# Patient Record
Sex: FEMALE | Race: BLACK OR AFRICAN AMERICAN | Employment: OTHER | URBAN - METROPOLITAN AREA
[De-identification: names, ages, dates, MRNs, and addresses within clinical notes are randomized per-mention and may not be internally consistent; named-entity substitution may affect disease eponyms.]

---

## 2017-01-09 DIAGNOSIS — E87.6 HYPOKALEMIA: Primary | ICD-10-CM

## 2017-01-24 RX ORDER — DULOXETIN HYDROCHLORIDE 30 MG/1
CAPSULE, DELAYED RELEASE ORAL
Qty: 60 CAPSULE | Refills: 0 | Status: SHIPPED | OUTPATIENT
Start: 2017-01-24 | End: 2017-02-23 | Stop reason: SDUPTHER

## 2017-01-30 ENCOUNTER — OFFICE VISIT (OUTPATIENT)
Dept: PAIN MANAGEMENT | Age: 62
End: 2017-01-30

## 2017-01-30 VITALS
BODY MASS INDEX: 31.45 KG/M2 | SYSTOLIC BLOOD PRESSURE: 125 MMHG | HEART RATE: 99 BPM | DIASTOLIC BLOOD PRESSURE: 68 MMHG | WEIGHT: 189 LBS

## 2017-01-30 DIAGNOSIS — M51.36 DDD (DEGENERATIVE DISC DISEASE), LUMBAR: ICD-10-CM

## 2017-01-30 DIAGNOSIS — M47.819 FACET ARTHROPATHY: ICD-10-CM

## 2017-01-30 DIAGNOSIS — M43.10 DEGENERATIVE SPONDYLOLISTHESIS: ICD-10-CM

## 2017-01-30 DIAGNOSIS — M48.061 SPINAL STENOSIS OF LUMBAR REGION: ICD-10-CM

## 2017-01-30 DIAGNOSIS — F32.A DEPRESSION, UNSPECIFIED DEPRESSION TYPE: ICD-10-CM

## 2017-01-30 DIAGNOSIS — G89.4 CHRONIC PAIN SYNDROME: Primary | ICD-10-CM

## 2017-01-30 DIAGNOSIS — M43.16 SPONDYLOLISTHESIS, LUMBAR REGION: ICD-10-CM

## 2017-01-30 PROCEDURE — 99213 OFFICE O/P EST LOW 20 MIN: CPT | Performed by: NURSE PRACTITIONER

## 2017-02-21 ENCOUNTER — TELEPHONE (OUTPATIENT)
Dept: PRIMARY CARE CLINIC | Age: 62
End: 2017-02-21

## 2017-02-21 DIAGNOSIS — R73.03 PREDIABETES: Primary | ICD-10-CM

## 2017-02-22 ENCOUNTER — OFFICE VISIT (OUTPATIENT)
Dept: ORTHOPEDIC SURGERY | Age: 62
End: 2017-02-22

## 2017-02-22 VITALS — HEIGHT: 65 IN | WEIGHT: 188.93 LBS | BODY MASS INDEX: 31.48 KG/M2

## 2017-02-22 DIAGNOSIS — M51.26 DISC DISPLACEMENT, LUMBAR: ICD-10-CM

## 2017-02-22 DIAGNOSIS — M47.896 OTHER OSTEOARTHRITIS OF SPINE, LUMBAR REGION: ICD-10-CM

## 2017-02-22 DIAGNOSIS — M54.5 CHRONIC LOW BACK PAIN, UNSPECIFIED BACK PAIN LATERALITY, WITH SCIATICA PRESENCE UNSPECIFIED: ICD-10-CM

## 2017-02-22 DIAGNOSIS — M48.061 DEGENERATIVE LUMBAR SPINAL STENOSIS: Primary | ICD-10-CM

## 2017-02-22 DIAGNOSIS — G89.29 CHRONIC LOW BACK PAIN, UNSPECIFIED BACK PAIN LATERALITY, WITH SCIATICA PRESENCE UNSPECIFIED: ICD-10-CM

## 2017-02-22 PROBLEM — M54.50 CHRONIC LOW BACK PAIN: Status: ACTIVE | Noted: 2017-02-22

## 2017-02-22 PROBLEM — M47.816 OSTEOARTHRITIS OF LUMBAR SPINE: Status: ACTIVE | Noted: 2017-02-22

## 2017-02-22 PROCEDURE — 99214 OFFICE O/P EST MOD 30 MIN: CPT | Performed by: INTERNAL MEDICINE

## 2017-02-22 RX ORDER — OXYCODONE HYDROCHLORIDE AND ACETAMINOPHEN 5; 325 MG/1; MG/1
1 TABLET ORAL EVERY 8 HOURS PRN
Qty: 90 TABLET | Refills: 0 | Status: SHIPPED | OUTPATIENT
Start: 2017-02-22 | End: 2017-03-23 | Stop reason: SDUPTHER

## 2017-02-23 DIAGNOSIS — F33.9 RECURRENT MAJOR DEPRESSIVE DISORDER, REMISSION STATUS UNSPECIFIED (HCC): ICD-10-CM

## 2017-02-23 RX ORDER — DULOXETIN HYDROCHLORIDE 30 MG/1
CAPSULE, DELAYED RELEASE ORAL
Qty: 60 CAPSULE | Refills: 0 | Status: SHIPPED | OUTPATIENT
Start: 2017-02-23 | End: 2017-03-22 | Stop reason: SDUPTHER

## 2017-02-24 ENCOUNTER — OFFICE VISIT (OUTPATIENT)
Dept: PRIMARY CARE CLINIC | Age: 62
End: 2017-02-24

## 2017-02-24 ENCOUNTER — TELEPHONE (OUTPATIENT)
Dept: PRIMARY CARE CLINIC | Age: 62
End: 2017-02-24

## 2017-02-24 VITALS
WEIGHT: 193 LBS | RESPIRATION RATE: 18 BRPM | HEART RATE: 80 BPM | BODY MASS INDEX: 32.16 KG/M2 | DIASTOLIC BLOOD PRESSURE: 84 MMHG | OXYGEN SATURATION: 96 % | SYSTOLIC BLOOD PRESSURE: 120 MMHG | TEMPERATURE: 98 F

## 2017-02-24 DIAGNOSIS — R11.0 NAUSEA: ICD-10-CM

## 2017-02-24 DIAGNOSIS — J30.1 NON-SEASONAL ALLERGIC RHINITIS DUE TO POLLEN: ICD-10-CM

## 2017-02-24 DIAGNOSIS — E03.9 ACQUIRED HYPOTHYROIDISM: ICD-10-CM

## 2017-02-24 DIAGNOSIS — M79.7 FIBROMYALGIA, PRIMARY: ICD-10-CM

## 2017-02-24 DIAGNOSIS — B37.0 THRUSH: ICD-10-CM

## 2017-02-24 DIAGNOSIS — J45.40 MODERATE PERSISTENT ASTHMA WITHOUT COMPLICATION: ICD-10-CM

## 2017-02-24 DIAGNOSIS — E55.9 VITAMIN D DEFICIENCY: ICD-10-CM

## 2017-02-24 DIAGNOSIS — I10 ESSENTIAL HYPERTENSION: ICD-10-CM

## 2017-02-24 DIAGNOSIS — R73.03 PREDIABETES: Primary | ICD-10-CM

## 2017-02-24 DIAGNOSIS — K21.00 GASTROESOPHAGEAL REFLUX DISEASE WITH ESOPHAGITIS: Chronic | ICD-10-CM

## 2017-02-24 DIAGNOSIS — E53.8 VITAMIN B 12 DEFICIENCY: ICD-10-CM

## 2017-02-24 LAB
A/G RATIO: 2 (ref 1.1–2.2)
ALBUMIN SERPL-MCNC: 4.5 G/DL (ref 3.4–5)
ALP BLD-CCNC: 107 U/L (ref 40–129)
ALT SERPL-CCNC: 17 U/L (ref 10–40)
ANION GAP SERPL CALCULATED.3IONS-SCNC: 1 MMOL/L (ref 3–16)
AST SERPL-CCNC: 15 U/L (ref 15–37)
BASOPHILS ABSOLUTE: 0 K/UL (ref 0–0.2)
BASOPHILS RELATIVE PERCENT: 0.3 %
BILIRUB SERPL-MCNC: 0.4 MG/DL (ref 0–1)
BILIRUBIN URINE: NEGATIVE
BLOOD, URINE: NEGATIVE
BUN BLDV-MCNC: 7 MG/DL (ref 7–20)
CALCIUM SERPL-MCNC: 9.6 MG/DL (ref 8.3–10.6)
CHLORIDE BLD-SCNC: 121 MMOL/L (ref 99–110)
CLARITY: CLEAR
CO2: 25 MMOL/L (ref 21–32)
COLOR: YELLOW
CREAT SERPL-MCNC: 0.9 MG/DL (ref 0.6–1.2)
EOSINOPHILS ABSOLUTE: 0.1 K/UL (ref 0–0.6)
EOSINOPHILS RELATIVE PERCENT: 0.9 %
GFR AFRICAN AMERICAN: >60
GFR NON-AFRICAN AMERICAN: >60
GLOBULIN: 2.2 G/DL
GLUCOSE BLD-MCNC: 123 MG/DL (ref 70–99)
GLUCOSE URINE: NEGATIVE MG/DL
HBA1C MFR BLD: 5.9 %
HCT VFR BLD CALC: 42.1 % (ref 36–48)
HEMOGLOBIN: 13.2 G/DL (ref 12–16)
KETONES, URINE: NEGATIVE MG/DL
LEUKOCYTE ESTERASE, URINE: NEGATIVE
LIPASE: 35 U/L (ref 13–60)
LYMPHOCYTES ABSOLUTE: 3.1 K/UL (ref 1–5.1)
LYMPHOCYTES RELATIVE PERCENT: 25.7 %
MCH RBC QN AUTO: 26.9 PG (ref 26–34)
MCHC RBC AUTO-ENTMCNC: 31.3 G/DL (ref 31–36)
MCV RBC AUTO: 85.8 FL (ref 80–100)
MICROSCOPIC EXAMINATION: NORMAL
MONOCYTES ABSOLUTE: 0.6 K/UL (ref 0–1.3)
MONOCYTES RELATIVE PERCENT: 5.1 %
NEUTROPHILS ABSOLUTE: 8.1 K/UL (ref 1.7–7.7)
NEUTROPHILS RELATIVE PERCENT: 68 %
NITRITE, URINE: NEGATIVE
PDW BLD-RTO: 13.8 % (ref 12.4–15.4)
PH UA: 6
PLATELET # BLD: 207 K/UL (ref 135–450)
PMV BLD AUTO: 10.6 FL (ref 5–10.5)
POTASSIUM SERPL-SCNC: 3.9 MMOL/L (ref 3.5–5.1)
PROTEIN UA: NEGATIVE MG/DL
RBC # BLD: 4.91 M/UL (ref 4–5.2)
SODIUM BLD-SCNC: 147 MMOL/L (ref 136–145)
SPECIFIC GRAVITY UA: 1.01
TOTAL PROTEIN: 6.7 G/DL (ref 6.4–8.2)
TSH REFLEX FT4: 3.85 UIU/ML (ref 0.27–4.2)
URINE TYPE: NORMAL
UROBILINOGEN, URINE: 1 E.U./DL
VITAMIN D 25-HYDROXY: 31.5 NG/ML
WBC # BLD: 12 K/UL (ref 4–11)

## 2017-02-24 PROCEDURE — 99214 OFFICE O/P EST MOD 30 MIN: CPT | Performed by: INTERNAL MEDICINE

## 2017-02-24 PROCEDURE — 83036 HEMOGLOBIN GLYCOSYLATED A1C: CPT | Performed by: INTERNAL MEDICINE

## 2017-02-24 RX ORDER — ERGOCALCIFEROL 1.25 MG/1
50000 CAPSULE ORAL WEEKLY
Qty: 4 CAPSULE | Refills: 5 | Status: SHIPPED | OUTPATIENT
Start: 2017-02-24 | End: 2017-06-09 | Stop reason: SDUPTHER

## 2017-02-24 RX ORDER — HYDROCHLOROTHIAZIDE 25 MG/1
25 TABLET ORAL DAILY
Qty: 30 TABLET | Refills: 4 | Status: SHIPPED | OUTPATIENT
Start: 2017-02-24 | End: 2017-06-09

## 2017-02-24 RX ORDER — CYCLOBENZAPRINE HCL 10 MG
10 TABLET ORAL 3 TIMES DAILY PRN
Qty: 90 TABLET | Refills: 5 | Status: SHIPPED | OUTPATIENT
Start: 2017-02-24 | End: 2017-03-06

## 2017-02-24 RX ORDER — LORATADINE 10 MG/1
TABLET ORAL
Qty: 30 TABLET | Refills: 5 | Status: SHIPPED | OUTPATIENT
Start: 2017-02-24 | End: 2017-08-08 | Stop reason: SDUPTHER

## 2017-02-24 RX ORDER — PANTOPRAZOLE SODIUM 40 MG/1
40 TABLET, DELAYED RELEASE ORAL DAILY
Qty: 30 TABLET | Refills: 5 | Status: SHIPPED | OUTPATIENT
Start: 2017-02-24 | End: 2017-07-05 | Stop reason: SDUPTHER

## 2017-02-24 RX ORDER — CLONIDINE HYDROCHLORIDE 0.2 MG/1
TABLET ORAL
Qty: 60 TABLET | Refills: 5 | Status: SHIPPED | OUTPATIENT
Start: 2017-02-24 | End: 2017-06-09

## 2017-02-24 RX ORDER — PREGABALIN 50 MG/1
50 CAPSULE ORAL 3 TIMES DAILY
Qty: 90 CAPSULE | Refills: 3 | Status: SHIPPED | OUTPATIENT
Start: 2017-02-24 | End: 2017-06-09

## 2017-02-24 ASSESSMENT — ENCOUNTER SYMPTOMS
WHEEZING: 0
ALLERGIC/IMMUNOLOGIC NEGATIVE: 1
COUGH: 0
EYES NEGATIVE: 1
GASTROINTESTINAL NEGATIVE: 1
APNEA: 0
CHEST TIGHTNESS: 0
SHORTNESS OF BREATH: 0

## 2017-02-25 LAB — VITAMIN B-12: 787 PG/ML (ref 211–911)

## 2017-02-25 RX ORDER — AZITHROMYCIN 250 MG/1
TABLET, FILM COATED ORAL
Qty: 1 PACKET | Refills: 0 | Status: SHIPPED | OUTPATIENT
Start: 2017-02-25 | End: 2017-03-07

## 2017-02-27 ENCOUNTER — TELEPHONE (OUTPATIENT)
Dept: ORTHOPEDIC SURGERY | Age: 62
End: 2017-02-27

## 2017-02-28 ENCOUNTER — TELEPHONE (OUTPATIENT)
Dept: FAMILY MEDICINE CLINIC | Age: 62
End: 2017-02-28

## 2017-03-17 ENCOUNTER — TELEPHONE (OUTPATIENT)
Dept: PRIMARY CARE CLINIC | Age: 62
End: 2017-03-17

## 2017-03-21 DIAGNOSIS — F51.01 PRIMARY INSOMNIA: ICD-10-CM

## 2017-03-22 DIAGNOSIS — F33.9 RECURRENT MAJOR DEPRESSIVE DISORDER, REMISSION STATUS UNSPECIFIED (HCC): ICD-10-CM

## 2017-03-22 RX ORDER — QUETIAPINE FUMARATE 25 MG/1
TABLET, FILM COATED ORAL
Qty: 30 TABLET | Refills: 2 | Status: SHIPPED | OUTPATIENT
Start: 2017-03-22 | End: 2017-06-16

## 2017-03-23 ENCOUNTER — TELEPHONE (OUTPATIENT)
Dept: ORTHOPEDIC SURGERY | Age: 62
End: 2017-03-23

## 2017-03-23 DIAGNOSIS — M48.061 DEGENERATIVE LUMBAR SPINAL STENOSIS: ICD-10-CM

## 2017-03-23 RX ORDER — OXYCODONE HYDROCHLORIDE AND ACETAMINOPHEN 5; 325 MG/1; MG/1
1 TABLET ORAL EVERY 8 HOURS PRN
Qty: 63 TABLET | Refills: 0 | Status: SHIPPED | OUTPATIENT
Start: 2017-03-23 | End: 2017-06-13

## 2017-03-23 RX ORDER — DULOXETIN HYDROCHLORIDE 30 MG/1
CAPSULE, DELAYED RELEASE ORAL
Qty: 60 CAPSULE | Refills: 0 | Status: SHIPPED | OUTPATIENT
Start: 2017-03-23 | End: 2017-07-05 | Stop reason: SDUPTHER

## 2017-04-16 DIAGNOSIS — E87.6 HYPOKALEMIA: ICD-10-CM

## 2017-04-17 RX ORDER — POTASSIUM CHLORIDE 1500 MG/1
TABLET, EXTENDED RELEASE ORAL
Qty: 60 TABLET | Refills: 3 | OUTPATIENT
Start: 2017-04-17

## 2017-04-19 DIAGNOSIS — F33.9 RECURRENT MAJOR DEPRESSIVE DISORDER, REMISSION STATUS UNSPECIFIED (HCC): ICD-10-CM

## 2017-04-19 RX ORDER — DULOXETIN HYDROCHLORIDE 30 MG/1
CAPSULE, DELAYED RELEASE ORAL
Qty: 60 CAPSULE | Refills: 0 | OUTPATIENT
Start: 2017-04-19

## 2017-05-26 ENCOUNTER — TELEPHONE (OUTPATIENT)
Dept: PRIMARY CARE CLINIC | Age: 62
End: 2017-05-26

## 2017-06-07 RX ORDER — GABAPENTIN 100 MG/1
CAPSULE ORAL
Qty: 60 CAPSULE | Refills: 2 | OUTPATIENT
Start: 2017-06-07

## 2017-06-09 ENCOUNTER — OFFICE VISIT (OUTPATIENT)
Dept: PRIMARY CARE CLINIC | Age: 62
End: 2017-06-09

## 2017-06-09 VITALS
HEART RATE: 89 BPM | SYSTOLIC BLOOD PRESSURE: 95 MMHG | DIASTOLIC BLOOD PRESSURE: 62 MMHG | OXYGEN SATURATION: 99 % | TEMPERATURE: 98.4 F | HEIGHT: 64 IN | BODY MASS INDEX: 32.61 KG/M2 | RESPIRATION RATE: 16 BRPM | WEIGHT: 191 LBS

## 2017-06-09 DIAGNOSIS — R19.01 ABDOMINAL MASS, RUQ (RIGHT UPPER QUADRANT): ICD-10-CM

## 2017-06-09 DIAGNOSIS — R53.1 WEAKNESS: ICD-10-CM

## 2017-06-09 DIAGNOSIS — J45.40 MODERATE PERSISTENT ASTHMA WITHOUT COMPLICATION: ICD-10-CM

## 2017-06-09 DIAGNOSIS — R53.83 OTHER FATIGUE: ICD-10-CM

## 2017-06-09 DIAGNOSIS — F51.01 PRIMARY INSOMNIA: ICD-10-CM

## 2017-06-09 DIAGNOSIS — E55.9 VITAMIN D DEFICIENCY: ICD-10-CM

## 2017-06-09 DIAGNOSIS — R11.0 CHRONIC NAUSEA: ICD-10-CM

## 2017-06-09 DIAGNOSIS — R73.03 PREDIABETES: Primary | ICD-10-CM

## 2017-06-09 DIAGNOSIS — I10 ESSENTIAL HYPERTENSION: ICD-10-CM

## 2017-06-09 DIAGNOSIS — R11.0 NAUSEA: ICD-10-CM

## 2017-06-09 LAB — HBA1C MFR BLD: 5.7 %

## 2017-06-09 PROCEDURE — 99213 OFFICE O/P EST LOW 20 MIN: CPT | Performed by: INTERNAL MEDICINE

## 2017-06-09 PROCEDURE — 83036 HEMOGLOBIN GLYCOSYLATED A1C: CPT | Performed by: INTERNAL MEDICINE

## 2017-06-09 RX ORDER — ERGOCALCIFEROL 1.25 MG/1
50000 CAPSULE ORAL WEEKLY
Qty: 4 CAPSULE | Refills: 5 | Status: SHIPPED | OUTPATIENT
Start: 2017-06-09 | End: 2017-10-19 | Stop reason: SDUPTHER

## 2017-06-09 RX ORDER — AMITRIPTYLINE HYDROCHLORIDE 50 MG/1
TABLET, FILM COATED ORAL
Qty: 30 TABLET | Refills: 5 | Status: SHIPPED | OUTPATIENT
Start: 2017-06-09 | End: 2017-11-14 | Stop reason: SDUPTHER

## 2017-06-09 RX ORDER — CLONIDINE HYDROCHLORIDE 0.1 MG/1
TABLET ORAL
Qty: 60 TABLET | Refills: 5 | Status: SHIPPED | OUTPATIENT
Start: 2017-06-09 | End: 2017-11-14 | Stop reason: SDUPTHER

## 2017-06-09 RX ORDER — ONDANSETRON 4 MG/1
4 TABLET, FILM COATED ORAL DAILY PRN
Qty: 30 TABLET | Refills: 5 | Status: SHIPPED | OUTPATIENT
Start: 2017-06-09 | End: 2017-11-14 | Stop reason: SDUPTHER

## 2017-06-12 ENCOUNTER — HOSPITAL ENCOUNTER (OUTPATIENT)
Dept: OTHER | Age: 62
Discharge: HOME | End: 2017-06-12
Attending: INTERNAL MEDICINE | Admitting: INTERNAL MEDICINE

## 2017-06-13 ASSESSMENT — PATIENT HEALTH QUESTIONNAIRE - PHQ9
2. FEELING DOWN, DEPRESSED OR HOPELESS: 1
1. LITTLE INTEREST OR PLEASURE IN DOING THINGS: 1
SUM OF ALL RESPONSES TO PHQ9 QUESTIONS 1 & 2: 2
SUM OF ALL RESPONSES TO PHQ QUESTIONS 1-9: 2

## 2017-06-13 ASSESSMENT — ENCOUNTER SYMPTOMS
WHEEZING: 0
ALLERGIC/IMMUNOLOGIC NEGATIVE: 1
SHORTNESS OF BREATH: 0
EYES NEGATIVE: 1
COUGH: 0
GASTROINTESTINAL NEGATIVE: 1
CHEST TIGHTNESS: 0
APNEA: 0

## 2017-06-15 DIAGNOSIS — F51.01 PRIMARY INSOMNIA: ICD-10-CM

## 2017-06-16 DIAGNOSIS — R73.03 PREDIABETES: ICD-10-CM

## 2017-06-16 DIAGNOSIS — E55.9 VITAMIN D DEFICIENCY: ICD-10-CM

## 2017-06-16 DIAGNOSIS — R53.83 OTHER FATIGUE: ICD-10-CM

## 2017-06-16 DIAGNOSIS — R11.0 CHRONIC NAUSEA: ICD-10-CM

## 2017-06-16 DIAGNOSIS — R53.1 WEAKNESS: ICD-10-CM

## 2017-06-16 LAB
A/G RATIO: 1.9 (ref 1.1–2.2)
ALBUMIN SERPL-MCNC: 4.6 G/DL (ref 3.4–5)
ALP BLD-CCNC: 115 U/L (ref 40–129)
ALT SERPL-CCNC: 17 U/L (ref 10–40)
ANION GAP SERPL CALCULATED.3IONS-SCNC: 18 MMOL/L (ref 3–16)
AST SERPL-CCNC: 17 U/L (ref 15–37)
ATYPICAL LYMPHOCYTE RELATIVE PERCENT: 2 % (ref 0–6)
BANDED NEUTROPHILS RELATIVE PERCENT: 3 % (ref 0–7)
BASOPHILS ABSOLUTE: 0 K/UL (ref 0–0.2)
BASOPHILS RELATIVE PERCENT: 0 %
BILIRUB SERPL-MCNC: <0.2 MG/DL (ref 0–1)
BILIRUBIN URINE: NEGATIVE
BLOOD, URINE: NEGATIVE
BUN BLDV-MCNC: 9 MG/DL (ref 7–20)
CALCIUM SERPL-MCNC: 10 MG/DL (ref 8.3–10.6)
CHLORIDE BLD-SCNC: 104 MMOL/L (ref 99–110)
CHOLESTEROL, TOTAL: 178 MG/DL (ref 0–199)
CLARITY: CLEAR
CO2: 23 MMOL/L (ref 21–32)
COLOR: YELLOW
CREAT SERPL-MCNC: 1 MG/DL (ref 0.6–1.2)
EOSINOPHILS ABSOLUTE: 0.1 K/UL (ref 0–0.6)
EOSINOPHILS RELATIVE PERCENT: 1 %
GFR AFRICAN AMERICAN: >60
GFR NON-AFRICAN AMERICAN: 56
GLOBULIN: 2.4 G/DL
GLUCOSE BLD-MCNC: 83 MG/DL (ref 70–99)
GLUCOSE URINE: NEGATIVE MG/DL
HCT VFR BLD CALC: 43.6 % (ref 36–48)
HDLC SERPL-MCNC: 44 MG/DL (ref 40–60)
HEMATOLOGY PATH CONSULT: YES
HEMOGLOBIN: 13.3 G/DL (ref 12–16)
KETONES, URINE: NEGATIVE MG/DL
LDL CHOLESTEROL CALCULATED: ABNORMAL MG/DL
LDL CHOLESTEROL DIRECT: 95 MG/DL
LEUKOCYTE ESTERASE, URINE: NEGATIVE
LYMPHOCYTES ABSOLUTE: 7.3 K/UL (ref 1–5.1)
LYMPHOCYTES RELATIVE PERCENT: 52 %
MAGNESIUM: 1.8 MG/DL (ref 1.8–2.4)
MCH RBC QN AUTO: 27 PG (ref 26–34)
MCHC RBC AUTO-ENTMCNC: 30.4 G/DL (ref 31–36)
MCV RBC AUTO: 88.6 FL (ref 80–100)
MICROSCOPIC EXAMINATION: NORMAL
MONOCYTES ABSOLUTE: 0.5 K/UL (ref 0–1.3)
MONOCYTES RELATIVE PERCENT: 4 %
NEUTROPHILS ABSOLUTE: 5.6 K/UL (ref 1.7–7.7)
NEUTROPHILS RELATIVE PERCENT: 38 %
NITRITE, URINE: NEGATIVE
PDW BLD-RTO: 14.7 % (ref 12.4–15.4)
PH UA: 6
PLATELET # BLD: 176 K/UL (ref 135–450)
PMV BLD AUTO: 11.7 FL (ref 5–10.5)
POTASSIUM SERPL-SCNC: 4.1 MMOL/L (ref 3.5–5.1)
PROTEIN UA: NEGATIVE MG/DL
RBC # BLD: 4.91 M/UL (ref 4–5.2)
RBC # BLD: NORMAL 10*6/UL
SODIUM BLD-SCNC: 145 MMOL/L (ref 136–145)
SPECIFIC GRAVITY UA: 1.01
TOTAL PROTEIN: 7 G/DL (ref 6.4–8.2)
TRIGL SERPL-MCNC: 314 MG/DL (ref 0–150)
TSH REFLEX FT4: 2.94 UIU/ML (ref 0.27–4.2)
URINE TYPE: NORMAL
UROBILINOGEN, URINE: 0.2 E.U./DL
VITAMIN B-12: 872 PG/ML (ref 211–911)
VITAMIN D 25-HYDROXY: 22.5 NG/ML
VLDLC SERPL CALC-MCNC: ABNORMAL MG/DL
WBC # BLD: 13.6 K/UL (ref 4–11)

## 2017-06-16 RX ORDER — QUETIAPINE FUMARATE 50 MG/1
TABLET, FILM COATED ORAL
Qty: 30 TABLET | Refills: 4 | Status: SHIPPED | OUTPATIENT
Start: 2017-06-16 | End: 2017-10-19 | Stop reason: SDUPTHER

## 2017-06-19 LAB — HEMATOLOGY PATH CONSULT: NORMAL

## 2017-06-20 ENCOUNTER — TELEPHONE (OUTPATIENT)
Dept: PRIMARY CARE CLINIC | Age: 62
End: 2017-06-20

## 2017-06-21 DIAGNOSIS — F17.200 NEEDS SMOKING CESSATION EDUCATION: Primary | ICD-10-CM

## 2017-06-21 RX ORDER — NICOTINE 21 MG/24HR
1 PATCH, TRANSDERMAL 24 HOURS TRANSDERMAL EVERY 24 HOURS
Qty: 30 PATCH | Refills: 3 | Status: SHIPPED | OUTPATIENT
Start: 2017-06-21 | End: 2017-09-12 | Stop reason: SDUPTHER

## 2017-06-23 ENCOUNTER — TELEPHONE (OUTPATIENT)
Dept: PRIMARY CARE CLINIC | Age: 62
End: 2017-06-23

## 2017-06-23 ENCOUNTER — HOSPITAL ENCOUNTER (OUTPATIENT)
Dept: CT IMAGING | Facility: MEDICAL CENTER | Age: 62
Discharge: HOME | End: 2017-06-23
Attending: INTERNAL MEDICINE | Admitting: INTERNAL MEDICINE

## 2017-06-23 DIAGNOSIS — R19.01 RIGHT UPPER QUADRANT ABDOMINAL SWELLING, MASS AND LUMP: ICD-10-CM

## 2017-06-23 DIAGNOSIS — D72.820 LYMPHOCYTOSIS: Primary | ICD-10-CM

## 2017-06-23 DIAGNOSIS — R19.01 ABDOMINAL MASS, RUQ (RIGHT UPPER QUADRANT): ICD-10-CM

## 2017-06-27 ENCOUNTER — TELEPHONE (OUTPATIENT)
Dept: PRIMARY CARE CLINIC | Age: 62
End: 2017-06-27

## 2017-06-27 DIAGNOSIS — E03.9 ACQUIRED HYPOTHYROIDISM: ICD-10-CM

## 2017-06-27 RX ORDER — LEVOTHYROXINE SODIUM 0.15 MG/1
150 TABLET ORAL DAILY
Qty: 30 TABLET | Refills: 5 | Status: CANCELLED | OUTPATIENT
Start: 2017-06-27

## 2017-06-28 DIAGNOSIS — E03.9 ACQUIRED HYPOTHYROIDISM: ICD-10-CM

## 2017-06-28 RX ORDER — LEVOTHYROXINE SODIUM 0.15 MG/1
150 TABLET ORAL DAILY
Qty: 30 TABLET | Refills: 5 | Status: SHIPPED | OUTPATIENT
Start: 2017-06-28 | End: 2017-10-19

## 2017-07-27 RX ORDER — GABAPENTIN 100 MG/1
CAPSULE ORAL
Qty: 60 CAPSULE | Refills: 2 | OUTPATIENT
Start: 2017-07-27

## 2017-08-08 DIAGNOSIS — R11.0 NAUSEA: ICD-10-CM

## 2017-08-09 PROBLEM — D72.829 LEUKOCYTOSIS: Status: ACTIVE | Noted: 2017-08-09

## 2017-08-09 RX ORDER — PROMETHAZINE HYDROCHLORIDE 6.25 MG/5ML
SYRUP ORAL
Qty: 480 ML | Refills: 11 | Status: SHIPPED | OUTPATIENT
Start: 2017-08-09 | End: 2017-12-03

## 2017-08-09 RX ORDER — LORATADINE 10 MG/1
TABLET ORAL
Qty: 30 TABLET | Refills: 11 | Status: SHIPPED | OUTPATIENT
Start: 2017-08-09 | End: 2018-08-14 | Stop reason: SDUPTHER

## 2017-09-12 DIAGNOSIS — F17.200 NEEDS SMOKING CESSATION EDUCATION: ICD-10-CM

## 2017-09-12 RX ORDER — PANTOPRAZOLE SODIUM 40 MG/1
TABLET, DELAYED RELEASE ORAL
Qty: 30 TABLET | Refills: 0 | Status: SHIPPED | OUTPATIENT
Start: 2017-09-12 | End: 2017-10-13 | Stop reason: SDUPTHER

## 2017-09-12 RX ORDER — CALCIUM CITRATE/VITAMIN D3 200MG-6.25
TABLET ORAL
Qty: 100 EACH | Refills: 0 | Status: SHIPPED | OUTPATIENT
Start: 2017-09-12 | End: 2018-01-10 | Stop reason: SDUPTHER

## 2017-09-12 RX ORDER — NICOTINE 21 MG/24HR
PATCH, TRANSDERMAL 24 HOURS TRANSDERMAL
Qty: 30 PATCH | Refills: 0 | Status: SHIPPED | OUTPATIENT
Start: 2017-09-12 | End: 2017-10-13 | Stop reason: SDUPTHER

## 2017-10-13 DIAGNOSIS — F17.200 NEEDS SMOKING CESSATION EDUCATION: ICD-10-CM

## 2017-10-14 RX ORDER — NICOTINE 21 MG/24HR
PATCH, TRANSDERMAL 24 HOURS TRANSDERMAL
Qty: 30 PATCH | Refills: 11 | Status: SHIPPED | OUTPATIENT
Start: 2017-10-14 | End: 2017-12-03

## 2017-10-14 RX ORDER — PANTOPRAZOLE SODIUM 40 MG/1
TABLET, DELAYED RELEASE ORAL
Qty: 30 TABLET | Refills: 11 | Status: SHIPPED | OUTPATIENT
Start: 2017-10-14 | End: 2018-02-23

## 2017-10-19 ENCOUNTER — OFFICE VISIT (OUTPATIENT)
Dept: PRIMARY CARE CLINIC | Age: 62
End: 2017-10-19

## 2017-10-19 VITALS
OXYGEN SATURATION: 96 % | WEIGHT: 205 LBS | TEMPERATURE: 97.8 F | HEART RATE: 91 BPM | RESPIRATION RATE: 18 BRPM | BODY MASS INDEX: 34.15 KG/M2 | DIASTOLIC BLOOD PRESSURE: 83 MMHG | SYSTOLIC BLOOD PRESSURE: 118 MMHG

## 2017-10-19 DIAGNOSIS — Z12.11 COLON CANCER SCREENING: ICD-10-CM

## 2017-10-19 DIAGNOSIS — I10 ESSENTIAL HYPERTENSION: ICD-10-CM

## 2017-10-19 DIAGNOSIS — D72.829 LEUKOCYTOSIS, UNSPECIFIED TYPE: ICD-10-CM

## 2017-10-19 DIAGNOSIS — R73.03 PREDIABETES: ICD-10-CM

## 2017-10-19 DIAGNOSIS — F51.01 PRIMARY INSOMNIA: ICD-10-CM

## 2017-10-19 DIAGNOSIS — B96.89 ACUTE BRONCHITIS, BACTERIAL: ICD-10-CM

## 2017-10-19 DIAGNOSIS — E03.9 ACQUIRED HYPOTHYROIDISM: ICD-10-CM

## 2017-10-19 DIAGNOSIS — K59.01 SLOW TRANSIT CONSTIPATION: Primary | Chronic | ICD-10-CM

## 2017-10-19 DIAGNOSIS — J20.8 ACUTE BRONCHITIS, BACTERIAL: ICD-10-CM

## 2017-10-19 DIAGNOSIS — E55.9 VITAMIN D DEFICIENCY: ICD-10-CM

## 2017-10-19 DIAGNOSIS — D17.1 LIPOMA OF BACK: ICD-10-CM

## 2017-10-19 DIAGNOSIS — J01.01 ACUTE RECURRENT MAXILLARY SINUSITIS: ICD-10-CM

## 2017-10-19 LAB — HBA1C MFR BLD: 5.7 %

## 2017-10-19 PROCEDURE — G8417 CALC BMI ABV UP PARAM F/U: HCPCS | Performed by: INTERNAL MEDICINE

## 2017-10-19 PROCEDURE — G8482 FLU IMMUNIZE ORDER/ADMIN: HCPCS | Performed by: INTERNAL MEDICINE

## 2017-10-19 PROCEDURE — 99214 OFFICE O/P EST MOD 30 MIN: CPT | Performed by: INTERNAL MEDICINE

## 2017-10-19 PROCEDURE — 83036 HEMOGLOBIN GLYCOSYLATED A1C: CPT | Performed by: INTERNAL MEDICINE

## 2017-10-19 PROCEDURE — G8427 DOCREV CUR MEDS BY ELIG CLIN: HCPCS | Performed by: INTERNAL MEDICINE

## 2017-10-19 PROCEDURE — 1036F TOBACCO NON-USER: CPT | Performed by: INTERNAL MEDICINE

## 2017-10-19 PROCEDURE — 3017F COLORECTAL CA SCREEN DOC REV: CPT | Performed by: INTERNAL MEDICINE

## 2017-10-19 PROCEDURE — 3014F SCREEN MAMMO DOC REV: CPT | Performed by: INTERNAL MEDICINE

## 2017-10-19 RX ORDER — DOCUSATE SODIUM 100 MG/1
100 CAPSULE, LIQUID FILLED ORAL 3 TIMES DAILY PRN
Qty: 90 CAPSULE | Refills: 5 | Status: SHIPPED | OUTPATIENT
Start: 2017-10-19 | End: 2018-10-25 | Stop reason: SDUPTHER

## 2017-10-19 RX ORDER — DIPHENHYDRAMINE HCL 50 MG
CAPSULE ORAL
Qty: 60 CAPSULE | Refills: 5 | Status: SHIPPED | OUTPATIENT
Start: 2017-10-19 | End: 2018-07-12

## 2017-10-19 RX ORDER — LEVOTHYROXINE SODIUM 0.15 MG/1
150 TABLET ORAL DAILY
Qty: 30 TABLET | Refills: 5 | Status: SHIPPED | OUTPATIENT
Start: 2017-10-19 | End: 2017-10-25 | Stop reason: SDUPTHER

## 2017-10-19 RX ORDER — QUETIAPINE FUMARATE 100 MG/1
100 TABLET, FILM COATED ORAL NIGHTLY
Qty: 30 TABLET | Refills: 5 | Status: SHIPPED | OUTPATIENT
Start: 2017-10-19 | End: 2017-11-16

## 2017-10-19 RX ORDER — ERGOCALCIFEROL 1.25 MG/1
50000 CAPSULE ORAL WEEKLY
Qty: 4 CAPSULE | Refills: 5 | Status: SHIPPED | OUTPATIENT
Start: 2017-10-19 | End: 2017-10-26 | Stop reason: SDUPTHER

## 2017-10-19 RX ORDER — AMOXICILLIN 500 MG/1
500 CAPSULE ORAL 3 TIMES DAILY
Qty: 30 CAPSULE | Refills: 0 | Status: SHIPPED | OUTPATIENT
Start: 2017-10-19 | End: 2017-10-29

## 2017-10-19 RX ORDER — ROSUVASTATIN CALCIUM 10 MG/1
10 TABLET, COATED ORAL NIGHTLY
Qty: 30 TABLET | Refills: 5 | Status: SHIPPED | OUTPATIENT
Start: 2017-10-19 | End: 2018-02-23

## 2017-10-19 ASSESSMENT — ENCOUNTER SYMPTOMS
GASTROINTESTINAL NEGATIVE: 1
EYES NEGATIVE: 1
SHORTNESS OF BREATH: 0
APNEA: 0
COUGH: 0
WHEEZING: 0
ALLERGIC/IMMUNOLOGIC NEGATIVE: 1
CHEST TIGHTNESS: 0

## 2017-10-19 ASSESSMENT — PATIENT HEALTH QUESTIONNAIRE - PHQ9
1. LITTLE INTEREST OR PLEASURE IN DOING THINGS: 0
2. FEELING DOWN, DEPRESSED OR HOPELESS: 0
SUM OF ALL RESPONSES TO PHQ QUESTIONS 1-9: 0
SUM OF ALL RESPONSES TO PHQ9 QUESTIONS 1 & 2: 0

## 2017-10-19 NOTE — PROGRESS NOTES
person, place, and time. She appears well-developed and well-nourished. No distress. HENT:   Head: Normocephalic and atraumatic. Right Ear: External ear normal.   Left Ear: External ear normal.   Nose: Nose normal.   Mouth/Throat: Oropharynx is clear and moist. No oropharyngeal exudate. Eyes: Conjunctivae and EOM are normal. Pupils are equal, round, and reactive to light. Right eye exhibits no discharge. Left eye exhibits no discharge. No scleral icterus. Neck: Normal range of motion. Neck supple. No JVD present. No thyromegaly present. Cardiovascular: Normal rate, regular rhythm and intact distal pulses. Exam reveals no friction rub. Pulmonary/Chest: Effort normal and breath sounds normal. No respiratory distress. She has no wheezes. She has no rales. She exhibits no tenderness. Scattered rhonchi   Abdominal: Soft. Bowel sounds are normal. She exhibits no distension. There is no tenderness. There is no rebound and no guarding. Musculoskeletal: She exhibits no edema or tenderness. Lymphadenopathy:     She has no cervical adenopathy. Neurological: She is alert and oriented to person, place, and time. No cranial nerve deficit. She exhibits normal muscle tone. Coordination normal.   Skin: Skin is warm and dry. No rash noted. She is not diaphoretic. No erythema. No pallor. Psychiatric: She has a normal mood and affect. Her behavior is normal. Judgment and thought content normal.   a1c 5.7    Assessment:      1. Slow transit constipation  docusate sodium (COLACE) 100 MG capsule   2. Vitamin D deficiency  vitamin D (ERGOCALCIFEROL) 76532 units CAPS capsule   3. Essential hypertension controlled, continue medication. 4. Prediabetes controlled. Work on diet and exercise with goal to get patient in the non diabetic range. 5. Leukocytosis, unspecified type     6. Acute recurrent maxillary sinusitis  amoxicillin (AMOXIL) 500 MG capsule   7.  Acute bronchitis, bacterial  amoxicillin (AMOXIL) 500 MG capsule   8. Colon cancer screening  POCT Fecal Immunochemical Test (FIT)   9. Acquired hypothyroidism , euthyroid, refill medication. levothyroxine (SYNTHROID) 150 MCG tablet           Plan:      Return in three months. Hardy Fierro received counseling on the following healthy behaviors: medication adherence    Patient given educational materials on After visit summary with diagnosis and treatment plan. I have instructed Hardy Fierro to complete a self tracking handout on Blood Pressures  and Weights and instructed them to bring it with them to her next appointment. Discussed use, benefit, and side effects of prescribed medications. Barriers to medication compliance addressed. All patient questions answered. Pt voiced understanding. Patient is taking over the counter meds and discussed as to how they interact with prescription medications.

## 2017-10-24 ENCOUNTER — TELEPHONE (OUTPATIENT)
Dept: PRIMARY CARE CLINIC | Age: 62
End: 2017-10-24

## 2017-10-24 NOTE — TELEPHONE ENCOUNTER
Pt called stating that she called the pharmacy and they dont have her levothyroxine (SYNTHROID) 150 MCG tablet and her Chantix RX's, looks like the levothyroxine (SYNTHROID) 150 MCG tablet was sent on 10.19.17    Pls call RX into  [facility], [address] [place] [address] Misa Lamar [phone]    Thank you!!!

## 2017-10-25 DIAGNOSIS — E03.9 ACQUIRED HYPOTHYROIDISM: ICD-10-CM

## 2017-10-25 RX ORDER — LEVOTHYROXINE SODIUM 0.15 MG/1
150 TABLET ORAL DAILY
Qty: 30 TABLET | Refills: 5 | Status: SHIPPED | OUTPATIENT
Start: 2017-10-25 | End: 2018-07-11 | Stop reason: SDUPTHER

## 2017-10-26 ENCOUNTER — TELEPHONE (OUTPATIENT)
Dept: PRIMARY CARE CLINIC | Age: 62
End: 2017-10-26

## 2017-10-26 DIAGNOSIS — E55.9 VITAMIN D DEFICIENCY: ICD-10-CM

## 2017-10-26 RX ORDER — ERGOCALCIFEROL 1.25 MG/1
50000 CAPSULE ORAL WEEKLY
Qty: 4 CAPSULE | Refills: 5 | Status: SHIPPED | OUTPATIENT
Start: 2017-10-26 | End: 2018-07-11 | Stop reason: SDUPTHER

## 2017-10-26 NOTE — TELEPHONE ENCOUNTER
Genie wants to know if script for vitamin D is D2 or D3 50,000.       ALSO:  Pt asking for script for Chantix    [facility]  [phone]

## 2017-11-14 DIAGNOSIS — F51.01 PRIMARY INSOMNIA: ICD-10-CM

## 2017-11-14 DIAGNOSIS — R11.0 CHRONIC NAUSEA: ICD-10-CM

## 2017-11-14 DIAGNOSIS — I10 ESSENTIAL HYPERTENSION: ICD-10-CM

## 2017-11-14 DIAGNOSIS — J45.40 MODERATE PERSISTENT ASTHMA WITHOUT COMPLICATION: ICD-10-CM

## 2017-11-16 RX ORDER — HYDROCHLOROTHIAZIDE 25 MG/1
TABLET ORAL
Qty: 30 TABLET | Refills: 1 | Status: SHIPPED | OUTPATIENT
Start: 2017-11-16 | End: 2018-01-10 | Stop reason: SDUPTHER

## 2017-11-16 RX ORDER — CYCLOBENZAPRINE HCL 10 MG
TABLET ORAL
Qty: 90 TABLET | Refills: 11 | Status: SHIPPED | OUTPATIENT
Start: 2017-11-16 | End: 2018-07-11 | Stop reason: SDUPTHER

## 2017-11-16 RX ORDER — IPRATROPIUM/ALBUTEROL SULFATE 20-100 MCG
MIST INHALER (GRAM) INHALATION
Qty: 4 G | Refills: 11 | Status: SHIPPED | OUTPATIENT
Start: 2017-11-16 | End: 2018-02-23

## 2017-11-16 RX ORDER — ONDANSETRON 4 MG/1
4 TABLET, FILM COATED ORAL DAILY PRN
Qty: 30 TABLET | Refills: 11 | Status: SHIPPED | OUTPATIENT
Start: 2017-11-16 | End: 2018-07-11

## 2017-11-16 RX ORDER — AMITRIPTYLINE HYDROCHLORIDE 50 MG/1
TABLET, FILM COATED ORAL
Qty: 30 TABLET | Refills: 11 | Status: SHIPPED | OUTPATIENT
Start: 2017-11-16 | End: 2018-02-23

## 2017-11-16 RX ORDER — CLONIDINE HYDROCHLORIDE 0.1 MG/1
TABLET ORAL
Qty: 60 TABLET | Refills: 11 | Status: SHIPPED | OUTPATIENT
Start: 2017-11-16 | End: 2018-02-23

## 2017-12-04 PROBLEM — J96.00 ACUTE RESPIRATORY FAILURE (HCC): Status: ACTIVE | Noted: 2017-12-04

## 2017-12-10 RX ORDER — IPRATROPIUM BROMIDE AND ALBUTEROL SULFATE 2.5; .5 MG/3ML; MG/3ML
1 SOLUTION RESPIRATORY (INHALATION) EVERY 4 HOURS
Qty: 360 ML | Refills: 0 | Status: SHIPPED | OUTPATIENT
Start: 2017-12-10 | End: 2018-02-23

## 2017-12-10 NOTE — PROGRESS NOTES
Pt was running out of duoneb solution.  I called in refill to [facility] on [address]

## 2017-12-14 ENCOUNTER — HOSPITAL ENCOUNTER (OUTPATIENT)
Dept: OTHER | Age: 62
Discharge: HOME | End: 2017-12-14

## 2017-12-14 DIAGNOSIS — M25.511 RIGHT SHOULDER PAIN, UNSPECIFIED CHRONICITY: ICD-10-CM

## 2017-12-19 DIAGNOSIS — Z12.11 COLON CANCER SCREENING: ICD-10-CM

## 2017-12-19 LAB
CONTROL: NORMAL
HEMOCCULT STL QL: NEGATIVE

## 2017-12-19 PROCEDURE — 82274 ASSAY TEST FOR BLOOD FECAL: CPT | Performed by: INTERNAL MEDICINE

## 2018-01-04 ENCOUNTER — TELEPHONE (OUTPATIENT)
Dept: PRIMARY CARE CLINIC | Age: 63
End: 2018-01-04

## 2018-01-05 DIAGNOSIS — F51.01 PRIMARY INSOMNIA: ICD-10-CM

## 2018-01-05 RX ORDER — QUETIAPINE FUMARATE 100 MG/1
100 TABLET, FILM COATED ORAL NIGHTLY
Qty: 30 TABLET | Refills: 5 | Status: CANCELLED | OUTPATIENT
Start: 2018-01-05

## 2018-01-10 DIAGNOSIS — E53.8 VITAMIN B 12 DEFICIENCY: ICD-10-CM

## 2018-01-12 RX ORDER — HYDROCHLOROTHIAZIDE 25 MG/1
TABLET ORAL
Qty: 30 TABLET | Refills: 11 | Status: SHIPPED | OUTPATIENT
Start: 2018-01-12 | End: 2018-12-31 | Stop reason: SDUPTHER

## 2018-01-12 RX ORDER — CYANOCOBALAMIN 1000 UG/ML
INJECTION INTRAMUSCULAR; SUBCUTANEOUS
Qty: 3 ML | Refills: 11 | Status: SHIPPED | OUTPATIENT
Start: 2018-01-12 | End: 2018-07-11

## 2018-01-12 RX ORDER — CALCIUM CITRATE/VITAMIN D3 200MG-6.25
TABLET ORAL
Qty: 100 EACH | Refills: 11 | Status: SHIPPED | OUTPATIENT
Start: 2018-01-12 | End: 2018-07-11 | Stop reason: SDUPTHER

## 2018-02-23 ENCOUNTER — OFFICE VISIT (OUTPATIENT)
Dept: PRIMARY CARE CLINIC | Age: 63
End: 2018-02-23

## 2018-02-23 VITALS
DIASTOLIC BLOOD PRESSURE: 84 MMHG | TEMPERATURE: 98.4 F | WEIGHT: 182.9 LBS | SYSTOLIC BLOOD PRESSURE: 124 MMHG | RESPIRATION RATE: 14 BRPM | BODY MASS INDEX: 30.47 KG/M2 | OXYGEN SATURATION: 93 % | HEIGHT: 65 IN | HEART RATE: 99 BPM

## 2018-02-23 DIAGNOSIS — E55.9 VITAMIN D DEFICIENCY: ICD-10-CM

## 2018-02-23 DIAGNOSIS — R73.03 PREDIABETES: ICD-10-CM

## 2018-02-23 DIAGNOSIS — R10.13 EPIGASTRIC PAIN: ICD-10-CM

## 2018-02-23 DIAGNOSIS — F32.0 MILD SINGLE CURRENT EPISODE OF MAJOR DEPRESSIVE DISORDER (HCC): Primary | ICD-10-CM

## 2018-02-23 DIAGNOSIS — F32.0 MILD SINGLE CURRENT EPISODE OF MAJOR DEPRESSIVE DISORDER (HCC): ICD-10-CM

## 2018-02-23 DIAGNOSIS — J45.40 MODERATE PERSISTENT ASTHMA WITHOUT COMPLICATION: ICD-10-CM

## 2018-02-23 DIAGNOSIS — I10 ESSENTIAL HYPERTENSION: ICD-10-CM

## 2018-02-23 DIAGNOSIS — B35.1 NAIL FUNGUS: ICD-10-CM

## 2018-02-23 LAB
BILIRUBIN URINE: ABNORMAL
BLOOD, URINE: NEGATIVE
CLARITY: CLEAR
COLOR: ABNORMAL
CREATININE URINE: 272.3 MG/DL (ref 28–259)
GLUCOSE URINE: NEGATIVE MG/DL
HBA1C MFR BLD: 5.8 %
KETONES, URINE: NEGATIVE MG/DL
LEUKOCYTE ESTERASE, URINE: NEGATIVE
MICROALBUMIN UR-MCNC: 1.7 MG/DL
MICROALBUMIN/CREAT UR-RTO: 6.2 MG/G (ref 0–30)
MICROSCOPIC EXAMINATION: ABNORMAL
NITRITE, URINE: NEGATIVE
PH UA: 6
PROTEIN UA: NEGATIVE MG/DL
SPECIFIC GRAVITY UA: 1.02
URINE TYPE: ABNORMAL
UROBILINOGEN, URINE: 1 E.U./DL

## 2018-02-23 PROCEDURE — G8482 FLU IMMUNIZE ORDER/ADMIN: HCPCS | Performed by: INTERNAL MEDICINE

## 2018-02-23 PROCEDURE — 3014F SCREEN MAMMO DOC REV: CPT | Performed by: INTERNAL MEDICINE

## 2018-02-23 PROCEDURE — G8417 CALC BMI ABV UP PARAM F/U: HCPCS | Performed by: INTERNAL MEDICINE

## 2018-02-23 PROCEDURE — G8427 DOCREV CUR MEDS BY ELIG CLIN: HCPCS | Performed by: INTERNAL MEDICINE

## 2018-02-23 PROCEDURE — 3017F COLORECTAL CA SCREEN DOC REV: CPT | Performed by: INTERNAL MEDICINE

## 2018-02-23 PROCEDURE — 1036F TOBACCO NON-USER: CPT | Performed by: INTERNAL MEDICINE

## 2018-02-23 PROCEDURE — 83036 HEMOGLOBIN GLYCOSYLATED A1C: CPT | Performed by: INTERNAL MEDICINE

## 2018-02-23 PROCEDURE — 99214 OFFICE O/P EST MOD 30 MIN: CPT | Performed by: INTERNAL MEDICINE

## 2018-02-23 RX ORDER — LOSARTAN POTASSIUM 25 MG/1
25 TABLET ORAL DAILY
Qty: 30 TABLET | Refills: 3 | Status: SHIPPED | OUTPATIENT
Start: 2018-02-23 | End: 2018-06-21 | Stop reason: SDUPTHER

## 2018-02-23 RX ORDER — RANITIDINE 150 MG/1
150 TABLET ORAL 2 TIMES DAILY
Qty: 60 TABLET | Refills: 3 | Status: SHIPPED | OUTPATIENT
Start: 2018-02-23 | End: 2018-02-23 | Stop reason: SDUPTHER

## 2018-02-23 RX ORDER — RANITIDINE 150 MG/1
150 TABLET ORAL 2 TIMES DAILY
Qty: 60 TABLET | Refills: 3 | Status: SHIPPED | OUTPATIENT
Start: 2018-02-23 | End: 2018-07-11

## 2018-02-23 RX ORDER — TERBINAFINE HYDROCHLORIDE 250 MG/1
250 TABLET ORAL DAILY
Qty: 42 TABLET | Refills: 0 | Status: SHIPPED | OUTPATIENT
Start: 2018-02-23 | End: 2018-07-11 | Stop reason: SDUPTHER

## 2018-02-23 ASSESSMENT — ENCOUNTER SYMPTOMS
EYES NEGATIVE: 1
APNEA: 0
CHEST TIGHTNESS: 0
SHORTNESS OF BREATH: 0
WHEEZING: 0
COUGH: 0
GASTROINTESTINAL NEGATIVE: 1
ALLERGIC/IMMUNOLOGIC NEGATIVE: 1

## 2018-02-23 NOTE — PROGRESS NOTES
disease)    Essential hypertension    Prediabetes    Vitamin D deficiency    History of cholecystectomy    Degenerative spondylolisthesis    Spondylolisthesis, lumbar region    DDD (degenerative disc disease), lumbar    Mechanical low back pain    Chronic pain syndrome    Facet arthropathy    Spinal stenosis of lumbar region    Degenerative lumbar spinal stenosis    Disc displacement, lumbar    Osteoarthritis of lumbar spine    Chronic low back pain    Lymphocytosis    Leukocytosis    Acute respiratory failure (HCC)     Allergies   Allergen Reactions    Bupropion Other (See Comments)     Muscle spasms    Morphine Hives and Itching    Morphine And Related Itching       Review of Systems   Constitutional: Negative. Negative for fatigue. HENT: Negative. Eyes: Negative. Respiratory: Negative for apnea, cough, chest tightness, shortness of breath and wheezing. Asthma and current smoker and copd. hospitalized for COPD exacerbation in December 2017. Cardiovascular: Negative for chest pain, palpitations and leg swelling. Hypertension. Gastrointestinal: Negative. Endocrine: Negative for cold intolerance, heat intolerance, polydipsia, polyphagia and polyuria. Acquired hypothyroidism on supplement. Genitourinary: Negative. Musculoskeletal: Negative. Negative for neck pain. Skin: Negative. Negative for pallor. Allergic/Immunologic: Negative. Neurological: Negative for dizziness, tremors, seizures, speech difficulty, weakness and headaches. Psychiatric/Behavioral: Negative for agitation, behavioral problems, confusion, decreased concentration and suicidal ideas. The patient is not nervous/anxious and is not hyperactive. INsomnia       Objective:   Physical Exam   Constitutional: She is oriented to person, place, and time. She appears well-developed and well-nourished. No distress. HENT:   Head: Normocephalic and atraumatic.    Right Ear: External TO FT4    Comprehensive Metabolic Panel    Urinalysis   5. Prediabetes Is controlled on current regimen continue. Microalbumin / Creatinine Urine Ratio    Lipid Panel   6. Moderate persistent asthma without complication With COPD will get thyroid function tests continue Dulera as Spiriva continue p.r.n. Combivent. FULL PFT STUDY WITH PRE AND POST    mometasone-formoterol (DULERA) 200-5 MCG/ACT inhaler    tiotropium (SPIRIVA HANDIHALER) 18 MCG inhalation capsule    DISCONTINUED: mometasone-formoterol (DULERA) 200-5 MCG/ACT inhaler    DISCONTINUED: tiotropium (SPIRIVA HANDIHALER) 18 MCG inhalation capsule   7. Vitamin D deficiency  Vitamin D 25 Hydroxy           Plan: Natasha Pacheco received counseling on the following healthy behaviors: medication adherence    Patient given educational materials on After visit summary with diagnosis and treatment plan. I have instructed Natasha Pacheco to complete a self tracking handout on Blood Pressures  and Weights and instructed them to bring it with them to her next appointment. Discussed use, benefit, and side effects of prescribed medications. Barriers to medication compliance addressed. All patient questions answered. Pt voiced understanding. Patient is taking over the counter meds and discussed as to how they interact with prescription medications.

## 2018-02-24 LAB
A/G RATIO: 1.8 (ref 1.1–2.2)
ALBUMIN SERPL-MCNC: 4.9 G/DL (ref 3.4–5)
ALP BLD-CCNC: 112 U/L (ref 40–129)
ALT SERPL-CCNC: 16 U/L (ref 10–40)
ANION GAP SERPL CALCULATED.3IONS-SCNC: 17 MMOL/L (ref 3–16)
AST SERPL-CCNC: 18 U/L (ref 15–37)
BILIRUB SERPL-MCNC: 0.5 MG/DL (ref 0–1)
BUN BLDV-MCNC: 13 MG/DL (ref 7–20)
CALCIUM SERPL-MCNC: 10.2 MG/DL (ref 8.3–10.6)
CHLORIDE BLD-SCNC: 100 MMOL/L (ref 99–110)
CHOLESTEROL, TOTAL: 117 MG/DL (ref 0–199)
CO2: 27 MMOL/L (ref 21–32)
CREAT SERPL-MCNC: 0.9 MG/DL (ref 0.6–1.2)
GFR AFRICAN AMERICAN: >60
GFR NON-AFRICAN AMERICAN: >60
GLOBULIN: 2.7 G/DL
GLUCOSE BLD-MCNC: 107 MG/DL (ref 70–99)
HDLC SERPL-MCNC: 52 MG/DL (ref 40–60)
LDL CHOLESTEROL CALCULATED: 44 MG/DL
LIPASE: 52 U/L (ref 13–60)
POTASSIUM SERPL-SCNC: 4.2 MMOL/L (ref 3.5–5.1)
SODIUM BLD-SCNC: 144 MMOL/L (ref 136–145)
TOTAL PROTEIN: 7.6 G/DL (ref 6.4–8.2)
TRIGL SERPL-MCNC: 103 MG/DL (ref 0–150)
TSH REFLEX FT4: 0.93 UIU/ML (ref 0.27–4.2)
VITAMIN B-12: 1071 PG/ML (ref 211–911)
VITAMIN D 25-HYDROXY: 43.4 NG/ML
VLDLC SERPL CALC-MCNC: 21 MG/DL

## 2018-02-25 LAB — H PYLORI BREATH TEST: NEGATIVE

## 2018-02-26 ENCOUNTER — TELEPHONE (OUTPATIENT)
Dept: PRIMARY CARE CLINIC | Age: 63
End: 2018-02-26

## 2018-03-07 ENCOUNTER — TELEPHONE (OUTPATIENT)
Dept: PRIMARY CARE CLINIC | Age: 63
End: 2018-03-07

## 2018-04-12 ENCOUNTER — CARE COORDINATION (OUTPATIENT)
Dept: CARE COORDINATION | Age: 63
End: 2018-04-12

## 2018-04-13 ENCOUNTER — TELEPHONE (OUTPATIENT)
Dept: PRIMARY CARE CLINIC | Age: 63
End: 2018-04-13

## 2018-04-19 ENCOUNTER — CARE COORDINATION (OUTPATIENT)
Dept: CARE COORDINATION | Age: 63
End: 2018-04-19

## 2018-04-24 ENCOUNTER — CARE COORDINATION (OUTPATIENT)
Dept: CARE COORDINATION | Age: 63
End: 2018-04-24

## 2018-05-02 ENCOUNTER — CARE COORDINATION (OUTPATIENT)
Dept: CARE COORDINATION | Age: 63
End: 2018-05-02

## 2018-05-09 ENCOUNTER — CARE COORDINATION (OUTPATIENT)
Dept: CARE COORDINATION | Age: 63
End: 2018-05-09

## 2018-05-29 ENCOUNTER — CARE COORDINATION (OUTPATIENT)
Dept: CARE COORDINATION | Age: 63
End: 2018-05-29

## 2018-07-11 ENCOUNTER — OFFICE VISIT (OUTPATIENT)
Dept: PRIMARY CARE CLINIC | Age: 63
End: 2018-07-11

## 2018-07-11 VITALS
BODY MASS INDEX: 30.95 KG/M2 | WEIGHT: 186 LBS | DIASTOLIC BLOOD PRESSURE: 70 MMHG | HEART RATE: 100 BPM | TEMPERATURE: 99 F | RESPIRATION RATE: 18 BRPM | SYSTOLIC BLOOD PRESSURE: 122 MMHG | OXYGEN SATURATION: 95 %

## 2018-07-11 DIAGNOSIS — G47.33 OSA (OBSTRUCTIVE SLEEP APNEA): ICD-10-CM

## 2018-07-11 DIAGNOSIS — R11.0 CHRONIC NAUSEA: ICD-10-CM

## 2018-07-11 DIAGNOSIS — E53.8 VITAMIN B 12 DEFICIENCY: ICD-10-CM

## 2018-07-11 DIAGNOSIS — M43.10 DEGENERATIVE SPONDYLOLISTHESIS: ICD-10-CM

## 2018-07-11 DIAGNOSIS — E03.9 ACQUIRED HYPOTHYROIDISM: ICD-10-CM

## 2018-07-11 DIAGNOSIS — M62.838 MUSCLE SPASM: ICD-10-CM

## 2018-07-11 DIAGNOSIS — Z71.6 ENCOUNTER FOR SMOKING CESSATION COUNSELING: ICD-10-CM

## 2018-07-11 DIAGNOSIS — F32.4 MAJOR DEPRESSIVE DISORDER WITH SINGLE EPISODE, IN PARTIAL REMISSION (HCC): ICD-10-CM

## 2018-07-11 DIAGNOSIS — R73.03 PREDIABETES: Primary | ICD-10-CM

## 2018-07-11 DIAGNOSIS — Z78.0 MENOPAUSE: ICD-10-CM

## 2018-07-11 DIAGNOSIS — E78.2 MIXED HYPERLIPIDEMIA: ICD-10-CM

## 2018-07-11 DIAGNOSIS — E55.9 VITAMIN D DEFICIENCY: ICD-10-CM

## 2018-07-11 DIAGNOSIS — B35.1 NAIL FUNGUS: ICD-10-CM

## 2018-07-11 DIAGNOSIS — E03.9 HYPOTHYROIDISM (ACQUIRED): ICD-10-CM

## 2018-07-11 PROBLEM — J96.00 ACUTE RESPIRATORY FAILURE (HCC): Status: RESOLVED | Noted: 2017-12-04 | Resolved: 2018-07-11

## 2018-07-11 LAB
ALBUMIN SERPL-MCNC: 4.8 G/DL (ref 3.4–5)
ANION GAP SERPL CALCULATED.3IONS-SCNC: 16 MMOL/L (ref 3–16)
BASOPHILS ABSOLUTE: 0 K/UL (ref 0–0.2)
BASOPHILS RELATIVE PERCENT: 0.4 %
BUN BLDV-MCNC: 14 MG/DL (ref 7–20)
CALCIUM SERPL-MCNC: 10 MG/DL (ref 8.3–10.6)
CHLORIDE BLD-SCNC: 101 MMOL/L (ref 99–110)
CO2: 27 MMOL/L (ref 21–32)
CREAT SERPL-MCNC: 0.9 MG/DL (ref 0.6–1.2)
EOSINOPHILS ABSOLUTE: 0.1 K/UL (ref 0–0.6)
EOSINOPHILS RELATIVE PERCENT: 0.5 %
GFR AFRICAN AMERICAN: >60
GFR NON-AFRICAN AMERICAN: >60
GLUCOSE BLD-MCNC: 129 MG/DL (ref 70–99)
HBA1C MFR BLD: 5.8 %
HCT VFR BLD CALC: 44.5 % (ref 36–48)
HEMOGLOBIN: 14.5 G/DL (ref 12–16)
LYMPHOCYTES ABSOLUTE: 2.8 K/UL (ref 1–5.1)
LYMPHOCYTES RELATIVE PERCENT: 26.5 %
MCH RBC QN AUTO: 28.2 PG (ref 26–34)
MCHC RBC AUTO-ENTMCNC: 32.6 G/DL (ref 31–36)
MCV RBC AUTO: 86.5 FL (ref 80–100)
MONOCYTES ABSOLUTE: 0.6 K/UL (ref 0–1.3)
MONOCYTES RELATIVE PERCENT: 5.3 %
NEUTROPHILS ABSOLUTE: 7.2 K/UL (ref 1.7–7.7)
NEUTROPHILS RELATIVE PERCENT: 67.3 %
PDW BLD-RTO: 14.1 % (ref 12.4–15.4)
PHOSPHORUS: 4.2 MG/DL (ref 2.5–4.9)
PLATELET # BLD: 189 K/UL (ref 135–450)
PMV BLD AUTO: 11 FL (ref 5–10.5)
POTASSIUM SERPL-SCNC: 4.2 MMOL/L (ref 3.5–5.1)
RBC # BLD: 5.14 M/UL (ref 4–5.2)
SODIUM BLD-SCNC: 144 MMOL/L (ref 136–145)
TSH REFLEX FT4: 0.6 UIU/ML (ref 0.27–4.2)
WBC # BLD: 10.6 K/UL (ref 4–11)

## 2018-07-11 PROCEDURE — 99214 OFFICE O/P EST MOD 30 MIN: CPT | Performed by: INTERNAL MEDICINE

## 2018-07-11 PROCEDURE — 3017F COLORECTAL CA SCREEN DOC REV: CPT | Performed by: INTERNAL MEDICINE

## 2018-07-11 PROCEDURE — G8417 CALC BMI ABV UP PARAM F/U: HCPCS | Performed by: INTERNAL MEDICINE

## 2018-07-11 PROCEDURE — 1036F TOBACCO NON-USER: CPT | Performed by: INTERNAL MEDICINE

## 2018-07-11 PROCEDURE — 83036 HEMOGLOBIN GLYCOSYLATED A1C: CPT | Performed by: INTERNAL MEDICINE

## 2018-07-11 PROCEDURE — G8427 DOCREV CUR MEDS BY ELIG CLIN: HCPCS | Performed by: INTERNAL MEDICINE

## 2018-07-11 RX ORDER — VARENICLINE TARTRATE 25 MG
KIT ORAL
Qty: 1 EACH | Refills: 0 | Status: SHIPPED | OUTPATIENT
Start: 2018-07-11 | End: 2018-08-16

## 2018-07-11 RX ORDER — CLONIDINE HYDROCHLORIDE 0.1 MG/1
TABLET ORAL
Refills: 11 | COMMUNITY
Start: 2018-06-21 | End: 2018-11-08 | Stop reason: SDUPTHER

## 2018-07-11 RX ORDER — CYANOCOBALAMIN 1000 UG/ML
INJECTION INTRAMUSCULAR; SUBCUTANEOUS
Qty: 3 ML | Refills: 11 | Status: SHIPPED | OUTPATIENT
Start: 2018-07-11 | End: 2019-01-24 | Stop reason: SDUPTHER

## 2018-07-11 RX ORDER — PANTOPRAZOLE SODIUM 40 MG/1
TABLET, DELAYED RELEASE ORAL
Refills: 11 | COMMUNITY
Start: 2018-06-21 | End: 2018-08-30 | Stop reason: SDUPTHER

## 2018-07-11 RX ORDER — ERGOCALCIFEROL 1.25 MG/1
50000 CAPSULE ORAL WEEKLY
Qty: 4 CAPSULE | Refills: 5 | Status: SHIPPED | OUTPATIENT
Start: 2018-07-11 | End: 2018-10-22

## 2018-07-11 RX ORDER — AMITRIPTYLINE HYDROCHLORIDE 50 MG/1
TABLET, FILM COATED ORAL
Refills: 11 | COMMUNITY
Start: 2018-06-21 | End: 2018-07-11

## 2018-07-11 RX ORDER — DESONIDE 0.5 MG/G
CREAM TOPICAL
COMMUNITY
Start: 2018-07-09 | End: 2019-05-17

## 2018-07-11 RX ORDER — QUETIAPINE FUMARATE 100 MG/1
TABLET, FILM COATED ORAL
COMMUNITY
Start: 2018-06-09 | End: 2018-07-11 | Stop reason: SDUPTHER

## 2018-07-11 RX ORDER — ROSUVASTATIN CALCIUM 10 MG/1
10 TABLET, COATED ORAL NIGHTLY
Qty: 30 TABLET | Refills: 5 | Status: SHIPPED | OUTPATIENT
Start: 2018-07-11 | End: 2019-01-04 | Stop reason: SDUPTHER

## 2018-07-11 RX ORDER — TERBINAFINE HYDROCHLORIDE 250 MG/1
250 TABLET ORAL DAILY
Qty: 42 TABLET | Refills: 0 | Status: SHIPPED | OUTPATIENT
Start: 2018-07-11 | End: 2018-07-12

## 2018-07-11 RX ORDER — CYCLOBENZAPRINE HCL 10 MG
TABLET ORAL
Qty: 90 TABLET | Refills: 11 | Status: SHIPPED | OUTPATIENT
Start: 2018-07-11 | End: 2018-11-08 | Stop reason: SDUPTHER

## 2018-07-11 RX ORDER — ONDANSETRON 4 MG/1
4 TABLET, FILM COATED ORAL DAILY PRN
Qty: 30 TABLET | Refills: 11 | Status: SHIPPED | OUTPATIENT
Start: 2018-07-11 | End: 2018-10-22

## 2018-07-11 RX ORDER — LEVOTHYROXINE SODIUM 0.15 MG/1
150 TABLET ORAL DAILY
Qty: 30 TABLET | Refills: 5 | Status: SHIPPED | OUTPATIENT
Start: 2018-07-11 | End: 2018-10-16 | Stop reason: SDUPTHER

## 2018-07-11 RX ORDER — PROMETHAZINE HYDROCHLORIDE 6.25 MG/5ML
SYRUP ORAL
Refills: 11 | COMMUNITY
Start: 2018-06-21 | End: 2019-05-06

## 2018-07-11 RX ORDER — LACTULOSE 10 G/15ML
SOLUTION ORAL PRN
COMMUNITY
Start: 2018-06-10 | End: 2021-02-17 | Stop reason: SDUPTHER

## 2018-07-11 ASSESSMENT — ENCOUNTER SYMPTOMS: ABDOMINAL PAIN: 1

## 2018-07-11 NOTE — PROGRESS NOTES
dyspnea on exertion with COPD. No chest pain. Lab Results   Component Value Date    CHOL 117 02/23/2018    CHOL 178 06/16/2017    CHOL 172 10/03/2016     Lab Results   Component Value Date    TRIG 103 02/23/2018    TRIG 314 (H) 06/16/2017    TRIG 149 10/03/2016     Lab Results   Component Value Date    HDL 52 02/23/2018    HDL 44 06/16/2017    HDL 48 10/03/2016     Lab Results   Component Value Date    LDLCALC 44 02/23/2018    LDLCALC see below 06/16/2017    LDLCALC 94 10/03/2016     Lab Results   Component Value Date    LABVLDL 21 02/23/2018    LABVLDL see below 06/16/2017    LABVLDL 30 10/03/2016     No results found for: CHOLHDLRATIO         9. Nail fungus to Lamisil for 6 weeks with only 30% improvement. Thickening of all nails of the toes and graying discoloration. No pain in the nails or drainage. terbinafine (LAMISIL) 250 MG tablet   10. Muscle spasm and neck upper lower back. Symptoms controlled with Flexeril and needs refill. cyclobenzaprine (FLEXERIL) 10 MG tablet   11. Hypothyroidism (acquired) present for years and treated with levothyroxine 150 MCG daily. No heat or cold intolerance. Some fatigue present. Needs updated TSH. Renal Function Panel   12. Vitamin D deficiency 2/24/2018  3:09 AM - Leydi Patel Incoming Results [facility]     Component Results     Component Value Ref Range & Units Status Collected Lab   Vit D, 25-Hydroxy 43.4  >=30 ng/mL Final 02/23/2018  9:34 AM  - [facility]   <=20 ng/mL. ........... Jasvir Inch Deficient   21-29 ng/mL. ......... Jasvir Inch Insufficient   >=30 ng/mL. ........ Jasvir Inch Sufficient    Testing Performed By   Present for years treated with weekly vitamin D2, 50,000 units. No bone pain. Needs refill medication. Goal vitamin D level is 50-80. vitamin D (ERGOCALCIFEROL) 38756 units CAPS capsule   13. Menopause for many years. No recent bone density. No history of fractures. Recommend 600 mg of calcium daily and already on vitamin D supplement and recommend walking daily.   Needs Spondylolisthesis, lumbar region    DDD (degenerative disc disease), lumbar    Mechanical low back pain    Chronic pain syndrome    Facet arthropathy    Spinal stenosis of lumbar region    Degenerative lumbar spinal stenosis    Disc displacement, lumbar    Osteoarthritis of lumbar spine    Chronic low back pain    Lymphocytosis    Leukocytosis     Allergies   Allergen Reactions    Bupropion Other (See Comments)     Muscle spasms    Morphine Hives and Itching    Morphine And Related Itching       Review of Systems   Constitutional: Negative. Negative for fatigue. HENT: Negative. Eyes: Negative. Respiratory: Negative for apnea, cough, chest tightness, shortness of breath and wheezing. Asthma and current smoker and copd. hospitalized for COPD exacerbation in December 2017. Cardiovascular: Negative for chest pain, palpitations and leg swelling. Hypertension. Gastrointestinal: Positive for abdominal pain. Endocrine: Negative for cold intolerance, heat intolerance, polydipsia, polyphagia and polyuria. Acquired hypothyroidism on supplement. Genitourinary: Negative. Musculoskeletal: Positive for back pain and myalgias. Negative for neck pain. Muscle spasm in neck and lower back   Skin: Negative. Negative for pallor. Allergic/Immunologic: Negative. Neurological: Negative for dizziness, tremors, seizures, speech difficulty, weakness and headaches. Psychiatric/Behavioral: Negative for agitation, behavioral problems, confusion, decreased concentration and suicidal ideas. The patient is not nervous/anxious and is not hyperactive. INsomnia       Objective:   Physical Exam   Constitutional: She is oriented to person, place, and time. She appears well-developed and well-nourished. No distress. HENT:   Head: Normocephalic and atraumatic.    Right Ear: External ear normal.   Left Ear: External ear normal.   Nose: Nose normal.   Mouth/Throat: Oropharynx is clear and moist. No oropharyngeal exudate. Eyes: Conjunctivae and EOM are normal. Pupils are equal, round, and reactive to light. Right eye exhibits no discharge. Left eye exhibits no discharge. No scleral icterus. Neck: Normal range of motion. Neck supple. No JVD present. No thyromegaly present. Cardiovascular: Normal rate, regular rhythm and intact distal pulses. Exam reveals no friction rub. Pulmonary/Chest: Effort normal and breath sounds normal. No respiratory distress. She has no wheezes. She has no rales. She exhibits no tenderness. Abdominal: Soft. Bowel sounds are normal. She exhibits no distension. There is no tenderness. There is no rebound and no guarding. Musculoskeletal: She exhibits no edema or tenderness. Lymphadenopathy:     She has no cervical adenopathy. Neurological: She is alert and oriented to person, place, and time. No cranial nerve deficit. She exhibits normal muscle tone. Coordination normal.   Skin: Skin is warm and dry. No rash noted. She is not diaphoretic. No erythema. No pallor. Psychiatric: She has a normal mood and affect. Her behavior is normal. Judgment and thought content normal.       Assessment:       Diagnosis Orders   1. Prediabetes is under good control with diet. Continue and encouraged more exercise. POCT glycosylated hemoglobin (Hb A1C)   2. Acquired hypothyroidism clinically euthyroid will refill medication and monitor level. levothyroxine (SYNTHROID) 150 MCG tablet    TSH WITH REFLEX TO FT4   3. Vitamin B 12 deficiency at goal with supplement will renew. cyanocobalamin 1000 MCG/ML injection    CBC Auto Differential   4. Chronic nausea , resolved will discontinue Zofran. ondansetron (ZOFRAN) 4 MG tablet   5. Encounter for smoking cessation counseling  varenicline (CHANTIX STARTING MONTH PAK) 0.5 MG X 11 & 1 MG X 42 tablet   6. AVRIL (obstructive sleep apnea) history not using CPAP and willing to try again.   Will refer to pulmonary/sleep medicine,

## 2018-07-11 NOTE — LETTER
[facility]  [address] [address]  Phone: [phone]  Fax: [phone]    Jayant Mir MD        July 11, 2018     Patient: Supriya Lara   YOB: 1955   Date of Visit: 7/11/2018       To Pharmacist:    Please give Azalia Palin Shingrix , if covered by insurance. If you have any questions or concerns, please don't hesitate to call.     Sincerely,        Jayant Mir MD

## 2018-07-12 RX ORDER — QUETIAPINE FUMARATE 100 MG/1
100 TABLET, FILM COATED ORAL NIGHTLY
Qty: 28 TABLET | Refills: 5 | Status: SHIPPED | OUTPATIENT
Start: 2018-07-12 | End: 2018-12-23 | Stop reason: SDUPTHER

## 2018-07-12 RX ORDER — TERBINAFINE HYDROCHLORIDE 250 MG/1
250 TABLET ORAL DAILY
Qty: 42 TABLET | Refills: 0 | Status: SHIPPED | OUTPATIENT
Start: 2018-07-12 | End: 2018-10-22

## 2018-07-12 ASSESSMENT — ENCOUNTER SYMPTOMS
APNEA: 0
SHORTNESS OF BREATH: 0
ALLERGIC/IMMUNOLOGIC NEGATIVE: 1
BACK PAIN: 1
EYES NEGATIVE: 1
COUGH: 0
CHEST TIGHTNESS: 0
WHEEZING: 0

## 2018-07-19 ENCOUNTER — TELEPHONE (OUTPATIENT)
Dept: PRIMARY CARE CLINIC | Age: 63
End: 2018-07-19

## 2018-07-19 DIAGNOSIS — R73.03 PREDIABETES: Primary | ICD-10-CM

## 2018-07-20 ENCOUNTER — TELEPHONE (OUTPATIENT)
Dept: PRIMARY CARE CLINIC | Age: 63
End: 2018-07-20

## 2018-07-20 NOTE — TELEPHONE ENCOUNTER
Pharmacy call requesting a glucose monitor for the patient please advise pharmacy number [phone]

## 2018-07-24 DIAGNOSIS — R73.03 PREDIABETES: ICD-10-CM

## 2018-07-24 NOTE — TELEPHONE ENCOUNTER
Ruben on [employer] needs script for [employer]. Her insurance no longer covers [employer].

## 2018-07-25 ENCOUNTER — TELEPHONE (OUTPATIENT)
Dept: PRIMARY CARE CLINIC | Age: 63
End: 2018-07-25

## 2018-07-25 NOTE — TELEPHONE ENCOUNTER
Pharmacy requesting glucose monitor for the patient (true metrix meter) patient has strips but not the meter please advise [phone]

## 2018-07-26 DIAGNOSIS — R73.03 PREDIABETES: ICD-10-CM

## 2018-07-26 RX ORDER — BLOOD-GLUCOSE METER
1 EACH MISCELLANEOUS DAILY
Qty: 1 DEVICE | Refills: 1 | Status: SHIPPED | OUTPATIENT
Start: 2018-07-26 | End: 2021-09-03

## 2018-08-16 RX ORDER — VARENICLINE TARTRATE 1 MG/1
1 TABLET, FILM COATED ORAL 2 TIMES DAILY
Qty: 60 TABLET | Refills: 3 | Status: SHIPPED | OUTPATIENT
Start: 2018-08-16 | End: 2018-08-28

## 2018-08-20 ENCOUNTER — TELEPHONE (OUTPATIENT)
Dept: PRIMARY CARE CLINIC | Age: 63
End: 2018-08-20

## 2018-08-28 ENCOUNTER — APPOINTMENT (OUTPATIENT)
Dept: GENERAL RADIOLOGY | Age: 63
End: 2018-08-28
Payer: COMMERCIAL

## 2018-08-28 ENCOUNTER — HOSPITAL ENCOUNTER (EMERGENCY)
Age: 63
Discharge: HOME | End: 2018-08-28
Attending: EMERGENCY MEDICINE
Payer: COMMERCIAL

## 2018-08-28 VITALS
RESPIRATION RATE: 17 BRPM | HEART RATE: 104 BPM | DIASTOLIC BLOOD PRESSURE: 74 MMHG | HEIGHT: 65 IN | TEMPERATURE: 98.3 F | OXYGEN SATURATION: 89 % | WEIGHT: 190 LBS | SYSTOLIC BLOOD PRESSURE: 132 MMHG | BODY MASS INDEX: 31.65 KG/M2

## 2018-08-28 DIAGNOSIS — J44.1 COPD EXACERBATION (HCC): Primary | ICD-10-CM

## 2018-08-28 DIAGNOSIS — J20.9 ACUTE BRONCHITIS, UNSPECIFIED ORGANISM: ICD-10-CM

## 2018-08-28 LAB
A/G RATIO: 1.8 (ref 1.1–2.2)
ALBUMIN SERPL-MCNC: 4.9 G/DL (ref 3.4–5)
ALP BLD-CCNC: 123 U/L (ref 40–129)
ALT SERPL-CCNC: 28 U/L (ref 10–40)
ANION GAP SERPL CALCULATED.3IONS-SCNC: 16 MMOL/L (ref 3–16)
AST SERPL-CCNC: 29 U/L (ref 15–37)
BILIRUB SERPL-MCNC: 0.4 MG/DL (ref 0–1)
BUN BLDV-MCNC: 10 MG/DL (ref 7–20)
CALCIUM SERPL-MCNC: 9.8 MG/DL (ref 8.3–10.6)
CHLORIDE BLD-SCNC: 100 MMOL/L (ref 99–110)
CO2: 27 MMOL/L (ref 21–32)
CREAT SERPL-MCNC: 0.8 MG/DL (ref 0.6–1.2)
GFR AFRICAN AMERICAN: >60
GFR NON-AFRICAN AMERICAN: >60
GLOBULIN: 2.7 G/DL
GLUCOSE BLD-MCNC: 115 MG/DL (ref 70–99)
HCT VFR BLD CALC: 43.2 % (ref 36–48)
HEMOGLOBIN: 13.8 G/DL (ref 12–16)
MCH RBC QN AUTO: 27.8 PG (ref 26–34)
MCHC RBC AUTO-ENTMCNC: 32 G/DL (ref 31–36)
MCV RBC AUTO: 86.7 FL (ref 80–100)
PDW BLD-RTO: 14.1 % (ref 12.4–15.4)
PLATELET # BLD: 171 K/UL (ref 135–450)
PMV BLD AUTO: 10.2 FL (ref 5–10.5)
POTASSIUM SERPL-SCNC: 3.7 MMOL/L (ref 3.5–5.1)
PRO-BNP: 10 PG/ML (ref 0–124)
RBC # BLD: 4.99 M/UL (ref 4–5.2)
SODIUM BLD-SCNC: 143 MMOL/L (ref 136–145)
TOTAL PROTEIN: 7.6 G/DL (ref 6.4–8.2)
TROPONIN: <0.01 NG/ML
WBC # BLD: 14 K/UL (ref 4–11)

## 2018-08-28 PROCEDURE — 6360000002 HC RX W HCPCS: Performed by: EMERGENCY MEDICINE

## 2018-08-28 PROCEDURE — 6360000002 HC RX W HCPCS

## 2018-08-28 PROCEDURE — 71045 X-RAY EXAM CHEST 1 VIEW: CPT

## 2018-08-28 PROCEDURE — 96374 THER/PROPH/DIAG INJ IV PUSH: CPT

## 2018-08-28 PROCEDURE — 94761 N-INVAS EAR/PLS OXIMETRY MLT: CPT

## 2018-08-28 PROCEDURE — 94664 DEMO&/EVAL PT USE INHALER: CPT

## 2018-08-28 PROCEDURE — 84484 ASSAY OF TROPONIN QUANT: CPT

## 2018-08-28 PROCEDURE — 6370000000 HC RX 637 (ALT 250 FOR IP): Performed by: EMERGENCY MEDICINE

## 2018-08-28 PROCEDURE — 85027 COMPLETE CBC AUTOMATED: CPT

## 2018-08-28 PROCEDURE — 99285 EMERGENCY DEPT VISIT HI MDM: CPT

## 2018-08-28 PROCEDURE — 94640 AIRWAY INHALATION TREATMENT: CPT

## 2018-08-28 PROCEDURE — 83880 ASSAY OF NATRIURETIC PEPTIDE: CPT

## 2018-08-28 PROCEDURE — 80053 COMPREHEN METABOLIC PANEL: CPT

## 2018-08-28 PROCEDURE — 93005 ELECTROCARDIOGRAM TRACING: CPT | Performed by: EMERGENCY MEDICINE

## 2018-08-28 RX ORDER — LEVOFLOXACIN 500 MG/1
500 TABLET, FILM COATED ORAL DAILY
Qty: 7 TABLET | Refills: 0 | Status: SHIPPED | OUTPATIENT
Start: 2018-08-28 | End: 2018-09-04

## 2018-08-28 RX ORDER — METHYLPREDNISOLONE SODIUM SUCCINATE 125 MG/2ML
INJECTION, POWDER, LYOPHILIZED, FOR SOLUTION INTRAMUSCULAR; INTRAVENOUS
Status: COMPLETED
Start: 2018-08-28 | End: 2018-08-28

## 2018-08-28 RX ORDER — ALBUTEROL SULFATE 2.5 MG/3ML
SOLUTION RESPIRATORY (INHALATION)
Status: DISCONTINUED
Start: 2018-08-28 | End: 2018-08-28 | Stop reason: HOSPADM

## 2018-08-28 RX ORDER — ALBUTEROL SULFATE 2.5 MG/3ML
5 SOLUTION RESPIRATORY (INHALATION) ONCE
Status: COMPLETED | OUTPATIENT
Start: 2018-08-28 | End: 2018-08-28

## 2018-08-28 RX ORDER — BENZONATATE 100 MG/1
100 CAPSULE ORAL 3 TIMES DAILY PRN
Qty: 21 CAPSULE | Refills: 1 | Status: SHIPPED | OUTPATIENT
Start: 2018-08-28 | End: 2018-10-22

## 2018-08-28 RX ORDER — IPRATROPIUM BROMIDE AND ALBUTEROL SULFATE 2.5; .5 MG/3ML; MG/3ML
1 SOLUTION RESPIRATORY (INHALATION) ONCE
Status: COMPLETED | OUTPATIENT
Start: 2018-08-28 | End: 2018-08-28

## 2018-08-28 RX ORDER — BENZONATATE 100 MG/1
200 CAPSULE ORAL ONCE
Status: COMPLETED | OUTPATIENT
Start: 2018-08-28 | End: 2018-08-28

## 2018-08-28 RX ORDER — PREDNISONE 10 MG/1
TABLET ORAL
Qty: 30 TABLET | Refills: 0 | Status: SHIPPED | OUTPATIENT
Start: 2018-08-28 | End: 2018-09-07

## 2018-08-28 RX ORDER — METHYLPREDNISOLONE SODIUM SUCCINATE 125 MG/2ML
125 INJECTION, POWDER, LYOPHILIZED, FOR SOLUTION INTRAMUSCULAR; INTRAVENOUS ONCE
Status: COMPLETED | OUTPATIENT
Start: 2018-08-28 | End: 2018-08-28

## 2018-08-28 RX ADMIN — IPRATROPIUM BROMIDE AND ALBUTEROL SULFATE 1 AMPULE: .5; 3 SOLUTION RESPIRATORY (INHALATION) at 09:41

## 2018-08-28 RX ADMIN — BENZONATATE 200 MG: 100 CAPSULE ORAL at 08:17

## 2018-08-28 RX ADMIN — METHYLPREDNISOLONE SODIUM SUCCINATE 125 MG: 125 INJECTION, POWDER, FOR SOLUTION INTRAMUSCULAR; INTRAVENOUS at 08:17

## 2018-08-28 RX ADMIN — METHYLPREDNISOLONE SODIUM SUCCINATE 125 MG: 125 INJECTION, POWDER, LYOPHILIZED, FOR SOLUTION INTRAMUSCULAR; INTRAVENOUS at 08:17

## 2018-08-28 RX ADMIN — ALBUTEROL SULFATE 5 MG: 2.5 SOLUTION RESPIRATORY (INHALATION) at 07:51

## 2018-08-28 NOTE — ED NOTES
Patient to ed with complaints of cough and generalized body aches which started yesterday. Patient reports her granddaughter has been ill with URI.      Theron Truong RN  08/28/18 0133

## 2018-08-28 NOTE — ED PROVIDER NOTES
mammogram 8/26/13    Annually at [facility]    Hx of migraine headaches     Hyperlipidemia     Hypertension     Hypothyroidism     Neuropathic pain         CURRENT MEDICATIONS:   Patient's Medications   New Prescriptions    No medications on file   Previous Medications    ALBUTEROL-IPRATROPIUM (COMBIVENT RESPIMAT)  MCG/ACT AERS INHALER    Inhale 1 puff into the lungs every 4 hours as needed for Wheezing    BLOOD GLUCOSE MONITORING SUPPL (TRUE METRIX METER) CRYSTAL    1 kit by Does not apply route daily    BLOOD GLUCOSE MONITORING SUPPL (TRUE METRIX METER) W/DEVICE KIT    1 kit by Does not apply route daily    BLOOD GLUCOSE MONITORING SUPPL (TRUE METRIX METER) W/DEVICE KIT    USE TO TEST BLOOD SUGAR DAILY    BLOOD GLUCOSE TEST STRIPS (TRUE METRIX BLOOD GLUCOSE TEST) STRIP    1 each by In Vitro route daily As needed.     CHOLECALCIFEROL (VITAMIN D3) 5000 UNITS CAPS    Take 1 capsule by mouth daily    CLONIDINE (CATAPRES) 0.1 MG TABLET    TAKE 1 TABLET BY MOUTH 2 TIMES A DAY    CYANOCOBALAMIN 1000 MCG/ML INJECTION    INJECT 1ML SUBCUTANEOUSLY EVERY MONTH    CYCLOBENZAPRINE (FLEXERIL) 10 MG TABLET    TAKE ONE (1) TABLET BY MOUTH THREE TIMES A DAY AS NEEDED FOR SPASMS    DESONIDE (DESOWEN) 0.05 % CREAM        DOCUSATE SODIUM (COLACE) 100 MG CAPSULE    Take 1 capsule by mouth 3 times daily as needed for Constipation    HYDROCHLOROTHIAZIDE (HYDRODIURIL) 25 MG TABLET    TAKE 1 TABLET BY MOUTH ONCE DAILY    LACTULOSE (CHRONULAC) 10 GM/15ML SOLUTION        LEVOTHYROXINE (SYNTHROID) 150 MCG TABLET    Take 1 tablet by mouth daily    LORATADINE (CLARITIN) 10 MG TABLET    TAKE ONE (1) TABLET BY MOUTH ONCE DAILY    LOSARTAN (COZAAR) 25 MG TABLET    TAKE ONE TABLET BY MOUTH DAILY    MOMETASONE-FORMOTEROL (DULERA) 200-5 MCG/ACT INHALER    INHALE 2 PUFFS BY MOUTH EVERY 12 HOURS    ONDANSETRON (ZOFRAN) 4 MG TABLET    Take 1 tablet by mouth daily as needed for Nausea or Vomiting    PANTOPRAZOLE (PROTONIX) 40 MG TABLET TAKE ONE  1  TABLET BY MOUTH ONCE DAILY    PROMETHAZINE (PHENERGAN) 6.25 MG/5ML SYRUP    TAKE 5MLS BY MOUTH FOUR TIMES DAILY AS NEEDED FOR NAUSEA    PROMETHAZINE (PHENERGAN) 6.25 MG/5ML SYRUP    TAKE 5MLS BY MOUTH FOUR TIMES DAILY AS NEEDED FOR NAUSEA    QUETIAPINE (SEROQUEL) 100 MG TABLET    Take 1 tablet by mouth nightly Discontinue elavil. Patient is on zoloft 50 mg daily now.     ROSUVASTATIN (CRESTOR) 10 MG TABLET    Take 1 tablet by mouth nightly    SERTRALINE (ZOLOFT) 50 MG TABLET    Take 1 tablet by mouth daily    TERBINAFINE (LAMISIL) 250 MG TABLET    Take 1 tablet by mouth daily    TIOTROPIUM (SPIRIVA HANDIHALER) 18 MCG INHALATION CAPSULE    Inhale 1 capsule into the lungs daily    TUBERCULIN-ALLERGY SYRINGES 28G X 1/2\" 1 ML MISC    1 each by Does not apply route every 30 days    VITAMIN D (ERGOCALCIFEROL) 68791 UNITS CAPS CAPSULE    Take 1 capsule by mouth once a week Give Vitamin D2,   Modified Medications    No medications on file   Discontinued Medications    VARENICLINE (CHANTIX CONTINUING MONTH DONNA) 1 MG TABLET    Take 1 tablet by mouth 2 times daily        SURGICAL HISTORY:       Procedure Laterality Date    CARPAL TUNNEL RELEASE      CENTRAL LINE  10/7/2013         CHOLECYSTECTOMY      COLONOSCOPY  9/6/2011    Tubular adenoma    COLONOSCOPY  10/24/2005    Dr. Beverlee Mcburney - Tubular adenoma    ESOPHAGEAL MOTILITY STUDY  6/17/2013    NORMAL    FINE NEEDLE ASPIRATION  8/10/2012    THYROID - NEGATIVE    HERNIA REPAIR      HYSTERECTOMY      MIA AND BSO  8/15/2002    Endometriosis, fibroids    TONSILLECTOMY      UPPER GASTROINTESTINAL ENDOSCOPY  10/17/2005    Bx small bowel, Gastric, GE junction all negative    UPPER GASTROINTESTINAL ENDOSCOPY  9/5/2000    Dr. Rena Thomas - NORMAL        FAMILY HISTORY:   Family History   Problem Relation Age of Onset    Diabetes Sister     Heart Disease Sister         chf    Cancer Sister 46        has metastatic colon cancer    Diabetes Sister     Kidney Disease Sister     Cancer Sister         brain cancer    Cancer Mother 68        cerival cancer    Osteoarthritis Mother     Heart Disease Father     High Blood Pressure Father     Heart Disease Brother 40        heart attack    High Blood Pressure Maternal Aunt     Cancer Other 46        liver cancer    Cancer Other 47        lung cancer and smoker, maternal neice    Cancer Other         bladder cancer, first cousin. SOCIAL HISTORY:   Social History     Social History    Marital status: Single     Spouse name: N/A    Number of children: N/A    Years of education: N/A     Occupational History    Not on file. Social History Main Topics    Smoking status: Former Smoker     Packs/day: 0.25     Years: 32.00     Types: Cigarettes     Quit date: 1/10/2017    Smokeless tobacco: Never Used      Comment: 30 years     Alcohol use No    Drug use: No    Sexual activity: Not on file     Other Topics Concern    Not on file     Social History Narrative    No narrative on file        ALLERGIES:   Allergies   Allergen Reactions    Bupropion Other (See Comments)     Muscle spasms    Morphine Hives and Itching    Morphine And Related Itching       PHYSICAL EXAM:  VITAL SIGNS: /89   Pulse 104   Temp 98.3 °F (36.8 °C) (Oral)   Resp 18   Ht 5' 5\" (1.651 m)   Wt 86.2 kg (190 lb)   SpO2 94%   BMI 31.62 kg/m²   Constitutional:  No acute distress, Non-toxic appearance. Occasional harsh cough. Not pale or diaphoretic. HENT: Normocephalic, Atraumatic Oropharynx moist, No oral exudates. TMs are normal.  Eyes:  PERRLA, EOMI, Conjunctiva normal, No discharge. Neck: No tenderness, Supple, No lymphadenopathy, No stridor. Cardiovascular:  Normal heart rate, Normal rhythm, No murmurs, No rubs, No gallops. Pulmonary/Chest:  Bilateral expiratory wheezes. No rales heard. She has tenderness of the anterior bilateral chest wall to palpation. This does seem to mimic her pain.   Abdomen:   Soft, No normal.  Troponin was normal.  EKG shows no ischemia or arrhythmia. BNP was 10. Chest x-ray read by the radiologist reviewed by myself shows no acute abnormality. Patient was initially treated with IV Solu-Medrol and albuterol ×2. She was given Tessalon for cough. On repeat exam she still had wheezing. Recheck O2 sat was 88-93% on room air. Temperature was 99.2. She was given a DuoNeb treatment. On reexam at 1045, the patient feels better. Breath sounds are significantly increased. No wheezes heard. Her cough is much improved. She states she wants to go home and does not want admission to the hospital.  I will treat her with tapering course of prednisone as well as Levaquin. She was given Tessalon to take if needed for cough. I advised follow-up with her primary care provider. I discussed in detail with Marylee Knee the results of evaluation in the Emergency Department, diagnosis, care and prognosis.        FINAL IMPRESSION:  1 -- COPD exacerbation  2 -- Acute bronchitis                Roseanna Skiff, MD  08/28/18 1609 [facility] Belem Kumar MD  09/07/18 1622

## 2018-08-28 NOTE — ED NOTES
While awake pt O2 saturation is between 90-93%, per patient this is her\" baseline\". When sleeping at home patient uses oxygen 2 1/2L NC at night. Patient admits she has COPD.

## 2018-08-28 NOTE — ED NOTES
Patient given prescription, discharge instructions verbal and written, patient verbalized understanding. Alert/oriented X4, Clear speech.   Patient exhibits no distress, ambulates with steady gait per self leaving unit, no further request.     Eliazar Keenan RN  08/28/18 [id number]

## 2018-08-29 DIAGNOSIS — M43.10 DEGENERATIVE SPONDYLOLISTHESIS: Primary | ICD-10-CM

## 2018-08-29 LAB
EKG ATRIAL RATE: 97 BPM
EKG DIAGNOSIS: NORMAL
EKG P AXIS: 79 DEGREES
EKG P-R INTERVAL: 148 MS
EKG Q-T INTERVAL: 320 MS
EKG QRS DURATION: 90 MS
EKG QTC CALCULATION (BAZETT): 406 MS
EKG R AXIS: -59 DEGREES
EKG T AXIS: 86 DEGREES
EKG VENTRICULAR RATE: 97 BPM

## 2018-08-29 PROCEDURE — 93010 ELECTROCARDIOGRAM REPORT: CPT | Performed by: INTERNAL MEDICINE

## 2018-08-30 NOTE — TELEPHONE ENCOUNTER
Asad Mccloud needs    Pantoprazole (PROTONIX) 40 Mg Tablet    She is out of them. Please call into [facility].

## 2018-09-01 RX ORDER — PANTOPRAZOLE SODIUM 40 MG/1
TABLET, DELAYED RELEASE ORAL
Qty: 30 TABLET | Refills: 11 | Status: SHIPPED | OUTPATIENT
Start: 2018-09-01 | End: 2018-10-08

## 2018-09-05 ENCOUNTER — HOSPITAL ENCOUNTER (OUTPATIENT)
Dept: OTHER | Age: 63
Discharge: HOME | End: 2018-09-12
Attending: INTERNAL MEDICINE | Admitting: INTERNAL MEDICINE

## 2018-09-05 ASSESSMENT — PAIN SCALES - QUEBEC BACK PAIN DISABILITY SCALE
MAKE YOUR BED: 1
MOVE A CHAIR: 1
CLIMB ONE FLIGHT OF STAIRS: 1
SLEEP THROUGH THE NIGHT: 2
PULL OR PUSH HEAVY DOORS: 2
QUEBEC DISABILITY INDEX: 40-59%
WALK SEVERAL KILOMETERS  OR MILES: 4
THROW A BALL: 4
TOTAL SCORE: 46
TURN OVER IN BED: 2
RUN ONE BLOCK OR 100M: 5
SIT IN A CHAIR FOR SEVERAL HOURS: 2
CARRY TWO BAGS OF GROCERIES: 3
REACH UP TO HIGH SHELVES: 1
LIFT AND CARRY A HEAVY SUITCASE: 5
WALK A FEW BLOCKS OR 300 TO 400M: 3
RIDE IN A CAR: 1
GET OUT OF BED: 2
QUEBEC CMS MODIFIER: CK
STAND UP FOR 20 TO 30 MINUTES: 1
PUT ON SOCKS OR PANYHOSE: 2
TAKE FOOD OUT OF THE REFRIGERATOR: 1
BEND OVER TO CLEAN THE BATHTUB: 3

## 2018-09-05 NOTE — PROGRESS NOTES
Physical Therapy  Initial Assessment  Date: 2018  Patient Name: Eloy Perez  MRN: [id number]  : 1955          Restrictions  Restrictions/Precautions  Restrictions/Precautions: Fall Risk (No fall risk)  Position Activity Restriction  Other position/activity restrictions: Intermittently uses O2    Subjective   General  Chart Reviewed: Yes  Patient assessed for rehabilitation services?: Yes  Additional Pertinent Hx: COPD, HTN, asthma, arthritis, Thyroid disease, Torn R Rot Cuff  Family / Caregiver Present: No  Referring Practitioner: Dr. Irene Deng  Referral Date : 18  Diagnosis: Degenerative Spondylolisthesis  PT Visit Information  Onset Date:  (States pain has been increaseing over the last 2 years)  PT Insurance Information: [employer]  Total # of Visits Approved: 12  Total # of Visits to Date: 1  Subjective  Subjective: C/o LBP intermittently radiating down L LE  Pain Screening  Patient Currently in Pain:  (8-9/10)       Objective     Observation/Palpation  Posture: Fair    Spine  Thoracic: Trunk mobility decreased 25%    Strength RLE  Strength RLE: WNL  Strength LLE  Strength LLE: WNL (Except PF 4/5, knee flex/ext 4/5)  Strength Other  Other: Trunk flex/ext 4/5     Additional Measures  Flexibility: HS flexibility: R/ 75 degrees, L/ 55 degrees  Sensation  Overall Sensation Status: WFL        Ambulation  Ambulation?:  (Amb without any assistive device)                            Assessment   Conditions Requiring Skilled Therapeutic Intervention  Body structures, Functions, Activity limitations: Decreased strength;Decreased ROM  Assessment: Prior level of function: Able to complete usual ADLs with less pain/ difficulty  Prognosis: Good  Decision Making: Low Complexity  REQUIRES PT FOLLOW UP: Yes  Activity Tolerance  Activity Tolerance: Patient Tolerated treatment well         Plan   Plan  Times per week: 2x/wk x 6 weeks  Current Treatment Recommendations: Strengthening, ROM, Home Exercise Program,

## 2018-09-12 DIAGNOSIS — F32.0 MILD SINGLE CURRENT EPISODE OF MAJOR DEPRESSIVE DISORDER (HCC): ICD-10-CM

## 2018-09-12 DIAGNOSIS — J45.40 MODERATE PERSISTENT ASTHMA WITHOUT COMPLICATION: ICD-10-CM

## 2018-09-12 RX ORDER — VORTIOXETINE 10 MG/1
TABLET, FILM COATED ORAL
Qty: 30 TABLET | Refills: 10 | Status: SHIPPED | OUTPATIENT
Start: 2018-09-12 | End: 2018-10-12

## 2018-09-12 RX ORDER — TIOTROPIUM BROMIDE 18 UG/1
CAPSULE ORAL; RESPIRATORY (INHALATION)
Qty: 30 CAPSULE | Refills: 10 | Status: SHIPPED | OUTPATIENT
Start: 2018-09-12 | End: 2018-10-12

## 2018-09-14 ENCOUNTER — TELEPHONE (OUTPATIENT)
Dept: PRIMARY CARE CLINIC | Age: 63
End: 2018-09-14

## 2018-09-24 LAB — DIABETIC RETINOPATHY: NEGATIVE

## 2018-10-08 RX ORDER — PANTOPRAZOLE SODIUM 40 MG/1
TABLET, DELAYED RELEASE ORAL
Qty: 30 TABLET | Refills: 11 | Status: SHIPPED | OUTPATIENT
Start: 2018-10-08 | End: 2018-10-22

## 2018-10-09 ENCOUNTER — OFFICE VISIT (OUTPATIENT)
Dept: PRIMARY CARE CLINIC | Age: 63
End: 2018-10-09
Payer: COMMERCIAL

## 2018-10-09 VITALS
SYSTOLIC BLOOD PRESSURE: 120 MMHG | HEIGHT: 65 IN | HEART RATE: 84 BPM | TEMPERATURE: 98.6 F | OXYGEN SATURATION: 96 % | DIASTOLIC BLOOD PRESSURE: 82 MMHG | BODY MASS INDEX: 31.06 KG/M2 | RESPIRATION RATE: 12 BRPM | WEIGHT: 186.4 LBS

## 2018-10-09 DIAGNOSIS — Z01.818 PRE-OPERATIVE GENERAL PHYSICAL EXAMINATION: ICD-10-CM

## 2018-10-09 DIAGNOSIS — H25.9 AGE-RELATED CATARACT OF BOTH EYES, UNSPECIFIED AGE-RELATED CATARACT TYPE: Primary | ICD-10-CM

## 2018-10-09 PROCEDURE — G8427 DOCREV CUR MEDS BY ELIG CLIN: HCPCS | Performed by: FAMILY MEDICINE

## 2018-10-09 PROCEDURE — G8484 FLU IMMUNIZE NO ADMIN: HCPCS | Performed by: FAMILY MEDICINE

## 2018-10-09 PROCEDURE — G8417 CALC BMI ABV UP PARAM F/U: HCPCS | Performed by: FAMILY MEDICINE

## 2018-10-09 PROCEDURE — 99243 OFF/OP CNSLTJ NEW/EST LOW 30: CPT | Performed by: FAMILY MEDICINE

## 2018-10-09 PROCEDURE — 3017F COLORECTAL CA SCREEN DOC REV: CPT | Performed by: FAMILY MEDICINE

## 2018-10-09 NOTE — PROGRESS NOTES
In Vitro route daily As needed. 100 each 3    QUEtiapine (SEROQUEL) 100 MG tablet Take 1 tablet by mouth nightly Discontinue elavil. Patient is on zoloft 50 mg daily now.  28 tablet 5    terbinafine (LAMISIL) 250 MG tablet Take 1 tablet by mouth daily 42 tablet 0    cloNIDine (CATAPRES) 0.1 MG tablet TAKE 1 TABLET BY MOUTH 2 TIMES A DAY  11    desonide (DESOWEN) 0.05 % cream       lactulose (CHRONULAC) 10 GM/15ML solution       promethazine (PHENERGAN) 6.25 MG/5ML syrup TAKE 5MLS BY MOUTH FOUR TIMES DAILY AS NEEDED FOR NAUSEA  11    cyclobenzaprine (FLEXERIL) 10 MG tablet TAKE ONE (1) TABLET BY MOUTH THREE TIMES A DAY AS NEEDED FOR SPASMS 90 tablet 11    rosuvastatin (CRESTOR) 10 MG tablet Take 1 tablet by mouth nightly 30 tablet 5    levothyroxine (SYNTHROID) 150 MCG tablet Take 1 tablet by mouth daily 30 tablet 5    cyanocobalamin 1000 MCG/ML injection INJECT 1ML SUBCUTANEOUSLY EVERY MONTH 3 mL 11    ondansetron (ZOFRAN) 4 MG tablet Take 1 tablet by mouth daily as needed for Nausea or Vomiting 30 tablet 11    vitamin D (ERGOCALCIFEROL) 73233 units CAPS capsule Take 1 capsule by mouth once a week Give Vitamin D2, 4 capsule 5    mometasone-formoterol (DULERA) 200-5 MCG/ACT inhaler INHALE 2 PUFFS BY MOUTH EVERY 12 HOURS 13 g 11    Tuberculin-Allergy Syringes 28G X 1/2\" 1 ML MISC 1 each by Does not apply route every 30 days 100 each 3    hydrochlorothiazide (HYDRODIURIL) 25 MG tablet TAKE 1 TABLET BY MOUTH ONCE DAILY 30 tablet 11    docusate sodium (COLACE) 100 MG capsule Take 1 capsule by mouth 3 times daily as needed for Constipation 90 capsule 5    Cholecalciferol (VITAMIN D3) 5000 units CAPS Take 1 capsule by mouth daily 30 capsule 5    albuterol-ipratropium (COMBIVENT RESPIMAT)  MCG/ACT AERS inhaler Inhale 1 puff into the lungs every 4 hours as needed for Wheezing 1 Inhaler 5     CATARACT    This patient presents to the office today for a preoperative consultation at the request of

## 2018-10-12 ENCOUNTER — OFFICE VISIT (OUTPATIENT)
Dept: PRIMARY CARE CLINIC | Age: 63
End: 2018-10-12
Payer: COMMERCIAL

## 2018-10-12 VITALS
DIASTOLIC BLOOD PRESSURE: 80 MMHG | SYSTOLIC BLOOD PRESSURE: 123 MMHG | BODY MASS INDEX: 29.95 KG/M2 | OXYGEN SATURATION: 98 % | HEART RATE: 88 BPM | WEIGHT: 180 LBS | RESPIRATION RATE: 18 BRPM

## 2018-10-12 DIAGNOSIS — I10 ESSENTIAL HYPERTENSION: ICD-10-CM

## 2018-10-12 DIAGNOSIS — R73.03 PREDIABETES: Primary | ICD-10-CM

## 2018-10-12 DIAGNOSIS — J44.1 COPD EXACERBATION (HCC): ICD-10-CM

## 2018-10-12 DIAGNOSIS — B35.1 NAIL FUNGUS: ICD-10-CM

## 2018-10-12 DIAGNOSIS — M48.061 SPINAL STENOSIS OF LUMBAR REGION WITHOUT NEUROGENIC CLAUDICATION: ICD-10-CM

## 2018-10-12 DIAGNOSIS — E03.9 ACQUIRED HYPOTHYROIDISM: ICD-10-CM

## 2018-10-12 DIAGNOSIS — Z01.419 WELL WOMAN EXAM: ICD-10-CM

## 2018-10-12 DIAGNOSIS — G47.33 OSA (OBSTRUCTIVE SLEEP APNEA): ICD-10-CM

## 2018-10-12 DIAGNOSIS — E55.9 VITAMIN D DEFICIENCY: ICD-10-CM

## 2018-10-12 DIAGNOSIS — M79.671 PAIN IN BOTH FEET: ICD-10-CM

## 2018-10-12 DIAGNOSIS — Z23 NEED FOR IMMUNIZATION AGAINST INFLUENZA: ICD-10-CM

## 2018-10-12 DIAGNOSIS — M79.672 PAIN IN BOTH FEET: ICD-10-CM

## 2018-10-12 DIAGNOSIS — R91.1 PULMONARY NODULE, LEFT: ICD-10-CM

## 2018-10-12 DIAGNOSIS — R73.03 PREDIABETES: ICD-10-CM

## 2018-10-12 LAB
ALBUMIN SERPL-MCNC: 4.7 G/DL (ref 3.4–5)
ANION GAP SERPL CALCULATED.3IONS-SCNC: 14 MMOL/L (ref 3–16)
BUN BLDV-MCNC: 12 MG/DL (ref 7–20)
CALCIUM SERPL-MCNC: 9.9 MG/DL (ref 8.3–10.6)
CHLORIDE BLD-SCNC: 104 MMOL/L (ref 99–110)
CHOLESTEROL, TOTAL: 106 MG/DL (ref 0–199)
CO2: 25 MMOL/L (ref 21–32)
CREAT SERPL-MCNC: 1.1 MG/DL (ref 0.6–1.2)
GFR AFRICAN AMERICAN: >60
GFR NON-AFRICAN AMERICAN: 50
GLUCOSE BLD-MCNC: 108 MG/DL (ref 70–99)
HBA1C MFR BLD: 6 %
HDLC SERPL-MCNC: 48 MG/DL (ref 40–60)
LDL CHOLESTEROL CALCULATED: 41 MG/DL
PHOSPHORUS: 3.4 MG/DL (ref 2.5–4.9)
POTASSIUM SERPL-SCNC: 3.6 MMOL/L (ref 3.5–5.1)
SODIUM BLD-SCNC: 143 MMOL/L (ref 136–145)
TRIGL SERPL-MCNC: 86 MG/DL (ref 0–150)
TSH REFLEX FT4: 0.25 UIU/ML (ref 0.27–4.2)
VLDLC SERPL CALC-MCNC: 17 MG/DL

## 2018-10-12 PROCEDURE — 99214 OFFICE O/P EST MOD 30 MIN: CPT | Performed by: INTERNAL MEDICINE

## 2018-10-12 PROCEDURE — G8926 SPIRO NO PERF OR DOC: HCPCS | Performed by: INTERNAL MEDICINE

## 2018-10-12 PROCEDURE — 1036F TOBACCO NON-USER: CPT | Performed by: INTERNAL MEDICINE

## 2018-10-12 PROCEDURE — G8427 DOCREV CUR MEDS BY ELIG CLIN: HCPCS | Performed by: INTERNAL MEDICINE

## 2018-10-12 PROCEDURE — G8417 CALC BMI ABV UP PARAM F/U: HCPCS | Performed by: INTERNAL MEDICINE

## 2018-10-12 PROCEDURE — 90688 IIV4 VACCINE SPLT 0.5 ML IM: CPT | Performed by: INTERNAL MEDICINE

## 2018-10-12 PROCEDURE — 3017F COLORECTAL CA SCREEN DOC REV: CPT | Performed by: INTERNAL MEDICINE

## 2018-10-12 PROCEDURE — 90471 IMMUNIZATION ADMIN: CPT | Performed by: INTERNAL MEDICINE

## 2018-10-12 PROCEDURE — G8482 FLU IMMUNIZE ORDER/ADMIN: HCPCS | Performed by: INTERNAL MEDICINE

## 2018-10-12 PROCEDURE — 83036 HEMOGLOBIN GLYCOSYLATED A1C: CPT | Performed by: INTERNAL MEDICINE

## 2018-10-12 PROCEDURE — 3023F SPIROM DOC REV: CPT | Performed by: INTERNAL MEDICINE

## 2018-10-12 ASSESSMENT — PATIENT HEALTH QUESTIONNAIRE - PHQ9
2. FEELING DOWN, DEPRESSED OR HOPELESS: 0
SUM OF ALL RESPONSES TO PHQ QUESTIONS 1-9: 0
1. LITTLE INTEREST OR PLEASURE IN DOING THINGS: 0
SUM OF ALL RESPONSES TO PHQ QUESTIONS 1-9: 0
SUM OF ALL RESPONSES TO PHQ9 QUESTIONS 1 & 2: 0

## 2018-10-12 ASSESSMENT — ENCOUNTER SYMPTOMS
ALLERGIC/IMMUNOLOGIC NEGATIVE: 1
CHEST TIGHTNESS: 0
ABDOMINAL PAIN: 1
COUGH: 0
SHORTNESS OF BREATH: 0
WHEEZING: 0
APNEA: 0
EYES NEGATIVE: 1
BACK PAIN: 1

## 2018-10-12 NOTE — PATIENT INSTRUCTIONS
the floor and your hips and pelvis are rocking back. 3. Hold for about 6 seconds while you breathe smoothly. 4. Repeat 8 to 12 times. Heel dig bridging    1. Lie on your back with both knees bent and your ankles bent so that only your heels are digging into the floor. Your knees should be bent about 90 degrees. 2. Then push your heels into the floor, squeeze your buttocks, and lift your hips off the floor until your shoulders, hips, and knees are all in a straight line. 3. Hold for about 6 seconds as you continue to breathe normally, and then slowly lower your hips back down to the floor and rest for up to 10 seconds. 4. Do 8 to 12 repetitions. Hamstring stretch in doorway    1. Lie on your back in a doorway, with one leg through the open door. 2. Slide your leg up the wall to straighten your knee. You should feel a gentle stretch down the back of your leg. 3. Hold the stretch for at least 15 to 30 seconds. Do not arch your back, point your toes, or bend either knee. Keep one heel touching the floor and the other heel touching the wall. 4. Repeat with your other leg. 5. Do 2 to 4 times for each leg. Hip flexor stretch    1. Kneel on the floor with one knee bent and one leg behind you. Place your forward knee over your foot. Keep your other knee touching the floor. 2. Slowly push your hips forward until you feel a stretch in the upper thigh of your rear leg. 3. Hold the stretch for at least 15 to 30 seconds. Repeat with your other leg. 4. Do 2 to 4 times on each side. Wall sit    1. Stand with your back 10 to 12 inches away from a wall. 2. Lean into the wall until your back is flat against it. 3. Slowly slide down until your knees are slightly bent, pressing your lower back into the wall. 4. Hold for about 6 seconds, then slide back up the wall. 5. Repeat 8 to 12 times. Follow-up care is a key part of your treatment and safety.  Be sure to make and go to all appointments, and call your doctor if

## 2018-10-12 NOTE — PROGRESS NOTES
Vaccine Information Sheet, \"Influenza - Inactivated\"  given to Corrinne Ganser, or parent/legal guardian of  Corrinne Ganser and verbalized understanding. Patient responses:    Have you ever had a reaction to a flu vaccine? No  Are you able to eat eggs without adverse effects? Yes  Do you have any current illness? No  Have you ever had Guillian Orlando Syndrome? No    Flu vaccine given per order. Please see immunization tab.
to person, place, and time. No cranial nerve deficit. She exhibits normal muscle tone. Coordination normal.   Skin: Skin is warm and dry. No rash noted. She is not diaphoretic. No erythema. No pallor. Psychiatric: She has a normal mood and affect. Her behavior is normal. Judgment and thought content normal.       ASSESSMENT/PLAN:     Diagnosis Orders   1. Prediabetes Stable on lifestyle changes, continue. POCT glycosylated hemoglobin (Hb A1C)    Microalbumin / Creatinine Urine Ratio    Renal Function Panel    Urinalysis    Lipid Panel    FRUCTOSAMINE   2. Need for immunization against influenza  INFLUENZA, QUADV, 3 YRS AND OLDER, IM, MDV, 0.5ML (Andrae Husk)   3. Essential hypertension Controlled with current medications, continue. 4. Spinal stenosis of lumbar region without neurogenic claudication Give back exercises since it has not done well with therapy in the past.  No bowel or bladder control issues. Flexeril controls back muscle spasm. 5. AVRIL (obstructive sleep apnea) Suspected will refer for evaluation. Amy Wei MD   6. COPD exacerbation ([address] Controlled with Dulera and Combivent, continue. No sign of infection. mometasone-formoterol (DULERA) 200-5 MCG/ACT inhaler   7. Well woman exam Needed refer to GYN. Jearld Brunner, MD   8. Vitamin D deficiency On supplement ordered level to see if dose adjustment need to keep level between 50-80. Vitamin D 25 Hydroxy   9. Acquired hypothyroidism Clinically euthyroid monitor TSH to see if dose adjustments in  TSH WITH REFLEX TO FT4   10. Pain in both feet Monitor B12 level and get fructosamine and see if A1c is accurate. Symptoms consistent with neuropathy. External Referral To Podiatry   11. Nail fungus With thickened nails patient completed a course of Lamisil refer to podiatry for trimming. Patient is also applying Vicks a febrile twice a day to thickened nails. External Referral To Podiatry   12.

## 2018-10-13 LAB
T4 FREE: 1.8 NG/DL (ref 0.9–1.8)
VITAMIN D 25-HYDROXY: 41.9 NG/ML

## 2018-10-14 LAB — FRUCTOSAMINE: 242 UMOL/L (ref 170–285)

## 2018-10-16 DIAGNOSIS — E03.9 ACQUIRED HYPOTHYROIDISM: ICD-10-CM

## 2018-10-16 RX ORDER — LEVOTHYROXINE SODIUM 0.12 MG/1
125 TABLET ORAL DAILY
Qty: 90 TABLET | Refills: 3 | Status: SHIPPED | OUTPATIENT
Start: 2018-10-16 | End: 2019-09-05 | Stop reason: SDUPTHER

## 2018-10-22 ENCOUNTER — OFFICE VISIT (OUTPATIENT)
Dept: GYNECOLOGY | Age: 63
End: 2018-10-22
Payer: COMMERCIAL

## 2018-10-22 VITALS
WEIGHT: 187 LBS | BODY MASS INDEX: 31.16 KG/M2 | HEART RATE: 89 BPM | DIASTOLIC BLOOD PRESSURE: 71 MMHG | SYSTOLIC BLOOD PRESSURE: 114 MMHG | HEIGHT: 65 IN

## 2018-10-22 DIAGNOSIS — Z01.419 WELL WOMAN EXAM WITH ROUTINE GYNECOLOGICAL EXAM: Primary | ICD-10-CM

## 2018-10-22 PROCEDURE — 99386 PREV VISIT NEW AGE 40-64: CPT | Performed by: OBSTETRICS & GYNECOLOGY

## 2018-10-22 PROCEDURE — G8482 FLU IMMUNIZE ORDER/ADMIN: HCPCS | Performed by: OBSTETRICS & GYNECOLOGY

## 2018-10-22 ASSESSMENT — ENCOUNTER SYMPTOMS
SHORTNESS OF BREATH: 0
ABDOMINAL PAIN: 0
APNEA: 0
NAUSEA: 0
TROUBLE SWALLOWING: 0
COUGH: 0
COLOR CHANGE: 0
CHEST TIGHTNESS: 0
SORE THROAT: 0
DIARRHEA: 0
VOMITING: 0
PHOTOPHOBIA: 0
ABDOMINAL DISTENTION: 0
CONSTIPATION: 0
ANAL BLEEDING: 0
RECTAL PAIN: 0
BACK PAIN: 0
BLOOD IN STOOL: 0
WHEEZING: 0

## 2018-10-22 NOTE — PROGRESS NOTES
Does not apply route daily 1 kit 0    blood glucose test strips (TRUE METRIX BLOOD GLUCOSE TEST) strip 1 each by In Vitro route daily As needed. 100 each 3    QUEtiapine (SEROQUEL) 100 MG tablet Take 1 tablet by mouth nightly Discontinue elavil. Patient is on zoloft 50 mg daily now.  28 tablet 5    cloNIDine (CATAPRES) 0.1 MG tablet TAKE 1 TABLET BY MOUTH 2 TIMES A DAY  11    desonide (DESOWEN) 0.05 % cream       lactulose (CHRONULAC) 10 GM/15ML solution       promethazine (PHENERGAN) 6.25 MG/5ML syrup TAKE 5MLS BY MOUTH FOUR TIMES DAILY AS NEEDED FOR NAUSEA  11    cyclobenzaprine (FLEXERIL) 10 MG tablet TAKE ONE (1) TABLET BY MOUTH THREE TIMES A DAY AS NEEDED FOR SPASMS 90 tablet 11    rosuvastatin (CRESTOR) 10 MG tablet Take 1 tablet by mouth nightly 30 tablet 5    cyanocobalamin 1000 MCG/ML injection INJECT 1ML SUBCUTANEOUSLY EVERY MONTH 3 mL 11    Tuberculin-Allergy Syringes 28G X 1/2\" 1 ML MISC 1 each by Does not apply route every 30 days 100 each 3    hydrochlorothiazide (HYDRODIURIL) 25 MG tablet TAKE 1 TABLET BY MOUTH ONCE DAILY 30 tablet 11    docusate sodium (COLACE) 100 MG capsule Take 1 capsule by mouth 3 times daily as needed for Constipation 90 capsule 5    Cholecalciferol (VITAMIN D3) 5000 units CAPS Take 1 capsule by mouth daily 30 capsule 5    albuterol-ipratropium (COMBIVENT RESPIMAT)  MCG/ACT AERS inhaler Inhale 1 puff into the lungs every 4 hours as needed for Wheezing 1 Inhaler 5     Family History   Problem Relation Age of Onset    Diabetes Sister     Heart Disease Sister         chf    Cancer Sister 46        has metastatic colon cancer    Diabetes Sister     Kidney Disease Sister     Cancer Sister         brain cancer    Cancer Mother 68        cerival cancer    Osteoarthritis Mother     Heart Disease Father     High Blood Pressure Father     Heart Disease Brother 44        heart attack    High Blood Pressure Maternal Aunt     Cancer Other 46        liver cancer    Cancer Other 47        lung cancer and smoker, maternal neice    Cancer Other         bladder cancer, first cousin. Review of Systems:  Review of Systems   Constitutional: Negative for appetite change, chills, fatigue, fever and unexpected weight change. HENT: Negative for ear pain, hearing loss, mouth sores, sore throat and trouble swallowing. Eyes: Negative for photophobia and visual disturbance. Respiratory: Negative for apnea, cough, chest tightness, shortness of breath and wheezing. Cardiovascular: Negative for chest pain, palpitations and leg swelling. Gastrointestinal: Negative for abdominal distention, abdominal pain, anal bleeding, blood in stool, constipation, diarrhea, nausea, rectal pain and vomiting. Endocrine: Negative for cold intolerance, heat intolerance, polydipsia, polyphagia and polyuria. Genitourinary: Negative for difficulty urinating, dyspareunia, dysuria, enuresis, frequency, genital sores, hematuria, menstrual problem, pelvic pain, urgency, vaginal bleeding, vaginal discharge and vaginal pain. Musculoskeletal: Negative for arthralgias, back pain, joint swelling and myalgias. Skin: Negative for color change and rash. Neurological: Negative for dizziness, seizures, syncope, light-headedness and headaches. Psychiatric/Behavioral: Negative for agitation, behavioral problems, confusion, decreased concentration, dysphoric mood, hallucinations, self-injury, sleep disturbance and suicidal ideas. The patient is not nervous/anxious and is not hyperactive. Physical Exam:  /71   Pulse 89   Ht 5' 5\" (1.651 m)   Wt 187 lb (84.8 kg)   BMI 31.12 kg/m²   Body mass index is 31.12 kg/m². Physical Exam   Constitutional: She appears well-developed and well-nourished. She is cooperative. No distress. HENT:   Head: Normocephalic and atraumatic.    Mouth/Throat: Oropharynx is clear and moist.   Eyes: Conjunctivae are normal. Right eye exhibits no

## 2018-10-24 LAB
HPV COMMENT: NORMAL
HPV TYPE 16: NOT DETECTED
HPV TYPE 18: NOT DETECTED
HPVOH (OTHER TYPES): NOT DETECTED

## 2018-10-25 DIAGNOSIS — K59.01 SLOW TRANSIT CONSTIPATION: Chronic | ICD-10-CM

## 2018-10-26 RX ORDER — DOCUSATE SODIUM 100 MG
CAPSULE ORAL
Qty: 90 CAPSULE | Refills: 4 | Status: SHIPPED | OUTPATIENT
Start: 2018-10-26 | End: 2021-02-17 | Stop reason: SDUPTHER

## 2018-10-26 RX ORDER — DIPHENHYDRAMINE HCL 50 MG/1
CAPSULE ORAL
Qty: 30 CAPSULE | Refills: 4 | Status: SHIPPED | OUTPATIENT
Start: 2018-10-26 | End: 2021-02-17 | Stop reason: SDUPTHER

## 2018-11-01 PROBLEM — H35.54 RPE (RETINAL PIGMENT EPITHELIUM) ATROPHY: Status: ACTIVE | Noted: 2018-11-01

## 2018-11-01 PROBLEM — H25.013 CORTICAL AGE-RELATED CATARACT OF BOTH EYES: Status: ACTIVE | Noted: 2018-11-01

## 2018-11-01 PROBLEM — H25.13 AGE-RELATED NUCLEAR CATARACT OF BOTH EYES: Status: ACTIVE | Noted: 2018-11-01

## 2018-11-08 DIAGNOSIS — J45.40 MODERATE PERSISTENT ASTHMA WITHOUT COMPLICATION: ICD-10-CM

## 2018-11-08 DIAGNOSIS — M62.838 MUSCLE SPASM: ICD-10-CM

## 2018-11-08 RX ORDER — CLONIDINE HYDROCHLORIDE 0.1 MG/1
TABLET ORAL
Qty: 60 TABLET | Refills: 11 | Status: SHIPPED | OUTPATIENT
Start: 2018-11-08 | End: 2019-12-31

## 2018-11-08 RX ORDER — VARENICLINE TARTRATE 1 MG/1
1 TABLET, FILM COATED ORAL 2 TIMES DAILY
Qty: 60 TABLET | Refills: 3 | Status: SHIPPED | OUTPATIENT
Start: 2018-11-08 | End: 2019-05-17

## 2018-11-08 RX ORDER — ONDANSETRON 4 MG/1
4 TABLET, ORALLY DISINTEGRATING ORAL EVERY 8 HOURS PRN
Qty: 30 TABLET | Refills: 1 | Status: SHIPPED | OUTPATIENT
Start: 2018-11-08 | End: 2019-01-24 | Stop reason: SDUPTHER

## 2018-11-08 RX ORDER — CYCLOBENZAPRINE HCL 10 MG
TABLET ORAL
Qty: 90 TABLET | Refills: 11 | Status: SHIPPED | OUTPATIENT
Start: 2018-11-08 | End: 2019-12-08 | Stop reason: SDUPTHER

## 2018-11-14 ENCOUNTER — TELEPHONE (OUTPATIENT)
Dept: PRIMARY CARE CLINIC | Age: 63
End: 2018-11-14

## 2018-11-14 ENCOUNTER — OFFICE VISIT (OUTPATIENT)
Dept: SLEEP MEDICINE | Age: 63
End: 2018-11-14
Payer: COMMERCIAL

## 2018-11-14 VITALS
SYSTOLIC BLOOD PRESSURE: 100 MMHG | DIASTOLIC BLOOD PRESSURE: 60 MMHG | HEIGHT: 65 IN | TEMPERATURE: 98.8 F | WEIGHT: 189.8 LBS | BODY MASS INDEX: 31.62 KG/M2 | RESPIRATION RATE: 18 BRPM | OXYGEN SATURATION: 97 % | HEART RATE: 95 BPM

## 2018-11-14 DIAGNOSIS — G47.33 OSA AND COPD OVERLAP SYNDROME (HCC): ICD-10-CM

## 2018-11-14 DIAGNOSIS — R09.02 HYPOXEMIA: ICD-10-CM

## 2018-11-14 DIAGNOSIS — J44.9 OSA AND COPD OVERLAP SYNDROME (HCC): ICD-10-CM

## 2018-11-14 DIAGNOSIS — G47.33 OSA (OBSTRUCTIVE SLEEP APNEA): Primary | ICD-10-CM

## 2018-11-14 PROCEDURE — G8417 CALC BMI ABV UP PARAM F/U: HCPCS | Performed by: PSYCHIATRY & NEUROLOGY

## 2018-11-14 PROCEDURE — 3017F COLORECTAL CA SCREEN DOC REV: CPT | Performed by: PSYCHIATRY & NEUROLOGY

## 2018-11-14 PROCEDURE — G8926 SPIRO NO PERF OR DOC: HCPCS | Performed by: PSYCHIATRY & NEUROLOGY

## 2018-11-14 PROCEDURE — 3023F SPIROM DOC REV: CPT | Performed by: PSYCHIATRY & NEUROLOGY

## 2018-11-14 PROCEDURE — 1036F TOBACCO NON-USER: CPT | Performed by: PSYCHIATRY & NEUROLOGY

## 2018-11-14 PROCEDURE — G8482 FLU IMMUNIZE ORDER/ADMIN: HCPCS | Performed by: PSYCHIATRY & NEUROLOGY

## 2018-11-14 PROCEDURE — 99204 OFFICE O/P NEW MOD 45 MIN: CPT | Performed by: PSYCHIATRY & NEUROLOGY

## 2018-11-14 PROCEDURE — G8427 DOCREV CUR MEDS BY ELIG CLIN: HCPCS | Performed by: PSYCHIATRY & NEUROLOGY

## 2018-11-14 ASSESSMENT — ENCOUNTER SYMPTOMS
ABDOMINAL PAIN: 1
NAUSEA: 1
SHORTNESS OF BREATH: 1
ALLERGIC/IMMUNOLOGIC NEGATIVE: 1
COUGH: 1
WHEEZING: 1
EYES NEGATIVE: 1
APNEA: 1

## 2018-11-14 ASSESSMENT — SLEEP AND FATIGUE QUESTIONNAIRES
HOW LIKELY ARE YOU TO NOD OFF OR FALL ASLEEP WHILE SITTING AND TALKING TO SOMEONE: 0
HOW LIKELY ARE YOU TO NOD OFF OR FALL ASLEEP WHILE SITTING INACTIVE IN A PUBLIC PLACE: 0
HOW LIKELY ARE YOU TO NOD OFF OR FALL ASLEEP WHILE LYING DOWN TO REST IN THE AFTERNOON WHEN CIRCUMSTANCES PERMIT: 0
HOW LIKELY ARE YOU TO NOD OFF OR FALL ASLEEP WHILE SITTING AND READING: 1
ESS TOTAL SCORE: 2
HOW LIKELY ARE YOU TO NOD OFF OR FALL ASLEEP IN A CAR, WHILE STOPPED FOR A FEW MINUTES IN TRAFFIC: 0
HOW LIKELY ARE YOU TO NOD OFF OR FALL ASLEEP WHILE WATCHING TV: 1
HOW LIKELY ARE YOU TO NOD OFF OR FALL ASLEEP WHILE SITTING QUIETLY AFTER LUNCH WITHOUT ALCOHOL: 0
NECK CIRCUMFERENCE (INCHES): 15
HOW LIKELY ARE YOU TO NOD OFF OR FALL ASLEEP WHEN YOU ARE A PASSENGER IN A CAR FOR AN HOUR WITHOUT A BREAK: 0

## 2018-11-14 NOTE — PATIENT INSTRUCTIONS
Patient Education        Learning About CPAP for Sleep Apnea  What is CPAP? CPAP is a small machine that you use at home every night while you sleep. It increases air pressure in your throat to keep your airway open. When you have sleep apnea, this can help you sleep better so you feel much better. CPAP stands for \"continuous positive airway pressure. \"  The CPAP machine will have one of the following:  · A mask that covers your nose and mouth  · Prongs that fit into your nose  · A mask that covers your nose only, the most common type. This type is called NCPAP. The N stands for \"nasal.\"  Why is it done? CPAP is usually the best treatment for obstructive sleep apnea. It is the first treatment choice and the most widely used. Your doctor may suggest CPAP if you have:  · Moderate to severe sleep apnea. · Sleep apnea and coronary artery disease (CAD). · Sleep apnea and heart failure. How does it help? · CPAP can help you have more normal sleep, so you feel less sleepy and more alert during the daytime. · CPAP may help keep heart failure or other heart problems from getting worse. · CPAP may help lower your blood pressure. · If you use CPAP, your bed partner may also sleep better because you are not snoring or restless. What are the side effects? Some people who use CPAP have:  · A dry or stuffy nose and a sore throat. · Irritated skin on the face. · Sore eyes. · Bloating. If you have any of these problems, work with your doctor to fix them. Here are some things you can try:  · Be sure the mask or nasal prongs fit well. · See if your doctor can adjust the pressure of your CPAP. · If your nose is dry, try a humidifier. · If your nose is runny or stuffy, try decongestant medicine or a steroid nasal spray. Be safe with medicines. Read and follow all instructions on the label. Do not use the medicine longer than the label says. If these things do not help, you might try a different type of machine. Some machines have air pressure that adjusts on its own. Others have air pressures that are different when you breathe in than when you breathe out. This may reduce discomfort caused by too much pressure in your nose. Where can you learn more? Go to https://chmichaeleb.[employer]. org and sign in to your [employer] account. Enter J676 in the [employer] box to learn more about \"Learning About CPAP for Sleep Apnea. \"     If you do not have an account, please click on the \"Sign Up Now\" link. Current as of: December 6, 2017  Content Version: 11.8  © 1851-5207 [employer], [employer]. Care instructions adapted under license by [facility] ([facility]). If you have questions about a medical condition or this instruction, always ask your healthcare professional. [address] any warranty or liability for your use of this information.

## 2018-12-09 ENCOUNTER — HOSPITAL ENCOUNTER (OUTPATIENT)
Dept: SLEEP CENTER | Age: 63
Discharge: HOME | End: 2018-12-09
Payer: COMMERCIAL

## 2018-12-09 DIAGNOSIS — G47.33 OSA (OBSTRUCTIVE SLEEP APNEA): ICD-10-CM

## 2018-12-09 DIAGNOSIS — R09.02 HYPOXEMIA: ICD-10-CM

## 2018-12-09 PROCEDURE — 95810 POLYSOM 6/> YRS 4/> PARAM: CPT

## 2018-12-10 PROCEDURE — 95810 POLYSOM 6/> YRS 4/> PARAM: CPT | Performed by: PSYCHIATRY & NEUROLOGY

## 2018-12-13 ENCOUNTER — TELEPHONE (OUTPATIENT)
Dept: SLEEP MEDICINE | Age: 63
End: 2018-12-13

## 2018-12-21 ENCOUNTER — TELEPHONE (OUTPATIENT)
Dept: PRIMARY CARE CLINIC | Age: 63
End: 2018-12-21

## 2018-12-24 RX ORDER — QUETIAPINE FUMARATE 100 MG/1
TABLET, FILM COATED ORAL
Qty: 28 TABLET | Refills: 4 | Status: SHIPPED | OUTPATIENT
Start: 2018-12-24 | End: 2019-05-17 | Stop reason: SDUPTHER

## 2018-12-26 ENCOUNTER — TELEPHONE (OUTPATIENT)
Dept: PRIMARY CARE CLINIC | Age: 63
End: 2018-12-26

## 2018-12-26 NOTE — TELEPHONE ENCOUNTER
Caller:[facility] @ [facility]   An order was faxed from [address] [facility] for home O2. Calling to request a signature and a fax back to [phone]   thank you!   Jessie: [phone]

## 2019-01-04 DIAGNOSIS — E78.2 MIXED HYPERLIPIDEMIA: ICD-10-CM

## 2019-01-08 RX ORDER — ROSUVASTATIN CALCIUM 10 MG/1
TABLET, COATED ORAL
Qty: 30 TABLET | Refills: 4 | Status: SHIPPED | OUTPATIENT
Start: 2019-01-08 | End: 2019-06-10 | Stop reason: SDUPTHER

## 2019-01-09 ENCOUNTER — TELEPHONE (OUTPATIENT)
Dept: PRIMARY CARE CLINIC | Age: 64
End: 2019-01-09

## 2019-01-19 ENCOUNTER — HOSPITAL ENCOUNTER (OUTPATIENT)
Dept: SLEEP CENTER | Age: 64
Discharge: HOME | End: 2019-01-19
Payer: COMMERCIAL

## 2019-01-19 DIAGNOSIS — R09.02 HYPOXEMIA: ICD-10-CM

## 2019-01-19 DIAGNOSIS — G47.33 OSA (OBSTRUCTIVE SLEEP APNEA): ICD-10-CM

## 2019-01-19 PROCEDURE — 95811 POLYSOM 6/>YRS CPAP 4/> PARM: CPT

## 2019-01-21 PROCEDURE — 95811 POLYSOM 6/>YRS CPAP 4/> PARM: CPT | Performed by: PSYCHIATRY & NEUROLOGY

## 2019-01-22 ENCOUNTER — TELEPHONE (OUTPATIENT)
Dept: SLEEP MEDICINE | Age: 64
End: 2019-01-22

## 2019-01-24 DIAGNOSIS — E53.8 VITAMIN B 12 DEFICIENCY: ICD-10-CM

## 2019-01-24 RX ORDER — ONDANSETRON 4 MG/1
TABLET, ORALLY DISINTEGRATING ORAL
Qty: 30 TABLET | Refills: 10 | Status: SHIPPED | OUTPATIENT
Start: 2019-01-24 | End: 2019-11-18

## 2019-01-24 RX ORDER — CALCIUM CITRATE/VITAMIN D3 200MG-6.25
TABLET ORAL
Qty: 100 EACH | Refills: 10 | Status: SHIPPED | OUTPATIENT
Start: 2019-01-24 | End: 2019-05-16 | Stop reason: SDUPTHER

## 2019-01-24 RX ORDER — CYANOCOBALAMIN 1000 UG/ML
INJECTION INTRAMUSCULAR; SUBCUTANEOUS
Qty: 3 ML | Refills: 10 | Status: SHIPPED | OUTPATIENT
Start: 2019-01-24 | End: 2020-03-03

## 2019-01-29 RX ORDER — LOSARTAN POTASSIUM 25 MG/1
TABLET ORAL
Qty: 30 TABLET | Refills: 0 | Status: SHIPPED | OUTPATIENT
Start: 2019-01-29 | End: 2019-03-03 | Stop reason: SDUPTHER

## 2019-02-02 DIAGNOSIS — E03.9 ACQUIRED HYPOTHYROIDISM: ICD-10-CM

## 2019-02-04 RX ORDER — LEVOTHYROXINE SODIUM 0.15 MG/1
TABLET ORAL
Qty: 30 TABLET | Refills: 4 | Status: SHIPPED | OUTPATIENT
Start: 2019-02-04 | End: 2019-04-29 | Stop reason: ALTCHOICE

## 2019-02-12 ENCOUNTER — TELEPHONE (OUTPATIENT)
Dept: PULMONOLOGY | Age: 64
End: 2019-02-12

## 2019-03-06 DIAGNOSIS — J45.40 MODERATE PERSISTENT ASTHMA WITHOUT COMPLICATION: ICD-10-CM

## 2019-04-03 DIAGNOSIS — R73.03 PREDIABETES: ICD-10-CM

## 2019-04-19 ENCOUNTER — TELEPHONE (OUTPATIENT)
Dept: INTERNAL MEDICINE CLINIC | Age: 64
End: 2019-04-19

## 2019-04-19 NOTE — TELEPHONE ENCOUNTER
PA SUBMITTED FOR True Metrix Meter w/Device XX KIT  Key: [facility] #: [id number]   STATUS: PENDING

## 2019-04-22 NOTE — TELEPHONE ENCOUNTER
Received APPROVAL for True Metrix Meter w/Device XX KIT for 30 days. Please advise patient. Thank you.

## 2019-04-29 ENCOUNTER — OFFICE VISIT (OUTPATIENT)
Dept: PRIMARY CARE CLINIC | Age: 64
End: 2019-04-29
Payer: COMMERCIAL

## 2019-04-29 VITALS
SYSTOLIC BLOOD PRESSURE: 118 MMHG | BODY MASS INDEX: 33.28 KG/M2 | DIASTOLIC BLOOD PRESSURE: 76 MMHG | WEIGHT: 200 LBS | OXYGEN SATURATION: 97 % | RESPIRATION RATE: 18 BRPM | TEMPERATURE: 99.8 F | HEART RATE: 94 BPM

## 2019-04-29 DIAGNOSIS — Z23 NEED FOR HEPATITIS B VACCINATION: ICD-10-CM

## 2019-04-29 DIAGNOSIS — R10.30 LOWER ABDOMINAL PAIN: ICD-10-CM

## 2019-04-29 DIAGNOSIS — Z11.59 NEED FOR HEPATITIS C SCREENING TEST: ICD-10-CM

## 2019-04-29 DIAGNOSIS — I10 ESSENTIAL HYPERTENSION: ICD-10-CM

## 2019-04-29 DIAGNOSIS — E55.9 VITAMIN D DEFICIENCY: ICD-10-CM

## 2019-04-29 DIAGNOSIS — E53.8 VITAMIN B 12 DEFICIENCY: ICD-10-CM

## 2019-04-29 DIAGNOSIS — J44.9 CHRONIC OBSTRUCTIVE PULMONARY DISEASE, UNSPECIFIED COPD TYPE (HCC): ICD-10-CM

## 2019-04-29 DIAGNOSIS — R73.03 PREDIABETES: ICD-10-CM

## 2019-04-29 DIAGNOSIS — R73.03 PREDIABETES: Primary | ICD-10-CM

## 2019-04-29 LAB
A/G RATIO: 1.6 (ref 1.1–2.2)
ALBUMIN SERPL-MCNC: 4.2 G/DL (ref 3.4–5)
ALP BLD-CCNC: 115 U/L (ref 40–129)
ALT SERPL-CCNC: 19 U/L (ref 10–40)
ANION GAP SERPL CALCULATED.3IONS-SCNC: 19 MMOL/L (ref 3–16)
AST SERPL-CCNC: 17 U/L (ref 15–37)
BILIRUB SERPL-MCNC: 0.3 MG/DL (ref 0–1)
BILIRUBIN URINE: NEGATIVE
BLOOD, URINE: NEGATIVE
BUN BLDV-MCNC: 9 MG/DL (ref 7–20)
CALCIUM SERPL-MCNC: 9.9 MG/DL (ref 8.3–10.6)
CHLORIDE BLD-SCNC: 99 MMOL/L (ref 99–110)
CHOLESTEROL, TOTAL: 135 MG/DL (ref 0–199)
CLARITY: CLEAR
CO2: 26 MMOL/L (ref 21–32)
COLOR: YELLOW
CREAT SERPL-MCNC: 0.9 MG/DL (ref 0.6–1.2)
GFR AFRICAN AMERICAN: >60
GFR NON-AFRICAN AMERICAN: >60
GLOBULIN: 2.7 G/DL
GLUCOSE BLD-MCNC: 169 MG/DL (ref 70–99)
GLUCOSE URINE: NEGATIVE MG/DL
HBA1C MFR BLD: 6 %
HBV SURFACE AB TITR SER: 8.62 MIU/ML
HDLC SERPL-MCNC: 48 MG/DL (ref 40–60)
HEPATITIS C ANTIBODY INTERPRETATION: NORMAL
KETONES, URINE: NEGATIVE MG/DL
LDL CHOLESTEROL CALCULATED: 58 MG/DL
LEUKOCYTE ESTERASE, URINE: NEGATIVE
MICROSCOPIC EXAMINATION: NORMAL
NITRITE, URINE: NEGATIVE
PH UA: 7 (ref 5–8)
POTASSIUM SERPL-SCNC: 3.2 MMOL/L (ref 3.5–5.1)
PROTEIN UA: NEGATIVE MG/DL
SODIUM BLD-SCNC: 144 MMOL/L (ref 136–145)
SPECIFIC GRAVITY UA: 1.01 (ref 1–1.03)
TOTAL PROTEIN: 6.9 G/DL (ref 6.4–8.2)
TRIGL SERPL-MCNC: 143 MG/DL (ref 0–150)
URINE TYPE: NORMAL
UROBILINOGEN, URINE: 0.2 E.U./DL
VITAMIN B-12: 919 PG/ML (ref 211–911)
VITAMIN D 25-HYDROXY: 39.7 NG/ML
VLDLC SERPL CALC-MCNC: 29 MG/DL

## 2019-04-29 PROCEDURE — G8417 CALC BMI ABV UP PARAM F/U: HCPCS | Performed by: INTERNAL MEDICINE

## 2019-04-29 PROCEDURE — 1036F TOBACCO NON-USER: CPT | Performed by: INTERNAL MEDICINE

## 2019-04-29 PROCEDURE — 99214 OFFICE O/P EST MOD 30 MIN: CPT | Performed by: INTERNAL MEDICINE

## 2019-04-29 PROCEDURE — G8427 DOCREV CUR MEDS BY ELIG CLIN: HCPCS | Performed by: INTERNAL MEDICINE

## 2019-04-29 PROCEDURE — 3017F COLORECTAL CA SCREEN DOC REV: CPT | Performed by: INTERNAL MEDICINE

## 2019-04-29 PROCEDURE — G8926 SPIRO NO PERF OR DOC: HCPCS | Performed by: INTERNAL MEDICINE

## 2019-04-29 PROCEDURE — 3023F SPIROM DOC REV: CPT | Performed by: INTERNAL MEDICINE

## 2019-04-29 PROCEDURE — 83036 HEMOGLOBIN GLYCOSYLATED A1C: CPT | Performed by: INTERNAL MEDICINE

## 2019-04-29 ASSESSMENT — ENCOUNTER SYMPTOMS
EYES NEGATIVE: 1
BACK PAIN: 1
ALLERGIC/IMMUNOLOGIC NEGATIVE: 1
APNEA: 0
ABDOMINAL PAIN: 1
CHEST TIGHTNESS: 0
SHORTNESS OF BREATH: 0
COUGH: 0
WHEEZING: 0

## 2019-04-29 NOTE — LETTER
[facility]  [facility] [address]  Phone: [phone]  Fax: [phone]    Russ Dubin, MD        April 29, 2019     Patient: Irene Hill   YOB: 1955   Date of Visit: 4/29/2019       To Pharmacist:    Please give Kneebone Shingrix. Immunization History   Administered Date(s) Administered    Hepatitis B (Engerix-B) 04/13/2017    Hepatitis B Adult (Engerix-B) 07/28/2017    Influenza Vaccine, unspecified formulation 10/02/2011, 02/15/2014    Influenza Virus Vaccine 09/25/2016, 09/05/2017    Influenza, Intradermal, Preservative free 09/25/2013, 02/15/2014    Influenza, Orvis Samir, 3 Years and older, IM (Fluzone 3 yrs and older or Afluria 5 yrs and older) 10/12/2018    Pneumococcal 13-valent Conjugate (Axjebei24) 10/05/2016    Pneumococcal Polysaccharide (Gemzlxegn76) 10/08/2013    Tdap (Boostrix, Adacel) 03/27/2015    Zoster Live (Zostavax) 04/13/2017     . If you have any questions or concerns, please don't hesitate to call.     Sincerely,          Russ Dubin, MD

## 2019-04-29 NOTE — PROGRESS NOTES
metabolic acidosis    COPD exacerbation (HCC)    GERD (gastroesophageal reflux disease)    Essential hypertension    Prediabetes    Vitamin D deficiency    History of cholecystectomy    Degenerative spondylolisthesis    Spondylolisthesis, lumbar region    DDD (degenerative disc disease), lumbar    Mechanical low back pain    Chronic pain syndrome    Facet arthropathy    Spinal stenosis of lumbar region    Degenerative lumbar spinal stenosis    Disc displacement, lumbar    Osteoarthritis of lumbar spine    Chronic low back pain    Lymphocytosis    Leukocytosis    Partial small bowel obstruction (HCC)    Cortical age-related cataract of both eyes    Age-related nuclear cataract of both eyes    RPE (retinal pigment epithelium) atrophy    Thyroid eye disease    AVRIL (obstructive sleep apnea)     Allergies   Allergen Reactions    Bupropion Other (See Comments)     Muscle spasms    Morphine Hives and Itching    Morphine And Related Itching         Review of Systems   Constitutional: Negative. Negative for fatigue. HENT: Negative. Negative for hoarse voice. Eyes: Negative. Respiratory: Negative for apnea, cough, hemoptysis, sputum production, chest tightness, shortness of breath and wheezing. Asthma and current smoker and copd. hospitalized for COPD exacerbation in December 2017. Cardiovascular: Negative for chest pain, orthopnea and leg swelling. Hypertension. Gastrointestinal: Positive for abdominal pain. Endocrine: Negative for cold intolerance, heat intolerance, polydipsia, polyphagia and polyuria. Acquired hypothyroidism on supplement. Genitourinary: Negative. Musculoskeletal: Positive for back pain and myalgias. Negative for neck pain. Muscle spasm in neck and lower back   Skin: Negative. Negative for pallor. Allergic/Immunologic: Negative.     Neurological: Negative for dizziness, tremors, seizures, speech difficulty, weakness and headaches. Psychiatric/Behavioral: Negative for agitation, behavioral problems, confusion, decreased concentration and suicidal ideas. The patient is not nervous/anxious and is not hyperactive. Insomnia controlled with Seroquel       Prior to Visit Medications    Medication Sig Taking? Authorizing Provider   Blood Glucose Monitoring Suppl (TRUE METRIX METER) w/Device KIT 1 kit by Does not apply route daily Yes Camille Troy MD   mometasone-formoterol (DULERA) 200-5 MCG/ACT inhaler INHALE TWO (2) PUFFS BY MOUTH EVERY 12 HOURS Yes Camille Troy MD   losartan (COZAAR) 25 MG tablet TAKE ONE TABLET BY MOUTH DAILY Yes Camille Troy MD   TRUE METRIX BLOOD GLUCOSE TEST strip USE TO TEST BLOOD SUGAR DAILY Yes Camille Troy MD   cyanocobalamin 1000 MCG/ML injection INJECT 1ML SUBCUTANEOUSLY EVERY MONTH Yes Camille Troy MD   ondansetron (ZOFRAN-ODT) 4 MG disintegrating tablet DISSOLVE 1 TABLET BY MOUTH EVERY 8 HOURS AS NEEDED FOR NAUSEA & VOMITING Yes Camille Troy MD   rosuvastatin (CRESTOR) 10 MG tablet TAKE ONE TABLET BY MOUTH ONCE NIGHTLY Yes Camille Troy MD   vitamin D (ERGOCALCIFEROL) 40504 units CAPS capsule TAKE 1 CAPSULE BY MOUTH EVERY WEEK Yes Camille Troy MD   hydrochlorothiazide (HYDRODIURIL) 25 MG tablet TAKE 1 TABLET BY MOUTH ONCE DAILY Yes Camille Troy MD   QUEtiapine (SEROQUEL) 100 MG tablet TAKE ONE TABLET BY MOUTH ONCE NIGHTLY .  DISCONTINUE ELAVIL Yes Camille Troy MD   cloNIDine (CATAPRES) 0.1 MG tablet TAKE 1 TABLET BY MOUTH 2 TIMES A DAY Yes Camille Troy MD   cyclobenzaprine (FLEXERIL) 10 MG tablet TAKE ONE (1) TABLET BY MOUTH THREE TIMES A DAY AS NEEDED FOR SPASMS Yes Camille Troy MD   albuterol-ipratropium (COMBIVENT RESPIMAT)  MCG/ACT AERS inhaler Inhale 1 puff into the lungs every 4 hours as needed for Wheezing Yes Camille Troy MD   varenicline ([address] well-nourished. No distress. HENT:   Head: Normocephalic and atraumatic. Right Ear: External ear normal.   Left Ear: External ear normal.   Nose: Nose normal.   Mouth/Throat: Oropharynx is clear and moist. No oropharyngeal exudate. Eyes: Pupils are equal, round, and reactive to light. Conjunctivae and EOM are normal. Right eye exhibits no discharge. Left eye exhibits no discharge. No scleral icterus. Neck: Normal range of motion. Neck supple. No JVD present. No thyromegaly present. Cardiovascular: Normal rate, regular rhythm and intact distal pulses. Exam reveals no friction rub. Pulmonary/Chest: Effort normal and breath sounds normal. No respiratory distress. She has no wheezes. She has no rales. She exhibits no tenderness. Abdominal: Soft. Bowel sounds are normal. She exhibits no distension. There is no tenderness. There is no rebound and no guarding. Musculoskeletal: She exhibits no edema or tenderness. Lymphadenopathy:     She has no cervical adenopathy. Neurological: She is alert and oriented to person, place, and time. No cranial nerve deficit. She exhibits normal muscle tone. Coordination normal.   Skin: Skin is warm and dry. No rash noted. She is not diaphoretic. No erythema. No pallor. Psychiatric: She has a normal mood and affect. Her behavior is normal. Judgment and thought content normal.       ASSESSMENT/PLAN:   Diagnosis Orders   1. Prediabetes stable on diet monitor A1c. POCT glycosylated hemoglobin (Hb A1C)    Comprehensive Metabolic Panel    Hemoglobin A1C   2. COPD continue current regimen, stable without exacerbation. 3. Essential hypertension controlled on current regimen continue. BP Readings from Last 3 Encounters:   04/29/19 118/76   11/14/18 100/60   10/22/18 114/71    TSH WITH REFLEX TO FT4    Lipid Panel   4.  Lower abdominal pain  VIET - Cesar Norton MD, Gastroenterology, [place]    Urinalysis    Urine Culture    CT ABDOMEN PELVIS W IV CONTRAST Additional Contrast? Radiologist Recommendation   5. Vitamin D deficiency  Vitamin D 25 Hydroxy   6. Vitamin B 12 deficiency  Vitamin B12   7. Need for hepatitis B vaccination  HEPATITIS B SURFACE ANTIBODY    HEPATITIS B CORE ANTIBODY, TOTAL   8. Need for hepatitis C screening test  HEPATITIS C ANTIBODY       Mole on right breast medially on lateral areola. Epigastric nodule hernia  No follow-ups on file. An electronic signature was used to authenticate this note.     --Washington Rivas MD on 4/29/2019 at 11:24 AM

## 2019-04-30 ENCOUNTER — TELEPHONE (OUTPATIENT)
Dept: PAIN MANAGEMENT | Age: 64
End: 2019-04-30

## 2019-04-30 LAB
ESTIMATED AVERAGE GLUCOSE: 142.7 MG/DL
HBA1C MFR BLD: 6.6 %

## 2019-04-30 NOTE — TELEPHONE ENCOUNTER
Submitted PA for True Metrix Blood Glucose Test VI STRP, Key: [id number] - Rx #: [id number] Via [facility] STATUS: PENDING

## 2019-05-01 LAB
HEPATITIS B CORE TOTAL ANTIBODY: NEGATIVE
URINE CULTURE, ROUTINE: NORMAL

## 2019-05-01 NOTE — TELEPHONE ENCOUNTER
Received DENIAL for True Metrix Blood Glucose Test VI STRP. Denial letter attached. Please advise patient. Thank you.

## 2019-05-02 ASSESSMENT — ENCOUNTER SYMPTOMS
HEMOPTYSIS: 0
DIFFICULTY BREATHING: 0
FREQUENT THROAT CLEARING: 0
HOARSE VOICE: 0
SPUTUM PRODUCTION: 0
ORTHOPNEA: 0

## 2019-05-02 ASSESSMENT — PATIENT HEALTH QUESTIONNAIRE - PHQ9
SUM OF ALL RESPONSES TO PHQ9 QUESTIONS 1 & 2: 0
2. FEELING DOWN, DEPRESSED OR HOPELESS: 0
SUM OF ALL RESPONSES TO PHQ QUESTIONS 1-9: 0
SUM OF ALL RESPONSES TO PHQ QUESTIONS 1-9: 0
1. LITTLE INTEREST OR PLEASURE IN DOING THINGS: 0

## 2019-05-02 ASSESSMENT — COPD QUESTIONNAIRES: COPD: 1

## 2019-05-03 ENCOUNTER — TELEPHONE (OUTPATIENT)
Dept: PRIMARY CARE CLINIC | Age: 64
End: 2019-05-03

## 2019-05-03 DIAGNOSIS — J45.40 MODERATE PERSISTENT ASTHMA WITHOUT COMPLICATION: ICD-10-CM

## 2019-05-03 RX ORDER — IPRATROPIUM/ALBUTEROL SULFATE 20-100 MCG
MIST INHALER (GRAM) INHALATION
Qty: 4 G | Refills: 10 | Status: SHIPPED | OUTPATIENT
Start: 2019-05-03 | End: 2020-04-07

## 2019-05-07 ENCOUNTER — HOSPITAL ENCOUNTER (OUTPATIENT)
Dept: CT IMAGING | Age: 64
Discharge: HOME | End: 2019-05-07
Payer: COMMERCIAL

## 2019-05-07 DIAGNOSIS — Z87.891 HISTORY OF TOBACCO USE, PRESENTING HAZARDS TO HEALTH: ICD-10-CM

## 2019-05-07 DIAGNOSIS — J44.1 COPD EXACERBATION (HCC): ICD-10-CM

## 2019-05-07 DIAGNOSIS — R10.30 LOWER ABDOMINAL PAIN: ICD-10-CM

## 2019-05-07 PROCEDURE — 74177 CT ABD & PELVIS W/CONTRAST: CPT

## 2019-05-07 PROCEDURE — 6360000004 HC RX CONTRAST MEDICATION: Performed by: INTERNAL MEDICINE

## 2019-05-07 PROCEDURE — 71250 CT THORAX DX C-: CPT

## 2019-05-07 RX ADMIN — IOPAMIDOL 80 ML: 755 INJECTION, SOLUTION INTRAVENOUS at 15:21

## 2019-05-07 RX ADMIN — IOHEXOL 50 ML: 240 INJECTION, SOLUTION INTRATHECAL; INTRAVASCULAR; INTRAVENOUS; ORAL at 15:21

## 2019-05-15 ENCOUNTER — TELEPHONE (OUTPATIENT)
Dept: PRIMARY CARE CLINIC | Age: 64
End: 2019-05-15

## 2019-05-15 NOTE — TELEPHONE ENCOUNTER
Per pharmacy One Touch test strips is not covered by insurance w/o a PA but the machine is covered. Pharmacy states that [facility] test and meter is covered and patient was switched to [facility].

## 2019-05-16 ENCOUNTER — ANESTHESIA EVENT (OUTPATIENT)
Dept: OPERATING ROOM | Age: 64
End: 2019-05-16
Payer: COMMERCIAL

## 2019-05-16 DIAGNOSIS — R73.03 PREDIABETES: ICD-10-CM

## 2019-05-16 NOTE — PROGRESS NOTES
[facility] / [facility] ([facility]) [address] [facility], [address]    Acknowledgment of Informed Consent for Surgical or Medical Procedure and Sedation  I agree to allow doctor(s) Lukasz Belcher and his/her associates or assistants, including residents and/or other qualified medical practitioner to perform the following medical treatment or procedure and to administer or direct the administration of sedation as necessary:  Procedure(s): TOTAL AVULSION OF 1-5 TOENAILS BILATERAL FEET  My doctor has explained the following regarding the proposed procedure:   the explanation of the procedure   the benefits of the procedure   the potential problems that might occur during recuperation   the risks and side effects of the procedure which could include but are not limited to severe blood loss, infection, stroke or death   the benefits, risks and side effect of alternative procedures including the consequences of declining this procedure or any alternative procedures   the likelihood of achieving satisfactory results. I acknowledge no guarantee or assurance has been made to me regarding the results. I understand that during the course of this treatment/procedure, unforeseen conditions can occur which require an additional or different procedure. I agree to allow my physician or assistants to perform such extension of the original procedure as they may find necessary. I understand that sedation will often result in temporary impairment of memory and fine motor skills and that sedation can occasionally progress to a state of deep sedation or general anesthesia. I understand the risks of anesthesia for surgery include, but are not limited to, sore throat, hoarseness, injury to face, mouth, or teeth; nausea; headache; injury to blood vessels or nerves; death, brain damage, or paralysis.     I understand that if I have a Limitation of Treatment order in effect during my hospitalization, the order may or may not be in effect during this procedure. I give my doctor permission to give me blood or blood products. I understand that there are risks with receiving blood such as hepatitis, AIDS, fever, or allergic reaction. I acknowledge that the risks, benefits, and alternatives of this treatment have been explained to me and that no express or implied warranty has been given by the hospital, any blood bank, or any person or entity as to the blood or blood components transfused. At the discretion of my doctor, I agree to allow observers, equipment/product representatives and allow photographing, and/or televising of the procedure, provided my name or identity is maintained confidentially. I agree the hospital may dispose of or use for scientific or educational purposes any tissue, fluid, or body parts which may be removed.     ________________________________Date________Time______ am/pm  ([facility])  Patient or Signature of Closest Relative or Legal Guardian    ________________________________Date________Time______am/pm      Page 1 of  1  Witness

## 2019-05-17 RX ORDER — AMITRIPTYLINE HYDROCHLORIDE 50 MG/1
50 TABLET, FILM COATED ORAL 2 TIMES DAILY
COMMUNITY
End: 2021-02-17

## 2019-05-17 RX ORDER — IPRATROPIUM BROMIDE AND ALBUTEROL SULFATE 2.5; .5 MG/3ML; MG/3ML
1 SOLUTION RESPIRATORY (INHALATION) PRN
COMMUNITY
End: 2019-11-18 | Stop reason: SDUPTHER

## 2019-05-17 RX ORDER — OXYCODONE AND ACETAMINOPHEN 10; 325 MG/1; MG/1
1 TABLET ORAL EVERY 6 HOURS PRN
Status: ON HOLD | COMMUNITY
End: 2022-10-11 | Stop reason: HOSPADM

## 2019-05-17 RX ORDER — QUETIAPINE FUMARATE 100 MG/1
TABLET, FILM COATED ORAL
Qty: 28 TABLET | Refills: 3 | Status: SHIPPED | OUTPATIENT
Start: 2019-05-17 | End: 2019-08-20 | Stop reason: SDUPTHER

## 2019-05-17 NOTE — PROGRESS NOTES
[address] [facility]                          Date of Procedure 5/21/19 Time of Procedure per surgeon    PRIOR TO PROCEDURE DATE:  1. Please follow any guidelines/instructions prior to your procedure as advised by your surgeon. 2. Arrange for someone to drive you home and be with you for the first 24 hours after discharge for your safety after your procedure for which you received sedation. Ensure it is someone we can share information with regarding your discharge. 3. You must contact your surgeon for instructions IF:   You are taking any blood thinners, aspirin, anti-inflammatory or vitamin E.   There is a change in your physical condition such as a cold, fever, rash, cuts, sores or any other infection, especially near your surgical site. 4. Do not drink alcohol the day before or day of your procedure. 5. A Pre-op History and Physical for surgery MUST be completed by your Physician or Urgent Care within 30 days of your procedure date. Please bring a copy with you on the day of your procedure and along with any other testing performed. THE DAY OF YOUR PROCEDURE:  1. Follow instructions for ARRIVAL TIME as DIRECTED BY YOUR SURGEON. If your surgeon does not give you a specific arrival time, please arrive at per surgeon. 2. Enter the MAIN entrance from [address] [place] [address] and follow the signs to the free [employer] or [employer] parking (offered free of charge 6am-5pm). 3. Enter the Main Entrance of the hospital (do not enter from the lower level of the parking garage). Upon entrance, check in with the  at the main desk on your left. If no one is available at the desk, proceed into the [facility] Waiting Room and go through the door directly into the [facility]. There is a Check-in desk ACROSS from Room 5 (marked with a sign hanging from the ceiling). The phone number for the surgery center is [phone].     4. Please call [phone] stay overnight, you may bring a bag with personal items. Please have any large items you may need brought in by your family after your arrival to your hospital room. 15. If you have a Living Will or Durable Power of , please bring a copy on the day of your procedure. 15. With your permission, one family member may accompany you while you are being prepared for surgery. Once you are ready, additional family members may join you. HOW WE KEEP YOU SAFE and WORK TO PREVENT SURGICAL SITE INFECTIONS:  1. Health care workers should always check your ID bracelet to verify your name and birth date. You will be asked many times to state your name, date of birth, and allergies. 2. Health care workers should always clean their hands with soap or alcohol gel before providing care to you. It is okay to ask anyone if they cleaned their hands before they touch you. 3. You will be actively involved in verifying the type of procedure you are having and ensuring the correct surgical site. This will be confirmed multiple times prior to your procedure. Do NOT jeferson your surgery site UNLESS instructed to by your surgeon. 4. Do not shave or wax for 72 hours prior to procedure near your operative site. Shaving with a razor can irritate your skin and make it easier to develop an infection. On the day of your procedure, any hair that needs to be removed near the surgical site will be clipped by a healthcare worker using a special clippers designed to avoid skin irritation. 5. When you are in the operating room, your surgical site will be cleansed with a special soap, and in most cases, you will be given an antibiotic before the surgery begins. What to expect AFTER YOUR PROCEDURE:  1. Immediately following your procedure, your will be taken to the PACU for the first phase of your recovery.   Your nurse will help you recover from any potential side effects of anesthesia, such as extreme drowsiness, changes in your vital signs or breathing patterns. Nausea, headache, muscle aches, or sore throat may also occur after anesthesia. Your nurse will help you manage these potential side effects. 2. For comfort and safety, arrange to have someone at home with you for the first 24 hours after discharge. 3. You and your family will be given written instructions about your diet, activity, dressing care, medications, and return visits. 4. Once at home, should issues with nausea, pain, or bleeding occur, or should you notice any signs of infection, you should call your surgeon. 5. Always clean your hands before and after caring for your wound. Do not let your family touch your surgery site without cleaning their hands. 6. Narcotic pain medications can cause significant constipation. You may want to add a stool softener to your postoperative medication schedule or speak to your surgeon on how best to manage this SIDE EFFECT. SPECIAL INSTRUCTIONS     Thank you for allowing us to care for you. We strive to exceed your expectations in the delivery of care and service provided to you and your family. If you need to contact us for any reason, please call us at [phone]    Instructions reviewed with patient during preadmission testing phone interview. Jovi Fine. 5/17/2019 .3:41 PM      ADDITIONAL EDUCATIONAL INFORMATION REVIEWED PER PHONE WITH YOU AND/OR YOUR FAMILY:  No Bring a urine sample on day of surgery  No Pain Goal-Taking Control of Your Pain  No FAQs about Surgical Site Infections  No Hibiclens® Bathing Instructions   Yes Antibacterial Soap  No Karri® Wipes Bathing Instructions (Obtained from: https://www.i-dispo.com/. pdf )  No Incentive Spirometer Education  No Other

## 2019-05-21 ENCOUNTER — ANESTHESIA (OUTPATIENT)
Dept: OPERATING ROOM | Age: 64
End: 2019-05-21
Payer: COMMERCIAL

## 2019-05-21 ENCOUNTER — HOSPITAL ENCOUNTER (OUTPATIENT)
Age: 64
Setting detail: OUTPATIENT SURGERY
Discharge: HOME | End: 2019-05-21
Attending: PODIATRIST | Admitting: PODIATRIST
Payer: COMMERCIAL

## 2019-05-21 VITALS
HEART RATE: 83 BPM | WEIGHT: 194 LBS | RESPIRATION RATE: 18 BRPM | HEIGHT: 65 IN | BODY MASS INDEX: 32.32 KG/M2 | OXYGEN SATURATION: 94 % | SYSTOLIC BLOOD PRESSURE: 105 MMHG | TEMPERATURE: 97.1 F | DIASTOLIC BLOOD PRESSURE: 74 MMHG

## 2019-05-21 VITALS — DIASTOLIC BLOOD PRESSURE: 86 MMHG | RESPIRATION RATE: 10 BRPM | SYSTOLIC BLOOD PRESSURE: 134 MMHG

## 2019-05-21 DIAGNOSIS — G89.18 POST-OP PAIN: Primary | ICD-10-CM

## 2019-05-21 LAB
ANION GAP SERPL CALCULATED.3IONS-SCNC: 12 MMOL/L (ref 3–16)
BUN BLDV-MCNC: 16 MG/DL (ref 7–20)
CALCIUM SERPL-MCNC: 9.4 MG/DL (ref 8.3–10.6)
CHLORIDE BLD-SCNC: 103 MMOL/L (ref 99–110)
CO2: 26 MMOL/L (ref 21–32)
CREAT SERPL-MCNC: 0.9 MG/DL (ref 0.6–1.2)
GFR AFRICAN AMERICAN: >60
GFR NON-AFRICAN AMERICAN: >60
GLUCOSE BLD-MCNC: 119 MG/DL (ref 70–99)
GLUCOSE BLD-MCNC: 124 MG/DL (ref 70–99)
PERFORMED ON: ABNORMAL
POTASSIUM SERPL-SCNC: 4.1 MMOL/L (ref 3.5–5.1)
SODIUM BLD-SCNC: 141 MMOL/L (ref 136–145)

## 2019-05-21 PROCEDURE — 7100000011 HC PHASE II RECOVERY - ADDTL 15 MIN: Performed by: PODIATRIST

## 2019-05-21 PROCEDURE — 6360000002 HC RX W HCPCS: Performed by: NURSE ANESTHETIST, CERTIFIED REGISTERED

## 2019-05-21 PROCEDURE — 80048 BASIC METABOLIC PNL TOTAL CA: CPT

## 2019-05-21 PROCEDURE — 3600000012 HC SURGERY LEVEL 2 ADDTL 15MIN: Performed by: PODIATRIST

## 2019-05-21 PROCEDURE — 6360000002 HC RX W HCPCS: Performed by: PODIATRIST

## 2019-05-21 PROCEDURE — 2500000003 HC RX 250 WO HCPCS: Performed by: PODIATRIST

## 2019-05-21 PROCEDURE — 7100000001 HC PACU RECOVERY - ADDTL 15 MIN: Performed by: PODIATRIST

## 2019-05-21 PROCEDURE — 7100000010 HC PHASE II RECOVERY - FIRST 15 MIN: Performed by: PODIATRIST

## 2019-05-21 PROCEDURE — 3700000001 HC ADD 15 MINUTES (ANESTHESIA): Performed by: PODIATRIST

## 2019-05-21 PROCEDURE — 2709999900 HC NON-CHARGEABLE SUPPLY: Performed by: PODIATRIST

## 2019-05-21 PROCEDURE — 6370000000 HC RX 637 (ALT 250 FOR IP): Performed by: STUDENT IN AN ORGANIZED HEALTH CARE EDUCATION/TRAINING PROGRAM

## 2019-05-21 PROCEDURE — 2580000003 HC RX 258: Performed by: PODIATRIST

## 2019-05-21 PROCEDURE — 3600000002 HC SURGERY LEVEL 2 BASE: Performed by: PODIATRIST

## 2019-05-21 PROCEDURE — 7100000000 HC PACU RECOVERY - FIRST 15 MIN: Performed by: PODIATRIST

## 2019-05-21 PROCEDURE — 3700000000 HC ANESTHESIA ATTENDED CARE: Performed by: PODIATRIST

## 2019-05-21 PROCEDURE — 2580000003 HC RX 258: Performed by: ANESTHESIOLOGY

## 2019-05-21 RX ORDER — KETOROLAC TROMETHAMINE 5 MG/ML
1 SOLUTION OPHTHALMIC 3 TIMES DAILY
Status: ON HOLD | COMMUNITY
Start: 2018-10-25 | End: 2022-02-06

## 2019-05-21 RX ORDER — ERGOCALCIFEROL 1.25 MG/1
1 CAPSULE ORAL WEEKLY
Refills: 10 | COMMUNITY
Start: 2019-05-20 | End: 2019-12-08 | Stop reason: SDUPTHER

## 2019-05-21 RX ORDER — PROMETHAZINE HYDROCHLORIDE 6.25 MG/5ML
5 SYRUP ORAL PRN
Refills: 11 | COMMUNITY
Start: 2019-05-20 | End: 2019-09-10 | Stop reason: SDUPTHER

## 2019-05-21 RX ORDER — LABETALOL 20 MG/4 ML (5 MG/ML) INTRAVENOUS SYRINGE
5 EVERY 10 MIN PRN
Status: DISCONTINUED | OUTPATIENT
Start: 2019-05-21 | End: 2019-05-21 | Stop reason: HOSPADM

## 2019-05-21 RX ORDER — OXYCODONE HYDROCHLORIDE AND ACETAMINOPHEN 5; 325 MG/1; MG/1
2 TABLET ORAL PRN
Status: DISCONTINUED | OUTPATIENT
Start: 2019-05-21 | End: 2019-05-21 | Stop reason: HOSPADM

## 2019-05-21 RX ORDER — KETOROLAC TROMETHAMINE 30 MG/ML
INJECTION, SOLUTION INTRAMUSCULAR; INTRAVENOUS PRN
Status: DISCONTINUED | OUTPATIENT
Start: 2019-05-21 | End: 2019-05-21 | Stop reason: SDUPTHER

## 2019-05-21 RX ORDER — FENTANYL CITRATE 50 UG/ML
25 INJECTION, SOLUTION INTRAMUSCULAR; INTRAVENOUS EVERY 5 MIN PRN
Status: DISCONTINUED | OUTPATIENT
Start: 2019-05-21 | End: 2019-05-21 | Stop reason: HOSPADM

## 2019-05-21 RX ORDER — IBUPROFEN 800 MG/1
800 TABLET ORAL EVERY 8 HOURS PRN
Qty: 30 TABLET | Refills: 0 | Status: SHIPPED | OUTPATIENT
Start: 2019-05-21 | End: 2019-06-16 | Stop reason: SINTOL

## 2019-05-21 RX ORDER — PROPOFOL 10 MG/ML
INJECTION, EMULSION INTRAVENOUS PRN
Status: DISCONTINUED | OUTPATIENT
Start: 2019-05-21 | End: 2019-05-21 | Stop reason: SDUPTHER

## 2019-05-21 RX ORDER — FENTANYL CITRATE 50 UG/ML
INJECTION, SOLUTION INTRAMUSCULAR; INTRAVENOUS PRN
Status: DISCONTINUED | OUTPATIENT
Start: 2019-05-21 | End: 2019-05-21 | Stop reason: SDUPTHER

## 2019-05-21 RX ORDER — ONDANSETRON 2 MG/ML
4 INJECTION INTRAMUSCULAR; INTRAVENOUS
Status: DISCONTINUED | OUTPATIENT
Start: 2019-05-21 | End: 2019-05-21 | Stop reason: HOSPADM

## 2019-05-21 RX ORDER — BUPIVACAINE HYDROCHLORIDE 5 MG/ML
INJECTION, SOLUTION EPIDURAL; INTRACAUDAL PRN
Status: DISCONTINUED | OUTPATIENT
Start: 2019-05-21 | End: 2019-05-21 | Stop reason: ALTCHOICE

## 2019-05-21 RX ORDER — HYDROCODONE BITARTRATE AND ACETAMINOPHEN 5; 325 MG/1; MG/1
1 TABLET ORAL
Status: COMPLETED | OUTPATIENT
Start: 2019-05-21 | End: 2019-05-21

## 2019-05-21 RX ORDER — LIDOCAINE HYDROCHLORIDE 20 MG/ML
INJECTION, SOLUTION INTRAVENOUS PRN
Status: DISCONTINUED | OUTPATIENT
Start: 2019-05-21 | End: 2019-05-21 | Stop reason: SDUPTHER

## 2019-05-21 RX ORDER — HYDRALAZINE HYDROCHLORIDE 20 MG/ML
5 INJECTION INTRAMUSCULAR; INTRAVENOUS EVERY 10 MIN PRN
Status: DISCONTINUED | OUTPATIENT
Start: 2019-05-21 | End: 2019-05-21 | Stop reason: HOSPADM

## 2019-05-21 RX ORDER — SODIUM CHLORIDE, SODIUM LACTATE, POTASSIUM CHLORIDE, CALCIUM CHLORIDE 600; 310; 30; 20 MG/100ML; MG/100ML; MG/100ML; MG/100ML
INJECTION, SOLUTION INTRAVENOUS CONTINUOUS
Status: DISCONTINUED | OUTPATIENT
Start: 2019-05-21 | End: 2019-05-21 | Stop reason: HOSPADM

## 2019-05-21 RX ORDER — PREDNISOLONE ACETATE 10 MG/ML
1 SUSPENSION/ DROPS OPHTHALMIC 3 TIMES DAILY
Status: ON HOLD | COMMUNITY
Start: 2018-10-25 | End: 2022-02-06

## 2019-05-21 RX ORDER — ONDANSETRON 2 MG/ML
INJECTION INTRAMUSCULAR; INTRAVENOUS PRN
Status: DISCONTINUED | OUTPATIENT
Start: 2019-05-21 | End: 2019-05-21 | Stop reason: SDUPTHER

## 2019-05-21 RX ORDER — FENTANYL CITRATE 50 UG/ML
50 INJECTION, SOLUTION INTRAMUSCULAR; INTRAVENOUS EVERY 5 MIN PRN
Status: DISCONTINUED | OUTPATIENT
Start: 2019-05-21 | End: 2019-05-21 | Stop reason: HOSPADM

## 2019-05-21 RX ORDER — OXYCODONE HYDROCHLORIDE AND ACETAMINOPHEN 5; 325 MG/1; MG/1
1 TABLET ORAL PRN
Status: DISCONTINUED | OUTPATIENT
Start: 2019-05-21 | End: 2019-05-21 | Stop reason: HOSPADM

## 2019-05-21 RX ADMIN — Medication 2 G: at 11:07

## 2019-05-21 RX ADMIN — FENTANYL CITRATE 50 MCG: 50 INJECTION INTRAMUSCULAR; INTRAVENOUS at 11:34

## 2019-05-21 RX ADMIN — FENTANYL CITRATE 50 MCG: 50 INJECTION INTRAMUSCULAR; INTRAVENOUS at 11:07

## 2019-05-21 RX ADMIN — SODIUM CHLORIDE, POTASSIUM CHLORIDE, SODIUM LACTATE AND CALCIUM CHLORIDE: 600; 310; 30; 20 INJECTION, SOLUTION INTRAVENOUS at 09:59

## 2019-05-21 RX ADMIN — KETOROLAC TROMETHAMINE 30 MG: 30 INJECTION, SOLUTION INTRAMUSCULAR; INTRAVENOUS at 11:44

## 2019-05-21 RX ADMIN — HYDROCODONE BITARTRATE AND ACETAMINOPHEN 1 TABLET: 5; 325 TABLET ORAL at 13:41

## 2019-05-21 RX ADMIN — LIDOCAINE HYDROCHLORIDE 60 MG: 20 INJECTION, SOLUTION INTRAVENOUS at 11:13

## 2019-05-21 RX ADMIN — ONDANSETRON 4 MG: 2 INJECTION INTRAMUSCULAR; INTRAVENOUS at 11:37

## 2019-05-21 RX ADMIN — PROPOFOL 180 MG: 10 INJECTION, EMULSION INTRAVENOUS at 11:13

## 2019-05-21 ASSESSMENT — PULMONARY FUNCTION TESTS
PIF_VALUE: 6
PIF_VALUE: 5
PIF_VALUE: 9
PIF_VALUE: 6
PIF_VALUE: 5
PIF_VALUE: 5
PIF_VALUE: 3
PIF_VALUE: 7
PIF_VALUE: 0
PIF_VALUE: 8
PIF_VALUE: 3
PIF_VALUE: 7
PIF_VALUE: 7
PIF_VALUE: 5
PIF_VALUE: 1
PIF_VALUE: 5
PIF_VALUE: 4
PIF_VALUE: 5
PIF_VALUE: 7
PIF_VALUE: 5
PIF_VALUE: 1
PIF_VALUE: 7
PIF_VALUE: 5
PIF_VALUE: 7
PIF_VALUE: 5
PIF_VALUE: 7
PIF_VALUE: 24
PIF_VALUE: 5
PIF_VALUE: 7
PIF_VALUE: 7
PIF_VALUE: 5
PIF_VALUE: 6
PIF_VALUE: 1
PIF_VALUE: 5
PIF_VALUE: 5
PIF_VALUE: 8
PIF_VALUE: 7
PIF_VALUE: 5
PIF_VALUE: 7
PIF_VALUE: 5

## 2019-05-21 ASSESSMENT — LIFESTYLE VARIABLES: SMOKING_STATUS: 0

## 2019-05-21 ASSESSMENT — PAIN DESCRIPTION - PAIN TYPE: TYPE: SURGICAL PAIN

## 2019-05-21 ASSESSMENT — PAIN SCALES - GENERAL
PAINLEVEL_OUTOF10: 3
PAINLEVEL_OUTOF10: 3

## 2019-05-21 ASSESSMENT — PAIN - FUNCTIONAL ASSESSMENT: PAIN_FUNCTIONAL_ASSESSMENT: 0-10

## 2019-05-21 ASSESSMENT — PAIN DESCRIPTION - ORIENTATION: ORIENTATION: LEFT

## 2019-05-21 ASSESSMENT — PAIN DESCRIPTION - DESCRIPTORS: DESCRIPTORS: TINGLING

## 2019-05-21 ASSESSMENT — ENCOUNTER SYMPTOMS: SHORTNESS OF BREATH: 0

## 2019-05-21 ASSESSMENT — PAIN DESCRIPTION - LOCATION: LOCATION: FOOT

## 2019-05-21 NOTE — PROGRESS NOTES
Called Lab to draw stat BMP after unable to draw enough blood for sample with IV start and lab states 'someone will be right up'.

## 2019-05-21 NOTE — ANESTHESIA POSTPROCEDURE EVALUATION
Department of Anesthesiology  Postprocedure Note    Patient: Raquel Orourke  MRN: [id number]  YOB: 1955  Date of evaluation: 5/21/2019  Time:  12:29 PM     Procedure Summary     Date:  05/21/19 Room / Location:  [facility] OR [address] [place] [address] OR    Anesthesia Start:  1107 Anesthesia Stop:  6357    Procedure:  TOTAL AVULSION OF 1-5 TOENAILS BILATERAL FEET (Bilateral ) Diagnosis:  (PAINFUL MYCOTIC NAILS 1-5 BOTH FEET)    Surgeon:  Oscar Edge DPM Responsible Provider:  Heriberto Styles MD    Anesthesia Type:  general ASA Status:  3          Anesthesia Type: general    Vesta Phase I: Vesta Score: 7    Vesta Phase II:      Last vitals: Reviewed and per EMR flowsheets.        Anesthesia Post Evaluation    Patient location during evaluation: PACU  Level of consciousness: awake and alert  Airway patency: patent  Nausea & Vomiting: no vomiting  Complications: no  Cardiovascular status: blood pressure returned to baseline  Respiratory status: acceptable  Hydration status: euvolemic

## 2019-05-21 NOTE — PROGRESS NOTES
Pt and spouse verbalize understanding of dc instructions. Pt given pain pill prior to discharge as ice did not help.

## 2019-05-21 NOTE — PROGRESS NOTES
PACU Transfer to [facility]    Vitals:    05/21/19 1258   BP: 121/77   Pulse: 83   Resp: 18   Temp: 97.5   SpO2: 96%         Intake/Output Summary (Last 24 hours) at 5/21/2019 1306  Last data filed at 5/21/2019 1258  Gross per 24 hour   Intake 515 ml   Output 2 ml   Net 513 ml       Pain assessment:  0-10  Pain Level: 0    Patient transferred to care of [facility] RN.    5/21/2019 1:06 PM

## 2019-05-21 NOTE — H&P
Raquel Haven    [id number]    [facility] Same Day Surgery Update H & P  Department of General Surgery   Surgical Service   Pre-operative History and Physical  Last H & P within the last 30 days. DIAGNOSIS:   PAINFUL MYCOTIC NAILS 1-5 BOTH FEET    PROCEDURE:  SD REMOVAL OF NAIL BED [91879] (TOTAL AVULSION OF 1-5 TOENAILS BILATERAL FEET)     HISTORY OF PRESENT ILLNESS:  Patient with painful mycotic toenails of the bilateral feet.         Past Medical History:        Diagnosis Date    CAMDEN (acute kidney injury) ([address] 10/7/2013    Anxiety     Bronchitis     Chronic abdominal pain     possibly due to diverticulosos    Chronic back pain     COPD (chronic obstructive pulmonary disease) (HCC)     DDD (degenerative disc disease), lumbar 12/1/2016    Depression     Elevated glycated hemoglobin     History of normal mammogram 8/26/13    Annually at [facility]    Hx of migraine headaches     Hyperlipidemia     Hypertension     Hypothyroidism     Rotator cuff tear     right     Past Surgical History:        Procedure Laterality Date    CARPAL TUNNEL RELEASE      CENTRAL LINE  10/7/2013         CHOLECYSTECTOMY      COLONOSCOPY  9/6/2011    Tubular adenoma    COLONOSCOPY  10/24/2005    Dr. Chele Ku - Tubular adenoma    ESOPHAGEAL MOTILITY STUDY  6/17/2013    NORMAL    EYE SURGERY      cataracts    FINE NEEDLE ASPIRATION  8/10/2012    THYROID - NEGATIVE    HEMORRHOID SURGERY      HERNIA REPAIR      HYSTERECTOMY      MIA AND BSO  8/15/2002    Endometriosis, fibroids    TONSILLECTOMY      UPPER GASTROINTESTINAL ENDOSCOPY  10/17/2005    Bx small bowel, Gastric, GE junction all negative    UPPER GASTROINTESTINAL ENDOSCOPY  9/5/2000    Dr. Michael Kumar - NORMAL     Past Social History:  Social History     Socioeconomic History    Marital status: Single     Spouse name: None    Number of children: None    Years of education: None    Highest education level: None   Occupational History    None Social Needs    Financial resource strain: None    Food insecurity:     Worry: None     Inability: None    Transportation needs:     Medical: None     Non-medical: None   Tobacco Use    Smoking status: Former Smoker     Packs/day: 0.25     Years: 32.00     Pack years: 8.00     Types: Cigarettes     Last attempt to quit: 1/10/2017     Years since quittin.3    Smokeless tobacco: Never Used    Tobacco comment: 30 years    Substance and Sexual Activity    Alcohol use: No    Drug use: No    Sexual activity: None   Lifestyle    Physical activity:     Days per week: None     Minutes per session: None    Stress: None   Relationships    Social connections:     Talks on phone: None     Gets together: None     Attends [employer] service: None     Active member of club or organization: None     Attends meetings of clubs or organizations: None     Relationship status: None    Intimate partner violence:     Fear of current or ex partner: None     Emotionally abused: None     Physically abused: None     Forced sexual activity: None   Other Topics Concern    None   Social History Narrative    None         Medications Prior to Admission:      Prior to Admission medications    Medication Sig Start Date End Date Taking? Authorizing Provider   ipratropium-albuterol (DUONEB) 0.5-2.5 (3) MG/3ML SOLN nebulizer solution Inhale 1 vial into the lungs as needed for Shortness of Breath   Yes Historical Provider, MD   amitriptyline (ELAVIL) 50 MG tablet Take 50 mg by mouth 2 times daily   Yes Historical Provider, MD   oxyCODONE-acetaminophen (PERCOCET) 7.5-325 MG per tablet Take 1 tablet by mouth every 6 hours as needed for Pain.    Yes Historical Provider, MD   COMBIVENT RESPIMAT  MCG/ACT AERS inhaler INHALE 1 PUFF INTO THE LUNGS EVERY 4 HOURS AS NEEDED FOR WHEEZING 5/3/19  Yes Jerson Anthony MD   mometasone-formoterol (DULERA) 200-5 MCG/ACT inhaler INHALE TWO (2) PUFFS BY MOUTH EVERY 12 HOURS 3/6/19  Yes Claudine Underwood MD   losartan (COZAAR) 25 MG tablet TAKE ONE TABLET BY MOUTH DAILY 3/5/19  Yes Claudine Underwood MD   cyanocobalamin 1000 MCG/ML injection INJECT 1ML SUBCUTANEOUSLY EVERY MONTH 1/24/19  Yes Claudine Underwood MD   ondansetron (ZOFRAN-ODT) 4 MG disintegrating tablet DISSOLVE 1 TABLET BY MOUTH EVERY 8 HOURS AS NEEDED FOR NAUSEA & VOMITING 1/24/19  Yes Claudine Underwood MD   rosuvastatin (CRESTOR) 10 MG tablet TAKE ONE TABLET BY MOUTH ONCE NIGHTLY 1/8/19  Yes Claudine Underwood MD   hydrochlorothiazide (HYDRODIURIL) 25 MG tablet TAKE 1 TABLET BY MOUTH ONCE DAILY 1/3/19  Yes Claudine Underwood MD   cloNIDine (CATAPRES) 0.1 MG tablet TAKE 1 TABLET BY MOUTH 2 TIMES A DAY 11/8/18  Yes Claudine Underwood MD   cyclobenzaprine (FLEXERIL) 10 MG tablet TAKE ONE (1) TABLET BY MOUTH THREE TIMES A DAY AS NEEDED FOR SPASMS 11/8/18  Yes Claudine Underwood MD   BANOPHEN 50 MG capsule TAKE ONE CAPSULE BY MOUTH EVERY NIGHT AT BEDTIME AS NEEDED FOR ITCHING 10/26/18  Yes Claudine Underwood MD   levothyroxine (SYNTHROID) 125 MCG tablet Take 1 tablet by mouth daily Discontinue 135. 10/16/18  Yes Claudine Underwood MD   lactulose ([facility]) 10 GM/15ML solution as needed  6/10/18  Yes Ursula Gonzalez MD   QUEtiapine (SEROQUEL) 100 MG tablet TAKE ONE TABLET BY MOUTH ONCE NIGHTLY 5/17/19   Claudine Underwood MD   blood glucose test strips (TRUE METRIX BLOOD GLUCOSE TEST) strip USE TO TEST BLOOD SUGAR DAILY 5/16/19   Claudine Underwood MD   Blood Glucose Monitoring Suppl (TRUE METRIX METER) w/Device KIT 1 kit by Does not apply route daily 5/16/19   Claudine Underwood MD   STOOL SOFTENER 100 MG capsule TAKE ONE CAPSULE BY MOUTH THREE TIMES A DAY AS NEEDED FOR CONSTIPATION 10/26/18   Claudine Underwood MD   Blood Glucose Monitoring Suppl (TRUE METRIX METER) CRYSTAL 1 kit by Does not apply route daily 7/26/18   Claudine Underwood MD   Tuberculin-Allergy Syringes 28G X 1/2\" 1 ML MISC 1 each by Does not apply route every 30 days 2/23/18   Sheron Boone MD         Allergies:  Bupropion; Morphine; Dye [barium-containing compounds]; and Morphine and related    PHYSICAL EXAM:      /78   Pulse 80   Temp 98 °F (36.7 °C) (Oral)   Resp 18   Ht 5' 5\" (1.651 m)   Wt 194 lb (88 kg)   SpO2 93%   BMI 32.28 kg/m²      Heart:  regular rate and rhythm    Lungs:  No increased work of breathing, good air exchange, clear to auscultation bilaterally, no crackles or wheezing    Abdomen:  soft, non-distended, non-tender, no rebound tenderness or guarding, normal active bowel sounds and no masses palpated    ASSESSMENT AND PLAN:    1. Patient seen and focused exam done today- no new changes since last physical exam on 4/29/19    2. Access to ancillary services are available per request of the provider.     JACKIE Otto - SVETLANA     5/21/2019

## 2019-05-21 NOTE — ANESTHESIA PRE PROCEDURE
Department of Anesthesiology  Preprocedure Note       Name:  Fozia Nicole   Age:  61 y.o.  :  1955                                          MRN:  [id number]         Date:  2019      Surgeon: Emma Figueroa):  Noe Altman DPM    Procedure: TOTAL AVULSION OF 1-5 TOENAILS BILATERAL FEET (Bilateral )    Medications prior to admission:   Prior to Admission medications    Medication Sig Start Date End Date Taking? Authorizing Provider   vitamin D (ERGOCALCIFEROL) 34634 units CAPS capsule Take 1 capsule by mouth once a week 19  Yes Historical Provider, MD   prednisoLONE acetate (PRED FORTE) 1 % ophthalmic suspension Place 1 drop into the left eye three times daily 10/25/18  Yes Historical Provider, MD   ketorolac (ACULAR) 0.5 % ophthalmic solution Place 1 drop into the left eye 3 times daily 10/25/18  Yes Historical Provider, MD   amitriptyline (ELAVIL) 50 MG tablet Take 50 mg by mouth 2 times daily   Yes Historical Provider, MD   oxyCODONE-acetaminophen (PERCOCET) 7.5-325 MG per tablet Take 1 tablet by mouth every 6 hours as needed for Pain.    Yes Historical Provider, MD   blood glucose test strips (TRUE METRIX BLOOD GLUCOSE TEST) strip USE TO TEST BLOOD SUGAR DAILY 19  Yes Gary Mahoney MD   Blood Glucose Monitoring Suppl (TRUE METRIX METER) w/Device KIT 1 kit by Does not apply route daily 19  Yes Gary Mahoney MD   mometasone-formoterol (DULERA) 200-5 MCG/ACT inhaler INHALE TWO (2) PUFFS BY MOUTH EVERY 12 HOURS 3/6/19  Yes Gary Mahoney MD   losartan (COZAAR) 25 MG tablet TAKE ONE TABLET BY MOUTH DAILY 3/5/19  Yes Gary Mahoney MD   cyanocobalamin 1000 MCG/ML injection INJECT 1ML SUBCUTANEOUSLY EVERY MONTH 19  Yes Gary Mahoney MD   rosuvastatin (CRESTOR) 10 MG tablet TAKE ONE TABLET BY MOUTH ONCE NIGHTLY 19  Yes Gary Mahoney MD   hydrochlorothiazide (HYDRODIURIL) 25 MG tablet TAKE 1 TABLET BY MOUTH ONCE DAILY 1/3/19  Yes Amanda Loza Naomi Gonzalez MD   cloNIDine (CATAPRES) 0.1 MG tablet TAKE 1 TABLET BY MOUTH 2 TIMES A DAY 11/8/18  Yes Michelle Kaplan MD   cyclobenzaprine (FLEXERIL) 10 MG tablet TAKE ONE (1) TABLET BY MOUTH THREE TIMES A DAY AS NEEDED FOR SPASMS 11/8/18  Yes Michelle Kaplan MD   STOOL SOFTENER 100 MG capsule TAKE ONE CAPSULE BY MOUTH THREE TIMES A DAY AS NEEDED FOR CONSTIPATION 10/26/18  Yes Michelle Kaplan MD   levothyroxine (SYNTHROID) 125 MCG tablet Take 1 tablet by mouth daily Discontinue 135. 10/16/18  Yes Michelle Kaplan MD   Blood Glucose Monitoring Suppl (TRUE METRIX METER) CRYSTAL 1 kit by Does not apply route daily 7/26/18  Yes Michelle Kaplan MD   lactulose [facility]) 10 GM/15ML solution as needed  6/10/18  Yes Historical Provider, MD   Tuberculin-Allergy Syringes 28G X 1/2\" 1 ML MISC 1 each by Does not apply route every 30 days 2/23/18  Yes Michelle Kaplan MD   promethazine (PHENERGAN) 6.25 MG/5ML syrup Take 5 mLs by mouth as needed 5/20/19   Historical Provider, MD   QUEtiapine (SEROQUEL) 100 MG tablet TAKE ONE TABLET BY MOUTH ONCE NIGHTLY 5/17/19   Michelle Kaplan MD   ipratropium-albuterol (DUONEB) 0.5-2.5 (3) MG/3ML SOLN nebulizer solution Inhale 1 vial into the lungs as needed for Shortness of Breath    Historical Provider, MD   COMBIVENT RESPIMAT  MCG/ACT AERS inhaler INHALE 1 PUFF INTO THE LUNGS EVERY 4 HOURS AS NEEDED FOR WHEEZING 5/3/19   Michelle Kaplan MD   ondansetron (ZOFRAN-ODT) 4 MG disintegrating tablet DISSOLVE 1 TABLET BY MOUTH EVERY 8 HOURS AS NEEDED FOR NAUSEA & VOMITING 1/24/19   Michelle Kaplan MD   BANOPHEN 50 MG capsule TAKE ONE CAPSULE BY MOUTH EVERY NIGHT AT BEDTIME AS NEEDED FOR ITCHING 10/26/18   Michelle Kaplan MD       Current medications:    Current Facility-Administered Medications   Medication Dose Route Frequency Provider Last Rate Last Dose    lactated ringers infusion   Intravenous Continuous Paris Blanca Vicente  mL/hr at 05/21/19 0959      ceFAZolin (ANCEF) 2 g in dextrose 5 % 50 mL IVPB  2 g Intravenous Once Chet Smith DPM        sodium chloride 0.9 % 1,000 mL with gentamicin (GARAMYCIN) 80 mg    PRN Christophe Smith DPM           Allergies:     Allergies   Allergen Reactions    Bupropion Other (See Comments)     Muscle spasms    Morphine Hives and Itching    Dye [Barium-Containing Compounds] Nausea Only     Nausea and burning in throat after use of contrast dye for CT scan    Morphine And Related Itching       Problem List:    Patient Active Problem List   Diagnosis Code    Depression F32.9    Constipation K59.00    High anion gap metabolic acidosis H35.0    COPD exacerbation (HCC) J44.1    GERD (gastroesophageal reflux disease) K21.9    Essential hypertension I10    Prediabetes R73.03    Vitamin D deficiency E55.9    History of cholecystectomy Z90.49    Degenerative spondylolisthesis M43.10    Spondylolisthesis, lumbar region M43.16    DDD (degenerative disc disease), lumbar M51.36    Mechanical low back pain M54.5    Chronic pain syndrome G89.4    Facet arthropathy M47.819    Spinal stenosis of lumbar region M48.061    Degenerative lumbar spinal stenosis M48.061    Disc displacement, lumbar M51.26    Osteoarthritis of lumbar spine M47.816    Chronic low back pain M54.5, G89.29    Lymphocytosis D72.820    Leukocytosis D72.829    Partial small bowel obstruction (HCC) K56.600    Cortical age-related cataract of both eyes H25.013    Age-related nuclear cataract of both eyes H25.13    RPE (retinal pigment epithelium) atrophy H35.54    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 [address] 10/7/2013    Anxiety     Bronchitis     Chronic abdominal pain     possibly due to diverticulosos    Chronic back pain     COPD (chronic obstructive pulmonary disease) ([facility] [address]     DDD (degenerative disc disease), lumbar 2016    Depression     Elevated glycated hemoglobin     [facility] of normal mammogram 13    [facility]    Hx of migraine headaches     Hyperlipidemia     Hypertension     Hypothyroidism     Rotator cuff tear     right       Past Surgical History:        Procedure Laterality Date    CARPAL TUNNEL RELEASE      CENTRAL LINE  10/7/2013         CHOLECYSTECTOMY      COLONOSCOPY  2011    Tubular adenoma    COLONOSCOPY  10/24/2005    Dr. Gris Davis - Tubular adenoma    ENDOSCOPY, COLON, DIAGNOSTIC      ESOPHAGEAL MOTILITY STUDY  2013    NORMAL    EYE SURGERY      cataracts    FINE NEEDLE ASPIRATION  8/10/2012    THYROID - NEGATIVE    HEMORRHOID SURGERY      HERNIA REPAIR      HYSTERECTOMY      MIA AND BSO  8/15/2002    Endometriosis, fibroids    TONSILLECTOMY      UPPER GASTROINTESTINAL ENDOSCOPY  10/17/2005    Bx small bowel, Gastric, GE junction all negative    UPPER GASTROINTESTINAL ENDOSCOPY  2000    Dr. Gio Finch - NORMAL       Social History:    Social History     Tobacco Use    Smoking status: Former Smoker     Packs/day: 0.25     Years: 32.00     Pack years: 8.00     Types: Cigarettes     Last attempt to quit: 1/10/2017     Years since quittin.3    Smokeless tobacco: Never Used    Tobacco comment: 30 years    Substance Use Topics    Alcohol use:  No                                Counseling given: Not Answered  Comment: 30 years       Vital Signs (Current):   Vitals:    19 1528 19 0914   BP:  108/78   Pulse:  80   Resp:  18   Temp:  98 °F (36.7 °C)   TempSrc:  Oral   SpO2:  93%   Weight: 194 lb (88 kg) 194 lb (88 kg)   Height: 5' 5\" (1.651 m) 5' 5\" (1.651 m)                                              BP Readings from Last 3 Encounters:   19 108/78   19 118/76   18 100/60       NPO Status: Time of last liquid consumption: 0715                        Time of last solid consumption: comment: Oxygen use with exercise 2 L   Cardiovascular:    (+) hypertension: severe, hyperlipidemia    (-) past MI, CAD, CABG/stent, dysrhythmias,  angina,  CHF, orthopnea, PND and  MORALES      Rhythm: regular  Rate: normal                    Neuro/Psych:   (+) psychiatric history: stable with treatment   (-) seizures, TIA and CVA           GI/Hepatic/Renal:   (+) GERD:,      (-) hepatitis, liver disease, no renal disease and bowel prep       Endo/Other:    (+) hypothyroidism::., no malignancy/cancer. (-) blood dyscrasia, arthritis, no malignancy/cancer               Abdominal:           Vascular:     - DVT and PE. Anesthesia Plan      general     ASA 3       Induction: intravenous. MIPS: Postoperative opioids intended and Prophylactic antiemetics administered. Anesthetic plan and risks discussed with patient. Plan discussed with CRNA.                   Sierra Gould MD   5/21/2019

## 2019-05-21 NOTE — BRIEF OP NOTE
Brief Postoperative Note  ______________________________________________________________    Patient: Zafar Kaur  YOB: 1955  MRN: [id number]  Date of Procedure: 5/21/2019    Pre-Op Diagnosis: PAINFUL MYCOTIC NAILS 1-5 BOTH FEET    Post-Op Diagnosis: Same       Procedure(s):  TOTAL AVULSION OF 1-5 TOENAILS BILATERAL FEET    Anesthesia: General    Surgeon(s):  José Luis Nicholson DPM    Assistant: Nathalie Murray    Estimated Blood Loss (mL): <5 mL    Injectables: 30 cc of 0.5% marcaine plain total     Complications: None    Specimens:   * No specimens in log *    Implants:  * No implants in log *      Drains: * No LDAs found *    Findings:  Thickened, mycotic toenails x10 removed    Sarthak Hope DPM  Date: 5/21/2019  Time: 11:55 AM

## 2019-05-22 NOTE — OP NOTE
OPERATIVE REPORT    Gregg Ariza  [id number]  YOB: 1955    Surgeon: Dr. Nazia Durant, DPM  Assistant: Jackson Henderson, PGY1  Pre-operative Diagnosis: painful mycotic nails 1-5 b/l  Post-operative Diagnosis: Same  Procedure:  1. Total nail avulsion nail 1, right   2. Total nail avulsion nail 2, right   3. Total nail avulsion nail 3, right   4. Total nail avulsion nail 4, right   5. Total nail avulsion nail 5, right  6. Total nail avulsion nail 1, left  7. Total nail avulsion nail 2, left  8. Total nail avulsion nail 3, left   9. Total nail avulsion nail 4, left  10. Total nail avulsion nail 5, left  Pathology: none obtained  Anesthesia: general  Hemostasis: esmarch  Est. Blood Loss: <15cc   Materials: N/a  Injectables: 30 cc of 0.5% marcaine plain total (15 cc on the right foot, 15 cc on the left foot)  Findings: As dictated    INDICATIONS FOR PROCEDURE: This patient has signs and symptoms clinically  consistent with the above mentioned preoperative diagnosis. Discussed treatment options for mycotic toenails with the patient, including permanent and temporary procedures. We agreed upon a total nail avulsion procedure for toenails 1-5 b/l. For informed consent, the more common risks, benefits, and alternatives to the procedure were thoroughly discussed with the patient. No guarantees were given regarding the outcome. All potential risks, benefits, and complications were discussed with the patient prior to the scheduling of surgery. All the patient's questions were answered and no guarantees were given. The patient wished to proceed with surgery, and informed written consent was obtained. DETAILS OF PROCEDURE: The patient was brought from the pre-operative area and placed on the operating table in the supine position.  Following IV sedation, a local anesthetic block was then injected proximal to the incision site consisting of 15cc of 0.5% marcaine plain on the left and 15cc of 0.5% marcaine plain on the right. The bilateral  lower extremity was then scrubbed, prepped, and draped in the usual sterile fashion. A time-out was performed. The patient, procedure, and operative site were confirmed. An Esmarch bandage was then utilized to exsanguinate the patient's bilateral lower extremity and secured in place for hemostasis and the following procedures were performed:     Details of Procedure #1: Total Nail Avulsion, Toenail 1, Right Foot: Attention was directed toward the hallux nail on the right. Using a freer, the nail plate was freed from all soft tissue attachments. Next, a hemostat was used to remove the nail plate completely. Upon removal, a dermal curette was used to remove any remaining nail spicules. The nail bed was then irrigated with copious amounts of normal sterile saline and gentamicin. Attention was directed to the remaining nails 2-5 on the right foot. Details of Procedures 2-5: Total Nail Avulsion, Toenails 2-5, Right Foot: Attention was directed toward the respective nails on the right foot. Using a freer, each nail plate was freed from all soft tissue attachments. Next, a hemostat was used to remove the nail plates completely. Upon removal, a dermal curette was used to remove any remaining nail spicules. The nail bed was then irrigated with copious amounts of normal sterile saline and gentamicin. Next, attention was directed to the left foot. Details of Procedure #6: Total Nail Avulsion, Toenail 1, Left Foot: Attention was directed toward the hallux nail on the left. Using a freer, the nail plate was freed from all soft tissue attachments. Next, a hemostat was used to remove the nail plate completely. Upon removal, a dermal curette was used to remove any remaining nail spicules. The nail bed was then washed with copious amounts of normal sterile saline and gentamicin. Attention was directed to the remaining nails 2-5 on the right foot. Details of Procedures 7-10:  Total Nail Avulsion,

## 2019-06-09 ENCOUNTER — HOSPITAL ENCOUNTER (EMERGENCY)
Age: 64
Discharge: HOME | End: 2019-06-09
Attending: EMERGENCY MEDICINE
Payer: COMMERCIAL

## 2019-06-09 ENCOUNTER — APPOINTMENT (OUTPATIENT)
Dept: GENERAL RADIOLOGY | Age: 64
End: 2019-06-09
Payer: COMMERCIAL

## 2019-06-09 ENCOUNTER — APPOINTMENT (OUTPATIENT)
Dept: CT IMAGING | Age: 64
End: 2019-06-09
Payer: COMMERCIAL

## 2019-06-09 VITALS
HEART RATE: 93 BPM | BODY MASS INDEX: 33.32 KG/M2 | DIASTOLIC BLOOD PRESSURE: 77 MMHG | WEIGHT: 200 LBS | SYSTOLIC BLOOD PRESSURE: 108 MMHG | RESPIRATION RATE: 17 BRPM | OXYGEN SATURATION: 96 % | TEMPERATURE: 100.2 F | HEIGHT: 65 IN

## 2019-06-09 DIAGNOSIS — B34.9 VIRAL ILLNESS: Primary | ICD-10-CM

## 2019-06-09 DIAGNOSIS — R50.9 FEBRILE ILLNESS: ICD-10-CM

## 2019-06-09 DIAGNOSIS — E87.6 HYPOKALEMIA: ICD-10-CM

## 2019-06-09 LAB
A/G RATIO: 1.5 (ref 1.1–2.2)
ALBUMIN SERPL-MCNC: 4.1 G/DL (ref 3.4–5)
ALP BLD-CCNC: 100 U/L (ref 40–129)
ALT SERPL-CCNC: 15 U/L (ref 10–40)
ANION GAP SERPL CALCULATED.3IONS-SCNC: 12 MMOL/L (ref 3–16)
AST SERPL-CCNC: 15 U/L (ref 15–37)
BASOPHILS ABSOLUTE: 0.1 K/UL (ref 0–0.2)
BASOPHILS RELATIVE PERCENT: 0.9 %
BILIRUB SERPL-MCNC: 0.4 MG/DL (ref 0–1)
BILIRUBIN URINE: NEGATIVE
BLOOD, URINE: ABNORMAL
BUN BLDV-MCNC: 12 MG/DL (ref 7–20)
CALCIUM SERPL-MCNC: 9.2 MG/DL (ref 8.3–10.6)
CHLORIDE BLD-SCNC: 102 MMOL/L (ref 99–110)
CLARITY: CLEAR
CO2: 23 MMOL/L (ref 21–32)
COLOR: YELLOW
CREAT SERPL-MCNC: 1.1 MG/DL (ref 0.6–1.2)
EOSINOPHILS ABSOLUTE: 0 K/UL (ref 0–0.6)
EOSINOPHILS RELATIVE PERCENT: 0.1 %
EPITHELIAL CELLS, UA: NORMAL /HPF
GFR AFRICAN AMERICAN: >60
GFR NON-AFRICAN AMERICAN: 50
GLOBULIN: 2.7 G/DL
GLUCOSE BLD-MCNC: 109 MG/DL (ref 70–99)
GLUCOSE URINE: NEGATIVE MG/DL
HCT VFR BLD CALC: 37.8 % (ref 36–48)
HEMOGLOBIN: 12.3 G/DL (ref 12–16)
KETONES, URINE: NEGATIVE MG/DL
LACTIC ACID: 1.9 MMOL/L (ref 0.4–2)
LEUKOCYTE ESTERASE, URINE: NEGATIVE
LIPASE: 27 U/L (ref 13–60)
LYMPHOCYTES ABSOLUTE: 1.8 K/UL (ref 1–5.1)
LYMPHOCYTES RELATIVE PERCENT: 10.5 %
MAGNESIUM: 1.3 MG/DL (ref 1.8–2.4)
MCH RBC QN AUTO: 27.6 PG (ref 26–34)
MCHC RBC AUTO-ENTMCNC: 32.4 G/DL (ref 31–36)
MCV RBC AUTO: 85.2 FL (ref 80–100)
MICROSCOPIC EXAMINATION: YES
MONOCYTES ABSOLUTE: 1 K/UL (ref 0–1.3)
MONOCYTES RELATIVE PERCENT: 6.1 %
NEUTROPHILS ABSOLUTE: 13.7 K/UL (ref 1.7–7.7)
NEUTROPHILS RELATIVE PERCENT: 82.4 %
NITRITE, URINE: NEGATIVE
PDW BLD-RTO: 13.9 % (ref 12.4–15.4)
PH UA: 5 (ref 5–8)
PLATELET # BLD: 163 K/UL (ref 135–450)
PMV BLD AUTO: 11 FL (ref 5–10.5)
POTASSIUM REFLEX MAGNESIUM: 3.3 MMOL/L (ref 3.5–5.1)
PROTEIN UA: NEGATIVE MG/DL
RAPID INFLUENZA  B AGN: NEGATIVE
RAPID INFLUENZA A AGN: NEGATIVE
RBC # BLD: 4.44 M/UL (ref 4–5.2)
RBC UA: NORMAL /HPF (ref 0–2)
S PYO AG THROAT QL: NEGATIVE
SODIUM BLD-SCNC: 137 MMOL/L (ref 136–145)
SPECIFIC GRAVITY UA: <=1.005 (ref 1–1.03)
TOTAL PROTEIN: 6.8 G/DL (ref 6.4–8.2)
URINE TYPE: ABNORMAL
UROBILINOGEN, URINE: 0.2 E.U./DL
WBC # BLD: 16.7 K/UL (ref 4–11)
WBC UA: NORMAL /HPF (ref 0–5)

## 2019-06-09 PROCEDURE — 96360 HYDRATION IV INFUSION INIT: CPT

## 2019-06-09 PROCEDURE — 87880 STREP A ASSAY W/OPTIC: CPT

## 2019-06-09 PROCEDURE — 83690 ASSAY OF LIPASE: CPT

## 2019-06-09 PROCEDURE — 87081 CULTURE SCREEN ONLY: CPT

## 2019-06-09 PROCEDURE — 6360000004 HC RX CONTRAST MEDICATION: Performed by: EMERGENCY MEDICINE

## 2019-06-09 PROCEDURE — 74177 CT ABD & PELVIS W/CONTRAST: CPT

## 2019-06-09 PROCEDURE — 83605 ASSAY OF LACTIC ACID: CPT

## 2019-06-09 PROCEDURE — 71046 X-RAY EXAM CHEST 2 VIEWS: CPT

## 2019-06-09 PROCEDURE — 2580000003 HC RX 258: Performed by: EMERGENCY MEDICINE

## 2019-06-09 PROCEDURE — 85025 COMPLETE CBC W/AUTO DIFF WBC: CPT

## 2019-06-09 PROCEDURE — 87804 INFLUENZA ASSAY W/OPTIC: CPT

## 2019-06-09 PROCEDURE — 81001 URINALYSIS AUTO W/SCOPE: CPT

## 2019-06-09 PROCEDURE — 83735 ASSAY OF MAGNESIUM: CPT

## 2019-06-09 PROCEDURE — 80053 COMPREHEN METABOLIC PANEL: CPT

## 2019-06-09 PROCEDURE — 99284 EMERGENCY DEPT VISIT MOD MDM: CPT

## 2019-06-09 PROCEDURE — 6370000000 HC RX 637 (ALT 250 FOR IP): Performed by: EMERGENCY MEDICINE

## 2019-06-09 RX ORDER — POTASSIUM CHLORIDE 750 MG/1
20 TABLET, EXTENDED RELEASE ORAL ONCE
Status: COMPLETED | OUTPATIENT
Start: 2019-06-09 | End: 2019-06-09

## 2019-06-09 RX ORDER — TIZANIDINE 4 MG/1
4 TABLET ORAL 2 TIMES DAILY PRN
COMMUNITY

## 2019-06-09 RX ORDER — ACETAMINOPHEN 325 MG/1
650 TABLET ORAL ONCE
Status: COMPLETED | OUTPATIENT
Start: 2019-06-09 | End: 2019-06-09

## 2019-06-09 RX ORDER — 0.9 % SODIUM CHLORIDE 0.9 %
1000 INTRAVENOUS SOLUTION INTRAVENOUS ONCE
Status: COMPLETED | OUTPATIENT
Start: 2019-06-09 | End: 2019-06-09

## 2019-06-09 RX ORDER — POTASSIUM CHLORIDE 750 MG/1
20 TABLET, EXTENDED RELEASE ORAL DAILY
Qty: 10 TABLET | Refills: 0 | Status: SHIPPED | OUTPATIENT
Start: 2019-06-09 | End: 2021-02-17 | Stop reason: SDUPTHER

## 2019-06-09 RX ADMIN — IBUPROFEN 600 MG: 400 TABLET ORAL at 15:00

## 2019-06-09 RX ADMIN — ACETAMINOPHEN 650 MG: 325 TABLET ORAL at 15:00

## 2019-06-09 RX ADMIN — IOPAMIDOL 80 ML: 755 INJECTION, SOLUTION INTRAVENOUS at 17:21

## 2019-06-09 RX ADMIN — POTASSIUM CHLORIDE 20 MEQ: 10 TABLET, EXTENDED RELEASE ORAL at 18:04

## 2019-06-09 RX ADMIN — SODIUM CHLORIDE 1000 ML: 0.9 INJECTION, SOLUTION INTRAVENOUS at 16:42

## 2019-06-09 ASSESSMENT — PAIN SCALES - GENERAL
PAINLEVEL_OUTOF10: 8
PAINLEVEL_OUTOF10: 8

## 2019-06-09 ASSESSMENT — PAIN DESCRIPTION - LOCATION: LOCATION: CHEST;GENERALIZED

## 2019-06-09 ASSESSMENT — PAIN DESCRIPTION - PAIN TYPE: TYPE: ACUTE PAIN

## 2019-06-09 ASSESSMENT — PAIN DESCRIPTION - DESCRIPTORS: DESCRIPTORS: ACHING

## 2019-06-09 NOTE — ED NOTES
Pt up to RR, UA specimen collected and sent to lab      [facility] OF [facility], RN  06/09/19 9484

## 2019-06-09 NOTE — ED PROVIDER NOTES
CRITICAL CARE NOTE  There was a high probability of clinical significant / life threatening deterioration in the patient's condition which required my urgent intervention. Total critical care time was 24  minutes excluding any separately reported procedures. TRIAGE CHIEF COMPLAINT:   Chief Complaint   Patient presents with    Fever    Generalized Body Aches    Chest Pain    Pharyngitis       HPI: Caroleen Landau is a 59 y.o. female who presents to the emergency department with complaint of Onset last night of chills followed by fever, sore throat and body aches. She states she feels short of breath at times but is not coughing. She states she has some mild upper abdominal pain but has a ventral hernia and thinks that is the reason she has some pain. She had slight diarrhea last night but none today. Denies any vomiting. She is somewhat vague about whether she has any UTI symptoms. She has no hematuria or dysuria but may be urinating more frequently than normal.  Denies any skin rash. Denies neck pain. She has some mild bilateral ear pain. She did take some Tylenol this morning around 9 AM.     REVIEW OF SYSTEMS:   10 systems reviewed. Pertinent positives per HPI. Otherwise noted to be negative. I have reviewed the triage/nursing documentation and agree unless otherwise noted below.     PAST MEDICAL HISTORY:   Past Medical History:   Diagnosis Date    CAMDEN (acute kidney injury) ([facility] [address] 10/7/2013    Anxiety     Bronchitis     Chronic abdominal pain     possibly due to diverticulosos    Chronic back pain     [facility]     DDD (degenerative disc disease), lumbar 12/1/2016    Depression     Elevated glycated hemoglobin     History of normal mamm[facility]/26/13    [facility]    Hx of migraine headaches     Hyperlipidemia     Hypertension     Hypothyroidism     Rotator cuff tear     right        CURRENT MEDICATIONS:   Patient's Pain.    PREDNISOLONE ACETATE (PRED FORTE) 1 % OPHTHALMIC SUSPENSION    Place 1 drop into the left eye three times daily    PROMETHAZINE (PHENERGAN) 6.25 MG/5ML SYRUP    Take 5 mLs by mouth as needed    QUETIAPINE (SEROQUEL) 100 MG TABLET    TAKE ONE TABLET BY MOUTH ONCE NIGHTLY    ROSUVASTATIN (CRESTOR) 10 MG TABLET    TAKE ONE TABLET BY MOUTH ONCE NIGHTLY    STOOL SOFTENER 100 MG CAPSULE    TAKE ONE CAPSULE BY MOUTH THREE TIMES A DAY AS NEEDED FOR CONSTIPATION    TIZANIDINE (ZANAFLEX) 4 MG TABLET    Take 4 mg by mouth every 6 hours as needed    TUBERCULIN-ALLERGY SYRINGES 28G X 1/2\" 1 ML MISC    1 each by Does not apply route every 30 days    VITAMIN D (ERGOCALCIFEROL) 57191 UNITS CAPS CAPSULE    Take 1 capsule by mouth once a week   Modified Medications    No medications on file   Discontinued Medications    No medications on file        SURGICAL HISTORY:       Procedure Laterality Date    CARPAL TUNNEL RELEASE      CENTRAL LINE  10/7/2013         CHOLECYSTECTOMY      COLONOSCOPY  9/6/2011    Tubular adenoma    COLONOSCOPY  10/24/2005    Dr. Scott Martínez - Tubular adenoma    ENDOSCOPY, COLON, DIAGNOSTIC      ESOPHAGEAL MOTILITY STUDY  6/17/2013    NORMAL    EYE SURGERY      cataracts    FINE NEEDLE ASPIRATION  8/10/2012    THYROID - NEGATIVE    FINGER NAIL SURGERY Bilateral 5/21/2019    TOTAL AVULSION OF 1-5 TOENAILS BILATERAL FEET performed by Denny Loo DPM at [address] AND BSO  8/15/2002    Endometriosis, fibroids    TONSILLECTOMY      UPPER GASTROINTESTINAL ENDOSCOPY  10/17/2005    Bx small bowel, Gastric, GE junction all negative    UPPER GASTROINTESTINAL ENDOSCOPY  9/5/2000    Dr. Mercedes Irby - NORMAL        FAMILY HISTORY:       Problem Relation Age of Onset    Diabetes Sister     Heart Disease Sister         chf    Cancer Sister 46        has metastatic colon cancer    Diabetes Sister     Kidney Disease Sister     Cancer Sister         brain cancer    Cancer Mother 68        cerival cancer    Osteoarthritis Mother     Heart Disease Father     High Blood Pressure Father     Heart Disease Brother 40        heart attack    High Blood Pressure Maternal Aunt     Cancer Other 46        liver cancer    Cancer Other 47        lung cancer and smoker, maternal neice    Cancer Other         bladder cancer, first cousin. SOCIAL HISTORY:    reports that she quit smoking about 2 years ago. Her smoking use included cigarettes. She has a 8.00 pack-year smoking history. She has never used smokeless tobacco. She reports that she does not drink alcohol or use drugs. ALLERGIES:   Allergies   Allergen Reactions    Bupropion Other (See Comments)     Muscle spasms    Morphine Hives and Itching    Dye [Barium-Containing Compounds] Nausea Only     Nausea and burning in throat after use of contrast dye for CT scan    Morphine And Related Itching       PHYSICAL EXAM:  VITAL SIGNS: /72   Pulse 97   Temp 101.5 °F (38.6 °C) (Oral)   Resp 17   Ht 5' 5\" (1.651 m)   Wt 90.7 kg (200 lb)   SpO2 96%   BMI 33.28 kg/m²   Constitutional:  No acute distress, Non-toxic appearance. Not pale or diaphoretic. No respiratory distress. She is not coughing. HENT: Normocephalic, Atraumatic Oropharynx moist, No oral exudates. No oral swelling. No sinus tenderness. TMs are normal.   Eyes:  PERRL, EOMI, Conjunctiva normal, No discharge. Neck: No tenderness, Supple, No lymphadenopathy, No stridor. No meningismus. Cardiovascular:  Normal heart rate, Normal rhythm, No murmurs, No rubs, No gallops. Pulmonary/Chest:  Normal breath sounds, No respiratory distress, No wheezing,  Abdomen:   Soft, there is some inconsistent epigastric tenderness to palpation. No definite lower tenderness. No masses, No pulsatile masses.  Bowel sounds are normal. No evidence of hernia clinically  Back:  No tenderness, No CVA tenderness  Extremities:  Normal range of motion, Intact distal pulses, No edema, No tenderness  Neurologic:  Alert & oriented x 3, Speech is clear and appropriate, No upper extremity drift or lower extremity weakness,  Normal sensory function, No facial asymmetry, no truncal or extremity ataxia. Normal gait. Skin:  Warm, Dry, No erythema, No rash  Psychiatric:  Affect normal, Mood normal      EKG:    EKG interpreted by myself. Radiology:  XR CHEST STANDARD (2 VW)   Final Result      No acute disease. LAB  Labs Reviewed   URINALYSIS - Abnormal; Notable for the following components:       Result Value    Blood, Urine TRACE-INTACT (*)     All other components within normal limits    Narrative:     Performed at:  [facility]  [address],  [place]   Phone [phone] A THROAT    Narrative:     Performed at:  [facility]  [address],  [employer] [facility] [place]   Phone [phone]   RAPID INFLUENZA A/B ANTIGENS    Narrative:     Performed at:  [facility]  [address],  [facility] [place]   Phone [phone]   MICROSCOPIC URINALYSIS    Narrative:     Performed at:  [facility]  [address],  [employer] [facility] [place]   Phone [phone]   [address] [facility]       ED COURSE & MEDICAL DECISION MAKING:  Pertinent Labs & Imaging studies reviewed. (See chart for details)  66-year-old female with onset last night of chills followed by fever, body aches and sore throat. She has slight ear pain. She is not coughing. Some mild diarrhea last night but no vomiting. Denies abdominal pain. She was medicated with Tylenol and ibuprofen. Rapid flu and rapid strep tests were negative. Urinalysis was normal.  Chest x-ray read by the radiologist and reviewed by myself shows no acute abnormality.      On reexam, the patient's recheck temperature had gone up to 102.8. She was covered tightly with several blankets. Blankets were removed. On reexam, neck is supple without meningismus. I see no rash. She still has some mild inconsistent epigastric tenderness to palpation. She clinically looks like she feels better but her temperature did go up. She is not coughing. IV fluids were started. White blood cell count was 16.7 with a left shift. 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 Review of the old record shows that she had a white blood cell count of 14.0 on August 28 of last year. Hemoglobin was normal.  Lactic acid was normal.  CMP was normal with the exception of a potassium of 3.3. She was medicated with potassium by mouth. BUN and creatinine were normal.  Lipase was normal. CT scan of the abdomen and pelvis read by the radiologist reviewed by myself shows no acute intra-abdominal or pelvic abnormality to explain the patient's symptoms. There is a stable 12 mm low-density lesion in the liver suggesting hepatic cysts. Vision is status post cholecystectomy and hysterectomy. On reexam, the patient appears comfortable and is ambulating around the department. She did receive 1 L of IV fluids. Recheck temperature was 99.8 with recheck heart rate 93 and O2 sat 97%. Clinically she does not have a surgical abdomen, bowel obstruction, pyelonephritis, ureteral colic, pancreatitis, pneumonia,, meningitis, influenza or strep throat. She states that she feels much better and would like to go home. Likely this is a viral illness. She does take Percocet at home for chronic pain. I recommended she continue her regular dose of Percocet and add ibuprofen every 8 hours with food to this for fever or body aches. Advised increase fluids by mouth. She was given a Rx for potassium to take for the next 5 days.  I advised follow-up with her primary care doctor and return here for worsening. I discussed with Jt Zarco the exam results, diagnosis, care, prognosis and the importance of follow-up. The patient is stable for discharge. The patient and/or family are in agreement with the plan and all questions have been answered. Specific discharge instructions were explained, including reasons to return to the emergency department.       (Please note that portions of this note may have been completed with a voice recognition program.  Efforts were made to edit the dictation but occasionally words are mis-transcribed)      FINAL IMPRESSION:  1 -- viral illness  2 -- fever  3 -- hypokalemia                  Prince Ghanshyam MD  06/10/19 6465

## 2019-06-10 DIAGNOSIS — E78.2 MIXED HYPERLIPIDEMIA: ICD-10-CM

## 2019-06-10 RX ORDER — ROSUVASTATIN CALCIUM 10 MG/1
TABLET, COATED ORAL
Qty: 30 TABLET | Refills: 3 | Status: SHIPPED | OUTPATIENT
Start: 2019-06-10 | End: 2019-09-05 | Stop reason: SDUPTHER

## 2019-06-11 LAB — S PYO THROAT QL CULT: NORMAL

## 2019-06-16 ENCOUNTER — APPOINTMENT (OUTPATIENT)
Dept: CT IMAGING | Age: 64
End: 2019-06-16
Payer: COMMERCIAL

## 2019-06-16 ENCOUNTER — HOSPITAL ENCOUNTER (EMERGENCY)
Age: 64
Discharge: HOME | End: 2019-06-16
Attending: EMERGENCY MEDICINE
Payer: COMMERCIAL

## 2019-06-16 ENCOUNTER — APPOINTMENT (OUTPATIENT)
Dept: GENERAL RADIOLOGY | Age: 64
End: 2019-06-16
Payer: COMMERCIAL

## 2019-06-16 VITALS
DIASTOLIC BLOOD PRESSURE: 88 MMHG | OXYGEN SATURATION: 96 % | HEIGHT: 65 IN | SYSTOLIC BLOOD PRESSURE: 131 MMHG | WEIGHT: 202 LBS | BODY MASS INDEX: 33.66 KG/M2 | TEMPERATURE: 98.9 F | HEART RATE: 84 BPM | RESPIRATION RATE: 18 BRPM

## 2019-06-16 DIAGNOSIS — K29.00 ACUTE GASTRITIS, PRESENCE OF BLEEDING UNSPECIFIED, UNSPECIFIED GASTRITIS TYPE: ICD-10-CM

## 2019-06-16 DIAGNOSIS — J44.1 COPD EXACERBATION (HCC): Primary | ICD-10-CM

## 2019-06-16 LAB
A/G RATIO: 1.2 (ref 1.1–2.2)
ALBUMIN SERPL-MCNC: 3.9 G/DL (ref 3.4–5)
ALP BLD-CCNC: 102 U/L (ref 40–129)
ALT SERPL-CCNC: 17 U/L (ref 10–40)
ANION GAP SERPL CALCULATED.3IONS-SCNC: 10 MMOL/L (ref 3–16)
AST SERPL-CCNC: 16 U/L (ref 15–37)
BANDED NEUTROPHILS RELATIVE PERCENT: 3 % (ref 0–7)
BASOPHILS ABSOLUTE: 0 K/UL (ref 0–0.2)
BASOPHILS RELATIVE PERCENT: 0 %
BILIRUB SERPL-MCNC: 0.3 MG/DL (ref 0–1)
BILIRUBIN URINE: NEGATIVE
BLOOD, URINE: NEGATIVE
BUN BLDV-MCNC: 9 MG/DL (ref 7–20)
CALCIUM SERPL-MCNC: 9.1 MG/DL (ref 8.3–10.6)
CHLORIDE BLD-SCNC: 105 MMOL/L (ref 99–110)
CLARITY: CLEAR
CO2: 28 MMOL/L (ref 21–32)
COLOR: YELLOW
CREAT SERPL-MCNC: 0.9 MG/DL (ref 0.6–1.2)
EOSINOPHILS ABSOLUTE: 0.1 K/UL (ref 0–0.6)
EOSINOPHILS RELATIVE PERCENT: 1 %
GFR AFRICAN AMERICAN: >60
GFR NON-AFRICAN AMERICAN: >60
GLOBULIN: 3.2 G/DL
GLUCOSE BLD-MCNC: 145 MG/DL (ref 70–99)
GLUCOSE URINE: NEGATIVE MG/DL
HCT VFR BLD CALC: 36.9 % (ref 36–48)
HEMOGLOBIN: 11.5 G/DL (ref 12–16)
HYPOCHROMIA: ABNORMAL
KETONES, URINE: NEGATIVE MG/DL
LEUKOCYTE ESTERASE, URINE: NEGATIVE
LIPASE: 22 U/L (ref 13–60)
LYMPHOCYTES ABSOLUTE: 4.1 K/UL (ref 1–5.1)
LYMPHOCYTES RELATIVE PERCENT: 31 %
MCH RBC QN AUTO: 27 PG (ref 26–34)
MCHC RBC AUTO-ENTMCNC: 31.2 G/DL (ref 31–36)
MCV RBC AUTO: 86.3 FL (ref 80–100)
MICROSCOPIC EXAMINATION: NORMAL
MONOCYTES ABSOLUTE: 0.1 K/UL (ref 0–1.3)
MONOCYTES RELATIVE PERCENT: 1 %
NEUTROPHILS ABSOLUTE: 8.9 K/UL (ref 1.7–7.7)
NEUTROPHILS RELATIVE PERCENT: 64 %
NITRITE, URINE: NEGATIVE
PDW BLD-RTO: 14.3 % (ref 12.4–15.4)
PH UA: 5.5 (ref 5–8)
PLATELET # BLD: 253 K/UL (ref 135–450)
PLATELET SLIDE REVIEW: ADEQUATE
PMV BLD AUTO: 9.4 FL (ref 5–10.5)
POTASSIUM REFLEX MAGNESIUM: 3.9 MMOL/L (ref 3.5–5.1)
PROTEIN UA: NEGATIVE MG/DL
RBC # BLD: 4.28 M/UL (ref 4–5.2)
SODIUM BLD-SCNC: 143 MMOL/L (ref 136–145)
SPECIFIC GRAVITY UA: 1.01 (ref 1–1.03)
TOTAL PROTEIN: 7.1 G/DL (ref 6.4–8.2)
URINE TYPE: NORMAL
UROBILINOGEN, URINE: 0.2 E.U./DL
WBC # BLD: 13.3 K/UL (ref 4–11)

## 2019-06-16 PROCEDURE — 85025 COMPLETE CBC W/AUTO DIFF WBC: CPT

## 2019-06-16 PROCEDURE — 6360000004 HC RX CONTRAST MEDICATION: Performed by: EMERGENCY MEDICINE

## 2019-06-16 PROCEDURE — 94640 AIRWAY INHALATION TREATMENT: CPT

## 2019-06-16 PROCEDURE — 74177 CT ABD & PELVIS W/CONTRAST: CPT

## 2019-06-16 PROCEDURE — 94761 N-INVAS EAR/PLS OXIMETRY MLT: CPT

## 2019-06-16 PROCEDURE — 99285 EMERGENCY DEPT VISIT HI MDM: CPT

## 2019-06-16 PROCEDURE — 71046 X-RAY EXAM CHEST 2 VIEWS: CPT

## 2019-06-16 PROCEDURE — 2700000000 HC OXYGEN THERAPY PER DAY

## 2019-06-16 PROCEDURE — 83690 ASSAY OF LIPASE: CPT

## 2019-06-16 PROCEDURE — 80053 COMPREHEN METABOLIC PANEL: CPT

## 2019-06-16 PROCEDURE — 6360000002 HC RX W HCPCS: Performed by: EMERGENCY MEDICINE

## 2019-06-16 PROCEDURE — 94664 DEMO&/EVAL PT USE INHALER: CPT

## 2019-06-16 PROCEDURE — 6370000000 HC RX 637 (ALT 250 FOR IP): Performed by: EMERGENCY MEDICINE

## 2019-06-16 PROCEDURE — 81003 URINALYSIS AUTO W/O SCOPE: CPT

## 2019-06-16 RX ORDER — ALBUTEROL SULFATE 2.5 MG/3ML
2.5 SOLUTION RESPIRATORY (INHALATION) ONCE
Status: COMPLETED | OUTPATIENT
Start: 2019-06-16 | End: 2019-06-16

## 2019-06-16 RX ORDER — ALBUTEROL SULFATE 90 UG/1
2 AEROSOL, METERED RESPIRATORY (INHALATION) EVERY 4 HOURS PRN
Qty: 1 INHALER | Refills: 0 | Status: SHIPPED | OUTPATIENT
Start: 2019-06-16 | End: 2021-02-17

## 2019-06-16 RX ORDER — AZITHROMYCIN 250 MG/1
500 TABLET, FILM COATED ORAL ONCE
Status: COMPLETED | OUTPATIENT
Start: 2019-06-16 | End: 2019-06-16

## 2019-06-16 RX ORDER — IPRATROPIUM BROMIDE AND ALBUTEROL SULFATE 2.5; .5 MG/3ML; MG/3ML
1 SOLUTION RESPIRATORY (INHALATION) ONCE
Status: COMPLETED | OUTPATIENT
Start: 2019-06-16 | End: 2019-06-16

## 2019-06-16 RX ORDER — AZITHROMYCIN 250 MG/1
500 TABLET, FILM COATED ORAL ONCE
Status: DISCONTINUED | OUTPATIENT
Start: 2019-06-16 | End: 2019-06-16

## 2019-06-16 RX ORDER — OMEPRAZOLE 20 MG/1
20 CAPSULE, DELAYED RELEASE ORAL DAILY
Qty: 14 CAPSULE | Refills: 0 | Status: SHIPPED | OUTPATIENT
Start: 2019-06-16 | End: 2021-02-17 | Stop reason: SDUPTHER

## 2019-06-16 RX ORDER — PREDNISONE 20 MG/1
40 TABLET ORAL DAILY
Qty: 15 TABLET | Refills: 0 | Status: SHIPPED | OUTPATIENT
Start: 2019-06-16 | End: 2019-06-21

## 2019-06-16 RX ORDER — PREDNISONE 20 MG/1
60 TABLET ORAL ONCE
Status: COMPLETED | OUTPATIENT
Start: 2019-06-16 | End: 2019-06-16

## 2019-06-16 RX ORDER — AZITHROMYCIN 250 MG/1
TABLET, FILM COATED ORAL
Qty: 1 PACKET | Refills: 0 | Status: SHIPPED | OUTPATIENT
Start: 2019-06-16 | End: 2019-06-26

## 2019-06-16 RX ADMIN — AZITHROMYCIN MONOHYDRATE 500 MG: 250 TABLET ORAL at 16:26

## 2019-06-16 RX ADMIN — IPRATROPIUM BROMIDE AND ALBUTEROL SULFATE 1 AMPULE: .5; 3 SOLUTION RESPIRATORY (INHALATION) at 15:08

## 2019-06-16 RX ADMIN — IOPAMIDOL 80 ML: 755 INJECTION, SOLUTION INTRAVENOUS at 15:39

## 2019-06-16 RX ADMIN — ALBUTEROL SULFATE 2.5 MG: 2.5 SOLUTION RESPIRATORY (INHALATION) at 15:08

## 2019-06-16 RX ADMIN — PREDNISONE 60 MG: 20 TABLET ORAL at 14:51

## 2019-06-16 ASSESSMENT — PAIN SCALES - GENERAL: PAINLEVEL_OUTOF10: 10

## 2019-06-16 ASSESSMENT — PAIN DESCRIPTION - PAIN TYPE: TYPE: ACUTE PAIN

## 2019-06-16 ASSESSMENT — PAIN DESCRIPTION - LOCATION: LOCATION: ABDOMEN

## 2019-06-16 NOTE — ED PROVIDER NOTES
EMERGENCY DEPARTMENT PHYSICIAN DOCUMENTATION      CHIEF COMPLAINT  Abdominal Pain; Melena; and Pharyngitis      HISTORY OF PRESENT ILLNESS  Fozia Nicole is a 59 y.o. female with complaint of Abdominal Pain; Melena; and Pharyngitis    Onset of symptoms 5 days days ago. Sore throat is sharp, worse with swallowing, no radiation. No throat discharge. Associated with some nasal congestion. Cough is mildly productive, no hemoptysis. No mara fevers.   Also L ear pain  Seen in ER 7 days ago with sim sx, rapid flu and strep swabs neg  CXR neg    Hx of copd    Of note, now describes 12 hours of mild lower abdominal pain, mostly L, mild stretching, not better/worse with anything , no radiation, Mild nausea, no vomiting, +diarrhea  Hx diverticulitis      REVIEW OF SYSTEMS  A full 10 point Review of Systems was performed and is negative aside from pertinent positives mentioned in HPI    ALLERGIES:  Allergies   Allergen Reactions    Bupropion Other (See Comments)     Muscle spasms    Morphine Hives and Itching    Dye [Barium-Containing Compounds] Nausea Only     Nausea and burning in throat after use of contrast dye for CT scan    Morphine And Related Itching       PAST HISTORY  Past Medical History:   Diagnosis Date    [place] (acute kidney injury) ([facility] [address] 10/7/2013    Anxiety     Bronchitis     Chronic abdominal pain     possibly due to diverticulosos    Chronic back pain     COPD (chronic obstructive pulmonary disease) (HCC)     DDD (degenerative disc disease), lumbar 12/1/2016    Depression     Elevated glycated hemoglobin     History of normal mammogram 8/26/13    [facility] at [facility]    Hx of migraine headaches     Hyperlipidemia     Hypertension     Hypothyroidism     Rotator cuff tear     right       Family History   Problem Relation Age of Onset    Diabetes Sister     Heart Disease Sister         chf    Cancer Sister 46        has metastatic colon cancer    Diabetes Sister    Luke Sos Kidney Disease Sister     Cancer Sister         brain cancer    Cancer Mother 68        cerival cancer    Osteoarthritis Mother     Heart Disease Father     High Blood Pressure Father     Heart Disease Brother 40        heart attack    High Blood Pressure Maternal Aunt     Cancer Other 46        liver cancer    Cancer Other 47        lung cancer and smoker, maternal neice    Cancer Other         bladder cancer, first cousin. No current facility-administered medications on file prior to encounter. Current Outpatient Medications on File Prior to Encounter   Medication Sig Dispense Refill    rosuvastatin (CRESTOR) 10 MG tablet TAKE ONE TABLET BY MOUTH ONCE NIGHTLY 30 tablet 3    tiZANidine (ZANAFLEX) 4 MG tablet Take 4 mg by mouth every 6 hours as needed      potassium chloride (KLOR-CON M) 10 MEQ extended release tablet Take 2 tablets by mouth daily for 5 days 10 tablet 0    vitamin D (ERGOCALCIFEROL) 64818 units CAPS capsule Take 1 capsule by mouth once a week  10    prednisoLONE acetate (PRED FORTE) 1 % ophthalmic suspension Place 1 drop into the left eye three times daily      ketorolac (ACULAR) 0.5 % ophthalmic solution Place 1 drop into the left eye 3 times daily      promethazine (PHENERGAN) 6.25 MG/5ML syrup Take 5 mLs by mouth as needed  11    ibuprofen (ADVIL;MOTRIN) 800 MG tablet Take 1 tablet by mouth every 8 hours as needed for Pain 30 tablet 0    QUEtiapine (SEROQUEL) 100 MG tablet TAKE ONE TABLET BY MOUTH ONCE NIGHTLY 28 tablet 3    ipratropium-albuterol (DUONEB) 0.5-2.5 (3) MG/3ML SOLN nebulizer solution Inhale 1 vial into the lungs as needed for Shortness of Breath      amitriptyline (ELAVIL) 50 MG tablet Take 50 mg by mouth 2 times daily      oxyCODONE-acetaminophen (PERCOCET) 7.5-325 MG per tablet Take 1 tablet by mouth every 6 hours as needed for Pain.       blood glucose test strips (TRUE METRIX BLOOD GLUCOSE TEST) strip USE TO TEST BLOOD SUGAR DAILY 100 each 11    Blood Glucose Monitoring Suppl (TRUE METRIX METER) w/Device KIT 1 kit by Does not apply route daily 1 kit 0    COMBIVENT RESPIMAT  MCG/ACT AERS inhaler INHALE 1 PUFF INTO THE LUNGS EVERY 4 HOURS AS NEEDED FOR WHEEZING 4 g 10    mometasone-formoterol (DULERA) 200-5 MCG/ACT inhaler INHALE TWO (2) PUFFS BY MOUTH EVERY 12 HOURS 13 g 10    losartan (COZAAR) 25 MG tablet TAKE ONE TABLET BY MOUTH DAILY 90 tablet 3    cyanocobalamin 1000 MCG/ML injection INJECT 1ML SUBCUTANEOUSLY EVERY MONTH 3 mL 10    ondansetron (ZOFRAN-ODT) 4 MG disintegrating tablet DISSOLVE 1 TABLET BY MOUTH EVERY 8 HOURS AS NEEDED FOR NAUSEA & VOMITING 30 tablet 10    hydrochlorothiazide (HYDRODIURIL) 25 MG tablet TAKE 1 TABLET BY MOUTH ONCE DAILY 30 tablet 10    cloNIDine (CATAPRES) 0.1 MG tablet TAKE 1 TABLET BY MOUTH 2 TIMES A DAY 60 tablet 11    cyclobenzaprine (FLEXERIL) 10 MG tablet TAKE ONE (1) TABLET BY MOUTH THREE TIMES A DAY AS NEEDED FOR SPASMS 90 tablet 11    BANOPHEN 50 MG capsule TAKE ONE CAPSULE BY MOUTH EVERY NIGHT AT BEDTIME AS NEEDED FOR ITCHING 30 capsule 4    STOOL SOFTENER 100 MG capsule TAKE ONE CAPSULE BY MOUTH THREE TIMES A DAY AS NEEDED FOR CONSTIPATION 90 capsule 4    levothyroxine (SYNTHROID) 125 MCG tablet Take 1 tablet by mouth daily Discontinue 135. 90 tablet 3    Blood Glucose Monitoring Suppl (TRUE METRIX METER) CRYSTAL 1 kit by Does not apply route daily 1 Device 1    lactulose (CHRONULAC) 10 GM/15ML solution as needed       Tuberculin-Allergy Syringes 28G X 1/2\" 1 ML MISC 1 each by Does not apply route every 30 days 100 each 3       Social History     Tobacco Use    Smoking status: Former Smoker     Packs/day: 0.25     Years: 32.00     Pack years: 8.00     Types: Cigarettes     Last attempt to quit: 1/10/2017     Years since quittin.4    Smokeless tobacco: Never Used    Tobacco comment: 30 years    Substance Use Topics    Alcohol use: No    Drug use: No         EXAM:   Presentation Vital Signs: /88   Pulse 84   Temp 98.9 °F (37.2 °C) (Oral)   Resp 20   Ht 5' 5\" (1.651 m)   Wt 91.6 kg (202 lb)   SpO2 93%   BMI 33.61 kg/m²     General: Well nourished, no acute distress  Head: No traumatic injury  ENT: MMM, no facial asymmetry, no nasal discharge  Pharynx shows normal non-significantly enlarged tonsils without exudates. No pharyngeal lesions or uvular deviation  Eyes: EOM  Neck: No tracheal deviation or stridor  Lungs: bilateral wheezing that is mild, no sob, good air exchange  Abdomen: soft, mod epigastric pain and llq pain with mild voluntary guarding here only, no r/p signs  Cardiac: Regular rate and rhythm without gallops, murmurs, or rubs  Skin: no pallor, erythema, lesions or other abnormalities on exposed skin of face and arms  Extremities: Normal ROM of bilateral upper extremities at shoulders, elbows, wrists; normal ROM of bilateral LE at hips and knees. Neurologic: Alert, oriented x 3. No focal deficits upon moving arms and legs  Psychiatric: Appropriate demeanor without agitation or internal stimulation      MEDICAL DECISION MAKING  1) URI--> wheezing/copd exacerbation  Seen in ER 7 days ago with sim sx, rapid flu and strep swabs neg  CXR neg at that time  Has hx of copd  On baseline O2 no change satting well  Mild wheezes on exam, admin duoneb, steroids. CXR neg today  Azithromycin admin in ER, dc with same, prednisone, and albuterol    2) Diffuse mild abdom pain, mod epigastric pain, mod LLQ pain  Hx diverticulosis she states  Had fever/abdom pain 7 days ago, CT neg then  She states a new pain onset about 12-14 hours pta    CBC WBC 13, but has chronic leukocytosis  CMP wnl  Lipase 22  UA wnl  CT ab/pelvis obtained due to her moderate LLQ pain with reported hx of diverticulosis to r/o diverticulitis   1. No acute abnormality in the abdomen and pelvis. 2. Diverticulosis without evidence of diverticulitis. 3. Mild hepatic steatosis.     Will rx omeprazole, pt to avoid nsaids for 1 week    DISPOSITION  Home    IMPRESSION:  Viral upper respiratory illness  Cough  COPD exacerbation  --  Abdominal pain  diverticulosis  gastritis    This medical chart used with aid of transcription software. As such, there may be inadvertent errors in transcription of spellings and words despite physician's attempts to correct all possible errors.          Michael Melendrez MD  06/16/19 [id number]

## 2019-06-25 RX ORDER — LOSARTAN POTASSIUM 25 MG/1
TABLET ORAL
Qty: 30 TABLET | Refills: 0 | Status: SHIPPED | OUTPATIENT
Start: 2019-06-25 | End: 2019-09-05 | Stop reason: SDUPTHER

## 2019-08-22 RX ORDER — QUETIAPINE FUMARATE 100 MG/1
TABLET, FILM COATED ORAL
Qty: 28 TABLET | Refills: 10 | Status: SHIPPED | OUTPATIENT
Start: 2019-08-22 | End: 2020-09-12

## 2019-08-26 ENCOUNTER — OFFICE VISIT (OUTPATIENT)
Dept: PRIMARY CARE CLINIC | Age: 64
End: 2019-08-26
Payer: COMMERCIAL

## 2019-08-26 VITALS
RESPIRATION RATE: 20 BRPM | TEMPERATURE: 98.1 F | BODY MASS INDEX: 34.45 KG/M2 | SYSTOLIC BLOOD PRESSURE: 120 MMHG | HEART RATE: 101 BPM | WEIGHT: 207 LBS | OXYGEN SATURATION: 95 % | DIASTOLIC BLOOD PRESSURE: 81 MMHG

## 2019-08-26 DIAGNOSIS — I10 ESSENTIAL HYPERTENSION: ICD-10-CM

## 2019-08-26 DIAGNOSIS — K21.9 GASTROESOPHAGEAL REFLUX DISEASE WITHOUT ESOPHAGITIS: Primary | Chronic | ICD-10-CM

## 2019-08-26 DIAGNOSIS — R73.03 PREDIABETES: ICD-10-CM

## 2019-08-26 DIAGNOSIS — R06.09 DOE (DYSPNEA ON EXERTION): ICD-10-CM

## 2019-08-26 DIAGNOSIS — J44.1 COPD EXACERBATION (HCC): ICD-10-CM

## 2019-08-26 DIAGNOSIS — E55.9 VITAMIN D DEFICIENCY: ICD-10-CM

## 2019-08-26 DIAGNOSIS — E78.2 MIXED HYPERLIPIDEMIA: ICD-10-CM

## 2019-08-26 LAB
A/G RATIO: 2 (ref 1.1–2.2)
ALBUMIN SERPL-MCNC: 4.9 G/DL (ref 3.4–5)
ALP BLD-CCNC: 97 U/L (ref 40–129)
ALT SERPL-CCNC: 18 U/L (ref 10–40)
ANION GAP SERPL CALCULATED.3IONS-SCNC: 23 MMOL/L (ref 3–16)
AST SERPL-CCNC: 20 U/L (ref 15–37)
BASOPHILS ABSOLUTE: 0.1 K/UL (ref 0–0.2)
BASOPHILS RELATIVE PERCENT: 0.6 %
BILIRUB SERPL-MCNC: 0.3 MG/DL (ref 0–1)
BILIRUBIN URINE: NEGATIVE
BLOOD, URINE: NEGATIVE
BUN BLDV-MCNC: 11 MG/DL (ref 7–20)
CALCIUM SERPL-MCNC: 10.1 MG/DL (ref 8.3–10.6)
CHLORIDE BLD-SCNC: 96 MMOL/L (ref 99–110)
CHOLESTEROL, TOTAL: 120 MG/DL (ref 0–199)
CLARITY: CLEAR
CO2: 23 MMOL/L (ref 21–32)
COLOR: YELLOW
CREAT SERPL-MCNC: 0.8 MG/DL (ref 0.6–1.2)
CREATININE URINE: 189.6 MG/DL (ref 28–259)
EOSINOPHILS ABSOLUTE: 0.1 K/UL (ref 0–0.6)
EOSINOPHILS RELATIVE PERCENT: 0.7 %
GFR AFRICAN AMERICAN: >60
GFR NON-AFRICAN AMERICAN: >60
GLOBULIN: 2.4 G/DL
GLUCOSE BLD-MCNC: 137 MG/DL (ref 70–99)
GLUCOSE URINE: NEGATIVE MG/DL
HCT VFR BLD CALC: 42.3 % (ref 36–48)
HDLC SERPL-MCNC: 48 MG/DL (ref 40–60)
HEMOGLOBIN: 13.2 G/DL (ref 12–16)
KETONES, URINE: NEGATIVE MG/DL
LDL CHOLESTEROL CALCULATED: 47 MG/DL
LEUKOCYTE ESTERASE, URINE: NEGATIVE
LYMPHOCYTES ABSOLUTE: 3.9 K/UL (ref 1–5.1)
LYMPHOCYTES RELATIVE PERCENT: 29.5 %
MCH RBC QN AUTO: 27.6 PG (ref 26–34)
MCHC RBC AUTO-ENTMCNC: 31.2 G/DL (ref 31–36)
MCV RBC AUTO: 88.5 FL (ref 80–100)
MICROALBUMIN UR-MCNC: <1.2 MG/DL
MICROALBUMIN/CREAT UR-RTO: NORMAL MG/G (ref 0–30)
MICROSCOPIC EXAMINATION: NORMAL
MONOCYTES ABSOLUTE: 0.7 K/UL (ref 0–1.3)
MONOCYTES RELATIVE PERCENT: 5 %
NEUTROPHILS ABSOLUTE: 8.4 K/UL (ref 1.7–7.7)
NEUTROPHILS RELATIVE PERCENT: 64.2 %
NITRITE, URINE: NEGATIVE
PDW BLD-RTO: 14.1 % (ref 12.4–15.4)
PH UA: 6 (ref 5–8)
PLATELET # BLD: 190 K/UL (ref 135–450)
PMV BLD AUTO: 11.4 FL (ref 5–10.5)
POTASSIUM SERPL-SCNC: 3.9 MMOL/L (ref 3.5–5.1)
PRO-BNP: <5 PG/ML (ref 0–124)
PROTEIN UA: NEGATIVE MG/DL
RBC # BLD: 4.78 M/UL (ref 4–5.2)
SODIUM BLD-SCNC: 142 MMOL/L (ref 136–145)
SPECIFIC GRAVITY UA: 1.02 (ref 1–1.03)
TOTAL PROTEIN: 7.3 G/DL (ref 6.4–8.2)
TRIGL SERPL-MCNC: 123 MG/DL (ref 0–150)
TSH REFLEX FT4: 1.56 UIU/ML (ref 0.27–4.2)
URINE TYPE: NORMAL
UROBILINOGEN, URINE: 0.2 E.U./DL
VLDLC SERPL CALC-MCNC: 25 MG/DL
WBC # BLD: 13.1 K/UL (ref 4–11)

## 2019-08-26 PROCEDURE — G8926 SPIRO NO PERF OR DOC: HCPCS | Performed by: INTERNAL MEDICINE

## 2019-08-26 PROCEDURE — 99214 OFFICE O/P EST MOD 30 MIN: CPT | Performed by: INTERNAL MEDICINE

## 2019-08-26 PROCEDURE — G8417 CALC BMI ABV UP PARAM F/U: HCPCS | Performed by: INTERNAL MEDICINE

## 2019-08-26 PROCEDURE — 3023F SPIROM DOC REV: CPT | Performed by: INTERNAL MEDICINE

## 2019-08-26 PROCEDURE — 1036F TOBACCO NON-USER: CPT | Performed by: INTERNAL MEDICINE

## 2019-08-26 PROCEDURE — G8427 DOCREV CUR MEDS BY ELIG CLIN: HCPCS | Performed by: INTERNAL MEDICINE

## 2019-08-26 PROCEDURE — 3017F COLORECTAL CA SCREEN DOC REV: CPT | Performed by: INTERNAL MEDICINE

## 2019-08-26 RX ORDER — RANITIDINE 150 MG/1
150 TABLET ORAL 2 TIMES DAILY
Qty: 60 TABLET | Refills: 3 | Status: SHIPPED | OUTPATIENT
Start: 2019-08-26 | End: 2020-01-08

## 2019-08-26 ASSESSMENT — ENCOUNTER SYMPTOMS
HEMATOCHEZIA: 1
DIFFICULTY BREATHING: 0
SORE THROAT: 0
HEARTBURN: 0
RHINORRHEA: 0
TROUBLE SWALLOWING: 0
PRIMARY PULMONARY SYMPTOMS: DOE
SPUTUM PRODUCTION: 0
HEMOPTYSIS: 0
BACK PAIN: 1
NAUSEA: 1
CHEST TIGHTNESS: 1
COUGH: 0
CHEST TIGHTNESS: 0
ALLERGIC/IMMUNOLOGIC NEGATIVE: 1
APNEA: 0
WHEEZING: 0
FREQUENT THROAT CLEARING: 0
ABDOMINAL PAIN: 0
HEMATEMESIS: 0
EYES NEGATIVE: 1
DIARRHEA: 0
SHORTNESS OF BREATH: 1
HOARSE VOICE: 0

## 2019-08-26 ASSESSMENT — PATIENT HEALTH QUESTIONNAIRE - PHQ9
SUM OF ALL RESPONSES TO PHQ QUESTIONS 1-9: 0
2. FEELING DOWN, DEPRESSED OR HOPELESS: 0
SUM OF ALL RESPONSES TO PHQ QUESTIONS 1-9: 0
SUM OF ALL RESPONSES TO PHQ9 QUESTIONS 1 & 2: 0
1. LITTLE INTEREST OR PLEASURE IN DOING THINGS: 0

## 2019-08-26 ASSESSMENT — COPD QUESTIONNAIRES: COPD: 1

## 2019-08-26 NOTE — PROGRESS NOTES
Positive for back pain. Negative for myalgias and neck pain. Muscle spasm in neck and lower back   Skin: Negative. Negative for pallor and rash. Allergic/Immunologic: Negative. Neurological: Negative for dizziness, tremors, seizures, speech difficulty, weakness and headaches. Psychiatric/Behavioral: Negative for agitation, behavioral problems, confusion, decreased concentration and suicidal ideas. The patient is not nervous/anxious and is not hyperactive. Insomnia controlled with Seroquel       Prior to Visit Medications    Medication Sig Taking? Authorizing Provider   QUEtiapine (SEROQUEL) 100 MG tablet TAKE 1 TABLET BY MOUTH EVERY NIGHT AT BEDTIME  Yonny Lewis MD   losartan (COZAAR) 25 MG tablet TAKE ONE TABLET BY MOUTH DAILY  Gary Leggett MD   albuterol sulfate HFA (PROVENTIL HFA) 108 (90 Base) MCG/ACT inhaler Inhale 2 puffs into the lungs every 4 hours as needed for Wheezing or Shortness of Breath (Space out to every 6 hours as symptoms improve) Space out to every 6 hours as symptoms improve.   Tripp Yen MD   omeprazole (PRILOSEC) 20 MG delayed release capsule Take 1 capsule by mouth daily  Tripp Yen MD   rosuvastatin (CRESTOR) 10 MG tablet TAKE ONE TABLET BY MOUTH ONCE NIGHTLY  Yonny Lewis MD   tiZANidine (ZANAFLEX) 4 MG tablet Take 4 mg by mouth every 6 hours as needed  Historical Provider, MD   potassium chloride (KLOR-CON M) 10 MEQ extended release tablet Take 2 tablets by mouth daily for 5 days  Jackie Monsivais MD   vitamin D (ERGOCALCIFEROL) 12968 units CAPS capsule Take 1 capsule by mouth once a week  Historical Provider, MD   prednisoLONE acetate (PRED FORTE) 1 % ophthalmic suspension Place 1 drop into the left eye three times daily  Historical Provider, MD   ketorolac (ACULAR) 0.5 % ophthalmic solution Place 1 drop into the left eye 3 times daily  Historical Provider, MD   promethazine (PHENERGAN) 6.25 MG/5ML syrup Take 5 mLs by mouth as Monitoring Suppl (TRUE METRIX METER) CRYSTAL 1 kit by Does not apply route daily  Fortunato Stephenson MD   lactulose [facility]) 10 GM/15ML solution as needed   Historical Provider, MD   Tuberculin-Allergy Syringes 28G X 1/2\" 1 ML MISC 1 each by Does not apply route every 30 days  Fortunato Stephenson MD        Social History     Tobacco Use    Smoking status: Former Smoker     Packs/day: 0.25     Years: 32.00     Pack years: 8.00     Types: Cigarettes     Last attempt to quit: 1/10/2017     Years since quittin.6    Smokeless tobacco: Never Used    Tobacco comment: 30 years    Substance Use Topics    Alcohol use: No        There were no vitals filed for this visit. Estimated body mass index is 33.61 kg/m² as calculated from the following:    Height as of 19: 5' 5\" (1.651 m). Weight as of 19: 202 lb (91.6 kg). Physical Exam   Constitutional: She is oriented to person, place, and time. She appears well-developed and well-nourished. No distress. HENT:   Head: Normocephalic and atraumatic. Right Ear: External ear normal.   Left Ear: External ear normal.   Nose: Nose normal.   Mouth/Throat: Oropharynx is clear and moist. No oropharyngeal exudate. Eyes: Pupils are equal, round, and reactive to light. Conjunctivae and EOM are normal. Right eye exhibits no discharge. Left eye exhibits no discharge. No scleral icterus. Neck: Normal range of motion. Neck supple. No JVD present. No thyromegaly present. Cardiovascular: Normal rate, regular rhythm and intact distal pulses. Exam reveals no friction rub. Pulmonary/Chest: Effort normal and breath sounds normal. No respiratory distress. She has no wheezes. She has no rales. She exhibits no tenderness. Abdominal: Soft. Bowel sounds are normal. She exhibits no distension. There is no tenderness. There is no rebound and no guarding. Musculoskeletal: She exhibits no edema or tenderness. Lymphadenopathy:     She has no cervical adenopathy.

## 2019-08-27 DIAGNOSIS — D72.828 ACQUIRED NEUTROPHILIA: Primary | ICD-10-CM

## 2019-08-27 LAB
ESTIMATED AVERAGE GLUCOSE: 137 MG/DL
HBA1C MFR BLD: 6.4 %

## 2019-08-28 ENCOUNTER — TELEPHONE (OUTPATIENT)
Dept: PAIN MANAGEMENT | Age: 64
End: 2019-08-28

## 2019-08-28 LAB — FRUCTOSAMINE: 307 UMOL/L (ref 170–285)

## 2019-08-28 NOTE — TELEPHONE ENCOUNTER
Submitted PA for [employer] tablets, Key: [id number] - PA Case ID: [id number] - Rx #: [id number]  Via CMM STATUS: PENDING

## 2019-08-29 ENCOUNTER — TELEPHONE (OUTPATIENT)
Dept: PRIMARY CARE CLINIC | Age: 64
End: 2019-08-29

## 2019-09-05 DIAGNOSIS — E78.2 MIXED HYPERLIPIDEMIA: ICD-10-CM

## 2019-09-05 DIAGNOSIS — E03.9 ACQUIRED HYPOTHYROIDISM: ICD-10-CM

## 2019-09-06 RX ORDER — LOSARTAN POTASSIUM 25 MG/1
TABLET ORAL
Qty: 30 TABLET | Refills: 10 | Status: SHIPPED | OUTPATIENT
Start: 2019-09-06 | End: 2020-09-12

## 2019-09-06 RX ORDER — LEVOTHYROXINE SODIUM 0.12 MG/1
TABLET ORAL
Qty: 90 TABLET | Refills: 10 | Status: SHIPPED | OUTPATIENT
Start: 2019-09-06 | End: 2020-09-12

## 2019-09-06 RX ORDER — ROSUVASTATIN CALCIUM 10 MG/1
TABLET, COATED ORAL
Qty: 30 TABLET | Refills: 10 | Status: SHIPPED | OUTPATIENT
Start: 2019-09-06 | End: 2020-08-27

## 2019-09-09 ENCOUNTER — HOSPITAL ENCOUNTER (OUTPATIENT)
Dept: PULMONOLOGY | Age: 64
Setting detail: THERAPIES SERIES
Discharge: HOME | End: 2019-09-09
Payer: COMMERCIAL

## 2019-09-09 PROCEDURE — 99211 OFF/OP EST MAY X REQ PHY/QHP: CPT

## 2019-09-16 ENCOUNTER — HOSPITAL ENCOUNTER (EMERGENCY)
Age: 64
Discharge: HOME | End: 2019-09-16
Attending: EMERGENCY MEDICINE
Payer: COMMERCIAL

## 2019-09-16 VITALS
HEART RATE: 94 BPM | WEIGHT: 210.9 LBS | RESPIRATION RATE: 18 BRPM | OXYGEN SATURATION: 97 % | DIASTOLIC BLOOD PRESSURE: 78 MMHG | HEIGHT: 65 IN | SYSTOLIC BLOOD PRESSURE: 123 MMHG | BODY MASS INDEX: 35.14 KG/M2 | TEMPERATURE: 98.5 F

## 2019-09-16 DIAGNOSIS — K12.2 ORAL INFECTION: Primary | ICD-10-CM

## 2019-09-16 PROCEDURE — 90715 TDAP VACCINE 7 YRS/> IM: CPT | Performed by: EMERGENCY MEDICINE

## 2019-09-16 PROCEDURE — 6360000002 HC RX W HCPCS: Performed by: EMERGENCY MEDICINE

## 2019-09-16 PROCEDURE — 99282 EMERGENCY DEPT VISIT SF MDM: CPT

## 2019-09-16 PROCEDURE — 90471 IMMUNIZATION ADMIN: CPT | Performed by: EMERGENCY MEDICINE

## 2019-09-16 PROCEDURE — 6370000000 HC RX 637 (ALT 250 FOR IP): Performed by: EMERGENCY MEDICINE

## 2019-09-16 RX ORDER — CLINDAMYCIN HYDROCHLORIDE 300 MG/1
300 CAPSULE ORAL 3 TIMES DAILY
Qty: 21 CAPSULE | Refills: 0 | Status: SHIPPED | OUTPATIENT
Start: 2019-09-16 | End: 2019-09-23

## 2019-09-16 RX ADMIN — IBUPROFEN 600 MG: 200 TABLET, FILM COATED ORAL at 17:44

## 2019-09-16 RX ADMIN — TETANUS TOXOID, REDUCED DIPHTHERIA TOXOID AND ACELLULAR PERTUSSIS VACCINE, ADSORBED 0.5 ML: 5; 2.5; 8; 8; 2.5 SUSPENSION INTRAMUSCULAR at 17:45

## 2019-09-16 ASSESSMENT — PAIN SCALES - GENERAL
PAINLEVEL_OUTOF10: 9
PAINLEVEL_OUTOF10: 9

## 2019-09-16 ASSESSMENT — PAIN DESCRIPTION - DESCRIPTORS: DESCRIPTORS: ACHING

## 2019-09-16 ASSESSMENT — PAIN DESCRIPTION - PAIN TYPE: TYPE: ACUTE PAIN

## 2019-09-16 ASSESSMENT — PAIN DESCRIPTION - LOCATION: LOCATION: MOUTH

## 2019-09-16 NOTE — ED PROVIDER NOTES
History   Problem Relation Age of Onset    Diabetes Sister     Heart Disease Sister         [place]ink Numbers Cancer Sister 76        colon cancer  metastatic    Diabetes Sister     Kidney Disease Sister     Cancer Sister         brain cancer throat cancer and smoked    Cancer Mother 68        cerival cancer    Osteoarthritis Mother     Heart Disease Father     High Blood Pressure Father     Heart Disease Brother 40        heart attack    High Blood Pressure Maternal Aunt     Cancer Other 46        liver cancer    Cancer Other 47        lung cancer and smoker, maternal neice    Cancer Other         bladder cancer, first cousin.     Diabetes Sister 46        complication of diabetes     Social History     Socioeconomic History    Marital status: Single     Spouse name: Not on file    Number of children: Not on file    Years of education: Not on file    Highest education level: Not on file   Occupational History    Not on file   Social Needs    Financial resource strain: Not on file    Food insecurity:     Worry: Not on file     Inability: Not on file    Transportation needs:     Medical: Not on file     Non-medical: Not on file   Tobacco Use    Smoking status: Former Smoker     Packs/day: 0.25     Years: 32.00     Pack years: 8.00     Types: Cigarettes     Last attempt to quit: 1/10/2017     Years since quittin.6    Smokeless tobacco: Never Used    Tobacco comment: 30 years    Substance and Sexual Activity    Alcohol use: No    Drug use: No    Sexual activity: Not on file   Lifestyle    Physical activity:     Days per week: Not on file     Minutes per session: Not on file    Stress: Not on file   Relationships    Social connections:     Talks on phone: Not on file     Gets together: Not on file     Attends Scientologist service: Not on file     Active member of club or organization: Not on file     Attends meetings of clubs or organizations: Not on file     Relationship status: Not on file   [facility]

## 2019-09-23 ENCOUNTER — TELEPHONE (OUTPATIENT)
Dept: PAIN MANAGEMENT | Age: 64
End: 2019-09-23

## 2019-09-23 NOTE — TELEPHONE ENCOUNTER
Submitted PA for [employer] 500MCG tablets, via CM. Medication is APPROVED. 23-SEP-19:22-SEP-20 [employer]resp 500MCG OR TABS Quantity:90        Please advise patient. Thank you.

## 2019-10-02 ENCOUNTER — APPOINTMENT (OUTPATIENT)
Dept: PULMONOLOGY | Age: 64
End: 2019-10-02
Payer: COMMERCIAL

## 2019-10-04 ENCOUNTER — APPOINTMENT (OUTPATIENT)
Dept: PULMONOLOGY | Age: 64
End: 2019-10-04
Payer: COMMERCIAL

## 2019-10-07 ENCOUNTER — APPOINTMENT (OUTPATIENT)
Dept: PULMONOLOGY | Age: 64
End: 2019-10-07
Payer: COMMERCIAL

## 2019-10-09 ENCOUNTER — APPOINTMENT (OUTPATIENT)
Dept: PULMONOLOGY | Age: 64
End: 2019-10-09
Payer: COMMERCIAL

## 2019-10-11 ENCOUNTER — APPOINTMENT (OUTPATIENT)
Dept: PULMONOLOGY | Age: 64
End: 2019-10-11
Payer: COMMERCIAL

## 2019-10-14 ENCOUNTER — APPOINTMENT (OUTPATIENT)
Dept: PULMONOLOGY | Age: 64
End: 2019-10-14
Payer: COMMERCIAL

## 2019-10-16 ENCOUNTER — APPOINTMENT (OUTPATIENT)
Dept: PULMONOLOGY | Age: 64
End: 2019-10-16
Payer: COMMERCIAL

## 2019-10-18 ENCOUNTER — APPOINTMENT (OUTPATIENT)
Dept: PULMONOLOGY | Age: 64
End: 2019-10-18
Payer: COMMERCIAL

## 2019-10-21 ENCOUNTER — APPOINTMENT (OUTPATIENT)
Dept: PULMONOLOGY | Age: 64
End: 2019-10-21
Payer: COMMERCIAL

## 2019-10-23 ENCOUNTER — TELEPHONE (OUTPATIENT)
Dept: PRIMARY CARE CLINIC | Age: 64
End: 2019-10-23

## 2019-10-23 ENCOUNTER — HOSPITAL ENCOUNTER (OUTPATIENT)
Dept: PULMONOLOGY | Age: 64
Setting detail: THERAPIES SERIES
Discharge: HOME | End: 2019-10-23
Payer: COMMERCIAL

## 2019-10-23 PROCEDURE — G0238 OTH RESP PROC, INDIV: HCPCS

## 2019-10-23 PROCEDURE — 94618 PULMONARY STRESS TESTING: CPT

## 2019-10-23 PROCEDURE — G0237 THERAPEUTIC PROCD STRG ENDUR: HCPCS

## 2019-10-25 ENCOUNTER — APPOINTMENT (OUTPATIENT)
Dept: PULMONOLOGY | Age: 64
End: 2019-10-25
Payer: COMMERCIAL

## 2019-10-28 ENCOUNTER — HOSPITAL ENCOUNTER (OUTPATIENT)
Dept: PULMONOLOGY | Age: 64
Setting detail: THERAPIES SERIES
Discharge: HOME | End: 2019-10-28
Payer: COMMERCIAL

## 2019-10-28 PROCEDURE — G0237 THERAPEUTIC PROCD STRG ENDUR: HCPCS

## 2019-10-30 ENCOUNTER — HOSPITAL ENCOUNTER (OUTPATIENT)
Dept: PULMONOLOGY | Age: 64
Setting detail: THERAPIES SERIES
Discharge: HOME | End: 2019-10-30
Payer: COMMERCIAL

## 2019-10-30 PROCEDURE — G0237 THERAPEUTIC PROCD STRG ENDUR: HCPCS

## 2019-11-01 ENCOUNTER — APPOINTMENT (OUTPATIENT)
Dept: PULMONOLOGY | Age: 64
End: 2019-11-01
Payer: COMMERCIAL

## 2019-11-04 ENCOUNTER — HOSPITAL ENCOUNTER (OUTPATIENT)
Dept: PULMONOLOGY | Age: 64
Setting detail: THERAPIES SERIES
Discharge: HOME | End: 2019-11-04
Payer: COMMERCIAL

## 2019-11-04 PROCEDURE — G0237 THERAPEUTIC PROCD STRG ENDUR: HCPCS

## 2019-11-06 ENCOUNTER — HOSPITAL ENCOUNTER (OUTPATIENT)
Dept: PULMONOLOGY | Age: 64
Setting detail: THERAPIES SERIES
Discharge: HOME | End: 2019-11-06
Payer: COMMERCIAL

## 2019-11-06 PROCEDURE — G0237 THERAPEUTIC PROCD STRG ENDUR: HCPCS | Performed by: CLINICAL NURSE SPECIALIST

## 2019-11-08 ENCOUNTER — HOSPITAL ENCOUNTER (OUTPATIENT)
Dept: PULMONOLOGY | Age: 64
Setting detail: THERAPIES SERIES
Discharge: HOME | End: 2019-11-08
Payer: COMMERCIAL

## 2019-11-08 PROCEDURE — G0237 THERAPEUTIC PROCD STRG ENDUR: HCPCS

## 2019-11-11 ENCOUNTER — APPOINTMENT (OUTPATIENT)
Dept: PULMONOLOGY | Age: 64
End: 2019-11-11
Payer: COMMERCIAL

## 2019-11-13 ENCOUNTER — HOSPITAL ENCOUNTER (OUTPATIENT)
Dept: PULMONOLOGY | Age: 64
Setting detail: THERAPIES SERIES
Discharge: HOME | End: 2019-11-13
Payer: COMMERCIAL

## 2019-11-13 PROCEDURE — G0237 THERAPEUTIC PROCD STRG ENDUR: HCPCS

## 2019-11-15 ENCOUNTER — APPOINTMENT (OUTPATIENT)
Dept: PULMONOLOGY | Age: 64
End: 2019-11-15
Payer: COMMERCIAL

## 2019-11-18 ENCOUNTER — OFFICE VISIT (OUTPATIENT)
Dept: PRIMARY CARE CLINIC | Age: 64
End: 2019-11-18
Payer: COMMERCIAL

## 2019-11-18 ENCOUNTER — APPOINTMENT (OUTPATIENT)
Dept: PULMONOLOGY | Age: 64
End: 2019-11-18
Payer: COMMERCIAL

## 2019-11-18 VITALS
TEMPERATURE: 98.5 F | SYSTOLIC BLOOD PRESSURE: 133 MMHG | WEIGHT: 209.8 LBS | OXYGEN SATURATION: 92 % | HEART RATE: 103 BPM | BODY MASS INDEX: 34.95 KG/M2 | DIASTOLIC BLOOD PRESSURE: 79 MMHG | HEIGHT: 65 IN

## 2019-11-18 DIAGNOSIS — R11.0 CHRONIC NAUSEA: ICD-10-CM

## 2019-11-18 DIAGNOSIS — I10 ESSENTIAL HYPERTENSION: ICD-10-CM

## 2019-11-18 DIAGNOSIS — J01.00 ACUTE MAXILLARY SINUSITIS, RECURRENCE NOT SPECIFIED: ICD-10-CM

## 2019-11-18 DIAGNOSIS — R73.03 PREDIABETES: Primary | ICD-10-CM

## 2019-11-18 DIAGNOSIS — N63.20 LEFT BREAST MASS: ICD-10-CM

## 2019-11-18 DIAGNOSIS — R06.09 DOE (DYSPNEA ON EXERTION): ICD-10-CM

## 2019-11-18 DIAGNOSIS — J44.1 COPD EXACERBATION (HCC): ICD-10-CM

## 2019-11-18 LAB — HBA1C MFR BLD: 6.4 %

## 2019-11-18 PROCEDURE — 3017F COLORECTAL CA SCREEN DOC REV: CPT | Performed by: INTERNAL MEDICINE

## 2019-11-18 PROCEDURE — G8482 FLU IMMUNIZE ORDER/ADMIN: HCPCS | Performed by: INTERNAL MEDICINE

## 2019-11-18 PROCEDURE — G8417 CALC BMI ABV UP PARAM F/U: HCPCS | Performed by: INTERNAL MEDICINE

## 2019-11-18 PROCEDURE — 83036 HEMOGLOBIN GLYCOSYLATED A1C: CPT | Performed by: INTERNAL MEDICINE

## 2019-11-18 PROCEDURE — G8427 DOCREV CUR MEDS BY ELIG CLIN: HCPCS | Performed by: INTERNAL MEDICINE

## 2019-11-18 PROCEDURE — G8926 SPIRO NO PERF OR DOC: HCPCS | Performed by: INTERNAL MEDICINE

## 2019-11-18 PROCEDURE — 1036F TOBACCO NON-USER: CPT | Performed by: INTERNAL MEDICINE

## 2019-11-18 PROCEDURE — 99214 OFFICE O/P EST MOD 30 MIN: CPT | Performed by: INTERNAL MEDICINE

## 2019-11-18 PROCEDURE — 3023F SPIROM DOC REV: CPT | Performed by: INTERNAL MEDICINE

## 2019-11-18 RX ORDER — IPRATROPIUM BROMIDE AND ALBUTEROL SULFATE 2.5; .5 MG/3ML; MG/3ML
1 SOLUTION RESPIRATORY (INHALATION) PRN
Qty: 360 ML | Refills: 11 | Status: SHIPPED | OUTPATIENT
Start: 2019-11-18 | End: 2021-06-04 | Stop reason: SDUPTHER

## 2019-11-18 RX ORDER — LANCETS 30 GAUGE
1 EACH MISCELLANEOUS 2 TIMES DAILY
Qty: 100 EACH | Refills: 5 | Status: SHIPPED | OUTPATIENT
Start: 2019-11-18 | End: 2021-06-04 | Stop reason: SDUPTHER

## 2019-11-18 RX ORDER — AZITHROMYCIN 250 MG/1
250 TABLET, FILM COATED ORAL SEE ADMIN INSTRUCTIONS
Qty: 6 TABLET | Refills: 0 | Status: SHIPPED | OUTPATIENT
Start: 2019-11-18 | End: 2019-11-23

## 2019-11-19 ASSESSMENT — ENCOUNTER SYMPTOMS
COUGH: 0
BACK PAIN: 0
RHINORRHEA: 0
CHEST TIGHTNESS: 0
SINUS PAIN: 1
ALLERGIC/IMMUNOLOGIC NEGATIVE: 1
APNEA: 0
SHORTNESS OF BREATH: 0
DIARRHEA: 0
EYES NEGATIVE: 1
SORE THROAT: 1
TROUBLE SWALLOWING: 0
ABDOMINAL PAIN: 0
WHEEZING: 0
NAUSEA: 1

## 2019-11-20 ENCOUNTER — TELEPHONE (OUTPATIENT)
Dept: SURGERY | Age: 64
End: 2019-11-20

## 2019-11-20 ENCOUNTER — APPOINTMENT (OUTPATIENT)
Dept: PULMONOLOGY | Age: 64
End: 2019-11-20
Payer: COMMERCIAL

## 2019-11-20 ENCOUNTER — TELEPHONE (OUTPATIENT)
Dept: ORTHOPEDIC SURGERY | Age: 64
End: 2019-11-20

## 2019-11-22 ENCOUNTER — APPOINTMENT (OUTPATIENT)
Dept: PULMONOLOGY | Age: 64
End: 2019-11-22
Payer: COMMERCIAL

## 2019-11-22 ENCOUNTER — HOSPITAL ENCOUNTER (OUTPATIENT)
Age: 64
Discharge: HOME | End: 2019-11-22
Payer: COMMERCIAL

## 2019-11-25 ENCOUNTER — HOSPITAL ENCOUNTER (OUTPATIENT)
Dept: PULMONOLOGY | Age: 64
Setting detail: THERAPIES SERIES
Discharge: HOME | End: 2019-11-25
Payer: COMMERCIAL

## 2019-11-25 PROCEDURE — G0237 THERAPEUTIC PROCD STRG ENDUR: HCPCS

## 2019-11-27 ENCOUNTER — HOSPITAL ENCOUNTER (OUTPATIENT)
Dept: PULMONOLOGY | Age: 64
Setting detail: THERAPIES SERIES
Discharge: HOME | End: 2019-11-27
Payer: COMMERCIAL

## 2019-11-27 PROCEDURE — G0237 THERAPEUTIC PROCD STRG ENDUR: HCPCS

## 2019-12-02 ENCOUNTER — APPOINTMENT (OUTPATIENT)
Dept: PULMONOLOGY | Age: 64
End: 2019-12-02
Payer: COMMERCIAL

## 2019-12-04 ENCOUNTER — HOSPITAL ENCOUNTER (OUTPATIENT)
Dept: PULMONOLOGY | Age: 64
Setting detail: THERAPIES SERIES
Discharge: HOME | End: 2019-12-04
Payer: COMMERCIAL

## 2019-12-04 ENCOUNTER — HOSPITAL ENCOUNTER (OUTPATIENT)
Dept: ULTRASOUND IMAGING | Age: 64
Discharge: HOME | End: 2019-12-04
Payer: COMMERCIAL

## 2019-12-04 ENCOUNTER — HOSPITAL ENCOUNTER (OUTPATIENT)
Dept: MAMMOGRAPHY | Age: 64
Discharge: HOME | End: 2019-12-04
Payer: COMMERCIAL

## 2019-12-04 DIAGNOSIS — N63.20 LEFT BREAST MASS: ICD-10-CM

## 2019-12-04 DIAGNOSIS — N63.0 LUMP OR MASS IN BREAST: ICD-10-CM

## 2019-12-04 PROCEDURE — G0237 THERAPEUTIC PROCD STRG ENDUR: HCPCS

## 2019-12-04 PROCEDURE — G0279 TOMOSYNTHESIS, MAMMO: HCPCS

## 2019-12-04 PROCEDURE — 76642 ULTRASOUND BREAST LIMITED: CPT

## 2019-12-06 ENCOUNTER — APPOINTMENT (OUTPATIENT)
Dept: PULMONOLOGY | Age: 64
End: 2019-12-06
Payer: COMMERCIAL

## 2019-12-07 ENCOUNTER — APPOINTMENT (OUTPATIENT)
Dept: GENERAL RADIOLOGY | Age: 64
End: 2019-12-07
Payer: COMMERCIAL

## 2019-12-07 ENCOUNTER — HOSPITAL ENCOUNTER (EMERGENCY)
Age: 64
Discharge: HOME | End: 2019-12-07
Attending: EMERGENCY MEDICINE
Payer: COMMERCIAL

## 2019-12-07 VITALS
BODY MASS INDEX: 34.39 KG/M2 | HEART RATE: 97 BPM | OXYGEN SATURATION: 96 % | HEIGHT: 65 IN | DIASTOLIC BLOOD PRESSURE: 81 MMHG | RESPIRATION RATE: 18 BRPM | TEMPERATURE: 98.7 F | SYSTOLIC BLOOD PRESSURE: 134 MMHG | WEIGHT: 206.4 LBS

## 2019-12-07 DIAGNOSIS — R07.89 CHEST WALL PAIN: Primary | ICD-10-CM

## 2019-12-07 DIAGNOSIS — E83.42 HYPOMAGNESEMIA: ICD-10-CM

## 2019-12-07 DIAGNOSIS — E87.6 HYPOKALEMIA: ICD-10-CM

## 2019-12-07 LAB
A/G RATIO: 1.4 (ref 1.1–2.2)
ALBUMIN SERPL-MCNC: 4.1 G/DL (ref 3.4–5)
ALP BLD-CCNC: 102 U/L (ref 40–129)
ALT SERPL-CCNC: 21 U/L (ref 10–40)
ANION GAP SERPL CALCULATED.3IONS-SCNC: 15 MMOL/L (ref 3–16)
AST SERPL-CCNC: 21 U/L (ref 15–37)
BASOPHILS ABSOLUTE: 0.2 K/UL (ref 0–0.2)
BASOPHILS RELATIVE PERCENT: 1.5 %
BILIRUB SERPL-MCNC: <0.2 MG/DL (ref 0–1)
BUN BLDV-MCNC: 11 MG/DL (ref 7–20)
CALCIUM SERPL-MCNC: 9.6 MG/DL (ref 8.3–10.6)
CHLORIDE BLD-SCNC: 103 MMOL/L (ref 99–110)
CO2: 23 MMOL/L (ref 21–32)
CREAT SERPL-MCNC: 0.7 MG/DL (ref 0.6–1.2)
EOSINOPHILS ABSOLUTE: 0.1 K/UL (ref 0–0.6)
EOSINOPHILS RELATIVE PERCENT: 0.8 %
GFR AFRICAN AMERICAN: >60
GFR NON-AFRICAN AMERICAN: >60
GLOBULIN: 2.9 G/DL
GLUCOSE BLD-MCNC: 156 MG/DL (ref 70–99)
HCT VFR BLD CALC: 39.2 % (ref 36–48)
HEMOGLOBIN: 12.5 G/DL (ref 12–16)
LYMPHOCYTES ABSOLUTE: 2.7 K/UL (ref 1–5.1)
LYMPHOCYTES RELATIVE PERCENT: 21.7 %
MAGNESIUM: 1.6 MG/DL (ref 1.8–2.4)
MCH RBC QN AUTO: 27.5 PG (ref 26–34)
MCHC RBC AUTO-ENTMCNC: 31.8 G/DL (ref 31–36)
MCV RBC AUTO: 86.4 FL (ref 80–100)
MONOCYTES ABSOLUTE: 0.6 K/UL (ref 0–1.3)
MONOCYTES RELATIVE PERCENT: 5.1 %
NEUTROPHILS ABSOLUTE: 8.8 K/UL (ref 1.7–7.7)
NEUTROPHILS RELATIVE PERCENT: 70.9 %
PDW BLD-RTO: 13.7 % (ref 12.4–15.4)
PLATELET # BLD: 204 K/UL (ref 135–450)
PMV BLD AUTO: 10.6 FL (ref 5–10.5)
POTASSIUM REFLEX MAGNESIUM: 3.4 MMOL/L (ref 3.5–5.1)
RBC # BLD: 4.54 M/UL (ref 4–5.2)
SODIUM BLD-SCNC: 141 MMOL/L (ref 136–145)
TOTAL PROTEIN: 7 G/DL (ref 6.4–8.2)
TROPONIN: <0.01 NG/ML
WBC # BLD: 12.3 K/UL (ref 4–11)

## 2019-12-07 PROCEDURE — 93005 ELECTROCARDIOGRAM TRACING: CPT | Performed by: EMERGENCY MEDICINE

## 2019-12-07 PROCEDURE — 84484 ASSAY OF TROPONIN QUANT: CPT

## 2019-12-07 PROCEDURE — 6370000000 HC RX 637 (ALT 250 FOR IP): Performed by: EMERGENCY MEDICINE

## 2019-12-07 PROCEDURE — 71046 X-RAY EXAM CHEST 2 VIEWS: CPT

## 2019-12-07 PROCEDURE — 80053 COMPREHEN METABOLIC PANEL: CPT

## 2019-12-07 PROCEDURE — 85025 COMPLETE CBC W/AUTO DIFF WBC: CPT

## 2019-12-07 PROCEDURE — 96365 THER/PROPH/DIAG IV INF INIT: CPT

## 2019-12-07 PROCEDURE — 6360000002 HC RX W HCPCS: Performed by: EMERGENCY MEDICINE

## 2019-12-07 PROCEDURE — 96375 TX/PRO/DX INJ NEW DRUG ADDON: CPT

## 2019-12-07 PROCEDURE — 99285 EMERGENCY DEPT VISIT HI MDM: CPT

## 2019-12-07 PROCEDURE — 83735 ASSAY OF MAGNESIUM: CPT

## 2019-12-07 RX ORDER — KETOROLAC TROMETHAMINE 30 MG/ML
30 INJECTION, SOLUTION INTRAMUSCULAR; INTRAVENOUS ONCE
Status: COMPLETED | OUTPATIENT
Start: 2019-12-07 | End: 2019-12-07

## 2019-12-07 RX ORDER — MAGNESIUM SULFATE 1 G/100ML
1 INJECTION INTRAVENOUS ONCE
Status: COMPLETED | OUTPATIENT
Start: 2019-12-07 | End: 2019-12-07

## 2019-12-07 RX ORDER — POTASSIUM CHLORIDE 750 MG/1
40 TABLET, EXTENDED RELEASE ORAL 2 TIMES DAILY
Status: DISCONTINUED | OUTPATIENT
Start: 2019-12-07 | End: 2019-12-07 | Stop reason: HOSPADM

## 2019-12-07 RX ADMIN — POTASSIUM CHLORIDE 40 MEQ: 10 TABLET, EXTENDED RELEASE ORAL at 16:56

## 2019-12-07 RX ADMIN — KETOROLAC TROMETHAMINE 30 MG: 30 INJECTION, SOLUTION INTRAMUSCULAR at 15:14

## 2019-12-07 RX ADMIN — MAGNESIUM SULFATE HEPTAHYDRATE 1 G: 1 INJECTION, SOLUTION INTRAVENOUS at 16:48

## 2019-12-07 ASSESSMENT — PAIN DESCRIPTION - LOCATION: LOCATION: CHEST

## 2019-12-07 ASSESSMENT — PAIN DESCRIPTION - PAIN TYPE: TYPE: ACUTE PAIN

## 2019-12-07 ASSESSMENT — PAIN DESCRIPTION - DESCRIPTORS: DESCRIPTORS: ACHING;DULL

## 2019-12-07 ASSESSMENT — PAIN SCALES - GENERAL
PAINLEVEL_OUTOF10: 8
PAINLEVEL_OUTOF10: 8

## 2019-12-07 ASSESSMENT — PAIN DESCRIPTION - ONSET: ONSET: ON-GOING

## 2019-12-08 DIAGNOSIS — M62.838 MUSCLE SPASM: ICD-10-CM

## 2019-12-09 ENCOUNTER — APPOINTMENT (OUTPATIENT)
Dept: PULMONOLOGY | Age: 64
End: 2019-12-09
Payer: COMMERCIAL

## 2019-12-09 LAB
EKG ATRIAL RATE: 93 BPM
EKG DIAGNOSIS: NORMAL
EKG P AXIS: 81 DEGREES
EKG P-R INTERVAL: 142 MS
EKG Q-T INTERVAL: 362 MS
EKG QRS DURATION: 94 MS
EKG QTC CALCULATION (BAZETT): 450 MS
EKG R AXIS: -48 DEGREES
EKG T AXIS: 74 DEGREES
EKG VENTRICULAR RATE: 93 BPM

## 2019-12-09 PROCEDURE — 93010 ELECTROCARDIOGRAM REPORT: CPT | Performed by: INTERNAL MEDICINE

## 2019-12-10 ENCOUNTER — OFFICE VISIT (OUTPATIENT)
Dept: GYNECOLOGY | Age: 64
End: 2019-12-10
Payer: COMMERCIAL

## 2019-12-10 VITALS
DIASTOLIC BLOOD PRESSURE: 85 MMHG | SYSTOLIC BLOOD PRESSURE: 128 MMHG | BODY MASS INDEX: 33.82 KG/M2 | WEIGHT: 203 LBS | OXYGEN SATURATION: 96 % | HEIGHT: 65 IN | RESPIRATION RATE: 16 BRPM | HEART RATE: 101 BPM

## 2019-12-10 DIAGNOSIS — Z01.419 WELL WOMAN EXAM WITH ROUTINE GYNECOLOGICAL EXAM: Primary | ICD-10-CM

## 2019-12-10 DIAGNOSIS — N63.20 BREAST MASS, LEFT: ICD-10-CM

## 2019-12-10 PROCEDURE — 99396 PREV VISIT EST AGE 40-64: CPT | Performed by: OBSTETRICS & GYNECOLOGY

## 2019-12-10 PROCEDURE — G8482 FLU IMMUNIZE ORDER/ADMIN: HCPCS | Performed by: OBSTETRICS & GYNECOLOGY

## 2019-12-10 RX ORDER — CYCLOBENZAPRINE HCL 10 MG
TABLET ORAL
Qty: 90 TABLET | Refills: 0 | Status: SHIPPED | OUTPATIENT
Start: 2019-12-10 | End: 2019-12-31

## 2019-12-10 RX ORDER — ERGOCALCIFEROL 1.25 MG/1
CAPSULE ORAL WEEKLY
Qty: 4 CAPSULE | Refills: 1 | Status: SHIPPED | OUTPATIENT
Start: 2019-12-10 | End: 2020-03-03

## 2019-12-10 RX ORDER — CHLORHEXIDINE GLUCONATE 0.12 MG/ML
1 RINSE ORAL DAILY
Refills: 3 | Status: ON HOLD | COMMUNITY
Start: 2019-11-08 | End: 2022-02-06

## 2019-12-10 ASSESSMENT — ENCOUNTER SYMPTOMS
COUGH: 0
CONSTIPATION: 0
ABDOMINAL DISTENTION: 0
BACK PAIN: 0
VOMITING: 0
ABDOMINAL PAIN: 0
PHOTOPHOBIA: 0
WHEEZING: 0
ANAL BLEEDING: 0
COLOR CHANGE: 0
TROUBLE SWALLOWING: 0
NAUSEA: 0
SORE THROAT: 0
APNEA: 0
BLOOD IN STOOL: 0
DIARRHEA: 0
CHEST TIGHTNESS: 0
SHORTNESS OF BREATH: 0
RECTAL PAIN: 0

## 2019-12-11 ENCOUNTER — APPOINTMENT (OUTPATIENT)
Dept: PULMONOLOGY | Age: 64
End: 2019-12-11
Payer: COMMERCIAL

## 2019-12-13 ENCOUNTER — APPOINTMENT (OUTPATIENT)
Dept: PULMONOLOGY | Age: 64
End: 2019-12-13
Payer: COMMERCIAL

## 2019-12-16 ENCOUNTER — APPOINTMENT (OUTPATIENT)
Dept: PULMONOLOGY | Age: 64
End: 2019-12-16
Payer: COMMERCIAL

## 2019-12-18 ENCOUNTER — HOSPITAL ENCOUNTER (OUTPATIENT)
Dept: PULMONOLOGY | Age: 64
Setting detail: THERAPIES SERIES
Discharge: HOME | End: 2019-12-18
Payer: COMMERCIAL

## 2019-12-18 ENCOUNTER — TELEPHONE (OUTPATIENT)
Dept: ORTHOPEDIC SURGERY | Age: 64
End: 2019-12-18

## 2019-12-18 PROCEDURE — G0239 OTH RESP PROC, GROUP: HCPCS | Performed by: CLINICAL NURSE SPECIALIST

## 2019-12-20 ENCOUNTER — HOSPITAL ENCOUNTER (OUTPATIENT)
Dept: PULMONOLOGY | Age: 64
Setting detail: THERAPIES SERIES
Discharge: HOME | End: 2019-12-20
Payer: COMMERCIAL

## 2019-12-20 PROCEDURE — G0239 OTH RESP PROC, GROUP: HCPCS | Performed by: CLINICAL NURSE SPECIALIST

## 2019-12-23 ENCOUNTER — APPOINTMENT (OUTPATIENT)
Dept: PULMONOLOGY | Age: 64
End: 2019-12-23
Payer: COMMERCIAL

## 2019-12-27 ENCOUNTER — APPOINTMENT (OUTPATIENT)
Dept: PULMONOLOGY | Age: 64
End: 2019-12-27
Payer: COMMERCIAL

## 2019-12-30 DIAGNOSIS — M62.838 MUSCLE SPASM: ICD-10-CM

## 2019-12-31 RX ORDER — CLONIDINE HYDROCHLORIDE 0.1 MG/1
TABLET ORAL
Qty: 60 TABLET | Refills: 0 | Status: SHIPPED | OUTPATIENT
Start: 2019-12-31 | End: 2020-03-10

## 2019-12-31 RX ORDER — HYDROCHLOROTHIAZIDE 25 MG/1
TABLET ORAL
Qty: 60 TABLET | Refills: 0 | Status: SHIPPED | OUTPATIENT
Start: 2019-12-31 | End: 2020-05-04

## 2019-12-31 RX ORDER — CYCLOBENZAPRINE HCL 10 MG
TABLET ORAL
Qty: 90 TABLET | Refills: 0 | Status: SHIPPED | OUTPATIENT
Start: 2019-12-31 | End: 2021-02-17

## 2020-01-08 ENCOUNTER — OFFICE VISIT (OUTPATIENT)
Dept: PRIMARY CARE CLINIC | Age: 65
End: 2020-01-08
Payer: COMMERCIAL

## 2020-01-08 VITALS
BODY MASS INDEX: 33.66 KG/M2 | WEIGHT: 202 LBS | RESPIRATION RATE: 17 BRPM | OXYGEN SATURATION: 100 % | DIASTOLIC BLOOD PRESSURE: 74 MMHG | HEART RATE: 95 BPM | TEMPERATURE: 98 F | HEIGHT: 65 IN | SYSTOLIC BLOOD PRESSURE: 113 MMHG

## 2020-01-08 LAB
A/G RATIO: 1.9 (ref 1.1–2.2)
ALBUMIN SERPL-MCNC: 4.6 G/DL (ref 3.4–5)
ALP BLD-CCNC: 96 U/L (ref 40–129)
ALT SERPL-CCNC: 17 U/L (ref 10–40)
ANION GAP SERPL CALCULATED.3IONS-SCNC: 17 MMOL/L (ref 3–16)
AST SERPL-CCNC: 17 U/L (ref 15–37)
BASOPHILS ABSOLUTE: 0.1 K/UL (ref 0–0.2)
BASOPHILS RELATIVE PERCENT: 0.8 %
BILIRUB SERPL-MCNC: 0.4 MG/DL (ref 0–1)
BILIRUBIN URINE: NEGATIVE
BLOOD, URINE: NEGATIVE
BUN BLDV-MCNC: 11 MG/DL (ref 7–20)
CALCIUM SERPL-MCNC: 10 MG/DL (ref 8.3–10.6)
CHLORIDE BLD-SCNC: 97 MMOL/L (ref 99–110)
CHOLESTEROL, TOTAL: 123 MG/DL (ref 0–199)
CLARITY: CLEAR
CO2: 22 MMOL/L (ref 21–32)
COLOR: YELLOW
CREAT SERPL-MCNC: 0.8 MG/DL (ref 0.6–1.2)
CREATININE URINE: 219.4 MG/DL (ref 28–259)
EOSINOPHILS ABSOLUTE: 0.1 K/UL (ref 0–0.6)
EOSINOPHILS RELATIVE PERCENT: 1.2 %
GFR AFRICAN AMERICAN: >60
GFR NON-AFRICAN AMERICAN: >60
GLOBULIN: 2.4 G/DL
GLUCOSE BLD-MCNC: 134 MG/DL (ref 70–99)
GLUCOSE URINE: NEGATIVE MG/DL
HBA1C MFR BLD: 6.3 %
HCT VFR BLD CALC: 38.8 % (ref 36–48)
HDLC SERPL-MCNC: 53 MG/DL (ref 40–60)
HEMOGLOBIN: 12.6 G/DL (ref 12–16)
KETONES, URINE: NEGATIVE MG/DL
LDL CHOLESTEROL CALCULATED: 53 MG/DL
LEUKOCYTE ESTERASE, URINE: NEGATIVE
LIPASE: 22 U/L (ref 13–60)
LYMPHOCYTES ABSOLUTE: 3.2 K/UL (ref 1–5.1)
LYMPHOCYTES RELATIVE PERCENT: 33.7 %
MCH RBC QN AUTO: 27.7 PG (ref 26–34)
MCHC RBC AUTO-ENTMCNC: 32.4 G/DL (ref 31–36)
MCV RBC AUTO: 85.3 FL (ref 80–100)
MICROALBUMIN UR-MCNC: <1.2 MG/DL
MICROALBUMIN/CREAT UR-RTO: NORMAL MG/G (ref 0–30)
MICROSCOPIC EXAMINATION: NORMAL
MONOCYTES ABSOLUTE: 0.7 K/UL (ref 0–1.3)
MONOCYTES RELATIVE PERCENT: 7 %
NEUTROPHILS ABSOLUTE: 5.5 K/UL (ref 1.7–7.7)
NEUTROPHILS RELATIVE PERCENT: 57.3 %
NITRITE, URINE: NEGATIVE
PDW BLD-RTO: 13.6 % (ref 12.4–15.4)
PH UA: 5.5 (ref 5–8)
PLATELET # BLD: 189 K/UL (ref 135–450)
PMV BLD AUTO: 10.5 FL (ref 5–10.5)
POTASSIUM SERPL-SCNC: 3.7 MMOL/L (ref 3.5–5.1)
PROTEIN UA: NEGATIVE MG/DL
RBC # BLD: 4.55 M/UL (ref 4–5.2)
SODIUM BLD-SCNC: 136 MMOL/L (ref 136–145)
SPECIFIC GRAVITY UA: 1.02 (ref 1–1.03)
TOTAL PROTEIN: 7 G/DL (ref 6.4–8.2)
TRIGL SERPL-MCNC: 85 MG/DL (ref 0–150)
TSH REFLEX FT4: 0.94 UIU/ML (ref 0.27–4.2)
URINE TYPE: NORMAL
UROBILINOGEN, URINE: 0.2 E.U./DL
VITAMIN D 25-HYDROXY: 42.1 NG/ML
VLDLC SERPL CALC-MCNC: 17 MG/DL
WBC # BLD: 9.6 K/UL (ref 4–11)

## 2020-01-08 PROCEDURE — 83036 HEMOGLOBIN GLYCOSYLATED A1C: CPT | Performed by: INTERNAL MEDICINE

## 2020-01-08 PROCEDURE — G8427 DOCREV CUR MEDS BY ELIG CLIN: HCPCS | Performed by: INTERNAL MEDICINE

## 2020-01-08 PROCEDURE — 3017F COLORECTAL CA SCREEN DOC REV: CPT | Performed by: INTERNAL MEDICINE

## 2020-01-08 PROCEDURE — 1036F TOBACCO NON-USER: CPT | Performed by: INTERNAL MEDICINE

## 2020-01-08 PROCEDURE — G8482 FLU IMMUNIZE ORDER/ADMIN: HCPCS | Performed by: INTERNAL MEDICINE

## 2020-01-08 PROCEDURE — G8417 CALC BMI ABV UP PARAM F/U: HCPCS | Performed by: INTERNAL MEDICINE

## 2020-01-08 PROCEDURE — 99214 OFFICE O/P EST MOD 30 MIN: CPT | Performed by: INTERNAL MEDICINE

## 2020-01-08 RX ORDER — FAMOTIDINE 20 MG/1
20 TABLET, FILM COATED ORAL 2 TIMES DAILY
Qty: 60 TABLET | Refills: 3 | Status: SHIPPED | OUTPATIENT
Start: 2020-01-08 | End: 2021-02-17

## 2020-01-08 RX ORDER — NALOXONE HYDROCHLORIDE 4 MG/.1ML
SPRAY NASAL PRN
Refills: 0 | COMMUNITY
Start: 2019-11-11

## 2020-01-08 RX ORDER — LINACLOTIDE 145 UG/1
1 CAPSULE, GELATIN COATED ORAL PRN
COMMUNITY
Start: 2019-12-19 | End: 2021-02-17 | Stop reason: SDUPTHER

## 2020-01-08 RX ORDER — PROMETHAZINE HYDROCHLORIDE 6.25 MG/5ML
SYRUP ORAL
Qty: 118 ML | Refills: 0 | Status: SHIPPED | OUTPATIENT
Start: 2020-01-08 | End: 2020-02-12

## 2020-01-08 ASSESSMENT — ENCOUNTER SYMPTOMS
COUGH: 0
SWOLLEN GLANDS: 0
VOMITING: 0
SORE THROAT: 0
DIFFICULTY BREATHING: 0
NAUSEA: 1
PRIMARY PULMONARY SYMPTOMS: DOE
CHEST TIGHTNESS: 0
SHORTNESS OF BREATH: 1

## 2020-01-08 ASSESSMENT — PATIENT HEALTH QUESTIONNAIRE - PHQ9
SUM OF ALL RESPONSES TO PHQ QUESTIONS 1-9: 0
SUM OF ALL RESPONSES TO PHQ9 QUESTIONS 1 & 2: 0
SUM OF ALL RESPONSES TO PHQ QUESTIONS 1-9: 0
2. FEELING DOWN, DEPRESSED OR HOPELESS: 0
1. LITTLE INTEREST OR PLEASURE IN DOING THINGS: 0

## 2020-01-08 ASSESSMENT — COPD QUESTIONNAIRES: COPD: 1

## 2020-01-08 NOTE — PROGRESS NOTES
Skin: Negative. Negative for pallor and rash. Allergic/Immunologic: Negative. Neurological: Negative for dizziness, vertigo, tremors, seizures, speech difficulty, weakness and headaches. Psychiatric/Behavioral: Negative for agitation, behavioral problems, confusion, decreased concentration and suicidal ideas. The patient is not nervous/anxious and is not hyperactive. Insomnia controlled with Seroquel       Prior to Visit Medications    Medication Sig Taking?  Authorizing Provider   cyclobenzaprine (FLEXERIL) 10 MG tablet TAKE 1 TABLET BY MOUTH THREE TIMES DAILY AS NEEDED FOR SPASMS Yes Homar Peña MD   cloNIDine (CATAPRES) 0.1 MG tablet TAKE 1 TABLET BY MOUTH TWICE DAILY Yes Homar Peña MD   hydrochlorothiazide (HYDRODIURIL) 25 MG tablet TAKE 1 TABLET BY MOUTH ONCE DAILY Yes Homar Peña MD   vitamin D (ERGOCALCIFEROL) 1.25 MG (85029 UT) CAPS capsule TAKE 1 CAPSULE BY MOUTH ONCE A WEEK Yes Homar Peña MD   chlorhexidine (PERIDEX) 0.12 % solution Take 1 mL by mouth daily Yes Ursula Gonzalez MD   Nebulizers (AIRIAL COMPACT MINI NEBULIZER) MISC 1 each by Does not apply route 4 times daily Yes Homar Peña MD   Lancets MISC 1 each by Does not apply route 2 times daily Yes Homar Peña MD   ipratropium-albuterol (DUONEB) 0.5-2.5 (3) MG/3ML SOLN nebulizer solution Inhale 3 mLs into the lungs as needed for Shortness of Breath Yes Homar Peña MD   rosuvastatin (CRESTOR) 10 MG tablet TAKE 1 TABLET BY MOUTH EVERY NIGHT AT BEDTIME Yes Homar Peña MD   levothyroxine (SYNTHROID) 125 MCG tablet TAKE (1) TABLET BY MOUTH DAILY *DISCONTINUE 135MCG TABLET* Yes Homar Peña MD   losartan (COZAAR) 25 MG tablet TAKE (1) TABLET BY MOUTH DAILY Yes Homar Peña MD   Roflumilast (DALIRESP) 500 MCG tablet Take 1 tablet by mouth daily Yes Homar Peña MD   amitriptyline (ELAVIL) 50 MG tablet Take 50 mg by mouth 2 times daily Yes Historical Provider, MD   oxyCODONE-acetaminophen (PERCOCET) 7.5-325 MG per tablet Take 1 tablet by mouth every 6 hours as needed for Pain. Yes Historical Provider, MD   blood glucose test strips (TRUE METRIX BLOOD GLUCOSE TEST) strip USE TO TEST BLOOD SUGAR DAILY Yes Angel Sullivan MD   Blood Glucose Monitoring Suppl (TRUE METRIX METER) w/Device KIT 1 kit by Does not apply route daily Yes Angel Sullivan MD   COMBIVENT RESPIMAT  MCG/ACT AERS inhaler INHALE 1 PUFF INTO THE LUNGS EVERY 4 HOURS AS NEEDED FOR WHEEZING Yes Angel Sullivan MD   mometasone-formoterol (DULERA) 200-5 MCG/ACT inhaler INHALE TWO (2) PUFFS BY MOUTH EVERY 12 HOURS Yes Angel Sullivan MD   cyanocobalamin 1000 MCG/ML injection INJECT 1ML [address] Yes Angel Sullivan MD   Blood Glucose Monitoring Suppl (TRUE METRIX METER) CRYSTAL 1 kit by Does not apply route daily Yes Angel Sullivan MD   lactulose (CHRONULAC) 10 GM/15ML solution as needed  Yes Historical Provider, MD   Tuberculin-Allergy Syringes 28G X 1/2\" 1 ML MISC 1 each by Does not apply route every 30 days Yes Angel Sullivan MD   LINZESS 145 MCG capsule Take 1 capsule by mouth as needed  Historical Provider, MD   NARCAN 4 MG/0.1ML LIQD nasal spray Take by mouth as needed  Historical Provider, MD   Umeclidinium Bromide 62.5 MCG/INH AEPB Inhale 1 puff into the lungs daily STOP SPIRIVA, this replaces spiriva.   Patient not taking: Reported on 1/8/2020  Angel Sullivan MD   promethazine (PHENERGAN) 6.25 MG/5ML syrup TAKE 5 ML OR CC BY MOUTH 4 TIMES DAILY AS NEEDED FOR NAUSEA  Patient not taking: Reported on 4/6/5192  Kd Callaway MD   ranitidine (ZANTAC) 150 MG tablet Take 1 tablet by mouth 2 times daily  Patient not taking: Reported on 12/10/2019  Angel Sullvian MD   QUEtiapine (SEROQUEL) 100 MG tablet TAKE 1 TABLET BY MOUTH EVERY NIGHT AT BEDTIME  Patient not taking: Reported on 12/10/2019  Laurie Warren MD   albuterol sulfate HFA (PROVENTIL HFA) 108 (90 Base) MCG/ACT inhaler Inhale 2 puffs into the lungs every 4 hours as needed for Wheezing or Shortness of Breath (Space out to every 6 hours as symptoms improve) Space out to every 6 hours as symptoms improve.   Patient not taking: Reported on 2020  Lizz Hernandez MD   omeprazole (PRILOSEC) 20 MG delayed release capsule Take 1 capsule by mouth daily  Patient not taking: Reported on 12/10/2019  Lizz Hernandez MD   tiZANidine (ZANAFLEX) 4 MG tablet Take 4 mg by mouth every 6 hours as needed  Historical Provider, MD   potassium chloride (KLOR-CON M) 10 MEQ extended release tablet Take 2 tablets by mouth daily for 5 days  Earlyne Doing, MD   prednisoLONE acetate (PRED FORTE) 1 % ophthalmic suspension Place 1 drop into the left eye three times daily  Historical Provider, MD   ketorolac (ACULAR) 0.5 % ophthalmic solution Place 1 drop into the left eye 3 times daily  Historical Provider, MD   BANOPHEN 50 MG capsule TAKE ONE CAPSULE BY MOUTH EVERY NIGHT AT BEDTIME AS NEEDED FOR ITCHING  Patient not taking: Reported on 12/10/2019  Laurie Warren MD   STOOL SOFTENER 100 MG capsule TAKE ONE CAPSULE BY MOUTH THREE TIMES A DAY AS NEEDED FOR CONSTIPATION  Patient not taking: Reported on 12/10/2019  Laurie Warren MD        Social History     Tobacco Use    Smoking status: Former Smoker     Packs/day: 0.25     Years: 32.00     Pack years: 8.00     Types: Cigarettes     Last attempt to quit: 1/10/2017     Years since quittin.9    Smokeless tobacco: Never Used    Tobacco comment: 30 years    Substance Use Topics    Alcohol use: No        Vitals:    20 0756   BP: 113/74   Site: Right Upper Arm   Position: Sitting   Cuff Size: Large Adult   Pulse: 95   Resp: 17   Temp: 98 °F (36.7 °C)   TempSrc: Oral   SpO2: 100%   Weight: 202 lb (91.6 kg)   Height: 5' 5\" (1.651 m)     Estimated body mass index is 33.61 kg/m² as calculated from the following:    Height as of this encounter: 5' 5\" (1.651 m). Weight as of this encounter: 202 lb (91.6 kg). Physical Exam  Constitutional:       General: She is not in acute distress. Appearance: She is well-developed. She is not diaphoretic. HENT:      Head: Normocephalic and atraumatic. Right Ear: External ear normal.      Left Ear: External ear normal.      Nose: Nose normal.      Mouth/Throat:      Pharynx: No oropharyngeal exudate. Eyes:      General: No scleral icterus. Right eye: No discharge. Left eye: No discharge. Conjunctiva/sclera: Conjunctivae normal.      Pupils: Pupils are equal, round, and reactive to light. Neck:      Musculoskeletal: Normal range of motion and neck supple. Thyroid: No thyromegaly. Vascular: No JVD. Cardiovascular:      Rate and Rhythm: Normal rate and regular rhythm. Heart sounds: No friction rub. Pulmonary:      Effort: Pulmonary effort is normal. No respiratory distress. Breath sounds: Normal breath sounds. No wheezing or rales. Chest:      Chest wall: No tenderness. Abdominal:      General: Bowel sounds are normal. There is no distension. Palpations: Abdomen is soft. Tenderness: There is no tenderness. There is no guarding or rebound. Musculoskeletal:         General: No tenderness. Lymphadenopathy:      Cervical: No cervical adenopathy. Skin:     General: Skin is warm and dry. Coloration: Skin is not pale. Findings: No erythema or rash. Neurological:      Mental Status: She is alert and oriented to person, place, and time. Cranial Nerves: No cranial nerve deficit. Motor: No abnormal muscle tone. Coordination: Coordination normal.   Psychiatric:         Behavior: Behavior normal.         Thought Content:  Thought content normal.         Judgment: Judgment normal.       Radiation Dose Estimates     No radiation information found for this patient Narrative   EXAM: CT CHEST DIAGNOSTIC LOW DOSE (LDCT)       INDICATION: Female / 63 years, with a 8 pack per year smoking history.       COMPARISON: Chest CT dated 4/10/2017.       TECHNIQUE: Low-dose axial CT imaging obtained through the chest for the purposes of lung cancer screening. Axial images and multiplanar reformatted images reviewed.  Individualized dose optimization technique was used in order to meet ALARA standards for    radiation dose reduction.  In addition to vendor specific dose reduction algorithms, the dose reduction techniques vary based on the specific scanner utilized but frequently include automated exposure control, adjustment of the mA and/or kV according to    patient size, and use of interactive reconstruction technique.       FINDINGS:       AIRWAYS: The central airways are patent.       LUNGS: Moderate upper lobe predominant centrilobular emphysema. Mild bibasilar atelectasis.       NODULES, RIGHT:    *  None.       NODULES, LEFT:    *  4 mm noncalcified pulmonary nodule in the left lower lobe (series 4 image 41), unchanged. *  4 mm noncalcified pulmonary nodule in the left lung base (series 4 image 45).       PLEURA: Normal.       HEART / GREAT VESSELS: Heart is not enlarged. [place] No pleural effusion.       LYMPH NODES: No lymphadenopathy.       CHEST WALL: Normal.       BONES: No destructive osseous process.       UPPER ABDOMEN: Normal.               Impression       1. Presence of two 4 mm pulmonary nodules in the left lung.       2. Moderate upper lobe predominant centrilobular emphysema.       ASSESSMENT: Lung RADS Category 2       MODIFIER:       RECOMMENDATION: Follow up LDCT in 12 months.               ASSESSMENT/PLAN:   Diagnosis Orders   1. Chronic nausea for years. Not taking H2 blocker and has GERD. No abdominal pain , melena or hematochezia. No recent H. Pylori check. Patient sees pain management and takes percocet bid and dosage recently increased from 5 to 7.5 mg bid. Will call to get a copy and patient wants to switch oxygen companies will need this six minute test to apply for new oxygen connection company. The reason she wants to switch is because she needs the light weight portable 02 dispenser that her current company does not carry. 8. Former heavy cigarette smoker (20-39 per day) patient had a negative screening CT scan in May 2019 and will place the order for one year follow-up scan in May 2020. Patient can schedule the appointment for May in advance. CT Lung Screen (Initial or Annual)       An electronic signature was used to authenticate this note.     --Morgan Garcia MD on 1/8/2020 at 8:05 AM

## 2020-01-09 PROBLEM — R92.8 ABNORMAL MAMMOGRAM: Status: ACTIVE | Noted: 2020-01-09

## 2020-01-09 PROBLEM — Z87.891 FORMER HEAVY CIGARETTE SMOKER (20-39 PER DAY): Status: ACTIVE | Noted: 2020-01-09

## 2020-01-09 PROBLEM — R11.0 CHRONIC NAUSEA: Status: ACTIVE | Noted: 2020-01-09

## 2020-01-09 LAB
ESTIMATED AVERAGE GLUCOSE: 128.4 MG/DL
H PYLORI BREATH TEST: NEGATIVE
HBA1C MFR BLD: 6.1 %

## 2020-01-09 ASSESSMENT — ENCOUNTER SYMPTOMS
SINUS PAIN: 1
DIARRHEA: 0
ABDOMINAL PAIN: 0
ALLERGIC/IMMUNOLOGIC NEGATIVE: 1
EYES NEGATIVE: 1
WHEEZING: 0
CHEST TIGHTNESS: 0
BACK PAIN: 0
APNEA: 0
RHINORRHEA: 0
TROUBLE SWALLOWING: 0

## 2020-01-09 ASSESSMENT — PATIENT HEALTH QUESTIONNAIRE - PHQ9
SUM OF ALL RESPONSES TO PHQ QUESTIONS 1-9: 2
SUM OF ALL RESPONSES TO PHQ QUESTIONS 1-9: 2
1. LITTLE INTEREST OR PLEASURE IN DOING THINGS: 1
SUM OF ALL RESPONSES TO PHQ9 QUESTIONS 1 & 2: 2
2. FEELING DOWN, DEPRESSED OR HOPELESS: 1

## 2020-02-12 RX ORDER — PROMETHAZINE HYDROCHLORIDE 6.25 MG/5ML
SYRUP ORAL
Qty: 118 ML | Refills: 0 | Status: SHIPPED | OUTPATIENT
Start: 2020-02-12 | End: 2020-03-03

## 2020-03-20 ENCOUNTER — HOSPITAL ENCOUNTER (EMERGENCY)
Age: 65
Discharge: HOME | End: 2020-03-21
Attending: EMERGENCY MEDICINE
Payer: COMMERCIAL

## 2020-03-20 ENCOUNTER — APPOINTMENT (OUTPATIENT)
Dept: GENERAL RADIOLOGY | Age: 65
End: 2020-03-20
Payer: COMMERCIAL

## 2020-03-20 LAB
RAPID INFLUENZA  B AGN: NEGATIVE
RAPID INFLUENZA A AGN: NEGATIVE

## 2020-03-20 PROCEDURE — 80053 COMPREHEN METABOLIC PANEL: CPT

## 2020-03-20 PROCEDURE — 81003 URINALYSIS AUTO W/O SCOPE: CPT

## 2020-03-20 PROCEDURE — 85025 COMPLETE CBC W/AUTO DIFF WBC: CPT

## 2020-03-20 PROCEDURE — 71046 X-RAY EXAM CHEST 2 VIEWS: CPT

## 2020-03-20 PROCEDURE — 87804 INFLUENZA ASSAY W/OPTIC: CPT

## 2020-03-20 PROCEDURE — 99283 EMERGENCY DEPT VISIT LOW MDM: CPT

## 2020-03-20 RX ORDER — 0.9 % SODIUM CHLORIDE 0.9 %
1000 INTRAVENOUS SOLUTION INTRAVENOUS ONCE
Status: DISCONTINUED | OUTPATIENT
Start: 2020-03-20 | End: 2020-03-21 | Stop reason: HOSPADM

## 2020-03-20 RX ORDER — ONDANSETRON 2 MG/ML
4 INJECTION INTRAMUSCULAR; INTRAVENOUS ONCE
Status: DISCONTINUED | OUTPATIENT
Start: 2020-03-20 | End: 2020-03-20

## 2020-03-20 RX ORDER — ONDANSETRON 4 MG/1
4 TABLET, ORALLY DISINTEGRATING ORAL ONCE
Status: COMPLETED | OUTPATIENT
Start: 2020-03-20 | End: 2020-03-21

## 2020-03-20 RX ORDER — KETOROLAC TROMETHAMINE 15 MG/ML
15 INJECTION, SOLUTION INTRAMUSCULAR; INTRAVENOUS ONCE
Status: DISCONTINUED | OUTPATIENT
Start: 2020-03-20 | End: 2020-03-20

## 2020-03-20 ASSESSMENT — PAIN SCALES - GENERAL: PAINLEVEL_OUTOF10: 8

## 2020-03-21 ENCOUNTER — CARE COORDINATION (OUTPATIENT)
Dept: CARE COORDINATION | Age: 65
End: 2020-03-21

## 2020-03-21 VITALS
TEMPERATURE: 98.3 F | OXYGEN SATURATION: 94 % | HEART RATE: 94 BPM | DIASTOLIC BLOOD PRESSURE: 68 MMHG | RESPIRATION RATE: 15 BRPM | WEIGHT: 198.4 LBS | SYSTOLIC BLOOD PRESSURE: 113 MMHG | BODY MASS INDEX: 33.02 KG/M2

## 2020-03-21 LAB
A/G RATIO: 1.7 (ref 1.1–2.2)
ALBUMIN SERPL-MCNC: 4.5 G/DL (ref 3.4–5)
ALP BLD-CCNC: 90 U/L (ref 40–129)
ALT SERPL-CCNC: 19 U/L (ref 10–40)
ANION GAP SERPL CALCULATED.3IONS-SCNC: 17 MMOL/L (ref 3–16)
AST SERPL-CCNC: 20 U/L (ref 15–37)
BASOPHILS ABSOLUTE: 0.2 K/UL (ref 0–0.2)
BASOPHILS RELATIVE PERCENT: 1.5 %
BILIRUB SERPL-MCNC: 0.4 MG/DL (ref 0–1)
BILIRUBIN URINE: NEGATIVE
BLOOD, URINE: NEGATIVE
BUN BLDV-MCNC: 12 MG/DL (ref 7–20)
CALCIUM SERPL-MCNC: 9.9 MG/DL (ref 8.3–10.6)
CHLORIDE BLD-SCNC: 101 MMOL/L (ref 99–110)
CLARITY: CLEAR
CO2: 21 MMOL/L (ref 21–32)
COLOR: YELLOW
CREAT SERPL-MCNC: 0.7 MG/DL (ref 0.6–1.2)
EOSINOPHILS ABSOLUTE: 0.1 K/UL (ref 0–0.6)
EOSINOPHILS RELATIVE PERCENT: 0.5 %
GFR AFRICAN AMERICAN: >60
GFR NON-AFRICAN AMERICAN: >60
GLOBULIN: 2.7 G/DL
GLUCOSE BLD-MCNC: 130 MG/DL (ref 70–99)
GLUCOSE URINE: NEGATIVE MG/DL
HCT VFR BLD CALC: 41.3 % (ref 36–48)
HEMOGLOBIN: 13.4 G/DL (ref 12–16)
KETONES, URINE: NEGATIVE MG/DL
LEUKOCYTE ESTERASE, URINE: NEGATIVE
LYMPHOCYTES ABSOLUTE: 3 K/UL (ref 1–5.1)
LYMPHOCYTES RELATIVE PERCENT: 20.4 %
MCH RBC QN AUTO: 27.6 PG (ref 26–34)
MCHC RBC AUTO-ENTMCNC: 32.5 G/DL (ref 31–36)
MCV RBC AUTO: 84.8 FL (ref 80–100)
MICROSCOPIC EXAMINATION: NORMAL
MONOCYTES ABSOLUTE: 0.6 K/UL (ref 0–1.3)
MONOCYTES RELATIVE PERCENT: 3.9 %
NEUTROPHILS ABSOLUTE: 10.7 K/UL (ref 1.7–7.7)
NEUTROPHILS RELATIVE PERCENT: 73.7 %
NITRITE, URINE: NEGATIVE
PDW BLD-RTO: 14.1 % (ref 12.4–15.4)
PH UA: 5 (ref 5–8)
PLATELET # BLD: 209 K/UL (ref 135–450)
PMV BLD AUTO: 10.2 FL (ref 5–10.5)
POTASSIUM SERPL-SCNC: 3.5 MMOL/L (ref 3.5–5.1)
PROTEIN UA: NEGATIVE MG/DL
RBC # BLD: 4.87 M/UL (ref 4–5.2)
SODIUM BLD-SCNC: 139 MMOL/L (ref 136–145)
SPECIFIC GRAVITY UA: >=1.03 (ref 1–1.03)
TOTAL PROTEIN: 7.2 G/DL (ref 6.4–8.2)
URINE REFLEX TO CULTURE: NORMAL
URINE TYPE: NORMAL
UROBILINOGEN, URINE: 0.2 E.U./DL
WBC # BLD: 14.5 K/UL (ref 4–11)

## 2020-03-21 PROCEDURE — 6370000000 HC RX 637 (ALT 250 FOR IP): Performed by: EMERGENCY MEDICINE

## 2020-03-21 RX ORDER — BENZONATATE 200 MG/1
200 CAPSULE ORAL 3 TIMES DAILY PRN
Qty: 30 CAPSULE | Refills: 0 | Status: SHIPPED | OUTPATIENT
Start: 2020-03-21 | End: 2020-03-31

## 2020-03-21 RX ORDER — ONDANSETRON 4 MG/1
4 TABLET, ORALLY DISINTEGRATING ORAL EVERY 8 HOURS PRN
Qty: 15 TABLET | Refills: 0 | Status: SHIPPED | OUTPATIENT
Start: 2020-03-21 | End: 2021-02-17

## 2020-03-21 RX ADMIN — ONDANSETRON 4 MG: 4 TABLET, ORALLY DISINTEGRATING ORAL at 00:03

## 2020-03-21 NOTE — ED TRIAGE NOTES
C/o feeling weak, no energy with dry cough for the past three days and body aches. Pt in pain management and receives Percocet for chronic pain, has not helped with body aches.

## 2020-03-21 NOTE — ED PROVIDER NOTES
[facility]  EMERGENCY DEPT VISIT      Patient Identification  Aliya Elizalde is a 59 y.o. female. Chief Complaint   Generalized Body Aches      History of Present Illness: This is a  59 y.o. female who presents ambulatory  to the ED with complaints of 2-day history of low-grade fever up to 99.7, headache, generalized weakness, generalized body aches, left ear pain, scratchy throat, and dry cough. She has had some burning chest discomfort and mild shortness of breath. She does have COPD and uses oxygen primarily at nighttime. She has felt nauseated but has had no vomiting or abdominal pain. She has had some diarrhea today. No melena hematochezia. No known contacts with COVID. No recent travel.     Past Medical History:   Diagnosis Date    [place] (acute kidney injury) ([facility] [address] 10/7/2013    Anxiety     Bronchitis     Chronic abdominal pain     possibly due to diverticulosos    Chronic back pain     COPD [facility] obstructive [facility])     DDD (degenerative disc disease), lumbar 12/1/2016    Depression     Elevated glycated hemoglobin     History of normal mammogram 8/26/13    [facility]    Hx of migraine headaches     Hyperlipidemia     Hypertension     Hypothyroidism     Menopause ovarian failure     Rotator cuff tear     right       Past Surgical History:   Procedure Laterality Date    CARPAL TUNNEL RELEASE      CENTRAL LINE  10/7/2013         CHOLECYSTECTOMY      COLONOSCOPY  9/6/2011    Tubular adenoma    COLONOSCOPY  10/24/2005    Dr. Marvel Humphrey - Tubular adenoma    ENDOSCOPY, COLON, DIAGNOSTIC      ESOPHAGEAL MOTILITY STUDY  6/17/2013    NORMAL    EYE SURGERY      cataracts    FINE NEEDLE ASPIRATION  8/10/2012    THYROID - NEGATIVE    FINGER NAIL SURGERY Bilateral 5/21/2019    TOTAL AVULSION OF 1-5 TOENAILS BILATERAL FEET [facility]  8/15/2002 Endometriosis, fibroids    TONSILLECTOMY      UPPER GASTROINTESTINAL ENDOSCOPY  10/17/2005    Bx small bowel, Gastric, GE junction all negative    UPPER GASTROINTESTINAL ENDOSCOPY  9/5/2000    Dr. Meryle Mas - NORMAL         Current Facility-Administered Medications:     0.9 % sodium chloride bolus, 1,000 mL, Intravenous, Once, David Cook MD    Current Outpatient Medications:     benzonatate (TESSALON) 200 MG capsule, Take 1 capsule by mouth 3 times daily as needed for Cough, Disp: 30 capsule, Rfl: 0    ondansetron (ZOFRAN ODT) 4 MG disintegrating tablet, Take 1 tablet by mouth every 8 hours as needed for Nausea or Vomiting, Disp: 15 tablet, Rfl: 0    QUEtiapine (SEROQUEL) 100 MG tablet, TAKE 1 TABLET BY MOUTH EVERY NIGHT AT BEDTIME, Disp: 28 tablet, Rfl: 10    amitriptyline (ELAVIL) 50 MG tablet, Take 50 mg by mouth 2 times daily, Disp: , Rfl:     cloNIDine (CATAPRES) 0.1 MG tablet, Take 1 tablet by mouth twice daily, Disp: 60 tablet, Rfl: 5    cyanocobalamin 1000 MCG/ML injection, INJECT 1 ML SUBCUTANEOUSLY EVERY MONTH, Disp: 1 mL, Rfl: 5    promethazine (PHENERGAN) 6.25 MG/5ML syrup, TAKE 5 ML BY MOUTH  4 TIMES DAILY AS NEEDED FOR NAUSEA, Disp: 118 mL, Rfl: 0    vitamin D (ERGOCALCIFEROL) 1.25 MG (38061 UT) CAPS capsule, Take 1 capsule by mouth once a week, Disp: 4 capsule, Rfl: 5    LINZESS 145 MCG capsule, Take 1 capsule by mouth as needed, Disp: , Rfl:     NARCAN 4 MG/0.1ML LIQD nasal spray, Take by mouth as needed, Disp: , Rfl: 0    famotidine (PEPCID) 20 MG tablet, Take 1 tablet by mouth 2 times daily, Disp: 60 tablet, Rfl: 3    cyclobenzaprine (FLEXERIL) 10 MG tablet, TAKE 1 TABLET BY MOUTH THREE TIMES DAILY AS NEEDED FOR SPASMS, Disp: 90 tablet, Rfl: 0    hydrochlorothiazide (HYDRODIURIL) 25 MG tablet, TAKE 1 TABLET BY MOUTH ONCE DAILY, Disp: 60 tablet, Rfl: 0    chlorhexidine (PERIDEX) 0.12 % solution, Take 1 mL by mouth daily, Disp: , Rfl: 3    Nebulizers (AIRIAL COMPACT MINI NEBULIZER) MISC, 1 each by Does not apply route 4 times daily, Disp: 1 each, Rfl: 1    Lancets MISC, 1 each by Does not apply route 2 times daily, Disp: 100 each, Rfl: 5    ipratropium-albuterol (DUONEB) 0.5-2.5 (3) MG/3ML SOLN nebulizer solution, Inhale 3 mLs into the lungs as needed for Shortness of Breath, Disp: 360 mL, Rfl: 11    Umeclidinium Bromide 62.5 MCG/INH AEPB, Inhale 1 puff into the lungs daily STOP SPIRIVA, this replaces spiriva. (Patient not taking: Reported on 1/8/2020), Disp: 1 each, Rfl: 5    rosuvastatin (CRESTOR) 10 MG tablet, TAKE 1 TABLET BY MOUTH EVERY NIGHT AT BEDTIME, Disp: 30 tablet, Rfl: 10    levothyroxine (SYNTHROID) 125 MCG tablet, TAKE (1) TABLET BY MOUTH DAILY *DISCONTINUE 135MCG TABLET*, Disp: 90 tablet, Rfl: 10    losartan (COZAAR) 25 MG tablet, TAKE (1) TABLET BY MOUTH DAILY, Disp: 30 tablet, Rfl: 10    Roflumilast (DALIRESP) 500 MCG tablet, Take 1 tablet by mouth daily, Disp: 30 tablet, Rfl: 5    albuterol sulfate HFA (PROVENTIL HFA) 108 (90 Base) MCG/ACT inhaler, Inhale 2 puffs into the lungs every 4 hours as needed for Wheezing or Shortness of Breath (Space out to every 6 hours as symptoms improve) Space out to every 6 hours as symptoms improve.  (Patient not taking: Reported on 1/8/2020), Disp: 1 Inhaler, Rfl: 0    omeprazole (PRILOSEC) 20 MG delayed release capsule, Take 1 capsule by mouth daily (Patient not taking: Reported on 12/10/2019), Disp: 14 capsule, Rfl: 0    tiZANidine (ZANAFLEX) 4 MG tablet, Take 4 mg by mouth every 6 hours as needed, Disp: , Rfl:     potassium chloride (KLOR-CON M) 10 MEQ extended release tablet, Take 2 tablets by mouth daily for 5 days, Disp: 10 tablet, Rfl: 0    prednisoLONE acetate (PRED FORTE) 1 % ophthalmic suspension, Place 1 drop into the left eye three times daily, Disp: , Rfl:     ketorolac (ACULAR) 0.5 % ophthalmic solution, Place 1 drop into the left eye 3 times daily, Disp: , Rfl:     oxyCODONE-acetaminophen (PERCOCET) 198 lb 6.4 oz (90 kg)      Height       Head Circumference       Peak Flow       Pain Score       Pain Loc       Pain Edu? Excl. in [address]? HEAD: Normocephalic, atraumatic. EYES:  Extraocular muscles are intact. Pupils equal round and reactive to light. Conjunctivas are pink. Negative scleral icterus. ENT:  Mucous membranes are moist.  Pharynx without erythema or exudates. Left serous effusion. NECK: Nontender and supple. No cervical adenopathy. CHEST:  Clear to auscultation bilaterally. No rales, rhonchi, or wheezing. Diminished breath sounds  HEART:  Regular rate and regular rhythm. No murmurs. Strong and equal pulses in the upper and lower extremities. ABDOMEN: Soft,  nondistended, positive bowel sounds. abdomen is nontender. No rebound. no guarding. MUSCULOSKELETAL: The calves are nontender to palpation. Active range of motion of the upper and lower extremities. No edema. NEUROLOGICAL: Awake, alert and oriented x 3. Power intact in the upper and lower extremities. Sensation is intact to light touch in the upper and lower extremities. Cranial Nerves 2-12 are intact. DERMATOLOGIC: No petechiae, rashes, or ecchymoses. No erythema. PSYCH: normal mood and affect. Normal thought content. ED COURSE AND MEDICAL DECISION MAKING:    Radiology:  Films have been read by radiologist as noted in chart unless otherwise stated. Other radiologic studies (i.e. CT, MRI, ultrasounds, etc ) have been interpreted by radiologist.     XR CHEST STANDARD (2 VW)   Final Result      No acute cardiopulmonary findings.           Labs:  Results for orders placed or performed during the hospital encounter of 03/20/20   Rapid influenza A/B antigens   Result Value Ref Range    Rapid Influenza A Ag Negative Negative    Rapid Influenza B Ag Negative Negative   CBC Auto Differential   Result Value Ref Range    WBC 14.5 (H) 4.0 - 11.0 K/uL    RBC 4.87 4.00 - 5.20 M/uL    Hemoglobin 13.4 12.0 - 16.0 g/dL    Hematocrit 41.3 36.0 - 48.0 %    MCV 84.8 80.0 - 100.0 fL    MCH 27.6 26.0 - 34.0 pg    MCHC 32.5 31.0 - 36.0 g/dL    RDW 14.1 12.4 - 15.4 %    Platelets 449 681 - 183 K/uL    MPV 10.2 5.0 - 10.5 fL    Neutrophils % 73.7 %    Lymphocytes % 20.4 %    Monocytes % 3.9 %    Eosinophils % 0.5 %    Basophils % 1.5 %    Neutrophils Absolute 10.7 (H) 1.7 - 7.7 K/uL    Lymphocytes Absolute 3.0 1.0 - 5.1 K/uL    Monocytes Absolute 0.6 0.0 - 1.3 K/uL    Eosinophils Absolute 0.1 0.0 - 0.6 K/uL    Basophils Absolute 0.2 0.0 - 0.2 K/uL   Comprehensive Metabolic Panel   Result Value Ref Range    Sodium 139 136 - 145 mmol/L    Potassium 3.5 3.5 - 5.1 mmol/L    Chloride 101 99 - 110 mmol/L    CO2 21 21 - 32 mmol/L    Anion Gap 17 (H) 3 - 16    Glucose 130 (H) 70 - 99 mg/dL    BUN 12 7 - 20 mg/dL    CREATININE 0.7 0.6 - 1.2 mg/dL    GFR Non-African American >60 >60    GFR African American >60 >60    Calcium 9.9 8.3 - 10.6 mg/dL    Total Protein 7.2 6.4 - 8.2 g/dL    Alb 4.5 3.4 - 5.0 g/dL    Albumin/Globulin Ratio 1.7 1.1 - 2.2    Total Bilirubin 0.4 0.0 - 1.0 mg/dL    Alkaline Phosphatase 90 40 - 129 U/L    ALT 19 10 - 40 U/L    AST 20 15 - 37 U/L    Globulin 2.7 g/dL   Urinalysis Reflex to Culture   Result Value Ref Range    Color, UA Yellow Straw/Yellow    Clarity, UA Clear Clear    Glucose, Ur Negative Negative mg/dL    Bilirubin Urine Negative Negative    Ketones, Urine Negative Negative mg/dL    Specific Gravity, UA >=1.030 1.005 - 1.030    Blood, Urine Negative Negative    pH, UA 5.0 5.0 - 8.0    Protein, UA Negative Negative mg/dL    Urobilinogen, Urine 0.2 <2.0 E.U./dL    Nitrite, Urine Negative Negative    Leukocyte Esterase, Urine Negative Negative    Microscopic Examination Not Indicated     Urine Type NotGiven     Urine Reflex to Culture Not Indicated        Treatment in the department:  Patient received the following while in the ED.     Medications   0.9 % sodium chloride bolus (1,000 mLs Intravenous Not Given 3/21/20 0003)

## 2020-03-21 NOTE — ED NOTES
Patient given d/c instructions with return verbalization including medications. Emphasis on f/u, to return with worsening s/s. Patient driving self home. Patient ambulated to car with steady gait.      Puja Corona RN  03/21/20 [id number]

## 2020-03-21 NOTE — CARE COORDINATION
[facility]. CTN provided contact information for future reference.     Reviewed and educated patient on any new and changed medications related to discharge diagnosis     Plan for follow-up call tomorrow based on severity of symptoms and risk factors

## 2020-03-22 ENCOUNTER — CARE COORDINATION (OUTPATIENT)
Dept: CARE COORDINATION | Age: 65
End: 2020-03-22

## 2020-03-23 RX ORDER — PROMETHAZINE HYDROCHLORIDE 6.25 MG/5ML
SYRUP ORAL
Qty: 118 ML | Refills: 0 | Status: SHIPPED | OUTPATIENT
Start: 2020-03-23 | End: 2020-04-08

## 2020-03-26 ENCOUNTER — CARE COORDINATION (OUTPATIENT)
Dept: CARE COORDINATION | Age: 65
End: 2020-03-26

## 2020-04-02 ENCOUNTER — CARE COORDINATION (OUTPATIENT)
Dept: CARE COORDINATION | Age: 65
End: 2020-04-02

## 2020-04-07 ENCOUNTER — VIRTUAL VISIT (OUTPATIENT)
Dept: PRIMARY CARE CLINIC | Age: 65
End: 2020-04-07
Payer: COMMERCIAL

## 2020-04-07 ENCOUNTER — NURSE TRIAGE (OUTPATIENT)
Dept: OTHER | Facility: CLINIC | Age: 65
End: 2020-04-07

## 2020-04-07 PROCEDURE — 3017F COLORECTAL CA SCREEN DOC REV: CPT | Performed by: INTERNAL MEDICINE

## 2020-04-07 PROCEDURE — G8428 CUR MEDS NOT DOCUMENT: HCPCS | Performed by: INTERNAL MEDICINE

## 2020-04-07 PROCEDURE — 99213 OFFICE O/P EST LOW 20 MIN: CPT | Performed by: INTERNAL MEDICINE

## 2020-04-07 RX ORDER — METHYLPREDNISOLONE 4 MG/1
TABLET ORAL
Qty: 1 KIT | Refills: 0 | Status: SHIPPED | OUTPATIENT
Start: 2020-04-07 | End: 2020-04-13

## 2020-04-07 RX ORDER — AZITHROMYCIN 250 MG/1
250 TABLET, FILM COATED ORAL DAILY
Qty: 10 TABLET | Refills: 0 | Status: SHIPPED | OUTPATIENT
Start: 2020-04-07 | End: 2020-04-17

## 2020-04-07 RX ORDER — BENZONATATE 100 MG/1
100 CAPSULE ORAL 3 TIMES DAILY PRN
Qty: 60 CAPSULE | Refills: 0 | Status: SHIPPED | OUTPATIENT
Start: 2020-04-07 | End: 2020-04-27

## 2020-04-07 ASSESSMENT — ENCOUNTER SYMPTOMS
WHEEZING: 1
SHORTNESS OF BREATH: 1
HEMOPTYSIS: 0
COUGH: 1
SORE THROAT: 1
HEARTBURN: 0

## 2020-04-07 NOTE — PROGRESS NOTES
2020     Scout Dickens (:  1955) is a 59 y.o. female, here for evaluation of the following medical concerns:    Cough   This is a new (COPD and symptoms started 2 weeks ago . Patient went to ER and released. Health department called and wanted patient to return for covid testing.) problem. The current episode started in the past 7 days (Patient is not aware of anyone with covid. [address] daughter with family for 8 days and returned two days ago and patient started to feel ill one day ago. she had recovered from illness two weeks. ). The problem has been gradually worsening (Patient given ibuprofen 800 mg, tessalon pearls  and zofran. ). The problem occurs every few minutes. The cough is productive of purulent sputum (green mucous). Associated symptoms include myalgias, nasal congestion, a sore throat, shortness of breath, sweats and wheezing. Pertinent negatives include no chest pain, chills, fever, headaches, heartburn or hemoptysis. Associated symptoms comments: No diarrhea, no loss of smell and taste. Brother in law is in hospital with covid. [address] daughter has been around both patients. . Nothing aggravates the symptoms.  Treatments tried: O2.     Blood pressure 151/86  Pulse 103  o2 saturation  95 room air  Temp 99.4  And earlier in the day 100.2  resp 20  Patient Active Problem List   Diagnosis    Depression    Constipation    High anion gap metabolic acidosis    MORALES (dyspnea on exertion)    COPD exacerbation (HCC)    GERD (gastroesophageal reflux disease)    Essential hypertension    Prediabetes    Vitamin D deficiency    History of cholecystectomy    Degenerative spondylolisthesis    Spondylolisthesis, lumbar region    DDD (degenerative disc disease), lumbar    Mechanical low back pain    Chronic pain syndrome    Facet arthropathy    Spinal stenosis of lumbar region    Degenerative lumbar spinal stenosis    Disc displacement, lumbar    Osteoarthritis of lumbar spine    Chronic low back pain    Lymphocytosis    Leukocytosis    Partial small bowel obstruction (HCC)    Cortical age-related cataract of both eyes    Age-related nuclear cataract of both eyes    RPE (retinal pigment epithelium) atrophy    Thyroid eye disease    AVRIL (obstructive sleep apnea)    Mixed hyperlipidemia    Chronic nausea    Abnormal mammogram    Former heavy cigarette smoker (20-39 per day)     Allergies   Allergen Reactions    Bupropion Other (See Comments)     Muscle spasms/seizure like symptoms     Morphine Hives and Itching    Morphine And Related Itching       Review of Systems   Constitutional: Negative for chills and fever. HENT: Positive for sore throat. Respiratory: Positive for cough, shortness of breath and wheezing. Negative for hemoptysis. Cardiovascular: Negative for chest pain. Gastrointestinal: Negative for heartburn. Musculoskeletal: Positive for myalgias. Neurological: Negative for headaches. Prior to Visit Medications    Medication Sig Taking?  Authorizing Provider   DALIRESP 500 MCG tablet Take 1 tablet by mouth once daily  Catherine Laguna MD   promethazine (PHENERGAN) 6.25 MG/5ML syrup TAKE 5 ML BY MOUTH  4 TIMES DAILY AS NEEDED FOR NAUSEA  Catherine Laguna MD   ondansetron (ZOFRAN ODT) 4 MG disintegrating tablet Take 1 tablet by mouth every 8 hours as needed for Nausea or Vomiting  Ricky Sagastume MD   cloNIDine (CATAPRES) 0.1 MG tablet Take 1 tablet by mouth twice daily  Catherine Laguna MD   cyanocobalamin 1000 MCG/ML injection INJECT 1 ML SUBCUTANEOUSLY EVERY MONTH  Catherine Laguna MD   vitamin D (ERGOCALCIFEROL) 1.25 MG (65670 UT) CAPS capsule Take 1 capsule by mouth once a week  Catherine Laguna MD   LINZESS 145 MCG capsule Take 1 capsule by mouth as needed  Historical Provider, MD   NARCAN 4 MG/0.1ML LIQD nasal spray Take by mouth as needed  Historical Provider, MD   famotidine (PEPCID) 20 MG tablet Take 1 tablet potassium chloride (KLOR-CON M) 10 MEQ extended release tablet Take 2 tablets by mouth daily for 5 days  Janae Hall MD   prednisoLONE acetate (PRED FORTE) 1 % ophthalmic suspension Place 1 drop into the left eye three times daily  Historical Provider, MD   ketorolac (ACULAR) 0.5 % ophthalmic solution Place 1 drop into the left eye 3 times daily  Historical Provider, MD   amitriptyline (ELAVIL) 50 MG tablet Take 50 mg by mouth 2 times daily  Historical Provider, MD   oxyCODONE-acetaminophen (PERCOCET) 7.5-325 MG per tablet Take 1 tablet by mouth every 6 hours as needed for Pain.   Historical Provider, MD   blood glucose test strips (TRUE METRIX BLOOD GLUCOSE TEST) strip USE TO TEST BLOOD SUGAR DAILY  Mena Kayser, MD   Blood Glucose Monitoring Suppl (TRUE METRIX METER) w/Device KIT 1 kit by Does not apply route daily  Mena Kayser, MD   COMBIVENT RESPIMAT  MCG/ACT AERS inhaler INHALE 1 PUFF INTO THE LUNGS EVERY 4 HOURS AS NEEDED FOR WHEEZING  Mena Kayser, MD   mometasone-formoterol (DULERA) 200-5 MCG/ACT inhaler INHALE TWO (2) PUFFS BY MOUTH EVERY 12 HOURS  Mena Kayser, MD   BANOPHEN 50 MG capsule TAKE ONE CAPSULE BY MOUTH EVERY NIGHT AT BEDTIME AS NEEDED FOR ITCHING  Patient not taking: Reported on 12/10/2019  Mena Kayser, MD   STOOL SOFTENER 100 MG capsule TAKE ONE CAPSULE BY MOUTH THREE TIMES A DAY AS NEEDED FOR CONSTIPATION  Patient not taking: Reported on 12/10/2019  Mena Kayser, MD   Blood Glucose Monitoring Suppl (TRUE METRIX METER) CRYSTAL 1 kit by Does not apply route daily  Mena Kayser, MD   lactulose [facility]) 10 GM/15ML solution as needed   Historical Provider, MD   Tuberculin-Allergy Syringes 28G X 1/2\" 1 ML MISC 1 each by Does not apply route every 30 days  Mena Kayser, MD        Social History     Tobacco Use    Smoking status: Former Smoker     Packs/day: 0.25     Years: 32.00     Pack years: 8.00     Types: Cigarettes     Last attempt to quit: 1/10/2017     Years since quitting: 3.2    Smokeless tobacco: Never Used    Tobacco comment: 30 years    Substance Use Topics    Alcohol use: No        There were no vitals filed for this visit. Estimated body mass index is 33.02 kg/m² as calculated from the following:    Height as of 1/8/20: 5' 5\" (1.651 m). Weight as of 3/20/20: 198 lb 6.4 oz (90 kg). Physical Exam  Constitutional:       General: She is not in acute distress. Appearance: Normal appearance. She is obese. She is not ill-appearing, toxic-appearing or diaphoretic. HENT:      Right Ear: External ear normal.      Left Ear: External ear normal.   Pulmonary:      Comments: Barrel chest with use of intercostals. Neurological:      General: No focal deficit present. Mental Status: She is alert and oriented to person, place, and time. Cranial Nerves: No cranial nerve deficit. Psychiatric:         Mood and Affect: Mood normal.         Thought Content: Thought content normal.         Judgment: Judgment normal.         ASSESSMENT/PLAN:   Diagnosis Orders   1. COPD exacerbation (HCC) with yellow phlegm in increased cough and significant exposure to Covid. Give number of [address] for [address] Call [phone]. Give medrol dose pack, zithromax 250 mg qd for 10 days. Tessalon pearls for comfort. If short of breath at rest or if o2 sat decreased below 90 , call 911 and warn staff possible Covid-19. Use HHN with duoneb q 6 h. Continue umeclidinium qd  And Dulera 200 mg bid. azithromycin (ZITHROMAX) 250 MG tablet    methylPREDNISolone (MEDROL DOSEPACK) 4 MG tablet    benzonatate (TESSALON) 100 MG capsule         An electronic signature was used to authenticate this note.     --Casper Hsieh MD on 4/7/2020 at 7:01 PM

## 2020-04-07 NOTE — TELEPHONE ENCOUNTER
Reason for Disposition   MILD difficulty breathing (e.g., minimal/no SOB at rest, SOB with walking, pulse <100)    Answer Assessment - Initial Assessment Questions  1. COVID-19 DIAGNOSIS: \"Who made your Coronavirus (COVID-19) diagnosis? \" \"Was it confirmed by a positive lab test?\" If not diagnosed by a HCP, ask \"Are there lots of cases (community spread) where you live? \" (See public health department website, if unsure)    * MAJOR community spread: high number of cases; numbers of cases are increasing; many people hospitalized. * MINOR community spread: low number of cases; not increasing; few or no people hospitalized      major  2. ONSET: \"When did the COVID-19 symptoms start? \"       today  3. WORST SYMPTOM: \"What is your worst symptom? \" (e.g., cough, fever, shortness of breath, muscle aches)      SOB, chest pressure, nausea, nonproductive cough  4. COUGH: \"How bad is the cough? \"        moderate  5. FEVER: \"Do you have a fever? \" If so, ask: \"What is your temperature, how was it measured, and when did it start? \"      99.1  6. RESPIRATORY STATUS: \"Describe your breathing? \" (e.g., shortness of breath, wheezing, unable to speak)       SOB with exertion  7. BETTER-SAME-WORSE: [facility] you getting better, staying the same or getting worse compared to yesterday? \"  If getting worse, ask, \"In what way? \"      worse  8. HIGH RISK DISEASE: \"Do you have any chronic medical problems? \" (e.g., asthma, heart or lung disease, weak immune system, etc.)      COPD  9. PREGNANCY: \"Is there any chance you are pregnant? \" \"When was your last menstrual period? \"      n/a  10. OTHER SYMPTOMS: \"Do you have any other symptoms? \"  (e.g., runny nose, headache, sore throat, loss of smell)        Sore throat    Protocols used: CORONAVIRUS (COVID-19) [facility]

## 2020-04-08 RX ORDER — PROMETHAZINE HYDROCHLORIDE 6.25 MG/5ML
SYRUP ORAL
Qty: 118 ML | Refills: 0 | Status: SHIPPED | OUTPATIENT
Start: 2020-04-08 | End: 2021-02-17 | Stop reason: SDUPTHER

## 2020-05-26 ENCOUNTER — TELEPHONE (OUTPATIENT)
Dept: PRIMARY CARE CLINIC | Age: 65
End: 2020-05-26

## 2020-06-09 ENCOUNTER — TELEPHONE (OUTPATIENT)
Dept: PRIMARY CARE CLINIC | Age: 65
End: 2020-06-09

## 2020-06-09 NOTE — TELEPHONE ENCOUNTER
Patient requesting a medication refill.   Medication: Chantix 1 ml       Pharmacy:   Last office visit: 4/7/2020  Next office visit: Visit date not found

## 2020-06-10 ENCOUNTER — HOSPITAL ENCOUNTER (OUTPATIENT)
Dept: ULTRASOUND IMAGING | Age: 65
Discharge: HOME | End: 2020-06-10
Payer: COMMERCIAL

## 2020-06-10 ENCOUNTER — TELEPHONE (OUTPATIENT)
Dept: PRIMARY CARE CLINIC | Age: 65
End: 2020-06-10

## 2020-06-10 PROCEDURE — 76642 ULTRASOUND BREAST LIMITED: CPT

## 2020-06-10 RX ORDER — VARENICLINE TARTRATE 1 MG/1
1 TABLET, FILM COATED ORAL 2 TIMES DAILY
COMMUNITY
End: 2020-06-10 | Stop reason: SDUPTHER

## 2020-06-10 RX ORDER — VARENICLINE TARTRATE 1 MG/1
1 TABLET, FILM COATED ORAL 2 TIMES DAILY
Qty: 60 TABLET | Refills: 3 | Status: SHIPPED | OUTPATIENT
Start: 2020-06-10 | End: 2021-02-17 | Stop reason: SDUPTHER

## 2020-06-26 ENCOUNTER — HOSPITAL ENCOUNTER (OUTPATIENT)
Dept: ULTRASOUND IMAGING | Age: 65
Discharge: HOME | End: 2020-06-26
Payer: COMMERCIAL

## 2020-06-26 ENCOUNTER — HOSPITAL ENCOUNTER (OUTPATIENT)
Dept: MAMMOGRAPHY | Age: 65
Discharge: HOME | End: 2020-06-26
Payer: COMMERCIAL

## 2020-06-26 PROCEDURE — 76642 ULTRASOUND BREAST LIMITED: CPT

## 2020-09-14 PROBLEM — E03.9 HYPOTHYROIDISM: Status: ACTIVE | Noted: 2020-09-14

## 2020-09-14 PROBLEM — M19.90 OSTEOARTHRITIS: Status: ACTIVE | Noted: 2020-09-14

## 2020-09-14 PROBLEM — Z87.891 FORMER SMOKER: Status: ACTIVE | Noted: 2020-09-14

## 2020-09-14 RX ORDER — TIOTROPIUM BROMIDE 18 UG/1
18 CAPSULE ORAL; RESPIRATORY (INHALATION) DAILY
Qty: 90 CAPSULE | Refills: 1 | Status: SHIPPED | OUTPATIENT
Start: 2020-09-14 | End: 2021-02-17 | Stop reason: SDUPTHER

## 2020-09-14 RX ORDER — POTASSIUM CHLORIDE 750 MG/1
20 TABLET, FILM COATED, EXTENDED RELEASE ORAL DAILY
Status: ON HOLD | COMMUNITY
Start: 2020-08-20 | End: 2022-02-06

## 2020-10-14 ENCOUNTER — APPOINTMENT (OUTPATIENT)
Dept: GENERAL RADIOLOGY | Age: 65
End: 2020-10-14
Payer: COMMERCIAL

## 2020-10-14 ENCOUNTER — APPOINTMENT (OUTPATIENT)
Dept: CT IMAGING | Age: 65
End: 2020-10-14
Payer: COMMERCIAL

## 2020-10-14 ENCOUNTER — HOSPITAL ENCOUNTER (EMERGENCY)
Age: 65
Discharge: HOME | End: 2020-10-14
Attending: EMERGENCY MEDICINE
Payer: COMMERCIAL

## 2020-10-14 VITALS
RESPIRATION RATE: 16 BRPM | OXYGEN SATURATION: 98 % | SYSTOLIC BLOOD PRESSURE: 105 MMHG | DIASTOLIC BLOOD PRESSURE: 70 MMHG | WEIGHT: 180 LBS | HEART RATE: 80 BPM | HEIGHT: 65 IN | BODY MASS INDEX: 29.99 KG/M2 | TEMPERATURE: 98 F

## 2020-10-14 PROCEDURE — 99284 EMERGENCY DEPT VISIT MOD MDM: CPT

## 2020-10-14 PROCEDURE — 72125 CT NECK SPINE W/O DYE: CPT

## 2020-10-14 PROCEDURE — 73030 X-RAY EXAM OF SHOULDER: CPT

## 2020-10-14 PROCEDURE — 70450 CT HEAD/BRAIN W/O DYE: CPT

## 2020-10-14 PROCEDURE — 6370000000 HC RX 637 (ALT 250 FOR IP): Performed by: EMERGENCY MEDICINE

## 2020-10-14 RX ORDER — 0.9 % SODIUM CHLORIDE 0.9 %
1000 INTRAVENOUS SOLUTION INTRAVENOUS ONCE
Status: DISCONTINUED | OUTPATIENT
Start: 2020-10-14 | End: 2020-10-14

## 2020-10-14 RX ORDER — ACETAMINOPHEN 500 MG
1000 TABLET ORAL ONCE
Status: COMPLETED | OUTPATIENT
Start: 2020-10-14 | End: 2020-10-14

## 2020-10-14 RX ADMIN — ACETAMINOPHEN 1000 MG: 500 TABLET ORAL at 15:13

## 2020-10-14 ASSESSMENT — PAIN DESCRIPTION - PAIN TYPE: TYPE: ACUTE PAIN

## 2020-10-14 ASSESSMENT — PAIN DESCRIPTION - DESCRIPTORS: DESCRIPTORS: PATIENT UNABLE TO DESCRIBE

## 2020-10-14 ASSESSMENT — PAIN SCALES - GENERAL
PAINLEVEL_OUTOF10: 10
PAINLEVEL_OUTOF10: 7
PAINLEVEL_OUTOF10: 10

## 2020-10-14 ASSESSMENT — PAIN DESCRIPTION - LOCATION: LOCATION: HEAD

## 2020-10-14 NOTE — ED PROVIDER NOTES
CHIEF COMPLAINT  Fall and Head Injury      HISTORY OF PRESENT ILLNESS  Adrianne Helm is a 72 y.o. female, who presents to the ED with severe headache, neck pain, and shoulder pain after fall today. Patient was sitting on the toilet seat when the toilet seat broke causing her to fall against the wall onto her left side hitting the left side of her head and left shoulder. No loss of consciousness. Patient complains of severe headache and shoulder pain as well as severe left-sided neck pain. No weakness, numbness, paresthesias, no chest pain no abdominal pain no other extremity pain no back pain. No visual changes. .     Review of Systems    I have reviewed the following from the nursing documentation.     Past Medical History:   Diagnosis Date    [place] (acute kidney injury) ([facility] [address] 10/7/2013    Anxiety     Bronchitis     Chronic abdominal pain     possibly due to diverticulosos    Chronic back pain     [facility]     DDD (degenerative disc disease), lumbar 12/1/2016    Depression     Elevated glycated hemoglobin     History of normal mammogram 8/26/13    [facility]    Hx of migraine headaches     Hyperlipidemia     Hypertension     Hypothyroidism     Menopause ovarian failure     Rotator cuff tear     right     Past Surgical History:   Procedure Laterality Date    CARPAL TUNNEL RELEASE      CENTRAL LINE  10/7/2013         CHOLECYSTECTOMY      COLONOSCOPY  9/6/2011    Tubular adenoma    COLONOSCOPY  10/24/2005    Dr. Markos Tom - Tubular adenoma    ENDOSCOPY, COLON, DIAGNOSTIC      ESOPHAGEAL MOTILITY STUDY  6/17/2013    NORMAL    EYE SURGERY      cataracts    FINE NEEDLE ASPIRATION  8/10/2012    THYROID - NEGATIVE    FINGER NAIL SURGERY Bilateral 5/21/2019    TOTAL AVULSION OF 1-5 TOENAILS BILATERAL FEET performed by Nancie Mcdonald DPM at [address] AND [facility] 8/15/2002    Endometriosis, fibroids    TONSILLECTOMY      UPPER GASTROINTESTINAL ENDOSCOPY  10/17/2005    Bx small bowel, Gastric, GE junction all negative    UPPER GASTROINTESTINAL ENDOSCOPY  9/5/2000    Dr. Rufina Salvador - NORMAL     Family History   Problem Relation Age of Onset    Diabetes Sister     Heart Disease Sister         chf    Cancer Sister 76        colon cancer  metastatic    Diabetes Sister     Kidney Disease Sister     Cancer Sister         brain cancer throat cancer and smoked    Cancer Mother 68        cerival cancer    Osteoarthritis Mother     Heart Disease Father     High Blood Pressure Father     Heart Disease Brother 40        heart attack    High Blood Pressure Maternal Aunt     Cancer Other 46        liver cancer    Cancer Other 47        lung cancer and smoker, maternal neice    Cancer Other         bladder cancer, first cousin.     Diabetes Sister 46        complication of diabetes     Social History     Socioeconomic History    Marital status: Single     Spouse name: Not on file    Number of children: Not on file    Years of education: Not on file    Highest education level: Not on file   Occupational History    Not on file   Social Needs    Financial resource strain: Not on file    Food insecurity     Worry: Not on file     Inability: Not on file    Transportation needs     Medical: Not on file     Non-medical: Not on file   Tobacco Use    Smoking status: Former Smoker     Packs/day: 0.25     Years: 32.00     Pack years: 8.00     Types: Cigarettes     Last attempt to quit: 1/10/2017     Years since quitting: 3.7    Smokeless tobacco: Never Used    Tobacco comment: 30 years    Substance and Sexual Activity    Alcohol use: No    Drug use: No    Sexual activity: Not on file   Lifestyle    Physical activity     Days per week: Not on file     Minutes per session: Not on file    Stress: Not on file   Relationships    Social connections     Talks on phone: Not on file     Gets together: Not on file     Attends Taoism service: Not on file     Active member of club or organization: Not on file     Attends meetings of clubs or organizations: Not on file     Relationship status: Not on file    Intimate partner violence     Fear of current or ex partner: Not on file     Emotionally abused: Not on file     Physically abused: Not on file     Forced sexual activity: Not on file   Other Topics Concern    Not on file   Social History Narrative    Not on file     No current facility-administered medications for this encounter.       Current Outpatient Medications   Medication Sig Dispense Refill    cloNIDine (CATAPRES) 0.1 MG tablet Take 1 tablet by mouth twice daily 60 tablet 5    potassium chloride (KLOR-CON) 10 MEQ extended release tablet Take 20 mEq by mouth daily      QUEtiapine (SEROQUEL) 100 MG tablet TAKE 1 TABLET BY MOUTH AT BEDTIME 28 tablet 3    EUTHYROX 125 MCG tablet TAKE 1 TABLET BY MOUTH ONCE DAILY (DISCONTINUE  135MCG) 30 tablet 3    losartan (COZAAR) 25 MG tablet Take 1 tablet by mouth once daily 30 tablet 3    rosuvastatin (CRESTOR) 10 MG tablet TAKE 1 TABLET BY MOUTH NIGHTLY AT BEDTIME 28 tablet 5    cyanocobalamin 1000 MCG/ML injection INJECT 1 ML  ONCE EVERY MONTH 1 mL 5    vitamin D (ERGOCALCIFEROL) 1.25 MG (01503 UT) CAPS capsule Take 1 capsule by mouth once a week 4 capsule 5    hydroCHLOROthiazide (HYDRODIURIL) 25 MG tablet Take 1 tablet by mouth once daily 90 tablet 3    Umeclidinium Bromide (INCRUSE ELLIPTA) 62.5 MCG/INH AEPB Inhale 1 puff into the lungs daily 30 each 5    promethazine (PHENERGAN) 6.25 MG/5ML syrup TAKE 5 ML BY MOUTH  4 TIMES DAILY AS NEEDED FOR NAUSEA 118 mL 0    LINZESS 145 MCG capsule Take 1 capsule by mouth as needed      cyclobenzaprine (FLEXERIL) 10 MG tablet TAKE 1 TABLET BY MOUTH THREE TIMES DAILY AS NEEDED FOR SPASMS 90 tablet 0    tiZANidine (ZANAFLEX) 4 MG tablet Take 4 mg by mouth every 6 hours as needed      amitriptyline (ELAVIL) 50 MG tablet Take 50 mg by mouth 2 times daily      oxyCODONE-acetaminophen (PERCOCET) 7.5-325 MG per tablet Take 1 tablet by mouth every 6 hours as needed for Pain.  lactulose (CHRONULAC) 10 GM/15ML solution as needed       tiotropium (SPIRIVA HANDIHALER) 18 MCG inhalation capsule Inhale 1 capsule into the lungs daily This replaces incruse elpt 90 capsule 1    varenicline (CHANTIX) 1 MG tablet Take 1 tablet by mouth 2 times daily 60 tablet 3    DALIRESP 500 MCG tablet Take 1 tablet by mouth once daily 30 tablet 5    ondansetron (ZOFRAN ODT) 4 MG disintegrating tablet Take 1 tablet by mouth every 8 hours as needed for Nausea or Vomiting 15 tablet 0    NARCAN 4 MG/0.1ML LIQD nasal spray Take by mouth as needed  0    famotidine (PEPCID) 20 MG tablet Take 1 tablet by mouth 2 times daily 60 tablet 3    chlorhexidine (PERIDEX) 0.12 % solution Take 1 mL by mouth daily  3    Nebulizers (AIROmnigy COMPACT MINI NEBULIZER) MISC 1 each by Does not apply route 4 times daily 1 each 1    Lancets MISC 1 each by Does not apply route 2 times daily 100 each 5    ipratropium-albuterol (DUONEB) 0.5-2.5 (3) MG/3ML SOLN nebulizer solution Inhale 3 mLs into the lungs as needed for Shortness of Breath 360 mL 11    albuterol sulfate HFA (PROVENTIL HFA) 108 (90 Base) MCG/ACT inhaler Inhale 2 puffs into the lungs every 4 hours as needed for Wheezing or Shortness of Breath (Space out to every 6 hours as symptoms improve) Space out to every 6 hours as symptoms improve.  (Patient not taking: Reported on 1/8/2020) 1 Inhaler 0    omeprazole (PRILOSEC) 20 MG delayed release capsule Take 1 capsule by mouth daily (Patient not taking: Reported on 12/10/2019) 14 capsule 0    potassium chloride (KLOR-CON M) 10 MEQ extended release tablet Take 2 tablets by mouth daily for 5 days 10 tablet 0    prednisoLONE acetate (PRED FORTE) 1 % ophthalmic suspension Place 1 drop into the left eye three times daily      ketorolac (ACULAR) 0.5 % ophthalmic solution Place 1 drop into the left eye 3 times daily      blood glucose test strips (TRUE METRIX BLOOD GLUCOSE TEST) strip USE TO TEST BLOOD SUGAR DAILY 100 each 11    Blood Glucose Monitoring Suppl (TRUE METRIX METER) w/Device KIT 1 kit by Does not apply route daily 1 kit 0    mometasone-formoterol (DULERA) 200-5 MCG/ACT inhaler INHALE TWO (2) PUFFS BY MOUTH EVERY 12 HOURS 13 g 10    BANOPHEN 50 MG capsule TAKE ONE CAPSULE BY MOUTH EVERY NIGHT AT BEDTIME AS NEEDED FOR ITCHING (Patient not taking: Reported on 12/10/2019) 30 capsule 4    STOOL SOFTENER 100 MG capsule TAKE ONE CAPSULE BY MOUTH THREE TIMES A DAY AS NEEDED FOR CONSTIPATION (Patient not taking: Reported on 12/10/2019) 90 capsule 4    Blood Glucose Monitoring Suppl (TRUE METRIX METER) CRYSTAL 1 kit by Does not apply route daily 1 Device 1    Tuberculin-Allergy Syringes 28G X 1/2\" 1 ML MISC 1 each by Does not apply route every 30 days 100 each 3     Allergies   Allergen Reactions    Bupropion Other (See Comments)     Muscle spasms/seizure like symptoms     Morphine Hives and Itching    Morphine And Related Itching          PHYSICAL EXAM  /70   Pulse 80   Temp 98 °F (36.7 °C) (Oral)   Resp 16   Ht 5' 5\" (1.651 m)   Wt 180 lb (81.6 kg)   LMP  (LMP Unknown)   SpO2 98%   BMI 29.95 kg/m²   Physical Exam   GENERAL APPEARANCE: Awake and alert. Cooperative. In no acute distress. Mild tenderness to palpation in the left parietal area without soft tissue swelling, crepitus, deformity, ecchymosis. EYES: PERRL. Corneas clear. Sclera non icteric. No conjunctival injection extraocular movements are intact. ENT: Oropharynx clear. Airway patent. No stridor. No asymmetry. NECK: Supple. There is mild generalized tenderness of the trapezius on the left side from the cervical area to the shoulder. LUNGS: Clear. Equal breath sounds bilaterally. CARDIOVASCULAR: RRR. No murmurs rubs or gallops.      ABDOMEN: Soft non tender. No guarding or rebound. EXTREMITIES: Decreased range of motion of left shoulder secondary to pain. There is moderate tenderness to palpation the left anterior/mid deltoid area without crepitus, ecchymosis, deformity, soft tissue swelling. Neurovascular is intact. Radial pulse is intact. Sensation is normal.   SKIN: Warm and dry. NEURO: Alert and oriented x3. Strength 5/5 throughout. Sensation is intact. LABORATORY STUDIES:   Labs Reviewed - No data to display     RADIOLOGY  CT Head WO Contrast   Final Result   Impression:       1. No acute intracranial abnormality. 2. No acute fracture or traumatic malalignment of the cervical spine. CT Cervical Spine WO Contrast   Final Result   Impression:       1. No acute intracranial abnormality. 2. No acute fracture or traumatic malalignment of the cervical spine. XR SHOULDER LEFT (MIN 2 VIEWS)   Final Result   1. No acute osseous abnormality. If EKG done, EKG was interpreted independently by me    PROCEDURES  Procedures    EDCOURSE/MDM  Patient seen and evaluated. Old records selectively reviewed if pertinent. Labs and imaging reviewed and results discussed with patient. I considered closed head injury, cervical strain, left shoulder strain, sprain, contusion. I believe that there is LOW LIKELIHOOD of significant intracranial injury, spinal cord injury. If Chi Muñoz is discharged, I discussed with Chi Muñoz and/or family the exam results, diagnosis, care, prognosis, reasons to return and the importance of follow up. Patient and/or family is in agreement with plan and all questions have been answered. Specific discharge instructions explained, including reasons to return to the emergency department. If discharged, patient was given scripts for the following medications. Discharge Medication List as of 10/14/2020  4:18 PM          CLINICAL IMPRESSION  1. Injury of head, initial encounter    2. Strain of neck muscle, initial encounter    3. Injury of left shoulder, initial encounter        /70   Pulse 80   Temp 98 °F (36.7 °C) (Oral)   Resp 16   Ht 5' 5\" (1.651 m)   Wt 180 lb (81.6 kg)   LMP  (LMP Unknown)   SpO2 98%   BMI 29.95 kg/m²     DISPOSITION  Beto Peres was discharged in stable condition.                    Markel Callejas MD  10/15/20 1364

## 2020-10-14 NOTE — ED TRIAGE NOTES
Says the toilet broke causing her to fall between the toilet and shower this am. C/O pain all over head and left shoulder pain. Pt drowsy but answers questions when talked to.

## 2020-10-15 ENCOUNTER — CARE COORDINATION (OUTPATIENT)
Dept: CARE COORDINATION | Age: 65
End: 2020-10-15

## 2020-10-15 NOTE — CARE COORDINATION
Patient contacted regarding recent discharge and COVID-19 risk. Discussed COVID-19 related testing which was not done at this time. Test results were not done. Patient informed of results, if available? No     Care Transition Nurse/ Ambulatory Care Manager contacted the patient by telephone to perform post discharge assessment. Verified name and  with patient as identifiers. Patient has following risk factors of: COPD and asthma. CTN/ACM reviewed discharge instructions, medical action plan and red flags related to discharge diagnosis. Reviewed and educated them on any new and changed medications related to discharge diagnosis. Advised obtaining a 90-day supply of all daily and as-needed medications. Education provided regarding infection prevention, and signs and symptoms of COVID-19 and when to seek medical attention with patient who verbalized understanding. Discussed exposure protocols and quarantine from [address] you at higher risk for severe illness  and given an opportunity for questions and concerns. The patient agrees to contact the COVID-19 hotline [phone] or PCP office for questions related to their healthcare. CTN/ACM provided contact information for future reference. From CDC: Are you at higher risk for severe illness?  Wash your hands often.  Avoid close contact (6 feet, which is about two arm lengths) with people who are sick.  Put distance between yourself and other people if COVID-19 is spreading in your community.  Clean and disinfect frequently touched surfaces.  Avoid all cruise travel and non-essential air travel.  Call your healthcare professional if you have concerns about COVID-19 and your underlying condition or if you are sick. For more information on steps you can take to protect yourself        Spoke with patient,  Who  continues to experience headache, and shoulder pain.  Reviewed covid safety precautions with pt,mehnaz soni.    Discussed if pt had scheduled follow up with Dr Hussein Sotomayor yet, pt reports she no longer sees Dr Hussein Sotomayor and Now Has Visiting Physicians coming to her home,  and they are actually coming today to assess pt.    Will send note to MD to update

## 2020-10-21 RX ORDER — ROFLUMILAST 500 UG/1
TABLET ORAL
Qty: 30 TABLET | Refills: 0 | Status: SHIPPED | OUTPATIENT
Start: 2020-10-21 | End: 2020-11-21

## 2020-10-26 ENCOUNTER — TELEPHONE (OUTPATIENT)
Dept: PRIMARY CARE CLINIC | Age: 65
End: 2020-10-26

## 2020-10-26 NOTE — TELEPHONE ENCOUNTER
Ok to type up handicap sticker  She has lumbar low back pain  Rotator cuff disease  Not able to walk 200 feet

## 2020-10-26 NOTE — TELEPHONE ENCOUNTER
----- Message from Kyleigh Freeman sent at 10/21/2020  3:06 PM EDT -----  Subject: Message to Provider    QUESTIONS  Information for Provider? pt requesting documentation to get handicap   parking placard   please return her call [phone]  ---------------------------------------------------------------------------  --------------  [address]  What is the best way for the office to contact you? OK to leave message on   voicemail  Preferred Call Back Phone Number? [phone]  ---------------------------------------------------------------------------  --------------  SCRIPT ANSWERS  Relationship to Patient?  Self

## 2020-10-30 ENCOUNTER — TELEPHONE (OUTPATIENT)
Dept: PRIMARY CARE CLINIC | Age: 65
End: 2020-10-30

## 2020-10-30 NOTE — TELEPHONE ENCOUNTER
----- Message from Annetta Anandstephanie sent at 10/30/2020 11:34 AM EDT -----  Subject: Message to Provider    QUESTIONS  Information for Provider? pt states that Dr Radha Mann told her to call back   on Friday about getting her placard for disability to see if Dr Megan Guido   typed a letter so she could send to the [facility]. Please call and advise.   ---------------------------------------------------------------------------  --------------  CALL BACK INFO  What is the best way for the office to contact you? OK to leave message on   voicemail  Preferred Call Back Phone Number? [phone]  ---------------------------------------------------------------------------  --------------  SCRIPT ANSWERS  Relationship to Patient?  Self

## 2020-10-31 NOTE — TELEPHONE ENCOUNTER
Status: Signed       ----- Message from Haley Coffey sent at 10/30/2020 11:34 AM EDT -----  Subject: Message to Provider     QUESTIONS  Information for Provider? pt states that Dr Melanie Maldonado told her to call back   on Friday about getting her placard for disability to see if Dr Serenity Dumont   typed a letter so she could send to the [facility]. Please call and advise.   ---------------------------------------------------------------------------  --------------  CALL BACK INFO  What is the best way for the office to contact you? OK to leave message on   voicemail  Preferred Call Back Phone Number? [phone]        Handicap placard letter placed in epic. Please print and call patient to find out if she wants it mailed to her or if she wants to pick it up.

## 2021-01-12 DIAGNOSIS — R92.8 ABNORMAL MAMMOGRAM: Primary | ICD-10-CM

## 2021-02-17 ENCOUNTER — TELEPHONE (OUTPATIENT)
Dept: PRIMARY CARE CLINIC | Age: 66
End: 2021-02-17

## 2021-02-17 ENCOUNTER — VIRTUAL VISIT (OUTPATIENT)
Dept: PRIMARY CARE CLINIC | Age: 66
End: 2021-02-17
Payer: MEDICARE

## 2021-02-17 VITALS — DIASTOLIC BLOOD PRESSURE: 78 MMHG | HEART RATE: 102 BPM | SYSTOLIC BLOOD PRESSURE: 120 MMHG

## 2021-02-17 DIAGNOSIS — J45.40 MODERATE PERSISTENT ASTHMA WITHOUT COMPLICATION: ICD-10-CM

## 2021-02-17 DIAGNOSIS — E78.2 MIXED HYPERLIPIDEMIA: ICD-10-CM

## 2021-02-17 DIAGNOSIS — R74.8 ELEVATED CK: ICD-10-CM

## 2021-02-17 DIAGNOSIS — E53.8 VITAMIN B 12 DEFICIENCY: ICD-10-CM

## 2021-02-17 DIAGNOSIS — I10 ESSENTIAL HYPERTENSION: ICD-10-CM

## 2021-02-17 DIAGNOSIS — D17.1 BREAST LIPOMA: ICD-10-CM

## 2021-02-17 DIAGNOSIS — R92.8 OTHER ABNORMAL AND INCONCLUSIVE FINDINGS ON DIAGNOSTIC IMAGING OF BREAST: ICD-10-CM

## 2021-02-17 DIAGNOSIS — L29.9 PRURITUS: Primary | ICD-10-CM

## 2021-02-17 DIAGNOSIS — K59.01 SLOW TRANSIT CONSTIPATION: Chronic | ICD-10-CM

## 2021-02-17 DIAGNOSIS — E55.9 VITAMIN D DEFICIENCY: ICD-10-CM

## 2021-02-17 DIAGNOSIS — R92.2 INCONCLUSIVE MAMMOGRAM: ICD-10-CM

## 2021-02-17 DIAGNOSIS — E03.9 ACQUIRED HYPOTHYROIDISM: ICD-10-CM

## 2021-02-17 DIAGNOSIS — R73.01 IMPAIRED FASTING GLUCOSE: ICD-10-CM

## 2021-02-17 DIAGNOSIS — Z12.31 ENCOUNTER FOR SCREENING MAMMOGRAM FOR MALIGNANT NEOPLASM OF BREAST: ICD-10-CM

## 2021-02-17 DIAGNOSIS — J44.9 COPD, SEVERE (HCC): ICD-10-CM

## 2021-02-17 DIAGNOSIS — J44.1 COPD EXACERBATION (HCC): ICD-10-CM

## 2021-02-17 PROCEDURE — G8427 DOCREV CUR MEDS BY ELIG CLIN: HCPCS | Performed by: INTERNAL MEDICINE

## 2021-02-17 PROCEDURE — G8400 PT W/DXA NO RESULTS DOC: HCPCS | Performed by: INTERNAL MEDICINE

## 2021-02-17 PROCEDURE — 4040F PNEUMOC VAC/ADMIN/RCVD: CPT | Performed by: INTERNAL MEDICINE

## 2021-02-17 PROCEDURE — 1090F PRES/ABSN URINE INCON ASSESS: CPT | Performed by: INTERNAL MEDICINE

## 2021-02-17 PROCEDURE — 3017F COLORECTAL CA SCREEN DOC REV: CPT | Performed by: INTERNAL MEDICINE

## 2021-02-17 PROCEDURE — 1123F ACP DISCUSS/DSCN MKR DOCD: CPT | Performed by: INTERNAL MEDICINE

## 2021-02-17 PROCEDURE — 99215 OFFICE O/P EST HI 40 MIN: CPT | Performed by: INTERNAL MEDICINE

## 2021-02-17 RX ORDER — ROSUVASTATIN CALCIUM 10 MG/1
10 TABLET, COATED ORAL NIGHTLY
Qty: 30 TABLET | Refills: 5 | Status: SHIPPED | OUTPATIENT
Start: 2021-02-17 | End: 2021-06-04 | Stop reason: SDUPTHER

## 2021-02-17 RX ORDER — CLONIDINE HYDROCHLORIDE 0.1 MG/1
TABLET ORAL
Qty: 60 TABLET | Refills: 5 | Status: SHIPPED | OUTPATIENT
Start: 2021-02-17 | End: 2021-06-04 | Stop reason: SDUPTHER

## 2021-02-17 RX ORDER — LOSARTAN POTASSIUM 25 MG/1
TABLET ORAL
Qty: 30 TABLET | Refills: 5 | Status: SHIPPED | OUTPATIENT
Start: 2021-02-17 | End: 2021-06-04 | Stop reason: SDUPTHER

## 2021-02-17 RX ORDER — LACTULOSE 10 G/15ML
10 SOLUTION ORAL PRN
Qty: 1892 ML | Refills: 1 | Status: SHIPPED | OUTPATIENT
Start: 2021-02-17 | End: 2021-06-04 | Stop reason: SDUPTHER

## 2021-02-17 RX ORDER — PROMETHAZINE HYDROCHLORIDE 6.25 MG/5ML
SYRUP ORAL
Qty: 118 ML | Refills: 1 | Status: SHIPPED | OUTPATIENT
Start: 2021-02-17 | End: 2021-04-22

## 2021-02-17 RX ORDER — SPIRONOLACTONE 25 MG
1 TABLET ORAL 2 TIMES DAILY
Qty: 180 TABLET | Refills: 1 | Status: SHIPPED | OUTPATIENT
Start: 2021-02-17 | End: 2021-06-04 | Stop reason: SDUPTHER

## 2021-02-17 RX ORDER — CALCIUM CITRATE/VITAMIN D3 200MG-6.25
TABLET ORAL
Qty: 100 EACH | Refills: 11 | Status: SHIPPED | OUTPATIENT
Start: 2021-02-17 | End: 2021-06-04 | Stop reason: SDUPTHER

## 2021-02-17 RX ORDER — CYANOCOBALAMIN 1000 UG/ML
INJECTION INTRAMUSCULAR; SUBCUTANEOUS
Qty: 1 ML | Refills: 5 | Status: SHIPPED | OUTPATIENT
Start: 2021-02-17 | End: 2021-06-04 | Stop reason: SDUPTHER

## 2021-02-17 RX ORDER — TIOTROPIUM BROMIDE 18 UG/1
18 CAPSULE ORAL; RESPIRATORY (INHALATION) DAILY
Qty: 90 CAPSULE | Refills: 1 | Status: SHIPPED | OUTPATIENT
Start: 2021-02-17 | End: 2021-06-04 | Stop reason: SDUPTHER

## 2021-02-17 RX ORDER — DOCUSATE SODIUM 100 MG/1
CAPSULE, LIQUID FILLED ORAL
Qty: 90 CAPSULE | Refills: 3 | Status: SHIPPED | OUTPATIENT
Start: 2021-02-17 | End: 2021-06-04 | Stop reason: SDUPTHER

## 2021-02-17 RX ORDER — LINACLOTIDE 145 UG/1
145 CAPSULE, GELATIN COATED ORAL PRN
Qty: 90 CAPSULE | Refills: 1 | Status: SHIPPED | OUTPATIENT
Start: 2021-02-17 | End: 2021-06-04 | Stop reason: SDUPTHER

## 2021-02-17 RX ORDER — LEVOTHYROXINE SODIUM 0.12 MG/1
TABLET ORAL
Qty: 30 TABLET | Refills: 3 | Status: SHIPPED | OUTPATIENT
Start: 2021-02-17 | End: 2021-06-04 | Stop reason: SDUPTHER

## 2021-02-17 RX ORDER — VARENICLINE TARTRATE 1 MG/1
1 TABLET, FILM COATED ORAL 2 TIMES DAILY
Qty: 60 TABLET | Refills: 3 | Status: SHIPPED | OUTPATIENT
Start: 2021-02-17 | End: 2021-06-04 | Stop reason: SDUPTHER

## 2021-02-17 RX ORDER — POTASSIUM CHLORIDE 750 MG/1
20 TABLET, EXTENDED RELEASE ORAL DAILY
Qty: 60 TABLET | Refills: 3 | Status: SHIPPED | OUTPATIENT
Start: 2021-02-17 | End: 2021-06-04 | Stop reason: SDUPTHER

## 2021-02-17 RX ORDER — OMEPRAZOLE 20 MG/1
20 CAPSULE, DELAYED RELEASE ORAL
Qty: 60 CAPSULE | Refills: 3 | Status: SHIPPED | OUTPATIENT
Start: 2021-02-17 | End: 2021-06-04 | Stop reason: SDUPTHER

## 2021-02-17 RX ORDER — QUETIAPINE FUMARATE 100 MG/1
100 TABLET, FILM COATED ORAL NIGHTLY
Qty: 30 TABLET | Refills: 5 | Status: SHIPPED | OUTPATIENT
Start: 2021-02-17 | End: 2021-06-04 | Stop reason: SDUPTHER

## 2021-02-17 RX ORDER — DIPHENHYDRAMINE HCL 50 MG
CAPSULE ORAL
Qty: 30 CAPSULE | Refills: 4 | Status: SHIPPED | OUTPATIENT
Start: 2021-02-17 | End: 2021-06-04 | Stop reason: SDUPTHER

## 2021-02-17 ASSESSMENT — PATIENT HEALTH QUESTIONNAIRE - PHQ9
SUM OF ALL RESPONSES TO PHQ QUESTIONS 1-9: 10
7. TROUBLE CONCENTRATING ON THINGS, SUCH AS READING THE NEWSPAPER OR WATCHING TELEVISION: 0
4. FEELING TIRED OR HAVING LITTLE ENERGY: 2
SUM OF ALL RESPONSES TO PHQ QUESTIONS 1-9: 10
6. FEELING BAD ABOUT YOURSELF - OR THAT YOU ARE A FAILURE OR HAVE LET YOURSELF OR YOUR FAMILY DOWN: 1
SUM OF ALL RESPONSES TO PHQ9 QUESTIONS 1 & 2: 3
1. LITTLE INTEREST OR PLEASURE IN DOING THINGS: 1

## 2021-02-17 ASSESSMENT — LIFESTYLE VARIABLES: HOW OFTEN DO YOU HAVE A DRINK CONTAINING ALCOHOL: 0

## 2021-02-17 ASSESSMENT — COLUMBIA-SUICIDE SEVERITY RATING SCALE - C-SSRS
7. DID THIS OCCUR IN THE LAST THREE MONTHS: NO
2. HAVE YOU ACTUALLY HAD ANY THOUGHTS OF KILLING YOURSELF?: NO
1. WITHIN THE PAST MONTH, HAVE YOU WISHED YOU WERE DEAD OR WISHED YOU COULD GO TO SLEEP AND NOT WAKE UP?: NO

## 2021-02-17 NOTE — TELEPHONE ENCOUNTER
Need to know the dosage promethazine patient states she take 1.75 mL twice a day is this correct . Potassium is she suppose to take twice a day. True Metrix is she suppose to test twice a day or 3 times a day. Vitamin B12 need direction please send to the pharmacy.

## 2021-02-17 NOTE — TELEPHONE ENCOUNTER
Pharmacy has questions about dosage of promethazine, potassium chloride, Also questions concerning True Metrix test strips. Needs to know the route of the cyanocobalamin. Please call and advise. Marivel Paiz

## 2021-02-17 NOTE — PROGRESS NOTES
ago.    Review of Systems   Constitutional: Negative for activity change, appetite change, fatigue and unexpected weight change. HENT: Negative for dental problem, sinus pain, sore throat and trouble swallowing. Eyes: Negative for pain and visual disturbance. Respiratory: Negative for apnea, cough, chest tightness, shortness of breath and wheezing. Cardiovascular: Negative for chest pain and palpitations. Gastrointestinal: Negative for abdominal pain, blood in stool, constipation, diarrhea, nausea, rectal pain and vomiting. Endocrine: Negative for cold intolerance, heat intolerance, polydipsia, polyphagia and polyuria. Genitourinary: Negative for difficulty urinating, dysuria, flank pain, frequency, hematuria, pelvic pain, urgency, vaginal bleeding and vaginal discharge. Musculoskeletal: Negative for arthralgias, back pain, gait problem, joint swelling, myalgias, neck pain and neck stiffness. Skin: Negative for color change and rash. Neurological: Negative for dizziness, tremors, syncope, speech difficulty, weakness, light-headedness and headaches. Hematological: Negative for adenopathy. Does not bruise/bleed easily. Psychiatric/Behavioral: Negative for agitation, behavioral problems, decreased concentration, sleep disturbance and suicidal ideas. The patient is not nervous/anxious and is not hyperactive. Prior to Visit Medications    Medication Sig Taking?  Authorizing Provider   mometasone-formoterol (DULERA) 200-5 MCG/ACT inhaler INHALE TWO (2) PUFFS BY MOUTH EVERY 12 HOURS Yes Tangela Melendez MD   diphenhydrAMINE (BANOPHEN) 50 MG capsule TAKE ONE CAPSULE BY MOUTH EVERY NIGHT AT BEDTIME AS NEEDED FOR ITCHING Yes Tangela Melendez MD   blood glucose test strips (TRUE METRIX BLOOD GLUCOSE TEST) strip USE TO TEST BLOOD SUGAR DAILY Yes Tangela Melendez MD   levothyroxine (EUTHYROX) 125 MCG tablet TAKE 1 TABLET BY MOUTH ONCE DAILY (DISCONTINUE  135MCG) Yes Tangela Melendez MD   omeprazole (PRILOSEC) 20 MG delayed release capsule Take 1 capsule by mouth every morning (before breakfast) As needed for heartburn Yes Iron Fine MD   potassium chloride (KLOR-CON M) 10 MEQ extended release tablet Take 2 tablets by mouth daily for 5 days Yes Iron Fine MD   docusate sodium (STOOL SOFTENER) 100 MG capsule TAKE ONE CAPSULE BY MOUTH DAILY AS NEEDED FOR CONSTIPATION Yes Iron Fine MD   cloNIDine (CATAPRES) 0.1 MG tablet Bid Yes Iron Fine MD   cyanocobalamin 1000 MCG/ML injection INJECT 1 ML  ONCE EVERY MONTH Yes Iron Fine MD   Roflumilast (DALIRESP) 500 MCG tablet Take 1 tablet by mouth once daily Yes Iron Fine MD   losartan (COZAAR) 25 MG tablet Take 1 tablet by mouth once daily Yes Iron Fine MD   promethazine (PHENERGAN) 6.25 MG/5ML syrup Take every 6 hours as needed for nausea Yes Iron Fine MD   rosuvastatin (CRESTOR) 10 MG tablet Take 1 tablet by mouth nightly Yes Iron Fine MD   QUEtiapine (SEROQUEL) 100 MG tablet Take 1 tablet by mouth nightly Yes Iron Fine MD   tiotropium (Alissa Sake) 18 MCG inhalation capsule Inhale 1 capsule into the lungs daily This replaces Rafita Padilla Yes Iron Fine MD   varenicline (CHANTIX) 1 MG tablet Take 1 tablet by mouth 2 times daily Yes Iron Fine MD   Calcium Carbonate-Vitamin D (CALCIUM 600/VITAMIN D) 600-400 MG-UNIT CHEW Take 1 tablet by mouth 2 times daily Yes Iron Fine MD   LINZESS 145 MCG capsule Take 1 capsule by mouth as needed (constipation) Yes Iron Fine MD   lactulose (CHRONULAC) 10 GM/15ML solution Take 15 mLs by mouth as needed (constipation) Yes Iron Fine MD   potassium chloride (KLOR-CON) 10 MEQ extended release tablet Take 20 mEq by mouth daily  Historical Provider, MD   hydroCHLOROthiazide (HYDRODIURIL) 25 MG tablet Take 1 tablet by mouth once daily  Caitlin Yo MD   [facility] 4 MG/0.1ML LIQD nasal spray Take by mouth as needed  Historical Provider, MD Stress: Not on file   Relationships    Social connections     Talks on phone: Not on file     Gets together: Not on file     Attends [employer] service: Not on file     Active member of club or organization: Not on file     Attends meetings of clubs or organizations: Not on file     Relationship status: Not on file    Intimate partner violence     Fear of current or ex partner: Not on file     Emotionally abused: Not on file     Physically abused: Not on file     Forced sexual activity: Not on file   Other Topics Concern    Not on file   Social History Narrative    Not on file        Family History   Problem Relation Age of Onset    Diabetes Sister     Heart Disease Sister         Mariajose Aviles Cancer Sister 76        colon cancer  metastatic    Diabetes Sister     Kidney Disease Sister     Cancer Sister         brain cancer throat cancer and smoked    Cancer Mother 68        cerival cancer    Osteoarthritis Mother     Heart Disease Father     High Blood Pressure Father     Heart Disease Brother 40        heart attack    High Blood Pressure Maternal Aunt     Cancer Other 46        liver cancer    Cancer Other 47        lung cancer and smoker, maternal neice    Cancer Other         bladder cancer, first cousin.  Diabetes Sister 46        complication of diabetes       Vitals:    02/17/21 0924   BP: 120/78   Pulse: 102     Estimated body mass index is 29.95 kg/m² as calculated from the following:    Height as of 10/14/20: 5' 5\" (1.651 m). Weight as of 10/14/20: 180 lb (81.6 kg). PHYSICAL EXAM  GENERAL:  Pleasant  female who looks her stated age, awake alert and oriented x3, no acute distress. HEENT:  Normocephalic atraumatic. Pupils equal round reactive light and accommodation, extra ocular muscles are intact. Oropharynx is clear moist without injection or exudate. Tongue and palate move normally.   Turbinates appear normal.  Tympanic membranes appear normal.  NECK:  Supple nontender. No carotid bruits. Brisk carotid upstrokes, no JVD. No thyromegaly. LYMPH:  No supraclavicular cervical axillary or inguinal lymphadenopathy. LUNGS:  Clear to auscultation bilaterally. Excellent air entry. No inspiratory crackles or expiratory wheezes. HEART:  Regular rate and rhythm without pathologic murmur rub gallop S3 or S4. ABDOMEN:  Soft, nontender. Normal bowel sounds. No guarding. No masses. UROGENITAL:  Deferred  EXTREMITIES:  Warm and well perfused without clubbing cyanosis or edema. 2+ pulses in all 4 extremities. Capillary refill less than 2 seconds. NEURO:  Cranial nerves 2-12 grossly intact. Normal muscle bulk and tone. No resting tremor, cogwheeling, normal rapid alternating movements in the hands and feet. No stocking paresthesia. Normal gait and station. MUSCULOSKELETAL:  Mild osteoarthritic changes. SKIN:  No worrisome lesions, skin a little dry. PSYCH:  No psychomotor retardation or agitation. Good eye contact. Unrestricted affect range. Mood congruent with affect. Linear thought.     LABS  Lab Review   Admission on 03/20/2020, Discharged on 03/21/2020   Component Date Value    Rapid Influenza A Ag 03/20/2020 Negative     Rapid Influenza B Ag 03/20/2020 Negative     WBC 03/20/2020 14.5*    RBC 03/20/2020 4.87     Hemoglobin 03/20/2020 13.4     Hematocrit 03/20/2020 41.3     MCV 03/20/2020 84.8     MCH 03/20/2020 27.6     MCHC 03/20/2020 32.5     RDW 03/20/2020 14.1     Platelets 69/70/6119 209     MPV 03/20/2020 10.2     Neutrophils % 03/20/2020 73.7     Lymphocytes % 03/20/2020 20.4     Monocytes % 03/20/2020 3.9     Eosinophils % 03/20/2020 0.5     Basophils % 03/20/2020 1.5     Neutrophils Absolute 03/20/2020 10.7*    Lymphocytes Absolute 03/20/2020 3.0     Monocytes Absolute 03/20/2020 0.6     Eosinophils Absolute 03/20/2020 0.1     Basophils Absolute 03/20/2020 0.2     Sodium 03/20/2020 139     Potassium 03/20/2020 3.5     Chloride 03/20/2020 101     CO2 03/20/2020 21     Anion Gap 03/20/2020 17*    Glucose 03/20/2020 130*    BUN 03/20/2020 12     CREATININE 03/20/2020 0.7     GFR Non- 03/20/2020 >60     GFR  03/20/2020 >60     Calcium 03/20/2020 9.9     Total Protein 03/20/2020 7.2     Albumin 03/20/2020 4.5     Albumin/Globulin Ratio 03/20/2020 1.7     Total Bilirubin 03/20/2020 0.4     Alkaline Phosphatase 03/20/2020 90     ALT 03/20/2020 19     AST 03/20/2020 20     Globulin 03/20/2020 2.7     Color, UA 03/20/2020 Yellow     Clarity, UA 03/20/2020 Clear     Glucose, Ur 03/20/2020 Negative     Bilirubin Urine 03/20/2020 Negative     Ketones, Urine 03/20/2020 Negative     Specific Gravity, UA 03/20/2020 >=1.030     Blood, Urine 03/20/2020 Negative     pH, UA 03/20/2020 5.0     Protein, UA 03/20/2020 Negative     Urobilinogen, Urine 03/20/2020 0.2     Nitrite, Urine 03/20/2020 Negative     Leukocyte Esterase, Urine 03/20/2020 Negative     Microscopic Examination 03/20/2020 Not Indicated     Urine Type 03/20/2020 NotGiven     Urine Reflex to Culture 03/20/2020 Not Indicated    Orders Only on 01/08/2020   Component Date Value    H Pylori Breath Test 01/08/2020 Negative     Cholesterol, Total 01/08/2020 123     Triglycerides 01/08/2020 85     HDL 01/08/2020 53     LDL Calculated 01/08/2020 53     VLDL Cholesterol Calcula* 01/08/2020 17     Vit D, 25-Hydroxy 01/08/2020 42.1     WBC 01/08/2020 9.6     RBC 01/08/2020 4.55     Hemoglobin 01/08/2020 12.6     Hematocrit 01/08/2020 38.8     MCV 01/08/2020 85.3     MCH 01/08/2020 27.7     MCHC 01/08/2020 32.4     RDW 01/08/2020 13.6     Platelets 92/06/9996 189     MPV 01/08/2020 10.5     Neutrophils % 01/08/2020 57.3     Lymphocytes % 01/08/2020 33.7     Monocytes % 01/08/2020 7.0     Eosinophils % 01/08/2020 1.2     Basophils % 01/08/2020 0.8     Neutrophils Absolute 01/08/2020 5.5     Lymphocytes Absolute (May 2020). Smokes a few cigarettes a week currently. Wearing oxygen when her exertional hypoxia drops into the low 80s, during the day. Has been through pulmonary rehab. Did not feel the Anoro helped her, felt better response with Dulera. Little rescue inhaler use. Though historically bicarb has been reassuring, would like to check venous blood gas to determine extent of hypercapnia, as this will inform target oxygen saturation levels. Patient has home oximeter at home oxygen. - mometasone-formoterol (DULERA) 200-5 MCG/ACT inhaler; INHALE TWO (2) PUFFS BY MOUTH EVERY 12 HOURS  Dispense: 13 g; Refill: 10  - Roflumilast (DALIRESP) 500 MCG tablet; Take 1 tablet by mouth once daily  Dispense: 30 tablet; Refill: 5   - BLOOD GAS, VENOUS; Future    2. Pruritus    - diphenhydrAMINE (BANOPHEN) 50 MG capsule; TAKE ONE CAPSULE BY MOUTH EVERY NIGHT AT BEDTIME AS NEEDED FOR ITCHING  Dispense: 30 capsule; Refill: 4    3. Acquired hypothyroidism  Update TSH. - levothyroxine (EUTHYROX) 125 MCG tablet; TAKE 1 TABLET BY MOUTH ONCE DAILY (DISCONTINUE  135MCG)  Dispense: 30 tablet; Refill: 3    4. Slow transit constipation  Uses Linzess daily, lactulose 2 or 3 times a week, but also likes to take docusate a couple times a day, feels it helps. Chronic narcotic use. - docusate sodium (STOOL SOFTENER) 100 MG capsule; TAKE ONE CAPSULE BY MOUTH DAILY AS NEEDED FOR CONSTIPATION  Dispense: 90 capsule; Refill: 3    5. Vitamin B 12 deficiency  Self injects monthly at home. Update level. - cyanocobalamin 1000 MCG/ML injection; INJECT 1 ML  ONCE EVERY MONTH  Dispense: 1 mL; Refill: 5  - Vitamin B12; Future    6. Mixed hyperlipidemia  Update labs. No new myalgias or weakness, as best I can make out.  - rosuvastatin (CRESTOR) 10 MG tablet; Take 1 tablet by mouth nightly  Dispense: 30 tablet; Refill: 5  - Lipid Panel; Future  - AST; Future  - ALT; Future  -CK    8.  Breast lipoma  6-month follow-up surveillance must include targeted ultrasound to look at the breast mass thought to be lipoma though not biopsied.  - LASHAWN DIGITAL DIAGNOSTIC W OR WO CAD BILATERAL; Future  - US BREAST LIMITED LEFT; Future    9. Encounter for screening mammogram for malignant neoplasm of breast  See above  - LASHAWN DIGITAL DIAGNOSTIC W OR WO CAD BILATERAL; Future  - US BREAST LIMITED LEFT; Future    10. Impaired fasting glucose  Update surveillance. - Hemoglobin A1C; Future    11. Essential hypertension  Encourage home surveillance at follow-up. - Basic Metabolic Panel; Future    13. Vitamin D deficiency  Update level. Chronic inhaled steroid use. Occasional systemic glucocorticoid use for exacerbations. No bone density scan on record, ordered 2018 but patient chose not to go. Discuss advisability DEXA at face-to-face follow-up appointment. Also patient on vitamin D monotherapy, switch to calcium-vitamin D therapy. - Vitamin D 25 Hydroxy; Future    14. Other abnormal and inconclusive findings on diagnostic imaging of breast   See above  - LASHAWN DIGITAL DIAGNOSTIC W OR WO CAD BILATERAL; Future    15. Inconclusive mammogram   See above  - US BREAST LIMITED LEFT; Future    16. Healthcare maintenance. Discuss Shingrix at follow-up. Has started [employer] vaccination series. Colonoscopy September 6, 2011 and 2005 both adenoma excision. Pap 2018. PSG mild AVRIL 2019. Tdap Pneumovax 23 influenza up-to-date. Overdue for screening labs last checked a year ago. 17.  History of colon adenoma. Colonoscopy September 6, 2011 and 2005 both with adenoma excision. Referred November 2019, but never performed. Overdue since 2016 arguably. Will TERENCE on upcoming visit. Return in about 4 weeks (around 3/17/2021) for Report visit. It was a pleasure to visit with Ms. Nanci Hernandez today. Answered all questions as best I could.   Kd Campos MD   Melody Edward is a 72 y.o. female being evaluated by a Virtual Visit (video visit) encounter to address concerns as mentioned above.  A caregiver was present when appropriate. Due to this being a TeleHealth encounter (During [facility]72 public [facility] emergency), evaluation of the following organ systems was limited: Vitals/Constitutional/EENT/Resp/CV/GI//MS/Neuro/Skin/Heme-Lymph-Imm. Pursuant to the emergency declaration under the [address] [place] [address], [address] [place] [address] authority and the [employer] Act, this Virtual Visit was conducted with patient's (and/or legal guardian's) consent, to reduce the patient's risk of exposure to COVID-19 and provide necessary medical care. The patient (and/or legal guardian) has also been advised to contact this office for worsening conditions or problems, and seek emergency medical treatment and/or call 911 if deemed necessary. Patient identification was verified at the start of the visit: Yes    Total time spent for this encounter: 45 minutes    Services were provided through a video synchronous discussion virtually to substitute for in-person clinic visit. Patient and provider were located at their individual homes. --Alyse Mims MD on 2/18/2021 at 6:32 AM    An electronic signature was used to authenticate this note.

## 2021-02-22 DIAGNOSIS — E78.2 MIXED HYPERLIPIDEMIA: ICD-10-CM

## 2021-02-22 DIAGNOSIS — R74.8 ELEVATED CK: ICD-10-CM

## 2021-02-22 DIAGNOSIS — E53.8 VITAMIN B 12 DEFICIENCY: ICD-10-CM

## 2021-02-22 DIAGNOSIS — E55.9 VITAMIN D DEFICIENCY: ICD-10-CM

## 2021-02-22 DIAGNOSIS — R73.01 IMPAIRED FASTING GLUCOSE: ICD-10-CM

## 2021-02-22 DIAGNOSIS — I10 ESSENTIAL HYPERTENSION: ICD-10-CM

## 2021-02-22 LAB
ALT SERPL-CCNC: 28 U/L (ref 10–40)
ANION GAP SERPL CALCULATED.3IONS-SCNC: 13 MMOL/L (ref 3–16)
AST SERPL-CCNC: 32 U/L (ref 15–37)
BUN BLDV-MCNC: 10 MG/DL (ref 7–20)
CALCIUM SERPL-MCNC: 10.1 MG/DL (ref 8.3–10.6)
CHLORIDE BLD-SCNC: 102 MMOL/L (ref 99–110)
CHOLESTEROL, TOTAL: 119 MG/DL (ref 0–199)
CO2: 28 MMOL/L (ref 21–32)
CREAT SERPL-MCNC: 1 MG/DL (ref 0.6–1.2)
ESTIMATED AVERAGE GLUCOSE: 111.2 MG/DL
GFR AFRICAN AMERICAN: >60
GFR NON-AFRICAN AMERICAN: 56
GLUCOSE BLD-MCNC: 136 MG/DL (ref 70–99)
HBA1C MFR BLD: 5.5 %
HDLC SERPL-MCNC: 54 MG/DL (ref 40–60)
LDL CHOLESTEROL CALCULATED: 43 MG/DL
POTASSIUM SERPL-SCNC: 4.1 MMOL/L (ref 3.5–5.1)
SODIUM BLD-SCNC: 143 MMOL/L (ref 136–145)
TOTAL CK: 315 U/L (ref 26–192)
TRIGL SERPL-MCNC: 108 MG/DL (ref 0–150)
VITAMIN B-12: >2000 PG/ML (ref 211–911)
VITAMIN D 25-HYDROXY: 46.1 NG/ML
VLDLC SERPL CALC-MCNC: 22 MG/DL

## 2021-02-24 ENCOUNTER — HOSPITAL ENCOUNTER (OUTPATIENT)
Dept: MRI IMAGING | Age: 66
Discharge: HOME | End: 2021-02-24
Payer: MEDICARE

## 2021-02-24 DIAGNOSIS — M75.41 CORACOID IMPINGEMENT OF RIGHT SHOULDER: ICD-10-CM

## 2021-02-24 PROCEDURE — 73221 MRI JOINT UPR EXTREM W/O DYE: CPT

## 2021-03-01 ENCOUNTER — HOSPITAL ENCOUNTER (OUTPATIENT)
Dept: MAMMOGRAPHY | Age: 66
Discharge: HOME | End: 2021-03-01
Payer: MEDICARE

## 2021-03-01 ENCOUNTER — HOSPITAL ENCOUNTER (OUTPATIENT)
Dept: ULTRASOUND IMAGING | Age: 66
Discharge: HOME | End: 2021-03-01
Payer: MEDICARE

## 2021-03-01 DIAGNOSIS — R92.2 INCONCLUSIVE MAMMOGRAM: ICD-10-CM

## 2021-03-01 DIAGNOSIS — D17.1 BREAST LIPOMA: ICD-10-CM

## 2021-03-01 DIAGNOSIS — Z12.31 ENCOUNTER FOR SCREENING MAMMOGRAM FOR MALIGNANT NEOPLASM OF BREAST: ICD-10-CM

## 2021-03-01 DIAGNOSIS — R92.8 OTHER ABNORMAL AND INCONCLUSIVE FINDINGS ON DIAGNOSTIC IMAGING OF BREAST: ICD-10-CM

## 2021-03-01 PROCEDURE — 76642 ULTRASOUND BREAST LIMITED: CPT

## 2021-03-01 PROCEDURE — G0279 TOMOSYNTHESIS, MAMMO: HCPCS

## 2021-03-09 DIAGNOSIS — R74.8 ELEVATED CK: Primary | ICD-10-CM

## 2021-03-09 DIAGNOSIS — E03.9 ACQUIRED HYPOTHYROIDISM: Primary | ICD-10-CM

## 2021-03-09 DIAGNOSIS — E78.2 MIXED HYPERLIPIDEMIA: ICD-10-CM

## 2021-03-24 NOTE — PROGRESS NOTES
3/25/2021    Moira Victoria (:  1955) is a 72 y.o. female, here for evaluation of the following medical concerns:    Chief Complaint   Patient presents with    Abdominal Pain    Diarrhea       HPI  70-year-old -American female with COPD, previous 1 to 2 pack/day nicotine dependence, hypothyroidism hypertension hyperlipidemia mechanical low back pain on chronic narcotic complicated by chronic constipation and chronic nausea, obstructive sleep apnea, insomnia prescribed Seroquel who attends for report visit. Medical history notable for:    1. Severe COPD. FEV1/FVC= 49/66%, FEV1 1.2 L, with 110 mL / 10% FEV1 augmentation with bronchodilatorPFT  Dulera, Umeclidinium versus Spiriva, Daliresp DuoNeb. Nocturnal O2. Chest CT  pair 4 mm left lung nodules, unchanged CT , recommended for 1 year follow-up (May 2020). Smokes a few cigarettes a week currently. Wearing oxygen when her exertional hypoxia drops into the low 80s, during the day. Has been through pulmonary rehab.    2.  Hypertension. Clonidine 0.1 twice daily, HCTZ, losartan 25.    3.  Hyperlipidemia. Crestor x 6 months, prev Lipitor caused myalgia elevated CK. 4.  Chronic low back pain. MRI  L4-L5 DJD with edema, ligamentum flavum hypertrophy, mild to moderate right L4 foraminal narrowing DDD. Percocet 7.5 every 6 hours, Elavil 50 twice daily Seroquel 100 at bedtime. Linzess, laxatives for secondary constipation. Patient tells me she declined back surgery for concern of complications. 5.  Left breast mass 13 mm, lipomatous on mammogram 2019, unchanged on follow-up left breast ultrasound 2020. Recommended for follow-up left breast ultrasound and diagnostic mammogram 2020. .    6.  Health maintenance issues. Colonoscopy 2011 and 2005 both adenoma excision. Pap 2018. PSG mild AVRIL 2019. Tdap Pneumovax 23 influenza up-to-date. O    7. Abdominal pain.   In setting of chronic constipation due to narcotics, Linzess and infrequent lactulose, for the past few months patient has had for couple times a week sharp bilateral pelvic pain radiating to the rectum, followed by diarrhea which incompletely improves the discomfort. Other days of the week she has normal bowel movement without pain. On one occasion she saw blood. She does not know if she has hemorrhoids. No nausea vomiting. She is status post hysterectomy, and denies dysuria fevers. 8. TERENCE colonoscopy. The patient denies recent fevers, shaking chills, drenching sweats. The patient denies new headache, ear or sinus pressure/pain/drainage, sore throat, dental thermal sensitivity, productive cough, abdominal pain, diarrhea, dysuria, new dyspnea, new chest pain, new pleuritic chest pain. The patient also denies new leg swelling, joint warmth/redness, and open skin sores. Patient gets hives with morphine, has tolerated other narcotics. Allergic to buproprion as well. . They are not an easy bleeder. They have not been on chronic cortisone. They have no history of diabetes. They have no personal or family history of venous thromboembolism. There is no family history of malignant hyperthermia. Patient has tolerated general anesthesia for hysterectomy. . Patient has no known history of hepatitis. They have never received a blood transfusion. She has CPAP intolerant mild sleep apnea AHI 6 in 2019, jessica 86%. Uses nocturnal oxygen 2L. Review of Systems   Constitutional: Negative for activity change, appetite change, fatigue and unexpected weight change. HENT: Negative for dental problem, sinus pain, sore throat and trouble swallowing. Eyes: Negative for pain and visual disturbance. Respiratory: Negative for apnea, cough, chest tightness, shortness of breath and wheezing. Cardiovascular: Negative for chest pain and palpitations.    Gastrointestinal: Negative for negative for nausea and vomiting positives listed above in HPI.  Endocrine: Negative for cold intolerance, heat intolerance, polydipsia, polyphagia and polyuria. Genitourinary: Negative for difficulty urinating, dysuria, flank pain, frequency, hematuria, pelvic pain, urgency, vaginal bleeding and vaginal discharge. Musculoskeletal: Negative for arthralgias, back froylan, gait problem, joint swelling, myalgias, neck pain and neck stiffness. Skin: Negative for color change and rash. Neurological: Negative for dizziness, tremors, syncope, speech difficulty, weakness, light-headedness and headaches. Hematological: Negative for adenopathy. Does not bruise/bleed easily. Psychiatric/Behavioral: Negative for agitation, behavioral problems, decreased concentration, sleep disturbance and suicidal ideas. The patient is not nervous/anxious and is not hyperactive. Prior to Visit Medications    Medication Sig Taking?  Authorizing Provider   mometasone-formoterol (DULERA) 200-5 MCG/ACT inhaler INHALE TWO (2) PUFFS BY MOUTH EVERY 12 HOURS Yes Gloria Peralta MD   diphenhydrAMINE (BANOPHEN) 50 MG capsule TAKE ONE CAPSULE BY MOUTH EVERY NIGHT AT BEDTIME AS NEEDED FOR ITCHING Yes Gloria Peralta MD   blood glucose test strips (TRUE METRIX BLOOD GLUCOSE TEST) strip USE TO TEST BLOOD SUGAR DAILY Yes Gloria Peralta MD   levothyroxine (EUTHYROX) 125 MCG tablet TAKE 1 TABLET BY MOUTH ONCE DAILY (DISCONTINUE  135MCG) Yes Gloria Peralta MD   omeprazole (PRILOSEC) 20 MG delayed release capsule Take 1 capsule by mouth every morning (before breakfast) As needed for heartburn Yes Gloria Peralta MD   docusate sodium (STOOL SOFTENER) 100 MG capsule TAKE ONE CAPSULE BY MOUTH DAILY AS NEEDED FOR CONSTIPATION Yes Gloria Peralta MD   cloNIDine (CATAPRES) 0.1 MG tablet Bid Yes Gloria Peralta MD   cyanocobalamin 1000 MCG/ML injection INJECT 1 ML  ONCE EVERY MONTH Yes Gloria Peralta MD   Roflumilast (DALIRESP) 500 MCG tablet Take 1 tablet by mouth once daily Yes Gloria Peralta MD   losartan (COZAAR) 25 MG tablet Take 1 tablet by mouth once daily Yes Ezra Monroe MD   promethazine (PHENERGAN) 6.25 MG/5ML syrup Take every 6 hours as needed for nausea Yes Ezra Monroe MD   rosuvastatin (CRESTOR) 10 MG tablet Take 1 tablet by mouth nightly Yes Ezra Monroe MD   QUEtiapine (SEROQUEL) 100 MG tablet Take 1 tablet by mouth nightly Yes Ezra Monroe MD   tiotropium (Maureen Julian) 18 MCG inhalation capsule Inhale 1 capsule into the lungs daily This replaces Gerald Kerns Yes Ezra Monroe MD   varenicline (CHANTIX) 1 MG tablet Take 1 tablet by mouth 2 times daily Yes Ezra Monroe MD   Calcium Carbonate-Vitamin D (CALCIUM 600/VITAMIN D) 600-400 MG-UNIT CHEW Take 1 tablet by mouth 2 times daily Yes Ezra Monroe MD   LINZESS 145 MCG capsule Take 1 capsule by mouth as needed (constipation) Yes Ezra Monroe MD   lactulose (CHRONULAC) 10 GM/15ML solution Take 15 mLs by mouth as needed (constipation) Yes Ezra Monroe MD   potassium chloride (KLOR-CON) 10 MEQ extended release tablet Take 20 mEq by mouth daily Yes Historical Provider, MD   hydroCHLOROthiazide (HYDRODIURIL) 25 MG tablet Take 1 tablet by mouth once daily Yes Candace Pendleton MD   NARCAN 4 MG/0.1ML LIQD nasal spray Take by mouth as needed Yes Historical Provider, MD   chlorhexidine (PERIDEX) 0.12 % solution Take 1 mL by mouth daily Yes Historical Provider, MD   Nebulizers (AIRIAL COMPACT MINI NEBULIZER) MISC 1 each by Does not apply route 4 times daily Yes Candace Pendleton MD   Lancets MISC 1 each by Does not apply route 2 times daily Yes Candace Pendleton MD   ipratropium-albuterol (DUONEB) 0.5-2.5 (3) MG/3ML SOLN nebulizer solution Inhale 3 mLs into the lungs as needed for Shortness of Breath Yes Candace Pendleton MD   tiZANidine (ZANAFLEX) 4 MG tablet Take 4 mg by mouth every 6 hours as needed Yes Historical Provider, MD   prednisoLONE acetate (PRED FORTE) 1 % ophthalmic suspension Place 1 drop into the left eye three times daily Yes Historical Provider, MD   ketorolac (ACULAR) 0.5 % ophthalmic solution Place 1 drop into the left eye 3 times daily Yes Historical Provider, MD   oxyCODONE-acetaminophen (PERCOCET) 7.5-325 MG per tablet Take 1 tablet by mouth every 6 hours as needed for Pain.  Yes Historical Provider, MD   Blood Glucose Monitoring Suppl (TRUE METRIX METER) w/Device KIT 1 kit by Does not apply route daily Yes Jenifer Henry MD   Blood Glucose Monitoring Suppl (TRUE METRIX METER) CRYSTAL 1 kit by Does not apply route daily Yes Jenifer Henry MD   Tuberculin-Allergy Syringes 28G X 1/2\" 1 ML MISC 1 each by Does not apply route every 30 days Yes Jenifer Henry MD   potassium chloride (KLOR-CON M) 10 MEQ extended release tablet Take 2 tablets by mouth daily for 5 days  Humza Malhotra MD        Allergies   Allergen Reactions    Bupropion Other (See Comments)     Muscle spasms/seizure like symptoms     Morphine Hives and Itching    Morphine And Related Itching       Past Medical History:   Diagnosis Date    [place] (acute kidney injury) ([facility] [address] 10/7/2013    Anxiety     Bronchitis     Chronic abdominal pain     possibly due to diverticulosos    Chronic back pain     COPD (chronic obstructive pulmonary disease) ([facility] [address]     DDD (degenerative disc disease), lumbar 12/1/2016    Depression     Elevated glycated hemoglobin     History of normal mammogram 8/26/13    [facility]    Hx of migraine headaches     Hyperlipidemia     Hypertension     Hypothyroidism     Menopause ovarian failure     Rotator cuff tear     right       Past Surgical History:   Procedure Laterality Date    CARPAL TUNNEL RELEASE      CENTRAL LINE  10/7/2013         CHOLECYSTECTOMY      COLONOSCOPY  9/6/2011    Tubular adenoma    COLONOSCOPY  10/24/2005    Dr. Kyree Cuba - Tubular adenoma    ENDOSCOPY, COLON, DIAGNOSTIC      ESOPHAGEAL MOTILITY STUDY  6/17/2013    NORMAL    EYE SURGERY cataracts    FINE NEEDLE ASPIRATION  8/10/2012    THYROID - NEGATIVE    FINGER NAIL SURGERY Bilateral 2019    TOTAL AVULSION OF 1-5 TOENAILS BILATERAL FEET performed by Heidi Browning DPM at [address] AND [facility]  8/15/2002    Endometriosis, fibroids    TONSILLECTOMY      UPPER GASTROINTESTINAL ENDOSCOPY  10/17/2005    Bx small bowel, Gastric, GE junction all negative    UPPER GASTROINTESTINAL ENDOSCOPY  2000    Dr. Darryl Larson - NORMAL       Social History     Socioeconomic History    Marital status: Single     Spouse name: Not on file    Number of children: Not on file    Years of education: Not on file    Highest education level: Not on file   Occupational History    Not on file   Social Needs    Financial resource strain: Not on file    Food insecurity     Worry: Not on file     Inability: Not on file    Transportation needs     Medical: Not on file     Non-medical: Not on file   Tobacco Use    Smoking status: Former Smoker     Packs/day: 0.25     Years: 32.00     Pack years: 8.00     Types: Cigarettes     Quit date: 1/10/2017     Years since quittin.2    Smokeless tobacco: Never Used    Tobacco comment: 30 years    Substance and Sexual Activity    Alcohol use: No    Drug use: No    Sexual activity: Not on file   Lifestyle    Physical activity     Days per week: Not on file     Minutes per session: Not on file    Stress: Not on file   Relationships    Social connections     Talks on phone: Not on file     Gets together: Not on file     Attends Voodoo service: Not on file     Active member of club or organization: Not on file     Attends meetings of clubs or organizations: Not on file     Relationship status: Not on file    Intimate partner violence     Fear of current or ex partner: Not on file     Emotionally abused: Not on file     Physically abused: Not on file     Forced sexual activity: Not on file   Other Topics Concern    Not on file   Social History Narrative    Not on file        Family History   Problem Relation Age of Onset    Diabetes Sister     Heart Disease Sister         Grecia Clement Cancer Sister 76        colon cancer  metastatic    Diabetes Sister     Kidney Disease Sister     Cancer Sister         brain cancer throat cancer and smoked    Cancer Mother 68        cerival cancer    Osteoarthritis Mother     Heart Disease Father     High Blood Pressure Father     Heart Disease Brother 40        heart attack    High Blood Pressure Maternal Aunt     Cancer Other 46        liver cancer    Cancer Other 47        lung cancer and smoker, maternal neice    Cancer Other         bladder cancer, first cousin.  Diabetes Sister 46        complication of diabetes       Vitals:    03/25/21 0829   BP: 110/80   Pulse: 89   Temp: 97.5 °F (36.4 °C)   TempSrc: Oral   SpO2: 97%   Weight: 193 lb (87.5 kg)   Height: 5' 5\" (1.651 m)     Estimated body mass index is 32.12 kg/m² as calculated from the following:    Height as of this encounter: 5' 5\" (1.651 m). Weight as of this encounter: 193 lb (87.5 kg). PHYSICAL EXAM  GENERAL:  Pleasant  female who looks her stated age, awake alert and oriented x3, no acute distress. HEENT:  Normocephalic atraumatic. Pupils equal round reactive light and accommodation, extra ocular muscles are intact. Oropharynx is clear moist without injection or exudate. Tongue and palate move normally. Turbinates appear normal.  Tympanic membranes appear normal.  Mallampati 2. Full dentures. NECK:  Supple nontender. No carotid bruits. Brisk carotid upstrokes, no JVD. No thyromegaly. Can extend neck to 60 was before was on a plane. LYMPH:  No supraclavicular cervical axillary or inguinal lymphadenopathy. LUNGS:  Clear to auscultation bilaterally. Poor air entry. No inspiratory crackles or expiratory wheezes.   HEART: Distant heart sounds regular rate and rhythm without pathologic murmur rub gallop S3 or S4. ABDOMEN:  Soft, diffusely tender. Diminished bowel sounds. No guarding. No masses. Apple distribution obesity. UROGENITAL:  Deferred  EXTREMITIES:  Warm and well perfused without clubbing cyanosis or edema. 2+ pulses in all 4 extremities. Capillary refill less than 2 seconds. NEURO:  Cranial nerves 2-12 grossly intact. Normal muscle bulk and tone. No resting tremor, cogwheeling, normal rapid alternating movements in the hands and feet. No stocking paresthesia. Normal gait and station. Hyperreflexic. MUSCULOSKELETAL: Moderate osteoarthritic changes especially shoulder. SKIN:  No worrisome lesions, skin a little dry. PSYCH:  No psychomotor retardation or agitation. Good eye contact. Unrestricted affect range. Mood congruent with affect. Linear thought.     LABS  Lab Review   Orders Only on 02/22/2021   Component Date Value    Total CK 02/22/2021 315*    Vit D, 25-Hydroxy 02/22/2021 46.1     Sodium 02/22/2021 143     Potassium 02/22/2021 4.1     Chloride 02/22/2021 102     CO2 02/22/2021 28     Anion Gap 02/22/2021 13     Glucose 02/22/2021 136*    BUN 02/22/2021 10     CREATININE 02/22/2021 1.0     GFR Non- 02/22/2021 56*    GFR  02/22/2021 >60     Calcium 02/22/2021 10.1     ALT 02/22/2021 28     AST 02/22/2021 32     Cholesterol, Total 02/22/2021 119     Triglycerides 02/22/2021 108     HDL 02/22/2021 54     LDL Calculated 02/22/2021 43     VLDL Cholesterol Calcula* 02/22/2021 22     Vitamin B-12 02/22/2021 >2000*    Hemoglobin A1C 02/22/2021 5.5     eAG 02/22/2021 111.2    Admission on 03/20/2020, Discharged on 03/21/2020   Component Date Value    Rapid Influenza A Ag 03/20/2020 Negative     Rapid Influenza B Ag 03/20/2020 Negative     WBC 03/20/2020 14.5*    RBC 03/20/2020 4.87     Hemoglobin 03/20/2020 13.4     Hematocrit 03/20/2020 41.3     MCV 03/20/2020 84.8     MCH 03/20/2020 27.6  MCHC 03/20/2020 32.5     RDW 03/20/2020 14.1     Platelets 28/71/2738 209     MPV 03/20/2020 10.2     Neutrophils % 03/20/2020 73.7     Lymphocytes % 03/20/2020 20.4     Monocytes % 03/20/2020 3.9     Eosinophils % 03/20/2020 0.5     Basophils % 03/20/2020 1.5     Neutrophils Absolute 03/20/2020 10.7*    Lymphocytes Absolute 03/20/2020 3.0     Monocytes Absolute 03/20/2020 0.6     Eosinophils Absolute 03/20/2020 0.1     Basophils Absolute 03/20/2020 0.2     Sodium 03/20/2020 139     Potassium 03/20/2020 3.5     Chloride 03/20/2020 101     CO2 03/20/2020 21     Anion Gap 03/20/2020 17*    Glucose 03/20/2020 130*    BUN 03/20/2020 12     CREATININE 03/20/2020 0.7     GFR Non- 03/20/2020 >60     GFR  03/20/2020 >60     Calcium 03/20/2020 9.9     Total Protein 03/20/2020 7.2     Albumin 03/20/2020 4.5     Albumin/Globulin Ratio 03/20/2020 1.7     Total Bilirubin 03/20/2020 0.4     Alkaline Phosphatase 03/20/2020 90     ALT 03/20/2020 19     AST 03/20/2020 20     Globulin 03/20/2020 2.7     Color, UA 03/20/2020 Yellow     Clarity, UA 03/20/2020 Clear     Glucose, Ur 03/20/2020 Negative     Bilirubin Urine 03/20/2020 Negative     Ketones, Urine 03/20/2020 Negative     Specific Gravity, UA 03/20/2020 >=1.030     Blood, Urine 03/20/2020 Negative     pH, UA 03/20/2020 5.0     Protein, UA 03/20/2020 Negative     Urobilinogen, Urine 03/20/2020 0.2     Nitrite, Urine 03/20/2020 Negative     Leukocyte Esterase, Urine 03/20/2020 Negative     Microscopic Examination 03/20/2020 Not Indicated     Urine Type 03/20/2020 NotGiven     Urine Reflex to Culture 03/20/2020 Not Indicated          ASSESSMENT/PLAN  1. Severe COPD without complication  FDC5/IQB= 88/97%, FEV1 1.2 L, with 110 mL / 10% FEV1 augmentation with bronchodilatorPFT 2014 Dulera, Umeclidinium versus Spiriva, Daliresp DuoNeb. Nocturnal O2.   Chest CT 2017 pair 4 mm left lung nodules, unchanged CT 2019, recommended for 1 year follow-up (May 2020). Smokes a few cigarettes a week currently. Wearing oxygen when her exertional hypoxia drops into the low 80s, during the day. Has been through pulmonary rehab. Did not feel the Anoro helped her, felt better response with Dulera. Little rescue inhaler use. Though historically bicarb has been reassuring, would like to check venous blood gas to determine extent of hypercapnia, as this will inform target oxygen saturation levels. Patient has home oximeter at home oxygen. No recent albuterol/Combivent rescue inhaler use. 2. Pruritus  Wonder if this is related to her chronic narcotic use. - diphenhydrAMINE (BANOPHEN) 50 MG capsule; TAKE ONE CAPSULE BY MOUTH EVERY NIGHT AT BEDTIME AS NEEDED FOR ITCHING  Dispense: 30 capsule; Refill: 4    3. Acquired hypothyroidism  Update TSH. - levothyroxine (EUTHYROX) 125 MCG tablet;    4. Slow transit constipation  Uses Linzess daily, lactulose 2 or 3 times a week, but also likes to take docusate a couple times a day, feels it helps. Chronic narcotic use. We will have her alternate Linzess and lactulose prior to starting her extended colon prep. - docusate sodium (STOOL SOFTENER) 100 MG capsule; TAKE ONE CAPSULE BY MOUTH DAILY AS NEEDED FOR CONSTIPATION  Dispense: 90 capsule; Refill: 3    5. Vitamin B 12 deficiency  Self injects monthly at home. Update level. - cyanocobalamin 1000 MCG/ML injection; INJECT 1 ML  ONCE EVERY MONTH  Dispense: 1 mL; Refill: 5  - Vitamin B12; Future    6. Mixed hyperlipidemia  Excellent control. Normal LFTs however elevated CK for which she held her rosuvastatin under our care a couple weeks ago. May need bempedoic acid. No new myalgias or weakness, as best I can make out. -CK    8. Breast lipoma  6-month follow-up surveillance must include targeted ultrasound to look at the breast mass thought to be lipoma though not biopsied. No evidence of interval regression.   Repeat in 6 months. 9. Abdominal Pain  Almost certainly GI based, will hold off on CT and see what an aggressive colon prep does for her discomfort. Perforation. Suspect paradoxical diarrhea. 10. Impaired fasting glucose  ReAssuring A1c glucose in the low 130s. 11.  Essential hypertension  Encourage home surveillance excellent control on visit today. - Basic Metabolic Panel; Future    13. Vitamin D deficiency  Adequate repletion. Chronic inhaled steroid use. Occasional systemic glucocorticoid use for exacerbations. No bone density scan on record, ordered 2018 but patient chose not to go. Discuss advisability DEXA at face-to-face follow-up appointment. Also patient on vitamin D monotherapy, switched to calcium-vitamin D therapy. -DEXA in future    16. Healthcare maintenance. Discussed Shingrix will check with the pharmacy. Arpita Josué Has wished Mercedes Peter Covid vaccination series. Colonoscopy September 6, 2011 and 2005 both adenoma excision. Pap 2018. PSG mild AVRIL 2019. Tdap Pneumovax 23 influenza up-to-date. Dating screening labs today. .    17.  History of colon adenoma. Colonoscopy September 6, 2011 and 2005 both with adenoma excision. Referred November 2019, but never performed. Overdue since 2016 arguably. Signs of symptoms of active infection. Instruction provided for medications. Dr. Janiya Han is her gastroenterologist.    Return in about 3 months (around 6/25/2021). It was a pleasure to visit with Ms. Roberta Donis today. Answered all questions as best I could.   Kim Logan MD   35 minutes

## 2021-03-24 NOTE — TELEPHONE ENCOUNTER
Rep @ [address] states that we keep sending prescription for mometasone-formoterol (Dulera) 200-5 MG inhaler which is not covered under patient's insurance please remove it; however, the breo inhaler is covered but requires prior authorization

## 2021-03-25 ENCOUNTER — HOSPITAL ENCOUNTER (OUTPATIENT)
Age: 66
Discharge: HOME | End: 2021-03-25
Payer: MEDICARE

## 2021-03-25 ENCOUNTER — OFFICE VISIT (OUTPATIENT)
Dept: PRIMARY CARE CLINIC | Age: 66
End: 2021-03-25
Payer: MEDICARE

## 2021-03-25 VITALS
OXYGEN SATURATION: 97 % | TEMPERATURE: 97.5 F | HEIGHT: 65 IN | WEIGHT: 193 LBS | DIASTOLIC BLOOD PRESSURE: 80 MMHG | SYSTOLIC BLOOD PRESSURE: 110 MMHG | HEART RATE: 89 BPM | BODY MASS INDEX: 32.15 KG/M2

## 2021-03-25 DIAGNOSIS — Z01.818 PRE-OP EVALUATION: ICD-10-CM

## 2021-03-25 DIAGNOSIS — E03.9 ACQUIRED HYPOTHYROIDISM: ICD-10-CM

## 2021-03-25 DIAGNOSIS — E78.2 MIXED HYPERLIPIDEMIA: ICD-10-CM

## 2021-03-25 DIAGNOSIS — D36.9 ADENOMATOUS POLYP: ICD-10-CM

## 2021-03-25 DIAGNOSIS — R53.83 FATIGUE, UNSPECIFIED TYPE: ICD-10-CM

## 2021-03-25 DIAGNOSIS — K59.03 DRUG-INDUCED CONSTIPATION: ICD-10-CM

## 2021-03-25 DIAGNOSIS — R74.8 ELEVATED CK: ICD-10-CM

## 2021-03-25 DIAGNOSIS — J44.9 COPD, SEVERE (HCC): ICD-10-CM

## 2021-03-25 DIAGNOSIS — R74.8 ELEVATED CK: Primary | ICD-10-CM

## 2021-03-25 DIAGNOSIS — K62.89 RECTAL PAIN: ICD-10-CM

## 2021-03-25 LAB
BASE EXCESS VENOUS: 6.5 MMOL/L
C-REACTIVE PROTEIN: <3 MG/L (ref 0–5.1)
CARBOXYHEMOGLOBIN: 2.3 %
CHOLESTEROL, TOTAL: 126 MG/DL (ref 0–199)
HCO3 VENOUS: 33 MMOL/L (ref 23–29)
HCT VFR BLD CALC: 39.2 % (ref 36–48)
HDLC SERPL-MCNC: 44 MG/DL (ref 40–60)
HEMOGLOBIN: 12.8 G/DL (ref 12–16)
LDL CHOLESTEROL CALCULATED: 54 MG/DL
MCH RBC QN AUTO: 28.7 PG (ref 26–34)
MCHC RBC AUTO-ENTMCNC: 32.5 G/DL (ref 31–36)
MCV RBC AUTO: 88.2 FL (ref 80–100)
METHEMOGLOBIN VENOUS: 0.5 %
O2 CONTENT, VEN: 13 ML/DL
O2 SAT, VEN: 73 %
O2 THERAPY: ABNORMAL
PCO2, VEN: 51.3 MMHG (ref 40–50)
PDW BLD-RTO: 12.9 % (ref 12.4–15.4)
PH VENOUS: 7.41 (ref 7.35–7.45)
PLATELET # BLD: 171 K/UL (ref 135–450)
PMV BLD AUTO: 11 FL (ref 5–10.5)
PO2, VEN: 38 MMHG
RBC # BLD: 4.45 M/UL (ref 4–5.2)
TCO2 CALC VENOUS: 34 MMOL/L
TOTAL CK: 188 U/L (ref 26–192)
TRIGL SERPL-MCNC: 138 MG/DL (ref 0–150)
TSH REFLEX: 1.38 UIU/ML (ref 0.27–4.2)
VLDLC SERPL CALC-MCNC: 28 MG/DL
WBC # BLD: 9.2 K/UL (ref 4–11)

## 2021-03-25 PROCEDURE — 3017F COLORECTAL CA SCREEN DOC REV: CPT | Performed by: INTERNAL MEDICINE

## 2021-03-25 PROCEDURE — 1123F ACP DISCUSS/DSCN MKR DOCD: CPT | Performed by: INTERNAL MEDICINE

## 2021-03-25 PROCEDURE — 99214 OFFICE O/P EST MOD 30 MIN: CPT | Performed by: INTERNAL MEDICINE

## 2021-03-25 PROCEDURE — 1036F TOBACCO NON-USER: CPT | Performed by: INTERNAL MEDICINE

## 2021-03-25 PROCEDURE — G8400 PT W/DXA NO RESULTS DOC: HCPCS | Performed by: INTERNAL MEDICINE

## 2021-03-25 PROCEDURE — G8417 CALC BMI ABV UP PARAM F/U: HCPCS | Performed by: INTERNAL MEDICINE

## 2021-03-25 PROCEDURE — 1090F PRES/ABSN URINE INCON ASSESS: CPT | Performed by: INTERNAL MEDICINE

## 2021-03-25 PROCEDURE — G8427 DOCREV CUR MEDS BY ELIG CLIN: HCPCS | Performed by: INTERNAL MEDICINE

## 2021-03-25 PROCEDURE — 82803 BLOOD GASES ANY COMBINATION: CPT

## 2021-03-25 PROCEDURE — 36415 COLL VENOUS BLD VENIPUNCTURE: CPT

## 2021-03-25 PROCEDURE — 4040F PNEUMOC VAC/ADMIN/RCVD: CPT | Performed by: INTERNAL MEDICINE

## 2021-03-25 PROCEDURE — G8484 FLU IMMUNIZE NO ADMIN: HCPCS | Performed by: INTERNAL MEDICINE

## 2021-03-25 NOTE — TELEPHONE ENCOUNTER
I don't see a script in the chart for Ana Paula Garcia. Will need one to do the PA. Please respond to the pool ( [phone]). Not the person sending the telephone encounter. Thank you!

## 2021-03-25 NOTE — PATIENT INSTRUCTIONS
1. Tell your GI dr (Dr Madhuri Hilton) you probably need an extended prep given chronic constipation due to narcotics  2. Week before seeing Dr Madhuri Hilton, do the linzess alternating lactulose for 7 days, to really clean yourself out. .report  To me how it affects your lower abdl pain  3. Blood test today  4. Rtn 3 months  5. Hold HCTZ and potassium supplement on morning of colonoscopy. Take all your inhalers, and usual AM meds with small sip water at least 90 minutes before scheduled report time.

## 2021-03-26 LAB
ANTI-JO1 IGG: <0.2 AI (ref 0–0.9)
ANTI-NUCLEAR ANTIBODY (ANA): NEGATIVE

## 2021-03-29 ENCOUNTER — TELEPHONE (OUTPATIENT)
Dept: PRIMARY CARE CLINIC | Age: 66
End: 2021-03-29

## 2021-03-29 NOTE — TELEPHONE ENCOUNTER
Received: 4 days ago  Message Contents   MD Percy Shaikh MA             Venous blood gas shows only mild carbon dioxide retention from your COPD, allowing you to safely use oxygen supplementation as you

## 2021-03-29 NOTE — TELEPHONE ENCOUNTER
MACI Lopez MA             We think that your CK elevation muscle enzyme was due to your cholesterol medication.  We checked for autoimmune causes for elevated CK, as expected these came back normal/negative

## 2021-03-29 NOTE — TELEPHONE ENCOUNTER
Message  Received: 4 days ago  Message Contents   MD Dixon Cronin MA             Venous blood gas shows only mild carbon dioxide retention from your COPD, allowing you to safely use oxygen supplementation

## 2021-04-01 RX ORDER — FLUTICASONE FUROATE AND VILANTEROL 200; 25 UG/1; UG/1
1 POWDER RESPIRATORY (INHALATION) DAILY
Qty: 3 EACH | Refills: 3 | Status: SHIPPED | OUTPATIENT
Start: 2021-04-01 | End: 2021-06-04

## 2021-04-01 NOTE — TELEPHONE ENCOUNTER
Let patient know I sent prescription for Brio Ellipta 200/25 1 puff daily due to her insurance formulary change, to replace the [facility]

## 2021-04-22 ENCOUNTER — TELEPHONE (OUTPATIENT)
Dept: PRIMARY CARE CLINIC | Age: 66
End: 2021-04-22

## 2021-04-22 NOTE — TELEPHONE ENCOUNTER
Rep states  low dose is correct on promethazine HCI 6.25 mg; verify this with pharmacy call back at [phone]

## 2021-04-23 ENCOUNTER — TELEPHONE (OUTPATIENT)
Dept: PRIMARY CARE CLINIC | Age: 66
End: 2021-04-23

## 2021-04-23 NOTE — TELEPHONE ENCOUNTER
Dosage of Promethazine syrup 1.75 ml is a very small amount usually it is about 20 ml. What do you want the dosage to be?  Call [facility] back [phone]

## 2021-04-26 NOTE — TELEPHONE ENCOUNTER
Pt states that the promethazine PA has  the end of last month. Can you pls complete this process again so that pt can get medication extended as long as possible(pt requested end of the year) ?     Pt dilan: [phone]    [address] pharmacy: [phone]

## 2021-04-27 NOTE — TELEPHONE ENCOUNTER
PA submitted via CM for Promethazine HCl 6.25MG/5ML solution.   Key: [id number] - PA Case ID: [id number]    STATUS: PENDING

## 2021-04-28 NOTE — TELEPHONE ENCOUNTER
Received DENIAL for Promethazine HCl 6.25MG/5ML solution. Denial letter attached. Please advise patient. Thank you.

## 2021-06-03 ENCOUNTER — TELEPHONE (OUTPATIENT)
Dept: PRIMARY CARE CLINIC | Age: 66
End: 2021-06-03

## 2021-06-03 NOTE — TELEPHONE ENCOUNTER
Patient states that her insurance says she needs to have all of her prescriptions transferred to [facility] order pharmacy. Please make this her preferred pharmacy. Keep [facility] as emergency pharmacy. [employer] phone number is [phone]. [phone].

## 2021-06-04 DIAGNOSIS — E03.9 ACQUIRED HYPOTHYROIDISM: ICD-10-CM

## 2021-06-04 DIAGNOSIS — L29.9 PRURITUS: ICD-10-CM

## 2021-06-04 DIAGNOSIS — E78.2 MIXED HYPERLIPIDEMIA: ICD-10-CM

## 2021-06-04 DIAGNOSIS — J44.1 COPD EXACERBATION (HCC): ICD-10-CM

## 2021-06-04 DIAGNOSIS — E53.8 VITAMIN B 12 DEFICIENCY: ICD-10-CM

## 2021-06-04 DIAGNOSIS — K59.01 SLOW TRANSIT CONSTIPATION: Chronic | ICD-10-CM

## 2021-06-04 RX ORDER — LACTULOSE 10 G/15ML
10 SOLUTION ORAL PRN
Qty: 1892 ML | Refills: 1 | Status: SHIPPED | OUTPATIENT
Start: 2021-06-04

## 2021-06-04 RX ORDER — LOSARTAN POTASSIUM 25 MG/1
TABLET ORAL
Qty: 30 TABLET | Refills: 5 | Status: SHIPPED | OUTPATIENT
Start: 2021-06-04 | End: 2021-08-09

## 2021-06-04 RX ORDER — OMEPRAZOLE 20 MG/1
20 CAPSULE, DELAYED RELEASE ORAL
Qty: 60 CAPSULE | Refills: 3 | Status: SHIPPED | OUTPATIENT
Start: 2021-06-04 | End: 2021-07-13 | Stop reason: SDUPTHER

## 2021-06-04 RX ORDER — CALCIUM CITRATE/VITAMIN D3 200MG-6.25
TABLET ORAL
Qty: 100 EACH | Refills: 11 | Status: SHIPPED | OUTPATIENT
Start: 2021-06-04 | End: 2021-09-03

## 2021-06-04 RX ORDER — ROSUVASTATIN CALCIUM 10 MG/1
10 TABLET, COATED ORAL NIGHTLY
Qty: 30 TABLET | Refills: 5 | Status: SHIPPED | OUTPATIENT
Start: 2021-06-04 | End: 2021-06-25

## 2021-06-04 RX ORDER — TIOTROPIUM BROMIDE 18 UG/1
18 CAPSULE ORAL; RESPIRATORY (INHALATION) DAILY
Qty: 90 CAPSULE | Refills: 1 | Status: SHIPPED | OUTPATIENT
Start: 2021-06-04 | End: 2021-09-03

## 2021-06-04 RX ORDER — DIPHENHYDRAMINE HCL 50 MG
CAPSULE ORAL
Qty: 30 CAPSULE | Refills: 4 | Status: SHIPPED | OUTPATIENT
Start: 2021-06-04 | End: 2022-01-19 | Stop reason: SDUPTHER

## 2021-06-04 RX ORDER — VARENICLINE TARTRATE 1 MG/1
1 TABLET, FILM COATED ORAL 2 TIMES DAILY
Qty: 60 TABLET | Refills: 3 | Status: ON HOLD | OUTPATIENT
Start: 2021-06-04 | End: 2022-02-06

## 2021-06-04 RX ORDER — LINACLOTIDE 145 UG/1
145 CAPSULE, GELATIN COATED ORAL PRN
Qty: 90 CAPSULE | Refills: 1 | Status: ON HOLD | OUTPATIENT
Start: 2021-06-04 | End: 2022-02-06

## 2021-06-04 RX ORDER — LEVOTHYROXINE SODIUM 0.12 MG/1
TABLET ORAL
Qty: 30 TABLET | Refills: 3 | Status: SHIPPED | OUTPATIENT
Start: 2021-06-04 | End: 2021-07-13 | Stop reason: SDUPTHER

## 2021-06-04 RX ORDER — CLONIDINE HYDROCHLORIDE 0.1 MG/1
TABLET ORAL
Qty: 60 TABLET | Refills: 5 | Status: SHIPPED | OUTPATIENT
Start: 2021-06-04 | End: 2021-07-13 | Stop reason: SDUPTHER

## 2021-06-04 RX ORDER — LANCETS 30 GAUGE
1 EACH MISCELLANEOUS 2 TIMES DAILY
Qty: 100 EACH | Refills: 5 | Status: SHIPPED | OUTPATIENT
Start: 2021-06-04 | End: 2021-09-03

## 2021-06-04 RX ORDER — SPIRONOLACTONE 25 MG
1 TABLET ORAL 2 TIMES DAILY
Qty: 180 TABLET | Refills: 1 | Status: ON HOLD | OUTPATIENT
Start: 2021-06-04 | End: 2022-02-06

## 2021-06-04 RX ORDER — CYANOCOBALAMIN 1000 UG/ML
INJECTION INTRAMUSCULAR; SUBCUTANEOUS
Qty: 1 ML | Refills: 5 | Status: SHIPPED | OUTPATIENT
Start: 2021-06-04 | End: 2021-09-03

## 2021-06-04 RX ORDER — HYDROCHLOROTHIAZIDE 25 MG/1
TABLET ORAL
Qty: 90 TABLET | Refills: 3 | Status: SHIPPED | OUTPATIENT
Start: 2021-06-04 | End: 2021-07-13 | Stop reason: SDUPTHER

## 2021-06-04 RX ORDER — IPRATROPIUM BROMIDE AND ALBUTEROL SULFATE 2.5; .5 MG/3ML; MG/3ML
1 SOLUTION RESPIRATORY (INHALATION) PRN
Qty: 360 ML | Refills: 11 | Status: SHIPPED | OUTPATIENT
Start: 2021-06-04 | End: 2021-11-17 | Stop reason: SDUPTHER

## 2021-06-04 RX ORDER — DOCUSATE SODIUM 100 MG/1
CAPSULE, LIQUID FILLED ORAL
Qty: 90 CAPSULE | Refills: 3 | Status: ON HOLD | OUTPATIENT
Start: 2021-06-04 | End: 2022-10-11 | Stop reason: SDUPTHER

## 2021-06-04 RX ORDER — POTASSIUM CHLORIDE 750 MG/1
20 TABLET, EXTENDED RELEASE ORAL DAILY
Qty: 60 TABLET | Refills: 3 | Status: SHIPPED | OUTPATIENT
Start: 2021-06-04 | End: 2021-09-03

## 2021-06-04 RX ORDER — QUETIAPINE FUMARATE 100 MG/1
100 TABLET, FILM COATED ORAL NIGHTLY
Qty: 30 TABLET | Refills: 5 | Status: SHIPPED | OUTPATIENT
Start: 2021-06-04 | End: 2021-07-13 | Stop reason: SDUPTHER

## 2021-06-04 NOTE — TELEPHONE ENCOUNTER
Medication:   Requested Prescriptions      No prescriptions requested or ordered in this encounter        Last Filled:      Patient Phone Number: [phone] (home)     Last appt: 3/25/2021   Next appt: 6/25/2021    Last OARRS:   RX Monitoring 1/30/2017   Attestation The Prescription Monitoring Report for this patient was reviewed today.

## 2021-06-09 NOTE — TELEPHONE ENCOUNTER
Medication:   Requested Prescriptions     Pending Prescriptions Disp Refills    Promethazine HCl 6.25 MG/5ML SOLN [Pharmacy Med Name: Promethazine HCl 6.25 MG/5ML Oral Solution] 600 mL 0     Sig: TAKE 5 ML BY MOUTH  EVERY 6 HOURS AS NEEDED FOR NAUSEA        Last Filled:      Patient Phone Number: [phone] (home)     Last appt: 3/25/2021   Next appt: 6/25/2021    Last OARRS:   RX Monitoring 1/30/2017   Attestation The Prescription Monitoring Report for this patient was reviewed today.        Went to wrong pharmacy

## 2021-06-15 ENCOUNTER — TELEPHONE (OUTPATIENT)
Dept: PRIMARY CARE CLINIC | Age: 66
End: 2021-06-15

## 2021-06-15 NOTE — TELEPHONE ENCOUNTER
Rep asked that prescription for lactulose (CHRONULAC) 10 GM/15ML & LINZESS 145 MCG capsule need to know the or clarify the frequency/max per day

## 2021-06-21 ENCOUNTER — TELEPHONE (OUTPATIENT)
Dept: PRIMARY CARE CLINIC | Age: 66
End: 2021-06-21

## 2021-06-21 ENCOUNTER — HOSPITAL ENCOUNTER (OUTPATIENT)
Dept: MRI IMAGING | Age: 66
Discharge: HOME | End: 2021-06-21
Payer: MEDICARE

## 2021-06-21 DIAGNOSIS — M25.512 LEFT SHOULDER PAIN, UNSPECIFIED CHRONICITY: ICD-10-CM

## 2021-06-21 PROCEDURE — 73221 MRI JOINT UPR EXTREM W/O DYE: CPT

## 2021-06-21 NOTE — TELEPHONE ENCOUNTER
MD Shashi Chu MA  Please let patient know that MRI shows a tear in one of her rotator cuff tendons, severe arthritis in her right shoulder, as well as a shoulder joint effusion. Is she currently set up to see an orthopedist?  If not is the one that she would prefer to see? Anel Glasgow will make referral  Called patient to discuss follow up. MRI done today.

## 2021-06-25 ENCOUNTER — OFFICE VISIT (OUTPATIENT)
Dept: PRIMARY CARE CLINIC | Age: 66
End: 2021-06-25
Payer: MEDICARE

## 2021-06-25 VITALS
DIASTOLIC BLOOD PRESSURE: 78 MMHG | BODY MASS INDEX: 30.49 KG/M2 | SYSTOLIC BLOOD PRESSURE: 113 MMHG | WEIGHT: 183 LBS | TEMPERATURE: 97.3 F | HEIGHT: 65 IN | HEART RATE: 88 BPM | OXYGEN SATURATION: 95 %

## 2021-06-25 DIAGNOSIS — Z78.0 MENOPAUSE: ICD-10-CM

## 2021-06-25 DIAGNOSIS — E55.9 VITAMIN D DEFICIENCY: ICD-10-CM

## 2021-06-25 DIAGNOSIS — L29.9 PRURITUS: ICD-10-CM

## 2021-06-25 DIAGNOSIS — R73.01 IFG (IMPAIRED FASTING GLUCOSE): ICD-10-CM

## 2021-06-25 DIAGNOSIS — I10 ESSENTIAL HYPERTENSION: ICD-10-CM

## 2021-06-25 DIAGNOSIS — E53.8 VITAMIN B 12 DEFICIENCY: ICD-10-CM

## 2021-06-25 DIAGNOSIS — E78.2 MIXED HYPERLIPIDEMIA: ICD-10-CM

## 2021-06-25 DIAGNOSIS — D17.1 BREAST LIPOMA: Primary | ICD-10-CM

## 2021-06-25 DIAGNOSIS — N63.24 MASS OF LOWER INNER QUADRANT OF LEFT BREAST: ICD-10-CM

## 2021-06-25 DIAGNOSIS — R74.8 ELEVATED CK: ICD-10-CM

## 2021-06-25 DIAGNOSIS — E03.9 ACQUIRED HYPOTHYROIDISM: ICD-10-CM

## 2021-06-25 DIAGNOSIS — J44.9 COPD, SEVERE (HCC): ICD-10-CM

## 2021-06-25 PROCEDURE — 1123F ACP DISCUSS/DSCN MKR DOCD: CPT | Performed by: INTERNAL MEDICINE

## 2021-06-25 PROCEDURE — G8926 SPIRO NO PERF OR DOC: HCPCS | Performed by: INTERNAL MEDICINE

## 2021-06-25 PROCEDURE — 4040F PNEUMOC VAC/ADMIN/RCVD: CPT | Performed by: INTERNAL MEDICINE

## 2021-06-25 PROCEDURE — 99214 OFFICE O/P EST MOD 30 MIN: CPT | Performed by: INTERNAL MEDICINE

## 2021-06-25 PROCEDURE — 3017F COLORECTAL CA SCREEN DOC REV: CPT | Performed by: INTERNAL MEDICINE

## 2021-06-25 PROCEDURE — 3023F SPIROM DOC REV: CPT | Performed by: INTERNAL MEDICINE

## 2021-06-25 PROCEDURE — 1036F TOBACCO NON-USER: CPT | Performed by: INTERNAL MEDICINE

## 2021-06-25 PROCEDURE — G8427 DOCREV CUR MEDS BY ELIG CLIN: HCPCS | Performed by: INTERNAL MEDICINE

## 2021-06-25 PROCEDURE — G8400 PT W/DXA NO RESULTS DOC: HCPCS | Performed by: INTERNAL MEDICINE

## 2021-06-25 PROCEDURE — 1090F PRES/ABSN URINE INCON ASSESS: CPT | Performed by: INTERNAL MEDICINE

## 2021-06-25 PROCEDURE — G8417 CALC BMI ABV UP PARAM F/U: HCPCS | Performed by: INTERNAL MEDICINE

## 2021-06-25 SDOH — ECONOMIC STABILITY: FOOD INSECURITY: WITHIN THE PAST 12 MONTHS, THE FOOD YOU BOUGHT JUST DIDN'T LAST AND YOU DIDN'T HAVE MONEY TO GET MORE.: NEVER TRUE

## 2021-06-25 SDOH — ECONOMIC STABILITY: FOOD INSECURITY: WITHIN THE PAST 12 MONTHS, YOU WORRIED THAT YOUR FOOD WOULD RUN OUT BEFORE YOU GOT MONEY TO BUY MORE.: NEVER TRUE

## 2021-06-25 ASSESSMENT — SOCIAL DETERMINANTS OF HEALTH (SDOH): HOW HARD IS IT FOR YOU TO PAY FOR THE VERY BASICS LIKE FOOD, HOUSING, MEDICAL CARE, AND HEATING?: NOT HARD AT ALL

## 2021-06-25 NOTE — PROGRESS NOTES
2021    Beltran Yoon (:  1955) is a 77 y.o. female, here for evaluation of the following medical concerns:    No chief complaint on file. HPI  58-year-old -American female with COPD, previous 1 to 2 pack/day nicotine dependence, hypothyroidism hypertension hyperlipidemia mechanical low back pain on chronic narcotic complicated by chronic constipation and chronic nausea, obstructive sleep apnea, insomnia prescribed Seroquel who attends for report visit. Medical history notable for:    1. Severe COPD. FEV1/FVC= 49/66%, FEV1 1.2 L, with 110 mL / 10% FEV1 augmentation with bronchodilatorPFT  Dulera, Umeclidinium versus Spiriva, Daliresp DuoNeb. Nocturnal O2. Chest CT  pair 4 mm left lung nodules, unchanged CT , recommended for 1 year follow-up (May 2020). Smokes a few cigarettes a week currently. Wearing oxygen when her exertional hypoxia drops into the low 80s, during the day. Has been through pulmonary rehab.    2.  Hypertension. Clonidine 0.1 twice daily, HCTZ, losartan 25.    3.  Hyperlipidemia. Crestor x 6 months, prev Lipitor caused myalgia elevated CK. 4.  Chronic low back pain. MRI  L4-L5 DJD with edema, ligamentum flavum hypertrophy, mild to moderate right L4 foraminal narrowing DDD. Percocet 7.5 every 6 hours, Elavil 50 twice daily Seroquel 100 at bedtime. Linzess, laxatives for secondary constipation. Patient tells me she declined back surgery for concern of complications. 5.  Left breast mass 13 mm, lipomatous on mammogram 2019, unchanged on follow-up left breast ultrasound 2020. Recommended for follow-up left breast ultrasound and diagnostic mammogram 2020. .    6.  Health maintenance issues. Colonoscopy 2011 and 2005 both adenoma excision. Pap . PSG mild AVRIL 2019. Tdap Pneumovax 23 influenza up-to-date. O    7. Abdominal pain.   In setting of chronic constipation due to narcotics, Linzess and infrequent lactulose, for the past few months patient has had for couple times a week sharp bilateral pelvic pain radiating to the rectum, followed by diarrhea which incompletely improves the discomfort. Other days of the week she has normal bowel movement without pain. On one occasion she saw blood. She does not know if she has hemorrhoids. No nausea vomiting. She is status post hysterectomy, and denies dysuria fevers. 8. TERENCE colonoscopy. The patient denies recent fevers, shaking chills, drenching sweats. The patient denies new headache, ear or sinus pressure/pain/drainage, sore throat, dental thermal sensitivity, productive cough, abdominal pain, diarrhea, dysuria, new dyspnea, new chest pain, new pleuritic chest pain. The patient also denies new leg swelling, joint warmth/redness, and open skin sores. Patient gets hives with morphine, has tolerated other narcotics. Allergic to buproprion as well. . They are not an easy bleeder. They have not been on chronic cortisone. They have no history of diabetes. They have no personal or family history of venous thromboembolism. There is no family history of malignant hyperthermia. Patient has tolerated general anesthesia for hysterectomy. . Patient has no known history of hepatitis. They have never received a blood transfusion. She has CPAP intolerant mild sleep apnea AHI 6 in 2019, jessica 86%. Uses nocturnal oxygen 2L. Review of Systems   Constitutional: Negative for activity change, appetite change, fatigue and unexpected weight change. HENT: Negative for dental problem, sinus pain, sore throat and trouble swallowing. Eyes: Negative for pain and visual disturbance. Respiratory: Negative for apnea, cough, chest tightness, shortness of breath and wheezing. Cardiovascular: Negative for chest pain and palpitations. Gastrointestinal: Negative for negative for nausea and vomiting positives listed above in HPI.   Endocrine: Negative for cold intolerance, heat intolerance, polydipsia, polyphagia and polyuria. Genitourinary: Negative for difficulty urinating, dysuria, flank pain, frequency, hematuria, pelvic pain, urgency, vaginal bleeding and vaginal discharge. Musculoskeletal: Negative for arthralgias, back froylan, gait problem, joint swelling, myalgias, neck pain and neck stiffness. Skin: Negative for color change and rash. Neurological: Negative for dizziness, tremors, syncope, speech difficulty, weakness, light-headedness and headaches. Hematological: Negative for adenopathy. Does not bruise/bleed easily. Psychiatric/Behavioral: Negative for agitation, behavioral problems, decreased concentration, sleep disturbance and suicidal ideas. The patient is not nervous/anxious and is not hyperactive. Prior to Visit Medications    Medication Sig Taking?  Authorizing Provider   Promethazine HCl 6.25 MG/5ML SOLN TAKE 5 ML BY MOUTH  EVERY 6 HOURS AS NEEDED FOR NAUSEA  Ken Hernandez MD   Tuberculin-Allergy Syringes 28G X 1/2\" 1 ML MISC 1 each by Does not apply route every 30 days  Ken Hernandez MD   blood glucose test strips (TRUE METRIX BLOOD GLUCOSE TEST) strip USE TO TEST BLOOD SUGAR DAILY  Ken Hernandez MD   Calcium Carbonate-Vitamin D (CALCIUM 600/VITAMIN D) 600-400 MG-UNIT CHEW Take 1 tablet by mouth 2 times daily  Ken Hernandez MD   cloNIDine (CATAPRES) 0.1 MG tablet Bid  Ken Hernandez MD   cyanocobalamin 1000 MCG/ML injection INJECT 1 ML  ONCE EVERY MONTH  Ken Hernandez MD   diphenhydrAMINE (BANOPHEN) 50 MG capsule TAKE ONE CAPSULE BY MOUTH EVERY NIGHT AT BEDTIME AS NEEDED FOR ITCHING  Ken Hernandez MD   docusate sodium (STOOL SOFTENER) 100 MG capsule TAKE ONE CAPSULE BY MOUTH DAILY AS NEEDED FOR CONSTIPATION  Ken Hernandez MD   hydroCHLOROthiazide (HYDRODIURIL) 25 MG tablet Take 1 tablet by mouth once daily  Ken Hernandez MD   ipratropium-albuterol (DUONEB) 0.5-2.5 (3) MG/3ML SOLN nebulizer solution Inhale 3 mLs into the lungs as needed for Shortness of Breath  Erika Gunn MD   lactulose (CHRONULAC) 10 GM/15ML solution Take 15 mLs by mouth as needed (constipation)  Erika Gunn MD   Lancets MISC 1 each by Does not apply route 2 times daily  Erika Gunn MD   levothyroxine (EUTHYROX) 125 MCG tablet TAKE 1 TABLET BY MOUTH ONCE DAILY (DISCONTINUE  135MCG)  Erika Gunn MD   LINZESS 145 MCG capsule Take 1 capsule by mouth as needed (constipation)  Erika Gunn MD   losartan (COZAAR) 25 MG tablet Take 1 tablet by mouth once daily  Erika Gunn MD   potassium chloride (KLOR-CON M) 10 MEQ extended release tablet Take 2 tablets by mouth daily for 5 days  Erika Gunn MD   omeprazole (PRILOSEC) 20 MG delayed release capsule Take 1 capsule by mouth every morning (before breakfast) As needed for heartburn  Erika Gunn MD   mometasone-formoterol (DULERA) 200-5 MCG/ACT inhaler Inhale 2 puffs into the lungs every 12 hours  Erika Gunn MD   QUEtiapine (SEROQUEL) 100 MG tablet Take 1 tablet by mouth nightly  Erika Gunn MD   Roflumilast (DALIRESP) 500 MCG tablet Take 1 tablet by mouth once daily  Erika Gunn MD   rosuvastatin (CRESTOR) 10 MG tablet Take 1 tablet by mouth nightly  Erika Gunn MD   varenicline (CHANTIX) 1 MG tablet Take 1 tablet by mouth 2 times daily  Erika Gunn MD   tiotropium ([place] Leeanne) 18 MCG inhalation capsule Inhale 1 capsule into the lungs daily This replaces incruse elpt  Erika Gunn MD   potassium chloride (KLOR-CON) 10 MEQ extended release tablet Take 20 mEq by mouth daily  Historical Provider, MD   NARCAN 4 MG/0.1ML LIQD nasal spray Take by mouth as needed  Historical Provider, MD   chlorhexidine (PERIDEX) 0.12 % solution Take 1 mL by mouth daily  Historical Provider, MD   Nebulizers (AIRIAL COMPACT MINI NEBULIZER) MISC 1 each by Does not apply route 4 times daily  Shaun Astorga MD   tiZANidine (ZANAFLEX) 4 MG tablet Take 4 mg by mouth every 6 hours as needed Historical Provider, MD   prednisoLONE acetate (PRED FORTE) 1 % ophthalmic suspension Place 1 drop into the left eye three times daily  Historical Provider, MD   ketorolac (ACULAR) 0.5 % ophthalmic solution Place 1 drop into the left eye 3 times daily  Historical Provider, MD   oxyCODONE-acetaminophen (PERCOCET) 7.5-325 MG per tablet Take 1 tablet by mouth every 6 hours as needed for Pain.   Historical Provider, MD   Blood Glucose Monitoring Suppl (TRUE METRIX METER) w/Device KIT 1 kit by Does not apply route daily  Shoaib Rice MD   Blood Glucose Monitoring Suppl (TRUE METRIX METER) CRYSTAL 1 kit by Does not apply route daily  Shoaib Rice MD        Allergies   Allergen Reactions    Bupropion Other (See Comments)     Muscle spasms/seizure like symptoms     Morphine Hives and Itching    Morphine And Related Itching       Past Medical History:   Diagnosis Date    [place] (acute kidney injury) ([facility] [address] 10/7/2013    Anxiety     Bronchitis     Chronic abdominal pain     possibly due to diverticulosos    Chronic back pain     COPD (chronic obstructive pulmonary disease) ([facility] [address]     DDD (degenerative disc disease), lumbar 12/1/2016    Depression     Elevated glycated hemoglobin     History of normal mammogram 8/26/13    [facility]    Hx of migraine headaches     Hyperlipidemia     Hypertension     Hypothyroidism     Menopause ovarian failure     Rotator cuff tear     right       Past Surgical History:   Procedure Laterality Date    CARPAL TUNNEL RELEASE      CENTRAL LINE  10/7/2013         CHOLECYSTECTOMY      COLONOSCOPY  9/6/2011    Tubular adenoma    COLONOSCOPY  10/24/2005    Dr. Kiarra Mohr - Tubular adenoma    ENDOSCOPY, COLON, DIAGNOSTIC      ESOPHAGEAL MOTILITY STUDY  6/17/2013    NORMAL    EYE SURGERY      cataracts    FINE NEEDLE ASPIRATION  8/10/2012    THYROID - NEGATIVE    FINGER NAIL SURGERY Bilateral 5/21/2019    TOTAL AVULSION OF 1-5 TOENAILS BILATERAL FEET performed by Lyndsey Dolan DPM at [address] AND [facility]  8/15/2002    Endometriosis, fibroids    TONSILLECTOMY      UPPER GASTROINTESTINAL ENDOSCOPY  10/17/2005    Bx small bowel, Gastric, GE junction all negative    UPPER GASTROINTESTINAL ENDOSCOPY  2000    Dr. Joseph Orantes - NORMAL       Social History     Socioeconomic History    Marital status: Single     Spouse name: Not on file    Number of children: Not on file    Years of education: Not on file    Highest education level: Not on file   Occupational History    Not on file   Tobacco Use    Smoking status: Former Smoker     Packs/day: 0.25     Years: 32.00     Pack years: 8.00     Types: Cigarettes     Quit date: 1/10/2017     Years since quittin.4    Smokeless tobacco: Never Used    Tobacco comment: 30 years    Vaping Use    Vaping Use: Never used   Substance and Sexual Activity    Alcohol use: No    Drug use: No    Sexual activity: Not on file   Other Topics Concern    Not on file   Social History Narrative    Not on file     Social Determinants of Health     Financial Resource Strain:     Difficulty of Paying Living Expenses:    Food Insecurity:     Worried About Running Out of Food in the Last Year:     Ran Out of Food in the Last Year:    Transportation Needs:     Lack of Transportation (Medical):      Lack of Transportation (Non-Medical):    Physical Activity:     Days of Exercise per Week:     Minutes of Exercise per Session:    Stress:     Feeling of Stress :    Social Connections:     Frequency of Communication with Friends and Family:     Frequency of Social Gatherings with Friends and Family:     Attends Worship Services:     Active Member of Clubs or Organizations:     Attends Club or Organization Meetings:     Marital Status:    Intimate Partner Violence:     Fear of Current or Ex-Partner:     Emotionally Abused:     Physically Abused:     Deferred  EXTREMITIES:  Warm and well perfused without clubbing cyanosis or edema. 2+ pulses in all 4 extremities. Capillary refill less than 2 seconds. NEURO:  Cranial nerves 2-12 grossly intact. Normal muscle bulk and tone. No resting tremor, cogwheeling, normal rapid alternating movements in the hands and feet. No stocking paresthesia. Normal gait and station. Hyperreflexic. MUSCULOSKELETAL: Moderate osteoarthritic changes especially shoulder. SKIN:  No worrisome lesions, skin a little dry. PSYCH:  No psychomotor retardation or agitation. Good eye contact. Unrestricted affect range. Mood congruent with affect. Linear thought.     [id number] [facility] Outpatient Visit on 03/25/2021   Component Date Value    pH, [place] 03/25/2021 7.410     pCO2, [place] 03/25/2021 51.3*    pO2, [place] 03/25/2021 38     HCO3, Venous 03/25/2021 33*    Base Excess, [place] 03/25/2021 6.5     O2 Sat, [place] 03/25/2021 73     Carboxyhemoglobin 03/25/2021 2.3     MetHgb, [place] 03/25/2021 0.5     TC02 (Calc), [place] 03/25/2021 34     O2 Content, [place] 03/25/2021 13     O2 Therapy 03/25/2021 Unknown    Orders Only on 03/25/2021   Component Date Value    TSH 03/25/2021 1.38     DOMENICA 03/25/2021 Negative     Anti-JO1 IgG 03/25/2021 <0.2     Cholesterol, Total 03/25/2021 126     Triglycerides 03/25/2021 138     HDL 03/25/2021 44     LDL Calculated 03/25/2021 54     VLDL Cholesterol Calcula* 03/25/2021 28    Office Visit on 03/25/2021   Component Date Value    Total CK 03/25/2021 188     WBC 03/25/2021 9.2     RBC 03/25/2021 4.45     Hemoglobin 03/25/2021 12.8     Hematocrit 03/25/2021 39.2     MCV 03/25/2021 88.2     MCH 03/25/2021 28.7     MCHC 03/25/2021 32.5     RDW 03/25/2021 12.9     Platelets 69/67/9365 171     MPV 03/25/2021 11.0*    CRP 03/25/2021 <3.0    Orders Only on 02/22/2021   Component Date Value    Total CK 02/22/2021 315*    Vit D, 25-Hydroxy 02/22/2021 46.1     Sodium 02/22/2021 143     Potassium 02/22/2021 4.1     Chloride 02/22/2021 102     CO2 02/22/2021 28     Anion Gap 02/22/2021 13     Glucose 02/22/2021 136*    BUN 02/22/2021 10     CREATININE 02/22/2021 1.0     GFR Non- 02/22/2021 56*    GFR  02/22/2021 >60     Calcium 02/22/2021 10.1     ALT 02/22/2021 28     AST 02/22/2021 32     Cholesterol, Total 02/22/2021 119     Triglycerides 02/22/2021 108     HDL 02/22/2021 54     LDL Calculated 02/22/2021 43     VLDL Cholesterol Calcula* 02/22/2021 22     Vitamin B-12 02/22/2021 >2000*    Hemoglobin A1C 02/22/2021 5.5     eAG 02/22/2021 111.2          ASSESSMENT/PLAN  1. Severe COPD without complication  KZO7/DDC= 68/35%, FEV1 1.2 L, with 110 mL / 10% FEV1 augmentation with bronchodilatorPFT 2014 Dulera, Umeclidinium versus Spiriva, Daliresp DuoNeb. Nocturnal O2. Chest CT 2017 pair 4 mm left lung nodules, unchanged CT 2019, recommended for 1 year follow-up (May 2020). Finally quit smoking this year! Used to wear oxygen when her exertional hypoxia drops into the low 80s, during the day. Today with walk and deep knee bends, sufficient to elevate resting heart from 92->110, oxygen sat 95% dropped only briefly to 91% and quickly recorded. Has been through pulmonary rehab. Did not feel the Anoro helped her, felt better response with Dulera. Little rescue inhaler use. Though historically bicarb has been reassuring, venous blood gas CO2 just 51. No recent albuterol/Combivent rescue inhaler use. 2. Pruritus  Wonder if this is related to her chronic narcotic use. No daytime sedation. Hydroxyzine another treatment option.  - diphenhydrAMINE (BANOPHEN) 50 MG capsule; TAKE ONE CAPSULE BY MOUTH EVERY NIGHT AT BEDTIME AS NEEDED FOR ITCHING  Dispense: 30 capsule; Refill: 4    3. Acquired hypothyroidism  Up to date TSH, 3/2021, recheck 3/2022  - levothyroxine (EUTHYROX) 125 MCG tablet;    4.  Slow transit constipation  Uses Linzess 2x/week, lactulose excision. Pap 2018. PSG mild AVRIL 2019. Tdap Pneumovax 23 influenza up-to-date. Up to date with TSH A1c lipid panel mammogram (left breast lipoma). 17.  History of colon adenoma. Colonoscopy  2021 2011 and 2005 both with adenoma excision. Hyperplastic 2021. Dr. Sharona Tucker gastroenterologist.    18.  Left shoulder rotator cuff arthropathy. MRI showed left full-thickness anterior supraspinatus tendon tear with some acromioclavicular DJD. Seeing Pain Med Dr Krunal Healy. Dr Enedina Maki. No follow-ups on file. It was a pleasure to visit with Ms. Jim Molina today. Answered all questions as best I could.   Colin Manning MD   35 minutes

## 2021-06-25 NOTE — PATIENT INSTRUCTIONS
1. Prob need to see Shoulder Orthopedist  2. STOP b12 injections, will check b12 w blood test in future  3. Breast mmgm 9/2021  4. Once a week fasting AM glucose checks, if rises above 140, please let me know  5.  Fasting blood 2 months, see me after

## 2021-07-08 RX ORDER — PROMETHAZINE HYDROCHLORIDE 6.25 MG/5ML
SYRUP ORAL
Qty: 118 ML | Refills: 5 | Status: SHIPPED | OUTPATIENT
Start: 2021-07-08 | End: 2021-08-25

## 2021-07-13 DIAGNOSIS — E03.9 ACQUIRED HYPOTHYROIDISM: ICD-10-CM

## 2021-07-13 DIAGNOSIS — J44.1 COPD EXACERBATION (HCC): ICD-10-CM

## 2021-07-13 DIAGNOSIS — J45.40 MODERATE PERSISTENT ASTHMA WITHOUT COMPLICATION: ICD-10-CM

## 2021-07-14 RX ORDER — OMEPRAZOLE 20 MG/1
20 CAPSULE, DELAYED RELEASE ORAL
Qty: 90 CAPSULE | Refills: 1 | Status: SHIPPED | OUTPATIENT
Start: 2021-07-14 | End: 2022-01-17

## 2021-07-14 RX ORDER — HYDROCHLOROTHIAZIDE 25 MG/1
TABLET ORAL
Qty: 90 TABLET | Refills: 1 | Status: SHIPPED | OUTPATIENT
Start: 2021-07-14 | End: 2022-09-20 | Stop reason: SDUPTHER

## 2021-07-14 RX ORDER — ONDANSETRON 4 MG/1
4 TABLET, ORALLY DISINTEGRATING ORAL EVERY 8 HOURS PRN
Qty: 15 TABLET | Refills: 1 | Status: SHIPPED | OUTPATIENT
Start: 2021-07-14 | End: 2022-09-20 | Stop reason: SDUPTHER

## 2021-07-14 RX ORDER — LEVOTHYROXINE SODIUM 0.12 MG/1
TABLET ORAL
Qty: 90 TABLET | Refills: 1 | Status: SHIPPED | OUTPATIENT
Start: 2021-07-14 | End: 2021-08-30

## 2021-07-14 RX ORDER — CLONIDINE HYDROCHLORIDE 0.1 MG/1
TABLET ORAL
Qty: 180 TABLET | Refills: 1 | Status: SHIPPED | OUTPATIENT
Start: 2021-07-14 | End: 2022-09-20 | Stop reason: SDUPTHER

## 2021-07-14 RX ORDER — IBUPROFEN 800 MG/1
800 TABLET ORAL EVERY 8 HOURS PRN
Qty: 270 TABLET | Refills: 1 | Status: SHIPPED | OUTPATIENT
Start: 2021-07-14 | End: 2021-12-16

## 2021-07-14 RX ORDER — QUETIAPINE FUMARATE 100 MG/1
100 TABLET, FILM COATED ORAL NIGHTLY
Qty: 90 TABLET | Refills: 1 | Status: SHIPPED | OUTPATIENT
Start: 2021-07-14 | End: 2021-09-01 | Stop reason: SDUPTHER

## 2021-07-30 ENCOUNTER — HOSPITAL ENCOUNTER (OUTPATIENT)
Dept: GENERAL RADIOLOGY | Age: 66
Discharge: HOME | End: 2021-07-30
Payer: MEDICARE

## 2021-07-30 DIAGNOSIS — Z78.0 MENOPAUSE: ICD-10-CM

## 2021-07-30 PROCEDURE — 77080 DXA BONE DENSITY AXIAL: CPT

## 2021-08-09 RX ORDER — LOSARTAN POTASSIUM 25 MG/1
TABLET ORAL
Qty: 90 TABLET | Refills: 0 | Status: SHIPPED | OUTPATIENT
Start: 2021-08-09 | End: 2021-11-10

## 2021-08-09 NOTE — TELEPHONE ENCOUNTER
Medication:   Requested Prescriptions     Pending Prescriptions Disp Refills    losartan (COZAAR) 25 MG tablet [Pharmacy Med Name: Losartan Potassium 25 MG Oral Tablet] 90 tablet 0     Sig: Take 1 tablet by mouth once daily        Last Filled:      Patient Phone Number: [phone] (home)     Last appt: 6/25/2021   Next appt: 9/3/2021    Last OARRS:   RX Monitoring 1/30/2017   Attestation The Prescription Monitoring Report for this patient was reviewed today.

## 2021-08-25 NOTE — TELEPHONE ENCOUNTER
Medication:   Requested Prescriptions     Pending Prescriptions Disp Refills    Promethazine HCl 6.25 MG/5ML SOLN [Pharmacy Med Name: Promethazine HCl 6.25 MG/5ML Oral Solution] 600 mL 0     Sig: TAKE 5ML BY MOUTH  EVERY 6 HOURS AS NEEDED FOR NAUSEA        Last Filled:      Patient Phone Number: [phone] (home)     Last appt: 6/25/2021   Next appt: 9/3/2021    Last OARRS:   RX Monitoring 1/30/2017   Attestation The Prescription Monitoring Report for this patient was reviewed today.

## 2021-08-28 DIAGNOSIS — E03.9 ACQUIRED HYPOTHYROIDISM: ICD-10-CM

## 2021-08-30 RX ORDER — LEVOTHYROXINE SODIUM 0.12 MG/1
TABLET ORAL
Qty: 90 TABLET | Refills: 1 | Status: SHIPPED | OUTPATIENT
Start: 2021-08-30 | End: 2022-03-25

## 2021-08-31 NOTE — TELEPHONE ENCOUNTER
Medication:   Requested Prescriptions     Pending Prescriptions Disp Refills    QUEtiapine (SEROQUEL) 100 MG tablet 90 tablet 1     Sig: Take 1 tablet by mouth nightly        Last Filled:      Patient Phone Number: [phone] (home)     Last appt: 6/25/2021   Next appt: 9/3/2021    Last OARRS:   RX Monitoring 1/30/2017   Attestation The Prescription Monitoring Report for this patient was reviewed today.

## 2021-09-01 RX ORDER — QUETIAPINE FUMARATE 100 MG/1
100 TABLET, FILM COATED ORAL NIGHTLY
Qty: 90 TABLET | Refills: 1 | Status: SHIPPED | OUTPATIENT
Start: 2021-09-01 | End: 2022-01-03

## 2021-09-03 ENCOUNTER — TELEPHONE (OUTPATIENT)
Dept: PRIMARY CARE CLINIC | Age: 66
End: 2021-09-03

## 2021-09-03 ENCOUNTER — OFFICE VISIT (OUTPATIENT)
Dept: PRIMARY CARE CLINIC | Age: 66
End: 2021-09-03
Payer: MEDICARE

## 2021-09-03 VITALS
DIASTOLIC BLOOD PRESSURE: 78 MMHG | BODY MASS INDEX: 31.82 KG/M2 | TEMPERATURE: 97.8 F | WEIGHT: 191.2 LBS | HEART RATE: 81 BPM | SYSTOLIC BLOOD PRESSURE: 126 MMHG | OXYGEN SATURATION: 96 %

## 2021-09-03 DIAGNOSIS — G47.00 INSOMNIA, UNSPECIFIED TYPE: ICD-10-CM

## 2021-09-03 DIAGNOSIS — E78.2 MIXED HYPERLIPIDEMIA: Primary | ICD-10-CM

## 2021-09-03 DIAGNOSIS — E53.8 B12 DEFICIENCY: ICD-10-CM

## 2021-09-03 PROCEDURE — 4040F PNEUMOC VAC/ADMIN/RCVD: CPT | Performed by: INTERNAL MEDICINE

## 2021-09-03 PROCEDURE — 3017F COLORECTAL CA SCREEN DOC REV: CPT | Performed by: INTERNAL MEDICINE

## 2021-09-03 PROCEDURE — 1036F TOBACCO NON-USER: CPT | Performed by: INTERNAL MEDICINE

## 2021-09-03 PROCEDURE — G8428 CUR MEDS NOT DOCUMENT: HCPCS | Performed by: INTERNAL MEDICINE

## 2021-09-03 PROCEDURE — 1123F ACP DISCUSS/DSCN MKR DOCD: CPT | Performed by: INTERNAL MEDICINE

## 2021-09-03 PROCEDURE — 99214 OFFICE O/P EST MOD 30 MIN: CPT | Performed by: INTERNAL MEDICINE

## 2021-09-03 PROCEDURE — G8399 PT W/DXA RESULTS DOCUMENT: HCPCS | Performed by: INTERNAL MEDICINE

## 2021-09-03 PROCEDURE — G8417 CALC BMI ABV UP PARAM F/U: HCPCS | Performed by: INTERNAL MEDICINE

## 2021-09-03 PROCEDURE — 1090F PRES/ABSN URINE INCON ASSESS: CPT | Performed by: INTERNAL MEDICINE

## 2021-09-03 RX ORDER — AMITRIPTYLINE HYDROCHLORIDE 25 MG/1
1 TABLET, FILM COATED ORAL 2 TIMES DAILY PRN
Status: ON HOLD | COMMUNITY
Start: 2021-08-23 | End: 2022-02-06

## 2021-09-03 RX ORDER — SUVOREXANT 5 MG/1
5 TABLET, FILM COATED ORAL NIGHTLY PRN
Qty: 30 TABLET | Refills: 1 | Status: SHIPPED | OUTPATIENT
Start: 2021-09-03 | End: 2021-12-01

## 2021-09-03 RX ORDER — ALPRAZOLAM 1 MG/1
1 TABLET ORAL DAILY PRN
COMMUNITY

## 2021-09-03 NOTE — PROGRESS NOTES
9/3/2021    Nicki Diego (:  1955) is a 77 y.o. female, here for evaluation of the following medical concerns:    Chief Complaint   Patient presents with    Hypertension     discuss labs    COPD       HPI  61-year-old -American female with COPD, previous 1 to 2 pack/day nicotine dependence, hypothyroidism hypertension hyperlipidemia mechanical low back pain on chronic narcotic complicated by chronic constipation and chronic nausea, obstructive sleep apnea, insomnia prescribed Seroquel without benefit who attends for report visit. Her shoulder and back are doing pretty well, she denies muscle weakness or pain, already got her flu shot this season. She is not sure that her inhalers are helping anymore. She continues to remain abstinent of nicotine. Review of Systems   Constitutional: Negative for activity change, appetite change, fatigue and unexpected weight change. HENT: Negative for dental problem, sinus pain, sore throat and trouble swallowing. Eyes: Negative for pain and visual disturbance. Respiratory: Negative for apnea, cough, chest tightness, shortness of breath and wheezing. Cardiovascular: Negative for chest pain and palpitations. Gastrointestinal: Negative for negative for nausea and vomiting positives listed above in HPI. Endocrine: Negative for cold intolerance, heat intolerance, polydipsia, polyphagia and polyuria. Genitourinary: Negative for difficulty urinating, dysuria, flank pain, frequency, hematuria, pelvic pain, urgency, vaginal bleeding and vaginal discharge. Musculoskeletal: Negative for arthralgias, back froylan, gait problem, joint swelling, myalgias, neck pain and neck stiffness. Skin: Negative for color change and rash. Neurological: Negative for dizziness, tremors, syncope, speech difficulty, weakness, light-headedness and headaches. Hematological: Negative for adenopathy. Does not bruise/bleed easily.    Psychiatric/Behavioral: Negative for agitation, behavioral problems, decreased concentration, sleep disturbance and suicidal ideas. The patient is not nervous/anxious and is not hyperactive. Prior to Visit Medications    Medication Sig Taking? Authorizing Provider   ALPRAZolam Tomeka Mason) 1 MG tablet Take 1 mg by mouth 2 times daily as needed. Yes Historical Provider, MD   amitriptyline (ELAVIL) 25 MG tablet 1 tablet by Orogastric route 2 times daily as needed Yes Historical Provider, MD   Bempedoic Acid-Ezetimibe 180-10 MG TABS Take 1 tablet by mouth nightly Yes Bill Valero MD   Suvorexant (BELSOMRA) 5 MG TABS Take 5 mg by mouth nightly as needed (insomnia) for up to 30 days.  Yes Bill Valero MD   Budeson-Glycopyrrol-Formoterol 160-9-4.8 MCG/ACT AERO Inhale 2 puffs into the lungs 2 times daily Yes Bill Valero MD   QUEtiapine (SEROQUEL) 100 MG tablet Take 1 tablet by mouth nightly Yes Bill Valero MD   levothyroxine (EUTHYROX) 125 MCG tablet TAKE 1 TABLET EVERY DAY (DISCONTINUE 135MCG) Yes Bill Valero MD   Promethazine HCl 6.25 MG/5ML SOLN TAKE 5ML BY MOUTH  EVERY 6 HOURS AS NEEDED FOR NAUSEA Yes Bill Valero MD   losartan (COZAAR) 25 MG tablet Take 1 tablet by mouth once daily Yes Bill Valero MD   omeprazole (PRILOSEC) 20 MG delayed release capsule Take 1 capsule by mouth every morning (before breakfast) As needed for heartburn Yes Bill Valero MD   ondansetron (ZOFRAN ODT) 4 MG disintegrating tablet Take 1 tablet by mouth every 8 hours as needed for Nausea or Vomiting Yes Bill Valero MD   ibuprofen (ADVIL;MOTRIN) 800 MG tablet Take 1 tablet by mouth every 8 hours as needed for Pain Yes Bill Valero MD   cloNIDine (CATAPRES) 0.1 MG tablet Bid Yes Bill Valero MD   albuterol-ipratropium (COMBIVENT RESPIMAT)  MCG/ACT AERS inhaler INHALE 1 PUFF BY MOUTH EVERY SIX HOURS AS NEEDED FOR WHEEZING Yes Bill Valeor MD   Roflumilast (DALIRESP) 500 MCG tablet Take 1 tablet by mouth once daily Yes Bill Valero, MD   hydroCHLOROthiazide (HYDRODIURIL) 25 MG tablet Take 1 tablet by mouth once daily Yes Karan Stein MD   Calcium Carbonate-Vitamin D (CALCIUM 600/VITAMIN D) 600-400 MG-UNIT CHEW Take 1 tablet by mouth 2 times daily Yes Karan Stein MD   diphenhydrAMINE (BANOPHEN) 50 MG capsule TAKE ONE CAPSULE BY MOUTH EVERY NIGHT AT BEDTIME AS NEEDED FOR ITCHING Yes Karan Stein MD   docusate sodium (STOOL SOFTENER) 100 MG capsule TAKE ONE CAPSULE BY MOUTH DAILY AS NEEDED FOR CONSTIPATION Yes Karan Stein MD   ipratropium-albuterol (DUONEB) 0.5-2.5 (3) MG/3ML SOLN nebulizer solution Inhale 3 mLs into the lungs as needed for Shortness of Breath Yes Karan Stein MD   lactulose (CHRONULAC) 10 GM/15ML solution Take 15 mLs by mouth as needed (constipation) Yes Karan Stein MD   LINZESS 145 MCG capsule Take 1 capsule by mouth as needed (constipation) Yes Karan Stein MD   mometasone-formoterol (DULERA) 200-5 MCG/ACT inhaler Inhale 2 puffs into the lungs every 12 hours Yes Karan Stein MD   varenicline (CHANTIX) 1 MG tablet Take 1 tablet by mouth 2 times daily Yes Karan Stein MD   tiotropium (Gladys Barney) 18 MCG inhalation capsule Inhale 1 capsule into the lungs daily This replaces incruse elpt Yes Karan Stein MD   potassium chloride (KLOR-CON) 10 MEQ extended release tablet Take 20 mEq by mouth daily Yes Historical Provider, MD   NARCAN 4 MG/0.1ML LIQD nasal spray Take by mouth as needed Yes Historical Provider, MD   chlorhexidine (PERIDEX) 0.12 % solution Take 1 mL by mouth daily Yes Historical Provider, MD   tiZANidine (ZANAFLEX) 4 MG tablet Take 4 mg by mouth every 6 hours as needed Yes Historical Provider, MD   prednisoLONE acetate (PRED FORTE) 1 % ophthalmic suspension Place 1 drop into the left eye three times daily Yes Historical Provider, MD   ketorolac (ACULAR) 0.5 % ophthalmic solution Place 1 drop into the left eye 3 times daily Yes Historical Provider, MD   oxyCODONE-acetaminophen (PERCOCET) 7.5-325 MG per tablet Take 1 tablet by mouth every 6 hours as needed for Pain.  Yes Historical Provider, MD        Allergies   Allergen Reactions    Bupropion Other (See Comments)     Muscle spasms/seizure like symptoms     Morphine Hives and Itching    Morphine And Related Itching       Past Medical History:   Diagnosis Date    [facility]acute kidney injury) ([facility] [address] 10/7/2013    Anxiety     Bronchitis     Chronic abdominal pain     possibly due to diverticulosos    Chronic back pain     [facility]     DDD (degenerative disc disease), lumbar 12/1/2016    Depression     Elevated glycated hemoglobin     [facility] of normal mammogram 8/26/13    [facility]    Hx of migraine headaches     Hyperlipidemia     Hypertension     Hypothyroidism     Menopause ovarian failure     Rotator cuff tear     right       Past Surgical History:   Procedure Laterality Date    CARPAL TUNNEL RELEASE      CENTRAL LINE  10/7/2013         CHOLECYSTECTOMY      COLONOSCOPY  9/6/2011    Tubular adenoma    COLONOSCOPY  10/24/2005    Dr. Sherron Hurtado - Tubular adenoma    ENDOSCOPY, COLON, DIAGNOSTIC      ESOPHAGEAL MOTILITY STUDY  6/17/2013    NORMAL    EYE SURGERY      cataracts    FINE NEEDLE ASPIRATION  8/10/2012    THYROID - NEGATIVE    FINGER NAIL SURGERY Bilateral 5/21/2019    TOTAL AVULSION OF 1-5 TOENAILS BILATERAL FEET performed by Jeffrey Rhodes DPM at [address] [facility]  8/15/2002    Endometriosis, fibroids    TONSILLECTOMY      UPPER GASTROINTESTINAL ENDOSCOPY  10/17/2005    Bx small bowel, Gastric, GE junction all negative    UPPER GASTROINTESTINAL ENDOSCOPY  9/5/2000    Dr. Mara Godinez - NORMAL       Social History     Socioeconomic History    Marital status: Single     Spouse name: Not on file    Number of children: Not on file    Years of education: Not on file    Highest Slow transit constipation  Uses Linzess 2x/week, lactulose qod, discouraged docusate if feels it doesn't help. Chronic narcotic use. 5. Vitamin B 12 deficiency   Over 2000 February 22, 2021; lowest level 523 in 2014, stopped in Spring 2021. Had been injecting for many years. Doing trial off of injections follow-up level document adequacy. Update next 3 mos. 6.  Mixed hyperlipidemia  Suboptimal control. Normal LFTs however elevated CK off rosuvastatin for which she held her rosuvastatin under our care a couple weeks ago. May need bempedoic acid/zetia. No new myalgias or weakness, as best I can make out. Update in 3 months,   -CK  -lipid  -alt    8. Breast lipoma  6-month follow-up surveillance must include targeted ultrasound to look at the breast mass thought to be lipoma though not biopsied. No evidence of interval regression. She will get breast ultrasound this month. 9. Abdominal Pain  Improved at this time     10. Impaired fasting glucose  ReAssuring A1c 5.6%, glucose in the low 130s. 11.  Essential hypertension  Encourage home surveillance excellent control on visit today. - Basic Metabolic Panel; Future 2/2022    13. Vitamin D deficiency  Adequate repletion February 2021. Chronic inhaled steroid use. Occasional systemic glucocorticoid use for exacerbations. Spine -0.3 worst hip -1.0 DEXA July 2021 on calcium vitamin D therapy. 16. Healthcare maintenance. Discussed Shingrix will check with the pharmacy. Completed [address] vaccination series, 2021. Pap 2018. PSG mild AVRIL 2019. Tdap Pneumovax 23 influenza up-to-date. Up to date with TSH A1c lipid panel mammogram (left breast lipoma). 17.  History of colon adenoma. Colonoscopy  2021 2011 and 2005 both with adenoma excision. Hyperplastic 2021. Dr. Antonio Walker gastroenterologist.    18.  Left shoulder rotator cuff arthropathy. MRI showed left full-thickness anterior supraspinatus tendon tear with some acromioclavicular DJD. Seeing Pain Med Dr Elvia Self. Dr Diomedes Serrato. Doing better    19. Mildly elevated CK. Asx, still elevated off statin . Brock Nest -1     20. Insomnia. Ambien, seroquel not a good fit. Trial Belsomra. Return in about 3 months (around 12/3/2021). It was a pleasure to visit with Ms. Corby Godoy today. Answered all questions as best I could.   Josias Fan MD   35 minutes

## 2021-09-03 NOTE — PATIENT INSTRUCTIONS
1. Nexlizet ([email or web address]) rx sent start taking every night. 2. Print coupon for drug bring to pharmacy. Let me know if affordable. 3. Breast US this month (6 month f/u)  4. Breztri 2 puff 2x/day, gargle after  5.  Belsomra low dose for insomnia    Let us know how inhlr and sleeping pill working    Fasting blood test 3 months see me after    Stop Seroquel for [place]

## 2021-09-03 NOTE — TELEPHONE ENCOUNTER
Patient says that she needs PA for Nexlizet and also there is do mgs or dosage listed. Please advise ASAP.

## 2021-09-24 RX ORDER — CALCIUM CARBONATE/VITAMIN D3 600 MG-10
TABLET ORAL
Qty: 180 TABLET | Refills: 0 | Status: SHIPPED | OUTPATIENT
Start: 2021-09-24 | End: 2022-02-08 | Stop reason: SDUPTHER

## 2021-09-24 NOTE — TELEPHONE ENCOUNTER
Medication:   Requested Prescriptions     Pending Prescriptions Disp Refills    Calcium Carb-Cholecalciferol (CALCIUM-VITAMIN D) 600-400 MG-UNIT TABS [Pharmacy Med Name: CALCIUM/D 600-400   TAB] 180 tablet 0     Sig: Take 1 tablet by mouth twice daily        Last Filled:      Patient Phone Number: [phone] (home)     Last appt: 9/3/2021   Next appt: 12/6/2021    Last OARRS:   RX Monitoring 1/30/2017   Attestation The Prescription Monitoring Report for this patient was reviewed today.

## 2021-10-07 ENCOUNTER — HOSPITAL ENCOUNTER (OUTPATIENT)
Dept: MAMMOGRAPHY | Age: 66
Discharge: HOME | End: 2021-10-07
Payer: MEDICARE

## 2021-10-07 VITALS — HEIGHT: 65 IN | BODY MASS INDEX: 31.16 KG/M2 | WEIGHT: 187 LBS

## 2021-10-07 DIAGNOSIS — D17.1 BREAST LIPOMA: ICD-10-CM

## 2021-10-07 DIAGNOSIS — N63.24 MASS OF LOWER INNER QUADRANT OF LEFT BREAST: ICD-10-CM

## 2021-10-07 PROCEDURE — 77065 DX MAMMO INCL CAD UNI: CPT

## 2021-10-29 ENCOUNTER — TELEPHONE (OUTPATIENT)
Dept: PRIMARY CARE CLINIC | Age: 66
End: 2021-10-29

## 2021-11-10 RX ORDER — LOSARTAN POTASSIUM 25 MG/1
TABLET ORAL
Qty: 90 TABLET | Refills: 1 | Status: ON HOLD | OUTPATIENT
Start: 2021-11-10 | End: 2022-02-06

## 2021-11-11 NOTE — TELEPHONE ENCOUNTER
PT called in to follow up with her refill request for this medication. Please send to the [facility] on [address].

## 2021-11-17 ENCOUNTER — OFFICE VISIT (OUTPATIENT)
Dept: PRIMARY CARE CLINIC | Age: 66
End: 2021-11-17
Payer: MEDICARE

## 2021-11-17 VITALS
HEART RATE: 87 BPM | OXYGEN SATURATION: 97 % | WEIGHT: 190 LBS | SYSTOLIC BLOOD PRESSURE: 113 MMHG | HEIGHT: 65 IN | DIASTOLIC BLOOD PRESSURE: 76 MMHG | BODY MASS INDEX: 31.65 KG/M2 | TEMPERATURE: 96.6 F

## 2021-11-17 DIAGNOSIS — M19.011 ARTHROPATHY OF RIGHT SHOULDER: ICD-10-CM

## 2021-11-17 DIAGNOSIS — Z01.818 PRE-OP EVALUATION: Primary | ICD-10-CM

## 2021-11-17 PROCEDURE — 1123F ACP DISCUSS/DSCN MKR DOCD: CPT | Performed by: INTERNAL MEDICINE

## 2021-11-17 PROCEDURE — G8417 CALC BMI ABV UP PARAM F/U: HCPCS | Performed by: INTERNAL MEDICINE

## 2021-11-17 PROCEDURE — 99213 OFFICE O/P EST LOW 20 MIN: CPT | Performed by: INTERNAL MEDICINE

## 2021-11-17 PROCEDURE — 1036F TOBACCO NON-USER: CPT | Performed by: INTERNAL MEDICINE

## 2021-11-17 PROCEDURE — 4040F PNEUMOC VAC/ADMIN/RCVD: CPT | Performed by: INTERNAL MEDICINE

## 2021-11-17 PROCEDURE — 1090F PRES/ABSN URINE INCON ASSESS: CPT | Performed by: INTERNAL MEDICINE

## 2021-11-17 PROCEDURE — G8399 PT W/DXA RESULTS DOCUMENT: HCPCS | Performed by: INTERNAL MEDICINE

## 2021-11-17 PROCEDURE — G8484 FLU IMMUNIZE NO ADMIN: HCPCS | Performed by: INTERNAL MEDICINE

## 2021-11-17 PROCEDURE — 3017F COLORECTAL CA SCREEN DOC REV: CPT | Performed by: INTERNAL MEDICINE

## 2021-11-17 PROCEDURE — G8427 DOCREV CUR MEDS BY ELIG CLIN: HCPCS | Performed by: INTERNAL MEDICINE

## 2021-11-17 RX ORDER — IPRATROPIUM BROMIDE AND ALBUTEROL SULFATE 2.5; .5 MG/3ML; MG/3ML
1 SOLUTION RESPIRATORY (INHALATION) PRN
Qty: 360 ML | Refills: 11 | Status: SHIPPED | OUTPATIENT
Start: 2021-11-17

## 2021-11-17 NOTE — PATIENT INSTRUCTIONS
1. Only AM of surgery take at least hour before report time your inhalers, losartan, clonidine  2 Rx for duoneb sent  3. Hold NSAIDs for 5 days before surgery  4.   Trial Trelegy instead of breztri

## 2021-11-17 NOTE — PROGRESS NOTES
Respiratory: Negative for apnea, cough, chest tightness, shortness of breath and wheezing. Cardiovascular: Negative for chest pain and palpitations. Gastrointestinal: Negative for negative for nausea and vomiting positives listed above in HPI. Endocrine: Negative for cold intolerance, heat intolerance, polydipsia, polyphagia and polyuria. Genitourinary: Negative for difficulty urinating, dysuria, flank pain, frequency, hematuria, pelvic pain, urgency, vaginal bleeding and vaginal discharge. Musculoskeletal: Negative for arthralgias, back froylan, gait problem, joint swelling, myalgias, neck pain and neck stiffness. Skin: Negative for color change and rash. Neurological: Negative for dizziness, tremors, syncope, speech difficulty, weakness, light-headedness and headaches. Hematological: Negative for adenopathy. Does not bruise/bleed easily. Psychiatric/Behavioral: Negative for agitation, behavioral problems, decreased concentration, sleep disturbance and suicidal ideas. The patient is not nervous/anxious and is not hyperactive. Prior to Visit Medications    Medication Sig Taking? Authorizing Provider   Promethazine HCl 6.25 MG/5ML SOLN Take 5ml every 6 hours PRN nausea  Mela Valadez MD   losartan (COZAAR) 25 MG tablet TAKE 1 TABLET EVERY DAY  Mela Valadez MD   Calcium Carb-Cholecalciferol (CALCIUM-VITAMIN D) 600-400 MG-UNIT TABS Take 1 tablet by mouth twice daily  Mela Valadez MD   ALPRAZolam Glendia Reeve) 1 MG tablet Take 1 mg by mouth 2 times daily as needed.   Historical Provider, MD   amitriptyline (ELAVIL) 25 MG tablet 1 tablet by Orogastric route 2 times daily as needed  Historical Provider, MD   Bempedoic Acid-Ezetimibe 180-10 MG TABS Take 1 tablet by mouth nightly  Mela Valadez MD   Budeson-Glycopyrrol-Formoterol 160-9-4.8 MCG/ACT AERO Inhale 2 puffs into the lungs 2 times daily  Mela Valadez MD   QUEtiapine (SEROQUEL) 100 MG tablet Take 1 tablet by mouth nightly  Hank Apple Agustín De La Paz MD   levothyroxine (EUTHYROX) 125 MCG tablet TAKE 1 TABLET EVERY DAY (DISCONTINUE 135MCG)  Julieta Scales MD   omeprazole (PRILOSEC) 20 MG delayed release capsule Take 1 capsule by mouth every morning (before breakfast) As needed for heartburn  Julieta Scales MD   ondansetron (ZOFRAN ODT) 4 MG disintegrating tablet Take 1 tablet by mouth every 8 hours as needed for Nausea or Vomiting  Julieta Scales MD   ibuprofen (ADVIL;MOTRIN) 800 MG tablet Take 1 tablet by mouth every 8 hours as needed for Pain  Julieta Scales MD   cloNIDine (CATAPRES) 0.1 MG tablet Bid  Julieta Scales MD   albuterol-ipratropium (COMBIVENT RESPIMAT)  MCG/ACT AERS inhaler INHALE 1 PUFF BY MOUTH EVERY SIX HOURS AS NEEDED FOR WHEEZING  Julieta Scales MD   Roflumilast (DALIRESP) 500 MCG tablet Take 1 tablet by mouth once daily  Julieta Scales MD   hydroCHLOROthiazide (HYDRODIURIL) 25 MG tablet Take 1 tablet by mouth once daily  Julieta Scales MD   Calcium Carbonate-Vitamin D (CALCIUM 600/VITAMIN D) 600-400 MG-UNIT CHEW Take 1 tablet by mouth 2 times daily  Julieta Scales MD   diphenhydrAMINE (BANOPHEN) 50 MG capsule TAKE ONE CAPSULE BY MOUTH EVERY NIGHT AT BEDTIME AS NEEDED FOR ITCHING  Julieta Scales MD   docusate sodium (STOOL SOFTENER) 100 MG capsule TAKE ONE CAPSULE BY MOUTH DAILY AS NEEDED FOR CONSTIPATION  Julieta Scales MD   ipratropium-albuterol (DUONEB) 0.5-2.5 (3) MG/3ML SOLN nebulizer solution Inhale 3 mLs into the lungs as needed for Shortness of Breath  Julieta Scales MD   lactulose (CHRONULAC) 10 GM/15ML solution Take 15 mLs by mouth as needed (constipation)  Julieta Scales MD   LINZESS 145 MCG capsule Take 1 capsule by mouth as needed (constipation)  Julieta Scales MD   varenicline (CHANTIX) 1 MG tablet Take 1 tablet by mouth 2 times daily  Julieta Scales MD   potassium chloride (KLOR-CON) 10 MEQ extended release tablet Take 20 mEq by mouth daily  Historical Provider, MD   NARCAN 4 MG/0.1ML LIQD nasal spray Take by mouth as needed  Historical Provider, MD   chlorhexidine (PERIDEX) 0.12 % solution Take 1 mL by mouth daily  Historical Provider, MD   tiZANidine (ZANAFLEX) 4 MG tablet Take 4 mg by mouth every 6 hours as needed  Historical Provider, MD   prednisoLONE acetate (PRED FORTE) 1 % ophthalmic suspension Place 1 drop into the left eye three times daily  Historical Provider, MD   ketorolac (ACULAR) 0.5 % ophthalmic solution Place 1 drop into the left eye 3 times daily  Historical Provider, MD   oxyCODONE-acetaminophen (PERCOCET) 7.5-325 MG per tablet Take 1 tablet by mouth every 6 hours as needed for Pain.   Historical Provider, MD        Allergies   Allergen Reactions    Bupropion Other (See Comments)     Muscle spasms/seizure like symptoms     Morphine Hives and Itching    Morphine And Related Itching       Past Medical History:   Diagnosis Date    [place] (acute kidney injury) ([facility] [address] 10/7/2013    Anxiety     Bronchitis     Chronic abdominal pain     possibly due to diverticulosos    Chronic back pain     COPD (chronic obstructive pulmonary disease) ([facility] [address]     DDD (degenerative disc disease), lumbar 12/1/2016    Depression     Elevated glycated hemoglobin     History of normal mammogram 8/26/13    [facility]    Hx of migraine headaches     Hyperlipidemia     Hypertension     Hypothyroidism     Menopause ovarian failure     Rotator cuff tear     right       Past Surgical History:   Procedure Laterality Date    CARPAL TUNNEL RELEASE      CENTRAL LINE  10/7/2013         CHOLECYSTECTOMY      COLONOSCOPY  9/6/2011    Tubular adenoma    COLONOSCOPY  10/24/2005    Dr. Shashi Alcantara - Tubular adenoma    ENDOSCOPY, COLON, DIAGNOSTIC      ESOPHAGEAL MOTILITY STUDY  6/17/2013    NORMAL    EYE SURGERY      cataracts    FINE NEEDLE ASPIRATION  8/10/2012    THYROID - NEGATIVE    FINGER NAIL SURGERY Bilateral 5/21/2019    TOTAL AVULSION OF 1-5 TOENAILS BILATERAL FEET performed by Nathalia Durán CLIF Smith at [facility]      HYSTERECTOMY      MIA AND BSO  8/15/2002    Endometriosis, fibroids    TONSILLECTOMY      UPPER GASTROINTESTINAL ENDOSCOPY  10/17/2005    Bx small bowel, Gastric, GE junction all negative    UPPER GASTROINTESTINAL ENDOSCOPY  2000    Dr. Julita Smith - NORMAL       Social History     Socioeconomic History    Marital status: Single     Spouse name: Not on file    Number of children: Not on file    Years of education: Not on file    Highest education level: Not on file   Occupational History    Not on file   Tobacco Use    Smoking status: Former Smoker     Packs/day: 0.25     Years: 32.00     Pack years: 8.00     Types: Cigarettes     Quit date: 1/10/2017     Years since quittin.8    Smokeless tobacco: Never Used    Tobacco comment: 30 years    Vaping Use    Vaping Use: Never used   Substance and Sexual Activity    Alcohol use: No    Drug use: No    Sexual activity: Not on file   Other Topics Concern    Not on file   Social History Narrative    Not on file     Social Determinants of Health     Financial Resource Strain: Low Risk     Difficulty of Paying Living Expenses: Not hard at all   Food Insecurity: No Food Insecurity    Worried About [facility] in the Last Year: Never true    [facility] in the Last Year: Never true   Transportation Needs:     Lack of Transportation (Medical): Not on file    Lack of Transportation (Non-Medical):  Not on file   Physical Activity:     Days of Exercise per Week: Not on file    Minutes of Exercise per Session: Not on file   Stress:     Feeling of Stress : Not on file   Social Connections:     Frequency of Communication with Friends and Family: Not on file    Frequency of Social Gatherings with Friends and Family: Not on file    Attends Denominational Services: Not on file    Active Member of Clubs or Organizations: Not on file    Attends Club or Organization Meetings: Not on file    Marital Status: Not on file   Intimate Partner Violence:     Fear of Current or Ex-Partner: Not on file    Emotionally Abused: Not on file    Physically Abused: Not on file    Sexually Abused: Not on file   Housing Stability:     Unable to Pay for Housing in the Last Year: Not on file    Number of [place] in the Last Year: Not on file    Unstable Housing in the Last Year: Not on file        Family History   Problem Relation Age of Onset    Diabetes Sister     Heart Disease Sister         Alphonza Begin Cancer Sister 76        colon cancer  metastatic    Diabetes Sister     Kidney Disease Sister     Cancer Sister         brain cancer throat cancer and smoked    Cancer Mother 68        cerival cancer    Osteoarthritis Mother     Ovarian Cancer Mother     Heart Disease Father     High Blood Pressure Father     Heart Disease Brother 40        heart attack    High Blood Pressure Maternal Aunt     Cancer Other 46        liver cancer    Cancer Other 47        lung cancer and smoker, maternal neice    Cancer Other         bladder cancer, first cousin.  Diabetes Sister 46        complication of diabetes       There were no vitals filed for this visit. Estimated body mass index is 31.12 kg/m² as calculated from the following:    Height as of 10/7/21: 5' 5\" (1.651 m). Weight as of 10/7/21: 187 lb (84.8 kg). PHYSICAL EXAM  GENERAL:  Pleasant  female who looks her stated age, awake alert and oriented x3, no acute distress. HEENT:  Normocephalic atraumatic. Pupils equal round reactive light and accommodation, extra ocular muscles are intact. Oropharynx is clear moist without injection or exudate. Tongue and palate move normally. Turbinates appear normal.  Tympanic membranes appear normal.  Mallampati 2. Full dentures. NECK:  Supple nontender. No carotid bruits. Brisk carotid upstrokes, no JVD. No thyromegaly.   Can extend neck to 60 degrees above horizontal plane.  LYMPH:  No supraclavicular cervical axillary or inguinal lymphadenopathy. LUNGS:  Clear to auscultation bilaterally. Poor air entry. No inspiratory crackles or expiratory wheezes. HEART: Distant heart sounds regular rate and rhythm without pathologic murmur rub gallop S3 or S4. ABDOMEN:  Soft, non tender. Obese. No guarding. Diminished bowel sounds. No guarding. No masses. Apple distribution obesity. UROGENITAL:  Deferred  EXTREMITIES:  Warm and well perfused without clubbing cyanosis or edema. 2+ pulses in all 4 extremities. Capillary refill less than 2 seconds. NEURO:  Cranial nerves 2-12 grossly intact. Normal muscle bulk and tone. No resting tremor, cogwheeling, normal rapid alternating movements in the hands and feet. No stocking paresthesia. Normal gait and station. Hyperreflexic. MUSCULOSKELETAL: Moderate osteoarthritic changes especially shoulder. SKIN:  No worrisome lesions, skin a little dry. PSYCH:  No psychomotor retardation or agitation. Good eye contact. Unrestricted affect range. Mood congruent with affect.   Linear thought.     LABS  Lab Review   Orders Only on 09/01/2021   Component Date Value    Hemoglobin A1C 09/01/2021 5.6     eAG 09/01/2021 114.0     Fructosamine 09/01/2021 297*    Total CK 09/01/2021 283*    ALT 09/01/2021 12     AST 09/01/2021 14*    Cholesterol, Total 09/01/2021 183     Triglycerides 09/01/2021 175*    HDL 09/01/2021 51     LDL Calculated 09/01/2021 97     VLDL Cholesterol Calcula* 09/01/2021 [facility] Outpatient Visit on 03/25/2021   Component Date Value    pH, Pola 03/25/2021 7.410     pCO2, [place] 03/25/2021 51.3*    pO2, [place] 03/25/2021 38     HCO3, Venous 03/25/2021 33*    Base Excess, Pola 03/25/2021 6.5     O2 Sat, [place] 03/25/2021 73     Carboxyhemoglobin 03/25/2021 2.3     MetHgb, Pola 03/25/2021 0.5     TC02 (Calc), Pola 03/25/2021 34     O2 Content, Pola 03/25/2021 13     O2 Therapy 03/25/2021 Unknown    Orders Only on 03/25/2021   Component Date Value    TSH 03/25/2021 1.38     DOMENICA 03/25/2021 Negative     Anti-JO1 IgG 03/25/2021 <0.2     Cholesterol, Total 03/25/2021 126     Triglycerides 03/25/2021 138     HDL 03/25/2021 44     LDL Calculated 03/25/2021 54     VLDL Cholesterol Calcula* 03/25/2021 28    Office Visit on 03/25/2021   Component Date Value    Total CK 03/25/2021 188     WBC 03/25/2021 9.2     RBC 03/25/2021 4.45     Hemoglobin 03/25/2021 12.8     Hematocrit 03/25/2021 39.2     MCV 03/25/2021 88.2     MCH 03/25/2021 28.7     MCHC 03/25/2021 32.5     RDW 03/25/2021 12.9     Platelets 87/66/8892 171     MPV 03/25/2021 11.0*    CRP 03/25/2021 <3.0    Orders Only on 02/22/2021   Component Date Value    Total CK 02/22/2021 315*    Vit D, 25-Hydroxy 02/22/2021 46.1     Sodium 02/22/2021 143     Potassium 02/22/2021 4.1     Chloride 02/22/2021 102     CO2 02/22/2021 28     Anion Gap 02/22/2021 13     Glucose 02/22/2021 136*    BUN 02/22/2021 10     CREATININE 02/22/2021 1.0     GFR Non- 02/22/2021 56*    GFR  02/22/2021 >60     Calcium 02/22/2021 10.1     ALT 02/22/2021 28     AST 02/22/2021 32     Cholesterol, Total 02/22/2021 119     Triglycerides 02/22/2021 108     HDL 02/22/2021 54     LDL Calculated 02/22/2021 43     VLDL Cholesterol Calcula* 02/22/2021 22     Vitamin B-12 02/22/2021 >2000*    Hemoglobin A1C 02/22/2021 5.5     eAG 02/22/2021 111.2          ASSESSMENT/PLAN  1. Severe COPD without complication  UIW9/UAR= 27/00%, FEV1 1.2 L, with 110 mL / 10% FEV1 augmentation with bronchodilatorPFT 2014 Dulera, Umeclidinium versus Spiriva, Daliresp DuoNeb. No longer Nocturnal O2. Chest CT 2017 pair 4 mm left lung nodules, unchanged CT 2019, recommended for 1 year follow-up (May 2020). Finally quit smoking this year!  venous blood gas CO2 just 51. No recent albuterol/Combivent rescue inhaler use. She is no longer taking Daliresp.   She would like to try different inhaler, Trelegy sample provided. 2. Pruritus  Wonder if this is related to her chronic narcotic use. No daytime sedation. Hydroxyzine another treatment option. 3. Acquired hypothyroidism  Up to date TSH, 3/2021, recheck 3/2022    4. Slow transit constipation  Uses Linzess 2x/week, lactulose qod, discouraged docusate if feels it doesn't help. Chronic narcotic use. 5. Vitamin B 12 deficiency   Over 2000 February 22, 2021; lowest level 523 in 2014, stopped in Spring 2021. Had been injecting for many years. Doing trial off of injections follow-up level document adequacy. Update next 3 mos. 6.  Mixed hyperlipidemia  Suboptimal control. Normal LFTs however elevated CK off rosuvastatin for which she held her rosuvastatin under our care a couple weeks ago. May need bempedoic acid/zetia. No new myalgias or weakness, as best I can make out. Update in 3 months,   -CK  -lipid  -alt    8. Breast lipoma  6-month follow-up surveillance must include targeted ultrasound to look at the breast mass thought to be lipoma though not biopsied. No evidence of interval regression. She will get breast ultrasound this month. 9.  Preanesthetic medical evaluation. Left rotator cuff arthropathy   No signs or symptoms of active infection as tolerated dosage in the past no evidence of decompensated cardiopulmonary disease. RCRI 0. Directions on dosing medication on day of surgery provided to patient. She appears optimized for the contemplated procedure. Main concerns for perioperative complications are her severe COPD, aggressive pulmonary toilet of course and care with overventilation; chronic narcotic use requiring higher doses of narcotics to achieve sufficient analgesia in the perioperative setting. 10. Impaired fasting glucose  ReAssuring A1c 5.6%, glucose in the low 130s. 11.  Essential hypertension  Encourage home surveillance excellent control on visit today.   - Basic Metabolic Panel; Future 2/2022    13. Vitamin D deficiency  Adequate repletion February 2021. Chronic inhaled steroid use. Occasional systemic glucocorticoid use for exacerbations. Spine -0.3 worst hip -1.0 DEXA July 2021 on calcium vitamin D therapy. 16. Healthcare maintenance. Discussed Shingrix will check with the pharmacy. Completed [address] vaccination series, 2021. Pap 2018. PSG mild AVRIL 2019. Tdap Pneumovax 23 influenza up-to-date. Up to date with TSH A1c lipid panel mammogram (left breast lipoma). 17.  History of colon adenoma. Colonoscopy  2021 2011 and 2005 both with adenoma excision. Hyperplastic 2021. Dr. John Kilpatrick gastroenterologist.    18.  Left shoulder rotator cuff arthropathy. MRI showed left full-thickness anterior supraspinatus tendon tear with some acromioclavicular DJD. Seeing Pain Med Dr Melton Done. Dr Fransisca Gaston. Doing better    19. Mildly elevated CK. Asx, still elevated off statin . Katherine Goodell -1 negative    20. Insomnia. Ambien, seroquel, melatonin not a good fit. Trial Belsomra without benefit either. .    No follow-ups on file. It was a pleasure to visit with Ms. Sam Files today. Answered all questions as best I could.   Angelita Eubanks MD   25minutes

## 2021-11-22 ENCOUNTER — TELEPHONE (OUTPATIENT)
Dept: PRIMARY CARE CLINIC | Age: 66
End: 2021-11-22

## 2021-11-22 NOTE — TELEPHONE ENCOUNTER
Patient is cleared, I made it clear earlier in my note, which can be printed and faxed to [facility] surgical testing

## 2021-11-22 NOTE — TELEPHONE ENCOUNTER
Rep need copy of documentation that patient has been cleared for surgery scheduled for 11/30/21 please faxed to [phone]

## 2021-11-22 NOTE — TELEPHONE ENCOUNTER
----- Message from Delano Mota sent at 11/19/2021  3:24 PM EST -----  Subject: Message to Provider    QUESTIONS  Information for Provider? Pt needs a new nebulizer   ---------------------------------------------------------------------------  --------------  CALL BACK INFO  What is the best way for the office to contact you? OK to leave message on   voicemail  Preferred Call Back Phone Number? [phone]  ---------------------------------------------------------------------------  --------------  SCRIPT ANSWERS  Relationship to Patient?  Self

## 2021-11-23 ENCOUNTER — HOSPITAL ENCOUNTER (OUTPATIENT)
Age: 66
Discharge: HOME | End: 2021-11-23
Payer: MEDICARE

## 2021-11-23 DIAGNOSIS — M19.011 ARTHROPATHY OF RIGHT SHOULDER: ICD-10-CM

## 2021-11-23 DIAGNOSIS — Z01.818 PRE-OP EVALUATION: ICD-10-CM

## 2021-11-23 LAB
EKG ATRIAL RATE: 95 BPM
EKG DIAGNOSIS: NORMAL
EKG P AXIS: 77 DEGREES
EKG P-R INTERVAL: 140 MS
EKG Q-T INTERVAL: 348 MS
EKG QRS DURATION: 88 MS
EKG QTC CALCULATION (BAZETT): 437 MS
EKG R AXIS: -49 DEGREES
EKG T AXIS: 87 DEGREES
EKG VENTRICULAR RATE: 95 BPM

## 2021-11-23 PROCEDURE — 93005 ELECTROCARDIOGRAM TRACING: CPT

## 2021-11-23 PROCEDURE — 93010 ELECTROCARDIOGRAM REPORT: CPT | Performed by: INTERNAL MEDICINE

## 2021-11-29 DIAGNOSIS — G47.00 INSOMNIA, UNSPECIFIED TYPE: ICD-10-CM

## 2021-11-29 RX ORDER — FLUTICASONE FUROATE, UMECLIDINIUM BROMIDE AND VILANTEROL TRIFENATATE 200; 62.5; 25 UG/1; UG/1; UG/1
1 POWDER RESPIRATORY (INHALATION) DAILY
Qty: 2 EACH | Refills: 0 | Status: ON HOLD | COMMUNITY
Start: 2021-11-29 | End: 2022-02-06

## 2021-11-30 NOTE — TELEPHONE ENCOUNTER
Medication:   Requested Prescriptions     Pending Prescriptions Disp Refills    [address] 5 MG TABS [Pharmacy Med Name: Belsomra 5 MG Oral Tablet] 30 tablet 0     Sig: TAKE 1 TABLET BY MOUTH NIGHTLY AS NEEDED(INSOMNIA) FOR UP TO 30 DAYS        Last Filled:      Patient Phone Number: [phone] (home)     Last appt: 11/17/2021   Next appt: Visit date not found    Last OARRS:   RX Monitoring 1/30/2017   Attestation The Prescription Monitoring Report for this patient was reviewed today.

## 2021-12-01 RX ORDER — SUVOREXANT 5 MG/1
TABLET, FILM COATED ORAL
Qty: 30 TABLET | Refills: 1 | Status: SHIPPED | OUTPATIENT
Start: 2021-12-01 | End: 2021-12-31

## 2021-12-16 RX ORDER — IBUPROFEN 800 MG/1
TABLET ORAL
Qty: 270 TABLET | Refills: 1 | Status: SHIPPED | OUTPATIENT
Start: 2021-12-16

## 2021-12-28 ENCOUNTER — TELEPHONE (OUTPATIENT)
Dept: PRIMARY CARE CLINIC | Age: 66
End: 2021-12-28

## 2021-12-29 RX ORDER — CYCLOBENZAPRINE HCL 5 MG
5 TABLET ORAL 3 TIMES DAILY PRN
Qty: 30 TABLET | Refills: 0 | Status: SHIPPED | OUTPATIENT
Start: 2021-12-29 | End: 2022-02-07

## 2021-12-29 RX ORDER — PROMETHAZINE HYDROCHLORIDE 6.25 MG/5ML
6.25 SYRUP ORAL 4 TIMES DAILY PRN
Qty: 600 ML | Refills: 1 | Status: SHIPPED | OUTPATIENT
Start: 2021-12-29 | End: 2022-01-05

## 2022-01-03 RX ORDER — QUETIAPINE FUMARATE 100 MG/1
TABLET, FILM COATED ORAL
Qty: 90 TABLET | Refills: 1 | Status: SHIPPED | OUTPATIENT
Start: 2022-01-03 | End: 2022-06-29 | Stop reason: SDUPTHER

## 2022-01-11 DIAGNOSIS — J44.1 COPD EXACERBATION (HCC): ICD-10-CM

## 2022-01-17 ENCOUNTER — HOSPITAL ENCOUNTER (EMERGENCY)
Age: 67
Discharge: HOME | End: 2022-01-18
Attending: EMERGENCY MEDICINE
Payer: MEDICARE

## 2022-01-17 ENCOUNTER — NURSE TRIAGE (OUTPATIENT)
Dept: OTHER | Facility: CLINIC | Age: 67
End: 2022-01-17

## 2022-01-17 DIAGNOSIS — R06.02 SHORTNESS OF BREATH: ICD-10-CM

## 2022-01-17 DIAGNOSIS — T50.905A ADVERSE EFFECT OF DRUG, INITIAL ENCOUNTER: Primary | ICD-10-CM

## 2022-01-17 DIAGNOSIS — R11.0 NAUSEA: ICD-10-CM

## 2022-01-17 PROCEDURE — 99283 EMERGENCY DEPT VISIT LOW MDM: CPT

## 2022-01-17 RX ORDER — CETIRIZINE HYDROCHLORIDE 10 MG/1
10 TABLET ORAL DAILY
Status: DISCONTINUED | OUTPATIENT
Start: 2022-01-18 | End: 2022-01-18 | Stop reason: HOSPADM

## 2022-01-17 RX ORDER — OMEPRAZOLE 20 MG/1
20 CAPSULE, DELAYED RELEASE ORAL
Qty: 90 CAPSULE | Refills: 1 | Status: ON HOLD | OUTPATIENT
Start: 2022-01-17 | End: 2022-02-06

## 2022-01-17 RX ORDER — FAMOTIDINE 20 MG/1
20 TABLET, FILM COATED ORAL ONCE
Status: COMPLETED | OUTPATIENT
Start: 2022-01-18 | End: 2022-01-18

## 2022-01-17 NOTE — TELEPHONE ENCOUNTER
Nicole please find out which agency is providing her \"comforter. \"  Can she supply a picture of her pink oxygen tubing and tells us roughly how old it is? Can she get new tubing from her supplier?

## 2022-01-17 NOTE — TELEPHONE ENCOUNTER
Received call from Lizett Chadwick at [facility] with Red Flag Complaint. Subjective: Caller states \"her comforter is poisoning her. O2 sats 94-96% she has copd. \"     Current Symptoms: o2 tubing pink from comforter (red dye) , skin is pink, headache    Onset: 1 month ago; gradual    Associated Symptoms: NA    Pain Severity: 0/10; N/A; none    Temperature: none      What has been tried: tylenol    LMP: NA Pregnant: NA    Recommended disposition:to be seen today by pcp or ucc if appt not available, kristina states she has spoken to poison control and they were no help. Care advice provided, patient verbalizes understanding; denies any other questions or concerns; instructed to call back for any new or worsening symptoms. Writer provided warm transfer to Zach Roberts at [facility] for appointment scheduling     Attention Provider: Thank you for allowing me to participate in the care of your patient. The patient was connected to triage in response to information provided to the ECC/PSC. Please do not respond through this encounter as the response is not directed to a shared pool.             Reason for Disposition   Patient wants to be seen    Protocols used: POISONING-ADULT-[place]

## 2022-01-18 ENCOUNTER — APPOINTMENT (OUTPATIENT)
Dept: GENERAL RADIOLOGY | Age: 67
End: 2022-01-18
Payer: MEDICARE

## 2022-01-18 VITALS
SYSTOLIC BLOOD PRESSURE: 124 MMHG | BODY MASS INDEX: 31.99 KG/M2 | DIASTOLIC BLOOD PRESSURE: 74 MMHG | HEART RATE: 92 BPM | RESPIRATION RATE: 18 BRPM | WEIGHT: 192 LBS | OXYGEN SATURATION: 100 % | TEMPERATURE: 98.2 F | HEIGHT: 65 IN

## 2022-01-18 LAB
A/G RATIO: 1.4 (ref 1.1–2.2)
ALBUMIN SERPL-MCNC: 4.5 G/DL (ref 3.4–5)
ALP BLD-CCNC: 94 U/L (ref 40–129)
ALT SERPL-CCNC: 17 U/L (ref 10–40)
ANION GAP SERPL CALCULATED.3IONS-SCNC: 15 MMOL/L (ref 3–16)
AST SERPL-CCNC: 25 U/L (ref 15–37)
BASE EXCESS VENOUS: 0.5 MMOL/L (ref -3–3)
BASOPHILS ABSOLUTE: 0.1 K/UL (ref 0–0.2)
BASOPHILS RELATIVE PERCENT: 1 %
BILIRUB SERPL-MCNC: 0.3 MG/DL (ref 0–1)
BUN BLDV-MCNC: 16 MG/DL (ref 7–20)
CALCIUM SERPL-MCNC: 10.7 MG/DL (ref 8.3–10.6)
CHLORIDE BLD-SCNC: 102 MMOL/L (ref 99–110)
CO2: 24 MMOL/L (ref 21–32)
CREAT SERPL-MCNC: 0.9 MG/DL (ref 0.6–1.2)
EOSINOPHILS ABSOLUTE: 0.2 K/UL (ref 0–0.6)
EOSINOPHILS RELATIVE PERCENT: 1.6 %
GFR AFRICAN AMERICAN: >60
GFR NON-AFRICAN AMERICAN: >60
GLUCOSE BLD-MCNC: 97 MG/DL (ref 70–99)
HCO3 VENOUS: 25.2 MMOL/L (ref 23–29)
HCT VFR BLD CALC: 37.2 % (ref 36–48)
HEMOGLOBIN: 11.8 G/DL (ref 12–16)
LIPASE: 27 U/L (ref 13–60)
LYMPHOCYTES ABSOLUTE: 2.5 K/UL (ref 1–5.1)
LYMPHOCYTES RELATIVE PERCENT: 23.7 %
MCH RBC QN AUTO: 26.8 PG (ref 26–34)
MCHC RBC AUTO-ENTMCNC: 31.8 G/DL (ref 31–36)
MCV RBC AUTO: 84 FL (ref 80–100)
MONOCYTES ABSOLUTE: 1 K/UL (ref 0–1.3)
MONOCYTES RELATIVE PERCENT: 9 %
NEUTROPHILS ABSOLUTE: 6.9 K/UL (ref 1.7–7.7)
NEUTROPHILS RELATIVE PERCENT: 64.7 %
O2 SAT, VEN: 58 %
O2 THERAPY: NORMAL
PCO2, VEN: 40.6 MMHG (ref 40–50)
PDW BLD-RTO: 12.8 % (ref 12.4–15.4)
PH VENOUS: 7.4 (ref 7.35–7.45)
PLATELET # BLD: 305 K/UL (ref 135–450)
PMV BLD AUTO: 9.9 FL (ref 5–10.5)
PO2, VEN: 30.1 MMHG (ref 25–40)
POTASSIUM REFLEX MAGNESIUM: 4.1 MMOL/L (ref 3.5–5.1)
PRO-BNP: 39 PG/ML (ref 0–124)
RBC # BLD: 4.43 M/UL (ref 4–5.2)
SARS-COV-2: NOT DETECTED
SODIUM BLD-SCNC: 141 MMOL/L (ref 136–145)
TCO2 CALC VENOUS: 26 MMOL/L
TOTAL PROTEIN: 7.7 G/DL (ref 6.4–8.2)
TROPONIN: <0.01 NG/ML
WBC # BLD: 10.6 K/UL (ref 4–11)

## 2022-01-18 PROCEDURE — U0005 INFEC AGEN DETEC AMPLI PROBE: HCPCS

## 2022-01-18 PROCEDURE — U0003 INFECTIOUS AGENT DETECTION BY NUCLEIC ACID (DNA OR RNA); SEVERE ACUTE RESPIRATORY SYNDROME CORONAVIRUS 2 (SARS-COV-2) (CORONAVIRUS DISEASE [COVID-19]), AMPLIFIED PROBE TECHNIQUE, MAKING USE OF HIGH THROUGHPUT TECHNOLOGIES AS DESCRIBED BY CMS-2020-01-R: HCPCS

## 2022-01-18 PROCEDURE — 82803 BLOOD GASES ANY COMBINATION: CPT

## 2022-01-18 PROCEDURE — 83880 ASSAY OF NATRIURETIC PEPTIDE: CPT

## 2022-01-18 PROCEDURE — 71046 X-RAY EXAM CHEST 2 VIEWS: CPT

## 2022-01-18 PROCEDURE — 84484 ASSAY OF TROPONIN QUANT: CPT

## 2022-01-18 PROCEDURE — 85025 COMPLETE CBC W/AUTO DIFF WBC: CPT

## 2022-01-18 PROCEDURE — 6370000000 HC RX 637 (ALT 250 FOR IP): Performed by: EMERGENCY MEDICINE

## 2022-01-18 PROCEDURE — 83690 ASSAY OF LIPASE: CPT

## 2022-01-18 PROCEDURE — 80053 COMPREHEN METABOLIC PANEL: CPT

## 2022-01-18 RX ORDER — BENZONATATE 200 MG/1
200 CAPSULE ORAL 3 TIMES DAILY PRN
Qty: 30 CAPSULE | Refills: 0 | Status: SHIPPED | OUTPATIENT
Start: 2022-01-18 | End: 2022-01-25

## 2022-01-18 RX ORDER — NAPROXEN 500 MG/1
500 TABLET ORAL 2 TIMES DAILY WITH MEALS
Qty: 60 TABLET | Refills: 5 | Status: ON HOLD | OUTPATIENT
Start: 2022-01-18 | End: 2022-02-06

## 2022-01-18 RX ORDER — CETIRIZINE HYDROCHLORIDE 10 MG/1
10 TABLET ORAL DAILY
Qty: 30 TABLET | Refills: 0 | Status: ON HOLD | OUTPATIENT
Start: 2022-01-18 | End: 2022-02-06

## 2022-01-18 RX ADMIN — FAMOTIDINE 20 MG: 20 TABLET, FILM COATED ORAL at 01:26

## 2022-01-18 RX ADMIN — CETIRIZINE HYDROCHLORIDE 10 MG: 10 TABLET, FILM COATED ORAL at 01:26

## 2022-01-18 ASSESSMENT — ENCOUNTER SYMPTOMS
TROUBLE SWALLOWING: 0
COUGH: 0
SHORTNESS OF BREATH: 1
ABDOMINAL PAIN: 0
COLOR CHANGE: 0
VOMITING: 0
NAUSEA: 0
PHOTOPHOBIA: 0

## 2022-01-18 NOTE — ED NOTES
Patient prepared for and ready to be discharged. Patient discharged at this time in no acute distress after verbalizing understanding of discharge instructions. Patient left after receiving After Visit Summary instructions.         Chaim Singh RN  01/18/22 0131

## 2022-01-18 NOTE — ED PROVIDER NOTES
[address] [facility]  [facility]      Pt Name: Yan Hartmann  MRN: [id number]  [place] 1955  Date ofevaluation: 1/17/2022  Provider: Gabby Smith MD    CHIEF COMPLAINT       Chief Complaint   Patient presents with    Other     feels she is being poisoined by her comfortor         HISTORY OF PRESENT ILLNESS   (Location/Symptom, Timing/Onset,Context/Setting, Quality, Duration, Modifying Factors, Severity)  Note limiting factors. Yan Hartmann is a 77 y.o. female  who  has a past medical history of CAMDEN (acute kidney injury) ([facility] [address] Anxiety, Bronchitis, Chronic abdominal pain, Chronic back pain, COPD (chronic obstructive pulmonary disease) ([address] [address] DDD (degenerative disc disease), lumbar, Depression, Elevated glycated hemoglobin, History of normal mammogram, Hx of migraine headaches, Hyperlipidemia, Hypertension, Hypothyroidism, Menopause ovarian failure, and Rotator cuff tear. who presents to the emergency department with notable complaints. Patient reports that over the past few days she has noticed progressively worsening shortness of breath, nausea, episodes of lightheadedness, muscle aches and cough. Patient reports that she is feeling she has had to begin using home oxygen. Patient does acknowledge that there is some overlap of her symptoms with possible COVID infection but states that she recently purchased a new comforter, and has noticed that the red dye appears to be bleeding from the comforter. Patient is concerned that she may have toxicity secondary to the right lower diet which is causing her symptoms. Patient reports that she has called her PCP who she states instructed to present to the ED. patient reports that she is also been in touch with poison control, who referred her to the emergency department.   Patient reports that she did notice pink-tinged residue on the nasal cannula she was using and is concerned that the red dye may have been introduced into her lungs. Patient reports that she has done Internet research of her symptoms and that they do fit with red dye poisoning. Patient is concerned that she may have been exposed to red dye 40, which on review is typically used in food products. Patient reports that she has since gotten rid of the bedding and no longer is being exposed, although she reports that she does still notice pink stains on clothing and on her skin when bathing. HPI    NursingNotes were reviewed. REVIEW OF SYSTEMS    (2-9 systems for level 4, 10 or more for level 5)     Review of Systems   Constitutional: Negative for activity change, chills, fatigue and fever. HENT: Negative for congestion, mouth sores and trouble swallowing. Eyes: Negative for photophobia and visual disturbance. Respiratory: Positive for shortness of breath. Negative for cough. Cardiovascular: Negative for chest pain and palpitations. Gastrointestinal: Negative for abdominal pain, nausea and vomiting. Genitourinary: Negative for decreased urine volume, difficulty urinating and frequency. Musculoskeletal: Positive for myalgias. Negative for gait problem and neck pain. Skin: Negative for color change and rash. Neurological: Positive for light-headedness and headaches. Negative for dizziness, weakness and numbness. Psychiatric/Behavioral: Negative for confusion. The patient is not nervous/anxious. All other systems reviewed and are negative. Except as noted above the remainder of the review of systems was reviewed and negative.        PAST MEDICAL HISTORY     Past Medical History:   Diagnosis Date    CAMDEN (acute kidney injury) ([facility] [address] 10/7/2013    Anxiety     Bronchitis     Chronic abdominal pain     possibly due to diverticulosos    Chronic back pain     COPD (chronic obstructive pulmonary disease) (HCC)     DDD (degenerative disc disease), lumbar 12/1/2016    Depression     Elevated glycated hemoglobin     History of normal mammogram 8/26/13    [facility] at [facility]    Hx of migraine headaches     Hyperlipidemia     Hypertension     Hypothyroidism     Menopause ovarian failure     Rotator cuff tear     right         SURGICALHISTORY       Past Surgical History:   Procedure Laterality Date    CARPAL TUNNEL RELEASE      CENTRAL LINE  10/7/2013         CHOLECYSTECTOMY      COLONOSCOPY  9/6/2011    Tubular adenoma    COLONOSCOPY  10/24/2005    Dr. Niurka Moran - Tubular adenoma    ENDOSCOPY, COLON, DIAGNOSTIC      ESOPHAGEAL MOTILITY STUDY  6/17/2013    NORMAL    EYE SURGERY      cataracts    FINE NEEDLE ASPIRATION  8/10/2012    THYROID - NEGATIVE    FINGER NAIL SURGERY Bilateral 5/21/2019    TOTAL AVULSION OF 1-5 TOENAILS BILATERAL FEET performed by Julee Shaikh DPM at [address] AND [facility]  8/15/2002    Endometriosis, fibroids    TONSILLECTOMY      UPPER GASTROINTESTINAL ENDOSCOPY  10/17/2005    Bx small bowel, Gastric, GE junction all negative    UPPER GASTROINTESTINAL ENDOSCOPY  9/5/2000    Dr. Alyssa Gardner - [email or web address]       Discharge Medication List as of 1/18/2022  1:11 AM      CONTINUE these medications which have NOT CHANGED    Details   omeprazole (PRILOSEC) 20 MG delayed release capsule TAKE 1 CAPSULE BY MOUTH EVERY MORNING (BEFORE BREAKFAST) AS NEEDED FOR HEARTBURN, Disp-90 capsule, R-1Normal      Roflumilast (DALIRESP) 500 MCG tablet TAKE 1 TABLET EVERY DAY, Disp-90 tablet, R-1Normal      QUEtiapine (SEROQUEL) 100 MG tablet TAKE 1 TABLET EVERY NIGHT, Disp-90 tablet, R-1Normal       MG tablet TAKE 1 TABLET BY MOUTH EVERY 8 HOURS AS NEEDED FOR PAIN, Disp-270 tablet, R-1Normal      Fluticasone-Umeclidin-Vilant (TRELEGY ELLIPTA) 200-62.5-25 MCG/INH AEPB Inhale 1 puff into the lungs daily, Disp-2 each, R-0Sample      Nebulizers (COMPRESSOR/NEBULIZER) MISC 4 TIMES DAILY PRN Starting Wed 11/24/2021, Disp-1 each, R-0, Print      Respiratory Therapy Supplies MISC 4 TIMES DAILY PRN Starting Wed 11/24/2021, Disp-50 each, R-5, PrintAll Nebulizer supplies needed      ipratropium-albuterol (DUONEB) 0.5-2.5 (3) MG/3ML SOLN nebulizer solution Inhale 3 mLs into the lungs as needed for Shortness of Breath, Disp-360 mL, R-11Normal      Promethazine HCl 6.25 MG/5ML SOLN Take 5ml every 6 hours PRN nausea, Disp-600 mL, R-0Normal      losartan (COZAAR) 25 MG tablet TAKE 1 TABLET EVERY DAY, Disp-90 tablet, R-1Normal      Calcium Carb-Cholecalciferol (CALCIUM-VITAMIN D) 600-400 MG-UNIT TABS Take 1 tablet by mouth twice daily, Disp-180 tablet, R-0Normal      ALPRAZolam (XANAX) 1 MG tablet Take 1 mg by mouth 2 times daily as needed. Historical Med      amitriptyline (ELAVIL) 25 MG tablet 1 tablet by Orogastric route 2 times daily as neededHistorical Med      Bempedoic Acid-Ezetimibe 180-10 MG TABS Take 1 tablet by mouth nightly, Disp-90 tablet, R-3Normal      Budeson-Glycopyrrol-Formoterol 160-9-4.8 MCG/ACT AERO Inhale 2 puffs into the lungs 2 times daily, Disp-1 each, R-3Normal      levothyroxine (EUTHYROX) 125 MCG tablet TAKE 1 TABLET EVERY DAY (DISCONTINUE 135MCG), Disp-90 tablet, R-1Normal      ondansetron (ZOFRAN ODT) 4 MG disintegrating tablet Take 1 tablet by mouth every 8 hours as needed for Nausea or Vomiting, Disp-15 tablet, R-1Normal      cloNIDine (CATAPRES) 0.1 MG tablet Bid, Disp-180 tablet, R-1Normal      albuterol-ipratropium (COMBIVENT RESPIMAT)  MCG/ACT AERS inhaler INHALE 1 PUFF BY MOUTH EVERY SIX HOURS AS NEEDED FOR WHEEZING, Disp-3 Inhaler, R-1Normal      hydroCHLOROthiazide (HYDRODIURIL) 25 MG tablet Take 1 tablet by mouth once daily, Disp-90 tablet, R-1Normal      Calcium Carbonate-Vitamin D (CALCIUM 600/VITAMIN D) 600-400 MG-UNIT CHEW Take 1 tablet by mouth 2 times daily, Disp-180 tablet, R-1Normal      diphenhydrAMINE (BANOPHEN) 50 MG capsule TAKE ONE CAPSULE BY MOUTH EVERY NIGHT AT BEDTIME AS NEEDED FOR ITCHING, Disp-30 capsule, R-4Normal      docusate sodium (STOOL SOFTENER) 100 MG capsule TAKE ONE CAPSULE BY MOUTH DAILY AS NEEDED FOR CONSTIPATION, Disp-90 capsule, R-3Normal      lactulose (CHRONULAC) 10 GM/15ML solution Take 15 mLs by mouth as needed (constipation), Disp-1892 mL, R-1Normal      LINZESS 145 MCG capsule Take 1 capsule by mouth as needed (constipation), Disp-90 capsule, R-1, DAWNormal      varenicline (CHANTIX) 1 MG tablet Take 1 tablet by mouth 2 times daily, Disp-60 tablet, R-3Normal      potassium chloride (KLOR-CON) 10 MEQ extended release tablet Take 20 mEq by mouth dailyHistorical Med      NARCAN 4 MG/0.1ML LIQD nasal spray Take by mouth as needed, R-0, DAWHistorical Med      chlorhexidine (PERIDEX) 0.12 % solution Take 1 mL by mouth daily, R-3Historical Med      tiZANidine (ZANAFLEX) 4 MG tablet Take 4 mg by mouth every 6 hours as neededHistorical Med      prednisoLONE acetate (PRED FORTE) 1 % ophthalmic suspension Place 1 drop into the left eye three times dailyHistorical Med      ketorolac (ACULAR) 0.5 % ophthalmic solution Place 1 drop into the left eye 3 times dailyHistorical Med      oxyCODONE-acetaminophen (PERCOCET) 7.5-325 MG per tablet Take 1 tablet by mouth every 6 hours as needed for Pain. Historical Med                  Bupropion, Morphine, and Morphine and related    FAMILY HISTORY       Family History   Problem Relation Age of Onset    Diabetes Sister     Heart Disease Sister         chf    Cancer Sister 76        colon cancer  metastatic    Diabetes Sister     Kidney Disease Sister     Cancer Sister         brain cancer throat cancer and smoked    Cancer Mother 68        cerival cancer    Osteoarthritis Mother     Ovarian Cancer Mother     Heart Disease Father     High Blood Pressure Father     Heart Disease Brother 40        heart attack    High Blood Pressure Maternal Aunt     Cancer Other 46        liver cancer    Cancer Other 47        lung cancer and smoker, maternal neice    Cancer Other         bladder cancer, first cousin.  Diabetes Sister 46        complication of diabetes          SOCIAL HISTORY       Social History     Socioeconomic History    Marital status: Single     Spouse name: None    Number of children: None    Years of education: None    Highest education level: None   Occupational History    None   Tobacco Use    Smoking status: Former Smoker     Packs/day: 0.25     Years: 32.00     Pack years: 8.00     Types: Cigarettes     Quit date: 1/10/2017     Years since quittin.0    Smokeless tobacco: Never Used    Tobacco comment: 30 years    Vaping Use    Vaping Use: Never used   Substance and Sexual Activity    Alcohol use: No    Drug use: No    Sexual activity: None   Other Topics Concern    None   Social History Narrative    None     Social Determinants of Health     Financial Resource Strain: Low Risk     Difficulty of Paying Living Expenses: Not hard at all   Food Insecurity: No Food Insecurity    Worried About Running Out of Food in the Last Year: Never true    Tiffani of Food in the Last Year: Never true   Transportation Needs:     Lack of Transportation (Medical): Not on file    Lack of Transportation (Non-Medical):  Not on file   Physical Activity:     Days of Exercise per Week: Not on file    Minutes of Exercise per Session: Not on file   Stress:     Feeling of Stress : Not on file   Social Connections:     Frequency of Communication with Friends and Family: Not on file    Frequency of Social Gatherings with Friends and Family: Not on file    Attends Mandaeism Services: Not on file    Active Member of Clubs or Organizations: Not on file    Attends Club or Organization Meetings: Not on file    Marital Status: Not on file   Intimate Partner Violence:     Fear of Current or Ex-Partner: Not on file    Emotionally Abused: Not on file    Physically Abused: Not on file    Sexually Abused: Not on file   Housing Stability:     Unable to Pay for Housing in the Last Year: Not on file    Number of Places Lived in the Last Year: Not on file    Unstable Housing in the Last Year: Not on file       SCREENINGS             PHYSICAL EXAM    (up to 7 for level 4, 8 or more for level 5)     ED Triage Vitals   BP Temp Temp Source Pulse Resp SpO2 Height Weight   01/17/22 2350 01/18/22 0130 01/17/22 2350 01/17/22 2350 01/17/22 2350 01/17/22 2350 01/17/22 2350 01/17/22 2350   122/88 98.2 °F (36.8 °C) Oral 115 18 94 % 5' 5\" (1.651 m) 192 lb (87.1 kg)       Physical Exam  Vitals and nursing note reviewed. Constitutional:       Appearance: She is well-developed. HENT:      Head: Normocephalic and atraumatic. Mouth/Throat:      Mouth: Mucous membranes are moist.      Pharynx: Oropharynx is clear. Eyes:      Extraocular Movements: Extraocular movements intact. Conjunctiva/sclera: Conjunctivae normal.      Pupils: Pupils are equal, round, and reactive to light. Neck:      Trachea: No tracheal deviation. Cardiovascular:      Rate and Rhythm: Regular rhythm. Tachycardia present. Heart sounds: Normal heart sounds. Pulmonary:      Effort: Pulmonary effort is normal.      Breath sounds: Normal breath sounds. Abdominal:      General: There is no distension. Palpations: Abdomen is soft. Tenderness: There is no abdominal tenderness. Musculoskeletal:         General: Normal range of motion. Cervical back: Normal range of motion. Skin:     General: Skin is warm and dry. Capillary Refill: Capillary refill takes less than 2 seconds. Neurological:      General: No focal deficit present. Mental Status: She is alert and oriented to person, place, and time. Cranial Nerves: No cranial nerve deficit. Sensory: No sensory deficit. Motor: No weakness.       Gait: Gait normal.         RESULTS     EKG: All EKG's are interpreted by the Emergency Department Physician who either signs or Co-signsthis chart in the absence of a cardiologist.      RADIOLOGY:   Stephen Fothergill such as CT, Ultrasound and MRI are read by the radiologist. Plain radiographic images are visualized and preliminarily interpreted by the emergency physician with the below findings:      Interpretation per the Radiologist below, if available at the time ofthis note:    XR CHEST (2 VW)   Final Result      No acute pulmonary disease. ED BEDSIDE ULTRASOUND:   Performed by ED Physician - none    LABS:  Labs Reviewed   CBC WITH AUTO DIFFERENTIAL - Abnormal; Notable for the following components:       Result Value    Hemoglobin 11.8 (*)     All other components within normal limits    Narrative:     Performed at:  [facility]  [facility] [address],  [place]   Phone [phone]   COMPREHENSIVE METABOLIC PANEL W/ REFLEX TO MG FOR LOW K - Abnormal; Notable for the following components:    Calcium 10.7 (*)     All other components within normal limits    Narrative:     Performed at:  [facility]  [address],  [place]   Phone [phone]   LIPASE    Narrative:     Performed at:  [facility]  [address],  [place]   Phone [phone]   TROPONIN    Narrative:     Performed at:  [facility]  [address],  [place] [address]   Phone [phone] PEPTIDE    Narrative:     Performed at:  [facility]  [address],  [place] [address] [employer]   Phone [phone]   BLOOD GAS, VENOUS    Narrative:     Performed at:  [facility]  [address],  [place] [employer]   Phone [phone]       All other labs were within normal range or not returned as of this dictation.     EMERGENCY DEPARTMENT COURSE and DIFFERENTIAL DIAGNOSIS/MDM:   Vitals:    Vitals:    01/17/22 2350 01/18/22 0130   BP: 122/88 124/74   Pulse: 115 92   Resp: 18    Temp:  98.2 °F (36.8 °C)   TempSrc: Oral    SpO2: 94% 100%   Weight: 192 lb (87.1 kg)    Height: 5' 5\" (1.651 m)        Patient was given thefollowing medications:  Medications   cetirizine (ZYRTEC) tablet 10 mg (10 mg Oral Given 1/18/22 0126)   famotidine (PEPCID) tablet 20 mg (20 mg Oral Given 1/18/22 0126)       ED COURSE & MEDICAL DECISION MAKING    Pertinent Labs & Imaging studies reviewed. (See chart for details)   -  Patient seen and evaluated in the emergency department. -  Triage and nursing notes reviewed and incorporated. -  Old chart records reviewed and incorporated. -  Differential diagnosis includes: Differential Diagnosis: Acute Coronary Syndrome, Congestive Heart Failure, Myocardial Infarction, Pulmonary Embolus, Pneumonia, Pneumothorax, other    -  Work-up included:  See above  -  ED treatment included: See above  -  Results discussed with patient. Patient presents the ED with multiple complaints and concerns for possible toxicity from a diet and her bedding. Reports has been in contact with poison control. On presentation patient noted to be tachycardic. She is satting well on room air. She has normal respiratory effort. Labs show no emergent laboratory normalities. Patient's ABG without signs of hypercapnia. Patient's renal function within normal limits. Hemoglobin within normal limits. imaging studies show no acute cardiopulmonary disease. Patient's vital signs normalized without intervention. She was provided with antihistamines should she be having a reaction of sorts. Patient does report that she is removed her cell from the exposure. Patient encouraged to follow-up with poison control recommendations. There are no emergent laboratory or findings and vital signs within normal limits and I believe the patient is safe for discharge home.   Patient feels improved on reevaluation. Symptomatic treatment with expectant management discussed with the patient and they and/or family members present are amenable to treatment plan and outpatient follow-up. Strict return precautions were discussed with the patient and those present. They demonstrated understanding of when to return to the emergency department for new or worsening symptoms. .  The patient is agreeable with plan of care and disposition. REASSESSMENT          CRITICAL CARE TIME   Total Critical Care time was 0 minutes, excluding separately reportable procedures. There was a high probability of clinically significant/life threatening deterioration in the patient's condition which required my urgent intervention. CONSULTS:  None    PROCEDURES:  Unless otherwise noted below, none     Procedures    FINAL IMPRESSION      1. Adverse effect of drug, initial encounter    2. Nausea    3.  Shortness of breath          DISPOSITION/PLAN   DISPOSITION Decision To Discharge 01/18/2022 12:58:37 AM      PATIENT REFERREDTO:  Cinthya Jolly MD  [address] [phone]    Schedule an appointment as soon as possible for a visit   As needed      DISCHARGEMEDICATIONS:  Discharge Medication List as of 1/18/2022  1:11 AM      START taking these medications    Details   cetirizine (ZYRTEC) 10 MG tablet Take 1 tablet by mouth daily, Disp-30 tablet, R-0Print      benzonatate (TESSALON) 200 MG capsule Take 1 capsule by mouth 3 times daily as needed for Cough, Disp-30 capsule, R-0Print      naproxen (NAPROSYN) 500 MG tablet Take 1 tablet by mouth 2 times daily (with meals), Disp-60 tablet, R-5Print                (Please note that portions of this note were completed with a voice recognition program.  Efforts were made to edit the dictations but occasionally words are mis-transcribed.)    Aldona Boast, MD (electronically signed)  Attending Emergency Physician          Aldona Boast, MD  01/18/22 [id number]

## 2022-01-19 ENCOUNTER — OFFICE VISIT (OUTPATIENT)
Dept: PRIMARY CARE CLINIC | Age: 67
End: 2022-01-19
Payer: MEDICARE

## 2022-01-19 VITALS
SYSTOLIC BLOOD PRESSURE: 108 MMHG | WEIGHT: 188 LBS | OXYGEN SATURATION: 93 % | TEMPERATURE: 96.7 F | DIASTOLIC BLOOD PRESSURE: 75 MMHG | BODY MASS INDEX: 31.32 KG/M2 | HEART RATE: 102 BPM | HEIGHT: 65 IN

## 2022-01-19 DIAGNOSIS — E03.9 HYPOTHYROIDISM, UNSPECIFIED TYPE: Primary | ICD-10-CM

## 2022-01-19 DIAGNOSIS — L29.9 PRURITUS: ICD-10-CM

## 2022-01-19 DIAGNOSIS — J44.9 CHRONIC OBSTRUCTIVE PULMONARY DISEASE, UNSPECIFIED COPD TYPE (HCC): ICD-10-CM

## 2022-01-19 DIAGNOSIS — E03.9 HYPOTHYROIDISM, UNSPECIFIED TYPE: ICD-10-CM

## 2022-01-19 PROCEDURE — 1090F PRES/ABSN URINE INCON ASSESS: CPT | Performed by: INTERNAL MEDICINE

## 2022-01-19 PROCEDURE — 1123F ACP DISCUSS/DSCN MKR DOCD: CPT | Performed by: INTERNAL MEDICINE

## 2022-01-19 PROCEDURE — 4040F PNEUMOC VAC/ADMIN/RCVD: CPT | Performed by: INTERNAL MEDICINE

## 2022-01-19 PROCEDURE — 3017F COLORECTAL CA SCREEN DOC REV: CPT | Performed by: INTERNAL MEDICINE

## 2022-01-19 PROCEDURE — G8484 FLU IMMUNIZE NO ADMIN: HCPCS | Performed by: INTERNAL MEDICINE

## 2022-01-19 PROCEDURE — G8427 DOCREV CUR MEDS BY ELIG CLIN: HCPCS | Performed by: INTERNAL MEDICINE

## 2022-01-19 PROCEDURE — G8399 PT W/DXA RESULTS DOCUMENT: HCPCS | Performed by: INTERNAL MEDICINE

## 2022-01-19 PROCEDURE — 1036F TOBACCO NON-USER: CPT | Performed by: INTERNAL MEDICINE

## 2022-01-19 PROCEDURE — 3023F SPIROM DOC REV: CPT | Performed by: INTERNAL MEDICINE

## 2022-01-19 PROCEDURE — 99213 OFFICE O/P EST LOW 20 MIN: CPT | Performed by: INTERNAL MEDICINE

## 2022-01-19 PROCEDURE — G8417 CALC BMI ABV UP PARAM F/U: HCPCS | Performed by: INTERNAL MEDICINE

## 2022-01-19 RX ORDER — DIPHENHYDRAMINE HCL 50 MG
CAPSULE ORAL
Qty: 30 CAPSULE | Refills: 4 | Status: SHIPPED | OUTPATIENT
Start: 2022-01-19 | End: 2022-09-26

## 2022-01-19 RX ORDER — PREDNISONE 20 MG/1
20 TABLET ORAL DAILY
Qty: 10 TABLET | Refills: 0 | Status: SHIPPED | OUTPATIENT
Start: 2022-01-19 | End: 2022-01-27

## 2022-01-19 NOTE — PROGRESS NOTES
2022    Misha Ramachandran (:  1955) is a 77 y.o. female, here for evaluation of the following medical concerns:    Chief Complaint   Patient presents with    Other     follow up home O2 device    Other     poison       HPI  70-year-old -American female with COPD using supplemental oxygen intermittently at night, previous 1 to 2 pack/day nicotine dependence, hypothyroidism hypertension hyperlipidemia mechanical low back pain on chronic narcotic complicated by chronic constipation and chronic nausea, obstructive sleep apnea, insomnia prescribed Seroquel without benefit who attends for ED follow up. She reported installing a new bedspread and pillowcases, both bright red, before the holidays, and shows me a picture of the bedspread with what appears to be a large water stain, a well demarcated light patch surrounded by darker red. She brings in her oxygen cannula hoses which are stained pink. She reports feeling more dyspneic than usual.    She has removed the comforter and pillowcases and got new oxygen tubing. She evidently researched the comforter, and indicates that it contains red dye #40    She denies any liquids spills. We received a message that she thought her comforter was \"poisoning her\" on , though she was maintaining oxygen saturations in the low to mid 90s. Over the past year oxygen saturations on room air have run 95 to 98%, pulse  on clinic visits. She went to the ED yesterday, pulse 115, oxygen sat 94%; today here in the clinic oxygen saturation 92% pulse 105 increasing with ambulation on room air of about 75 feet to 90 to 94% pulse 111. Weight stable. Evaluation in the ED was notable for unremarkable chest x-ray, negative COVID swab normal BNP troponin CMP lipase and CBC. Carbon monoxide level was not checked. Review of Systems   Constitutional: Negative for activity change, appetite change, fatigue and unexpected weight change.    HENT: Negative for dental problem, sinus pain, sore throat and trouble swallowing. Eyes: Negative for pain and visual disturbance. Respiratory: Negative for apnea, cough, chest tightness,  and wheezing. Cardiovascular: Negative for chest pain and palpitations. Gastrointestinal: Negative for negative for nausea and vomiting positives listed above in HPI. Endocrine: Negative for cold intolerance, heat intolerance, polydipsia, polyphagia and polyuria. Genitourinary: Negative for difficulty urinating, dysuria, flank pain, frequency, hematuria, pelvic pain, urgency, vaginal bleeding and vaginal discharge. Musculoskeletal: Negative for arthralgias, back froylan, gait problem, joint swelling, myalgias, neck pain and neck stiffness. Skin: Negative for color change and rash. Neurological: Negative for dizziness, tremors, syncope, speech difficulty, weakness, light-headedness and headaches. Hematological: Negative for adenopathy. Does not bruise/bleed easily. Psychiatric/Behavioral: Negative for agitation, behavioral problems, decreased concentration, sleep disturbance and suicidal ideas. The patient is not nervous/anxious and is not hyperactive. Prior to Visit Medications    Medication Sig Taking?  Authorizing Provider   diphenhydrAMINE (BANOPHEN) 50 MG capsule TAKE ONE CAPSULE BY MOUTH EVERY NIGHT AT BEDTIME AS NEEDED FOR ITCHING Yes Verena Andersen MD   Promethazine HCl 6.25 MG/5ML SOLN Take 5ml every 6 hours PRN nausea Yes Verena Andersen MD   predniSONE (DELTASONE) 20 MG tablet Take 1 tablet by mouth daily for 10 days Yes Verena Andersen MD   Roflumilast (DALIRESP) 500 MCG tablet TAKE 1 TABLET EVERY DAY Yes Verena Andersen MD   QUEtiapine (SEROQUEL) 100 MG tablet TAKE 1 TABLET EVERY NIGHT Yes Verena Andersen MD    MG tablet TAKE 1 TABLET BY MOUTH EVERY 8 HOURS AS NEEDED FOR PAIN Yes Vernea Andersen MD   Nebulizers (COMPRESSOR/NEBULIZER) MISC 1 Units by Does not apply route 4 times daily as needed (Wheezing SOB) Yes Melvina Rodriguez MD   Respiratory Therapy Supplies MISC 1 each by Does not apply route 4 times daily as needed (wheezing SOB) All Nebulizer supplies needed Yes Melvina Rodriguez MD   ipratropium-albuterol (DUONEB) 0.5-2.5 (3) MG/3ML SOLN nebulizer solution Inhale 3 mLs into the lungs as needed for Shortness of Breath Yes Melvina Rodriguez MD   losartan (COZAAR) 25 MG tablet TAKE 1 TABLET EVERY DAY Yes Melvina Rodriguez MD   Calcium Carb-Cholecalciferol (CALCIUM-VITAMIN D) 600-400 MG-UNIT TABS Take 1 tablet by mouth twice daily Yes Melvina Rodriguez MD   ALPRAZolam Clifm Combe) 1 MG tablet Take 1 mg by mouth 2 times daily as needed. Yes Historical Provider, MD   amitriptyline (ELAVIL) 25 MG tablet 1 tablet by Orogastric route 2 times daily as needed Yes Historical Provider, MD   levothyroxine (EUTHYROX) 125 MCG tablet TAKE 1 TABLET EVERY DAY (DISCONTINUE 135MCG) Yes Melvina Rodriguez MD   cloNIDine (CATAPRES) 0.1 MG tablet Bid Yes Melvina Rodriguez MD   albuterol-ipratropium (COMBIVENT RESPIMAT)  MCG/ACT AERS inhaler INHALE 1 PUFF BY MOUTH EVERY SIX HOURS AS NEEDED FOR WHEEZING Yes Melvina Rodriguez MD   hydroCHLOROthiazide (HYDRODIURIL) 25 MG tablet Take 1 tablet by mouth once daily Yes Melvina Rodriguez MD   Calcium Carbonate-Vitamin D (CALCIUM 600/VITAMIN D) 600-400 MG-UNIT CHEW Take 1 tablet by mouth 2 times daily Yes Melvina Rodriguez MD   docusate sodium (STOOL SOFTENER) 100 MG capsule TAKE ONE CAPSULE BY MOUTH DAILY AS NEEDED FOR CONSTIPATION Yes Melvina Rodriguez MD   potassium chloride (KLOR-CON) 10 MEQ extended release tablet Take 20 mEq by mouth daily Yes Historical Provider, MD   tiZANidine (ZANAFLEX) 4 MG tablet Take 4 mg by mouth every 6 hours as needed Yes Historical Provider, MD   oxyCODONE-acetaminophen (PERCOCET) 7.5-325 MG per tablet Take 1 tablet by mouth every 6 hours as needed for Pain.  Yes Historical Provider, MD   cetirizine (ZYRTEC) 10 MG tablet Take 1 tablet by mouth daily  Patient not taking: Reported on 1/19/2022  Leigh Camacho MD   benzonatate (TESSALON) 200 MG capsule Take 1 capsule by mouth 3 times daily as needed for Cough  Patient not taking: Reported on 1/19/2022  Leigh Camacho MD   naproxen (NAPROSYN) 500 MG tablet Take 1 tablet by mouth 2 times daily (with meals)  Patient not taking: Reported on 1/19/2022  Leigh Camacho MD   omeprazole (PRILOSEC) 20 MG delayed release capsule TAKE 1 CAPSULE BY MOUTH EVERY MORNING (BEFORE BREAKFAST) AS NEEDED FOR HEARTBURN  Patient not taking: Reported on 1/19/2022  Regi Pham MD   Fluticasone-Umeclidin-Vilant (TRELEGY ELLIPTA) 200-62.5-25 MCG/INH AEPB Inhale 1 puff into the lungs daily  Patient not taking: Reported on 1/19/2022  Regi Pham MD   Bempedoic Acid-Ezetimibe 180-10 MG TABS Take 1 tablet by mouth nightly  Patient not taking: Reported on 1/19/2022  Regi Pham MD   Budeson-Glycopyrrol-Formoterol 160-9-4.8 MCG/ACT AERO Inhale 2 puffs into the lungs 2 times daily  Patient not taking: Reported on 1/19/2022  Regi Pham MD   ondansetron (ZOFRAN ODT) 4 MG disintegrating tablet Take 1 tablet by mouth every 8 hours as needed for Nausea or Vomiting  Patient not taking: Reported on 1/19/2022  Regi Pham MD   lactulose (CHRONULAC) 10 GM/15ML solution Take 15 mLs by mouth as needed (constipation)  Patient not taking: Reported on 1/19/2022  Regi Pham MD   LINZESS 145 MCG capsule Take 1 capsule by mouth as needed (constipation)  Patient not taking: Reported on 1/19/2022  Regi Pham MD   varenicline (CHANTIX) 1 MG tablet Take 1 tablet by mouth 2 times daily  Patient not taking: Reported on 1/19/2022  Regi Pham MD   [facility] 4 MG/0.1ML LIQD nasal spray Take by mouth as needed  Patient not taking: Reported on 1/19/2022  Historical Provider, MD   chlorhexidine (PERIDEX) 0.12 % solution Take 1 mL by mouth daily  Patient not taking: Reported on 1/19/2022  Historical Provider, MD   prednisoLONE acetate (PRED FORTE) 1 % Social History     Socioeconomic History    Marital status: Single     Spouse name: Not on file    Number of children: Not on file    Years of education: Not on file    Highest education level: Not on file   Occupational History    Not on file   Tobacco Use    Smoking status: Former Smoker     Packs/day: 0.25     Years: 32.00     Pack years: 8.00     Types: Cigarettes     Quit date: 1/10/2017     Years since quittin.0    Smokeless tobacco: Never Used    Tobacco comment: 30 years    Vaping Use    Vaping Use: Never used   Substance and Sexual Activity    Alcohol use: No    Drug use: No    Sexual activity: Not on file   Other Topics Concern    Not on file   Social History Narrative    Not on file     Social Determinants of Health     Financial Resource Strain: Low Risk     Difficulty of Paying Living Expenses: Not hard at all   Food Insecurity: No Food Insecurity    Worried About [facility] in the Last Year: Never true    [facility] in the Last Year: Never true   Transportation Needs:     Lack of Transportation (Medical): Not on file    Lack of Transportation (Non-Medical):  Not on file   Physical Activity:     Days of Exercise per Week: Not on file    Minutes of Exercise per Session: Not on file   Stress:     Feeling of Stress : Not on file   Social Connections:     Frequency of Communication with Friends and Family: Not on file    Frequency of Social Gatherings with Friends and Family: Not on file    Attends Advent Services: Not on file    Active Member of Clubs or Organizations: Not on file    Attends Club or Organization Meetings: Not on file    Marital Status: Not on file   Intimate Partner Violence:     Fear of Current or Ex-Partner: Not on file    Emotionally Abused: Not on file    Physically Abused: Not on file    Sexually Abused: Not on file   Housing Stability:     Unable to Pay for Housing in the Last Year: Not on file    Number of [facility] in the Last Year: Not on file    Unstable Housing in the Last Year: Not on file        Family History   Problem Relation Age of Onset    Diabetes Sister     Heart Disease Sister         Myah Reyna Cancer Sister 76        colon cancer  metastatic    Diabetes Sister     Kidney Disease Sister     Cancer Sister         brain cancer throat cancer and smoked    Cancer Mother 68        cerival cancer    Osteoarthritis Mother     Ovarian Cancer Mother     Heart Disease Father     High Blood Pressure Father     Heart Disease Brother 40        heart attack    High Blood Pressure Maternal Aunt     Cancer Other 46        liver cancer    Cancer Other 47        lung cancer and smoker, maternal neice    Cancer Other         bladder cancer, first cousin.  Diabetes Sister 46        complication of diabetes       Vitals:    01/19/22 0927   BP: 108/75   Pulse: 102   Temp: 96.7 °F (35.9 °C)   TempSrc: Temporal   SpO2: 93%   Weight: 188 lb (85.3 kg)   Height: 5' 5\" (1.651 m)     Estimated body mass index is 31.28 kg/m² as calculated from the following:    Height as of this encounter: 5' 5\" (1.651 m). Weight as of this encounter: 188 lb (85.3 kg). PHYSICAL EXAM  GENERAL:  Pleasant  female who looks her stated age, awake alert and oriented x3, in mild distress. HEENT: Clear no JVD  Lungs: Decreased air entry, but no inspiratory crackles or expiratory wheeze  Heart: Borderline tachycardia no pathologic murmur no S3 no S4  Abdomen: Benign  Extremities: No edema  PSYCH:  No psychomotor retardation or agitation. Good eye contact. Unrestricted affect range. Mood congruent with affect. Linear thought.     LABS  Lab Review   Admission on 01/17/2022, Discharged on 01/18/2022   Component Date Value    WBC 01/18/2022 10.6     RBC 01/18/2022 4.43     Hemoglobin 01/18/2022 11.8*    Hematocrit 01/18/2022 37.2     MCV 01/18/2022 84.0     MCH 01/18/2022 26.8     MCHC 01/18/2022 31.8     RDW 01/18/2022 12.8  Platelets 26/96/7649 305     MPV 01/18/2022 9.9     Neutrophils % 01/18/2022 64.7     Lymphocytes % 01/18/2022 23.7     Monocytes % 01/18/2022 9.0     Eosinophils % 01/18/2022 1.6     Basophils % 01/18/2022 1.0     Neutrophils Absolute 01/18/2022 6.9     Lymphocytes Absolute 01/18/2022 2.5     Monocytes Absolute 01/18/2022 1.0     Eosinophils Absolute 01/18/2022 0.2     Basophils Absolute 01/18/2022 0.1     Sodium 01/18/2022 141     Potassium reflex Magnesi* 01/18/2022 4.1     Chloride 01/18/2022 102     CO2 01/18/2022 24     Anion Gap 01/18/2022 15     Glucose 01/18/2022 97     BUN 01/18/2022 16     CREATININE 01/18/2022 0.9     GFR Non- 01/18/2022 >60     GFR  01/18/2022 >60     Calcium 01/18/2022 10.7*    Total Protein 01/18/2022 7.7     Albumin 01/18/2022 4.5     Albumin/Globulin Ratio 01/18/2022 1.4     Total Bilirubin 01/18/2022 0.3     Alkaline Phosphatase 01/18/2022 94     ALT 01/18/2022 17     AST 01/18/2022 25     Lipase 01/18/2022 27.0     Troponin 01/18/2022 <0.01     Pro-BNP 01/18/2022 39     pH, Pola 01/18/2022 7. 402     pCO2, [place] 01/18/2022 40.6     pO2, [place] 01/18/2022 30.1     HCO3, Venous 01/18/2022 25.2     Base Excess, [place] 01/18/2022 0.5     O2 Sat, [place] 01/18/2022 58     TC02 (Calc), [place] 01/18/2022 26     O2 Therapy 01/18/2022 Unknown     SARS-CoV-2 01/18/2022 Not Detected    Hospital Outpatient Visit on 11/23/2021   Component Date Value    Ventricular Rate 11/23/2021 95     Atrial Rate 11/23/2021 95     P-R Interval 11/23/2021 140     QRS Duration 11/23/2021 88     Q-T Interval 11/23/2021 348     QTc Calculation (Bazett) 11/23/2021 437     P Axis 11/23/2021 77     R Axis 11/23/2021 -52     T Axis 11/23/2021 87     Diagnosis 11/23/2021 Normal sinus rhythmLeft anterior fascicular blockAbnormal ECGWarly transitionConfirmed by Carol Ro MD, Georgina Oneil ([id number]) on 11/23/2021 2:07:47 PM    Orders Only on 09/01/2021 Component Date Value    Hemoglobin A1C 09/01/2021 5.6     eAG 09/01/2021 114.0     Fructosamine 09/01/2021 297*    Total CK 09/01/2021 283*    ALT 09/01/2021 12     AST 09/01/2021 14*    Cholesterol, Total 09/01/2021 183     Triglycerides 09/01/2021 175*    HDL 09/01/2021 51     LDL Calculated 09/01/2021 97     VLDL Cholesterol Calcula* 09/01/2021 [facility] Outpatient Visit on 03/25/2021   Component Date Value    pH, Pol[place] 03/25/2021 7.410     pCO2, [place] 03/25/2021 51.3*    pO2, [place] 03/25/2021 38     HCO3, Venous 03/25/2021 33*    Base Excess, [place] 03/25/2021 6.5     O2 Sat, [place] 03/25/2021 73     Carboxyhemoglobin 03/25/2021 2.3     MetHgb, [place] 03/25/2021 0.5     TC02 (Calc), [place] 03/25/2021 34     O2 Content, [place] 03/25/2021 13     O2 Therapy 03/25/2021 Unknown    Orders Only on 03/25/2021   Component Date Value    TSH 03/25/2021 1.38     DOMENICA 03/25/2021 Negative     Anti-JO1 IgG 03/25/2021 <0.2     Cholesterol, Total 03/25/2021 126     Triglycerides 03/25/2021 138     HDL 03/25/2021 44     LDL Calculated 03/25/2021 54     VLDL Cholesterol Calcula* 03/25/2021 28    Office Visit on 03/25/2021   Component Date Value    Total CK 03/25/2021 188     WBC 03/25/2021 9.2     RBC 03/25/2021 4.45     Hemoglobin 03/25/2021 12.8     Hematocrit 03/25/2021 39.2     MCV 03/25/2021 88.2     MCH 03/25/2021 28.7     MCHC 03/25/2021 32.5     RDW 03/25/2021 12.9     Platelets 62/25/2476 171     MPV 03/25/2021 11.0*    CRP 03/25/2021 <3.0    Orders Only on 02/22/2021   Component Date Value    Total CK 02/22/2021 315*    Vit D, 25-Hydroxy 02/22/2021 46.1     Sodium 02/22/2021 143     Potassium 02/22/2021 4.1     Chloride 02/22/2021 102     CO2 02/22/2021 28     Anion Gap 02/22/2021 13     Glucose 02/22/2021 136*    BUN 02/22/2021 10     CREATININE 02/22/2021 1.0     GFR Non- 02/22/2021 56*    GFR  02/22/2021 >60     Calcium 02/22/2021 10.1     ALT 02/22/2021 28     AST 02/22/2021 32     Cholesterol, Total 02/22/2021 119     Triglycerides 02/22/2021 108     HDL 02/22/2021 54     LDL Calculated 02/22/2021 43     VLDL Cholesterol Calcula* 02/22/2021 22     Vitamin B-12 02/22/2021 >2000*    Hemoglobin A1C 02/22/2021 5.5     eAG 02/22/2021 111.2          ASSESSMENT/PLAN  1. Severe COPD without complication  UND3/JUQ= 81/69%, FEV1 1.2 L, with 110 mL / 10% FEV1 augmentation with bronchodilatorPFT 2014 Dulera, Umeclidinium versus Spiriva, Daliresp DuoNeb. inconsistent nocturnal O2. Chest CT 2017 pair 4 mm left lung nodules, unchanged CT 2019, recommended for 1 year follow-up (May 2020). Finally quit [id number]. venous blood gas CO2 just 51. No asymmetric leg swelling to raise suspicion for a embolic event. Doubt heart failure. The tubing is clearly discolored. Did not think to cut open to tubing to see if the discoloration was on the outside versus inside. Oxygen saturation is a bit lower than usual but not typically in the level that should result in the level of dyspnea she reports. Patient asks for some treatment to [facility] [place] the dye\", but this evidently water-soluble component if absorbed into the bloodstream should be cleared renally. I recommend short course of prednisone 20 mg for 10 days then off. I recommend she see her pulmonologist, referral sent. Possibly higher level imaging would at least provide radiographic correlation to her subjective dyspnea and slightly worsening oxygen saturation, and might provide a therapeutic direction (question pneumonitis). 2. Pruritus  Wonder if this is related to her chronic narcotic use. No daytime sedation. Hydroxyzine another treatment option. 3. Acquired hypothyroidism  Up to date TSH, 3/2021, recheck 3/2022    4. Slow transit constipation  Uses Linzess 2x/week, lactulose qod, discouraged docusate if feels it doesn't help. Chronic narcotic use.       5. Vitamin B 12 deficiency   Over 2000 February 22, 2021; lowest level 523 in 2014, stopped in Spring 2021. Had been injecting for many years. Doing trial off of injections follow-up level document adequacy. Patient did not do B12 test 7 months ago, will update with next draw. 6.  Mixed hyperlipidemia  Suboptimal control. Normal LFTs however elevated CK after 3 months off rosuvastatin prescribed bempedoic acid/zetia but not covered by insurance. Offer Zetia on return. 8. Breast lipoma  6-month follow-up surveillance included targeted ultrasound to look at the breast mass thought to be lipoma though not biopsied. No evidence of interval regression. Get follow-up imaging this spring. 9. Abdominal Pain  Improved at this time     10. Impaired fasting glucose  ReAssuring A1c 5.6%, glucose in the low 130s. 11.  Essential hypertension  Encourage home surveillance, adequate control, reassuring BMP recently. 13. Vitamin D deficiency  Adequate repletion February 2021. Chronic inhaled steroid use. Occasional systemic glucocorticoid use for exacerbations. Spine -0.3 worst hip -1.0 DEXA July 2021 on calcium vitamin D therapy. 16. Healthcare maintenance. Discussed Shingrix will check with the pharmacy. Completed [address] vaccination series, 2021. Pap 2018. PSG mild AVRIL 2019. Tdap Pneumovax 23 influenza up-to-date. Up to date with TSH A1c lipid panel mammogram (left breast lipoma). 17.  History of colon adenoma. Colonoscopy  2021 2011 and 2005 both with adenoma excision. Hyperplastic 2021. Dr. Melvin Cardozo gastroenterologist.    18.  Left shoulder rotator cuff arthropathy. MRI showed left full-thickness anterior supraspinatus tendon tear with some acromioclavicular DJD. Seeing Pain Med Dr Pratibha Moreno. Dr Karissa Juan. Doing better    19. Mildly elevated CK. Asx, still elevated off statin . Butler Cera -1 negative. 20. Insomnia. Ambien, seroquel Belsomra ineffective or not covered. Return in about 4 weeks (around 2/16/2022).   b12 mammogram/ultrasound question higher level chest imaging Zetia and bring tubing bring back in    It was a pleasure to visit with Ms. Ramosdeangelo Ana today. Answered all questions as best I could.   Bobby Chaudhary MD   25min

## 2022-01-20 LAB — TSH REFLEX: 1.16 UIU/ML (ref 0.27–4.2)

## 2022-01-26 ENCOUNTER — TELEPHONE (OUTPATIENT)
Dept: PRIMARY CARE CLINIC | Age: 67
End: 2022-01-26

## 2022-01-26 DIAGNOSIS — R30.0 DYSURIA: Primary | ICD-10-CM

## 2022-01-26 NOTE — TELEPHONE ENCOUNTER
Pt would like a referral for a urinalysis so she can make an appointment or walk in please contact her @ [phone] please be advise

## 2022-01-26 NOTE — TELEPHONE ENCOUNTER
JOSSIE for PT to call the office regarding below message. Subject: Appointment Request     Reason for Call: Routine (Patient Request) No Script     QUESTIONS   Type of Appointment? Established Patient   Reason for appointment request? Other - Can't reschedule for provider   Additional Information for Provider? Patient would like to reschedule   appointment 1/31/22 for Feb 6 so that she can see specialist about poison   ingested. Patient ask that someone call back after lunch.   ---------------------------------------------------------------------------   --------------   CALL BACK INFO   What is the best way for the office to contact you? OK to leave message on   voicemail   Preferred Call Back Phone Number? [phone]   ---------------------------------------------------------------------------   --------------   SCRIPT ANSWERS   Relationship to Patient? Self   Have your symptoms changed? No   (Is the patient requesting to see the provider for a procedure?)? No   (Is the patient requesting to see the provider urgently - today or   tomorrow. )? No   Have you been diagnosed with, awaiting test results for, or told that you   are suspected of having COVID-19 (Coronavirus)? (If patient has tested   negative or was tested as a requirement for work, school, or travel and   not based on symptoms, answer no)? No   Within the past two weeks have you developed any of the following symptoms   (answer no if symptoms have been present longer than 2 weeks or began   more than 2 weeks ago)? Fever or Chills, Cough, Shortness of breath or   difficulty breathing, Loss of taste or smell, Sore throat, Nasal   congestion, Sneezing or runny nose, Fatigue or generalized body aches   (answer no if pain is specific to a body part e.g. back pain), Diarrhea,   Headache? No   Have you had close contact with someone with COVID-19 in the last 14 days?    No   (Service Expert - click yes below to proceed with [employer] As Usual Scheduling)?  Yes

## 2022-01-27 ENCOUNTER — HOSPITAL ENCOUNTER (EMERGENCY)
Age: 67
Discharge: HOME | End: 2022-01-27
Attending: EMERGENCY MEDICINE
Payer: MEDICARE

## 2022-01-27 ENCOUNTER — APPOINTMENT (OUTPATIENT)
Dept: GENERAL RADIOLOGY | Age: 67
End: 2022-01-27
Payer: MEDICARE

## 2022-01-27 VITALS
RESPIRATION RATE: 20 BRPM | WEIGHT: 186.56 LBS | BODY MASS INDEX: 31.08 KG/M2 | SYSTOLIC BLOOD PRESSURE: 134 MMHG | OXYGEN SATURATION: 96 % | HEIGHT: 65 IN | DIASTOLIC BLOOD PRESSURE: 70 MMHG | HEART RATE: 111 BPM | TEMPERATURE: 99.4 F

## 2022-01-27 DIAGNOSIS — J44.1 COPD EXACERBATION (HCC): Primary | ICD-10-CM

## 2022-01-27 DIAGNOSIS — Z20.822 PERSON UNDER INVESTIGATION FOR COVID-19: ICD-10-CM

## 2022-01-27 LAB
BILIRUBIN URINE: NEGATIVE
BLOOD, URINE: NEGATIVE
CLARITY: CLEAR
COLOR: YELLOW
GLUCOSE URINE: NEGATIVE MG/DL
KETONES, URINE: NEGATIVE MG/DL
LEUKOCYTE ESTERASE, URINE: NEGATIVE
MICROSCOPIC EXAMINATION: NORMAL
NITRITE, URINE: NEGATIVE
PH UA: 6.5 (ref 5–8)
PROTEIN UA: NEGATIVE MG/DL
RAPID INFLUENZA  B AGN: NEGATIVE
RAPID INFLUENZA A AGN: NEGATIVE
SPECIFIC GRAVITY UA: 1.01 (ref 1–1.03)
URINE TYPE: NORMAL
UROBILINOGEN, URINE: 0.2 E.U./DL

## 2022-01-27 PROCEDURE — 87804 INFLUENZA ASSAY W/OPTIC: CPT

## 2022-01-27 PROCEDURE — U0003 INFECTIOUS AGENT DETECTION BY NUCLEIC ACID (DNA OR RNA); SEVERE ACUTE RESPIRATORY SYNDROME CORONAVIRUS 2 (SARS-COV-2) (CORONAVIRUS DISEASE [COVID-19]), AMPLIFIED PROBE TECHNIQUE, MAKING USE OF HIGH THROUGHPUT TECHNOLOGIES AS DESCRIBED BY CMS-2020-01-R: HCPCS

## 2022-01-27 PROCEDURE — 94664 DEMO&/EVAL PT USE INHALER: CPT

## 2022-01-27 PROCEDURE — 81003 URINALYSIS AUTO W/O SCOPE: CPT

## 2022-01-27 PROCEDURE — 71046 X-RAY EXAM CHEST 2 VIEWS: CPT

## 2022-01-27 PROCEDURE — 6370000000 HC RX 637 (ALT 250 FOR IP): Performed by: EMERGENCY MEDICINE

## 2022-01-27 PROCEDURE — 99284 EMERGENCY DEPT VISIT MOD MDM: CPT

## 2022-01-27 PROCEDURE — 6360000002 HC RX W HCPCS: Performed by: EMERGENCY MEDICINE

## 2022-01-27 PROCEDURE — U0005 INFEC AGEN DETEC AMPLI PROBE: HCPCS

## 2022-01-27 PROCEDURE — 94640 AIRWAY INHALATION TREATMENT: CPT

## 2022-01-27 RX ORDER — BENZONATATE 100 MG/1
100 CAPSULE ORAL 3 TIMES DAILY PRN
Qty: 21 CAPSULE | Refills: 0 | Status: SHIPPED | OUTPATIENT
Start: 2022-01-27 | End: 2022-02-03

## 2022-01-27 RX ORDER — ALBUTEROL SULFATE 2.5 MG/3ML
2.5 SOLUTION RESPIRATORY (INHALATION) ONCE
Status: COMPLETED | OUTPATIENT
Start: 2022-01-27 | End: 2022-01-27

## 2022-01-27 RX ORDER — BENZONATATE 100 MG/1
100 CAPSULE ORAL ONCE
Status: COMPLETED | OUTPATIENT
Start: 2022-01-27 | End: 2022-01-27

## 2022-01-27 RX ORDER — PREDNISONE 20 MG/1
60 TABLET ORAL ONCE
Status: COMPLETED | OUTPATIENT
Start: 2022-01-27 | End: 2022-01-27

## 2022-01-27 RX ORDER — IPRATROPIUM BROMIDE AND ALBUTEROL SULFATE 2.5; .5 MG/3ML; MG/3ML
1 SOLUTION RESPIRATORY (INHALATION) ONCE
Status: COMPLETED | OUTPATIENT
Start: 2022-01-27 | End: 2022-01-27

## 2022-01-27 RX ORDER — PREDNISONE 20 MG/1
60 TABLET ORAL DAILY
Qty: 12 TABLET | Refills: 0 | Status: SHIPPED | OUTPATIENT
Start: 2022-01-28 | End: 2022-02-01

## 2022-01-27 RX ORDER — LIDOCAINE HYDROCHLORIDE 20 MG/ML
15 SOLUTION OROPHARYNGEAL ONCE
Status: COMPLETED | OUTPATIENT
Start: 2022-01-27 | End: 2022-01-27

## 2022-01-27 RX ADMIN — ALBUTEROL SULFATE 2.5 MG: 2.5 SOLUTION RESPIRATORY (INHALATION) at 16:32

## 2022-01-27 RX ADMIN — LIDOCAINE HYDROCHLORIDE 15 ML: 20 SOLUTION ORAL; TOPICAL at 17:18

## 2022-01-27 RX ADMIN — IPRATROPIUM BROMIDE AND ALBUTEROL SULFATE 1 AMPULE: .5; 2.5 SOLUTION RESPIRATORY (INHALATION) at 16:32

## 2022-01-27 RX ADMIN — BENZONATATE 100 MG: 100 CAPSULE ORAL at 17:17

## 2022-01-27 RX ADMIN — PREDNISONE 60 MG: 20 TABLET ORAL at 17:18

## 2022-01-27 NOTE — ED PROVIDER NOTES
Emergency Department Provider Note  Location: [address] [place] [address]  1/27/2022     Patient Identification  Candace Lewis is a 77 y.o. female    Chief Complaint  Shortness of Breath      Mode of Arrival  private car    HPI  (History provided by patient)  This is a 77 y.o. female with a PMH significant for COPD presented today for cough, congestion, SOB x 2 days. She reports upper back pain that is worse when she coughs. She also has diarrhea. No vomiting. No fever but she has chills and some body ache. No rash. No stiff neck. Patient states she is vaccinated and boosted against COVID. She is not aware of any specific sick contact. Patient also asked if we can check her urine. She states her PCP ordered an urinalysis but she hasn't had a chance to go get it done. She denies any urinary frequency, urgency, or dysuria. ROS  Review of Systems   Constitutional: Positive for chills, fatigue and fever (subjective fever and chills). HENT: Positive for congestion. Eyes: Negative for discharge. Respiratory: Positive for cough, shortness of breath and wheezing. Cardiovascular: Negative for chest pain. Gastrointestinal: Positive for diarrhea. Negative for abdominal pain and vomiting. Genitourinary: Negative for decreased urine volume, dysuria, frequency and urgency. Musculoskeletal: Positive for back pain (generalized upper back pain worse when she coughs). Negative for neck pain and neck stiffness. Skin: Negative for color change and rash. Neurological: Positive for headaches. I have reviewed the following nursing documentation:  Allergies:    Allergies   Allergen Reactions    Bupropion Other (See Comments)     Muscle spasms/seizure like symptoms     Morphine Hives and Itching    Morphine And Related Itching       Past medical history:  has a past medical history of CAMDEN (acute kidney injury) ([facility] [address] (10/7/2013), Anxiety, Bronchitis, Chronic abdominal pain, Chronic back pain, COPD (chronic obstructive pulmonary disease) ([facility] [address] DDD (degenerative disc disease), lumbar (12/1/2016), Depression, Elevated glycated hemoglobin, History of normal mammogram (8/26/13), migraine headaches, Hyperlipidemia, Hypertension, Hypothyroidism, Menopause ovarian failure, and Rotator cuff tear. Past surgical history:  has a past surgical history that includes central line (10/7/2013); Colonoscopy (9/6/2011); kirsty and bso (cervix removed) (8/15/2002); Upper gastrointestinal endoscopy (10/17/2005); Colonoscopy (10/24/2005); fine needle aspiration (8/10/2012); esophageal motility study (6/17/2013); Upper gastrointestinal endoscopy (9/5/2000); Cholecystectomy; Tonsillectomy; Carpal tunnel release; hernia repair; Hysterectomy; Hemorrhoid surgery; eye surgery; Endoscopy, colon, diagnostic; and Finger nail surgery (Bilateral, 5/21/2019). Home medications:   Prior to Admission medications    Medication Sig Start Date End Date Taking?  Authorizing Provider   predniSONE (DELTASONE) 20 MG tablet Take 1 tablet by mouth daily for 10 days 1/19/22 1/29/22 Yes Reynaldo Covlin MD   cetirizine (ZYRTEC) 10 MG tablet Take 1 tablet by mouth daily 1/18/22  Yes Cheo Gregory MD   Roflumilast (DALIRESP) 500 MCG tablet TAKE 1 TABLET EVERY DAY 1/11/22  Yes Reynaldo Colvin MD   QUEtiapine (SEROQUEL) 100 MG tablet TAKE 1 TABLET EVERY NIGHT 1/3/22  Yes Reynaldo Colvin MD   Fluticasone-Umeclidin-Vilant (TRELEGY ELLIPTA) 200-62.5-25 MCG/INH AEPB Inhale 1 puff into the lungs daily 11/29/21  Yes Reynaldo Colvin MD   ipratropium-albuterol (DUONEB) 0.5-2.5 (3) MG/3ML SOLN nebulizer solution Inhale 3 mLs into the lungs as needed for Shortness of Breath 11/17/21  Yes Reynaldo Colvin MD   losartan (COZAAR) 25 MG tablet TAKE 1 TABLET EVERY DAY 11/10/21  Yes Reynaldo Colvin MD   Calcium Carb-Cholecalciferol (CALCIUM-VITAMIN D) 600-400 MG-UNIT TABS Take 1 tablet by mouth twice daily 9/24/21  Yes MD Tony Benavides) 1 MG tablet Take 1 mg by mouth 2 times daily as needed. Yes Historical Provider, MD   amitriptyline (ELAVIL) 25 MG tablet 1 tablet by Orogastric route 2 times daily as needed 8/23/21  Yes Historical Provider, MD   Budfabiolaon-Glycopyrrol-Formoterol 160-9-4.8 MCG/ACT AERO Inhale 2 puffs into the lungs 2 times daily 9/3/21  Yes Marcin Torres MD   levothyroxine (EUTHYROX) 125 MCG tablet TAKE 1 TABLET EVERY DAY (DISCONTINUE 135MCG) 8/30/21  Yes Marcin Torres MD   cloNIDine (CATAPRES) 0.1 MG tablet Bid 7/14/21  Yes Marcin Torres MD   albuterol-ipratropium (COMBIVENT RESPIMAT)  MCG/ACT AERS inhaler INHALE 1 PUFF BY MOUTH EVERY SIX HOURS AS NEEDED FOR WHEEZING 7/14/21  Yes Marcin Torres MD   hydroCHLOROthiazide (HYDRODIURIL) 25 MG tablet Take 1 tablet by mouth once daily 7/14/21  Yes Marcin Torres MD   Calcium Carbonate-Vitamin D (CALCIUM 600/VITAMIN D) 600-400 MG-UNIT CHEW Take 1 tablet by mouth 2 times daily 6/4/21  Yes Marcin Torres MD   potassium chloride (KLOR-CON) 10 MEQ extended release tablet Take 20 mEq by mouth daily 8/20/20  Yes Historical Provider, MD   tiZANidine (ZANAFLEX) 4 MG tablet Take 4 mg by mouth every 6 hours as needed   Yes Historical Provider, MD   oxyCODONE-acetaminophen (PERCOCET) 7.5-325 MG per tablet Take 1 tablet by mouth every 6 hours as needed for Pain.    Yes Historical Provider, MD   diphenhydrAMINE (BANOPHEN) 50 MG capsule TAKE ONE CAPSULE BY MOUTH EVERY NIGHT AT BEDTIME AS NEEDED FOR ITCHING 1/19/22   Marcin Torres MD   Promethazine HCl 6.25 MG/5ML SOLN Take 5ml every 6 hours PRN nausea 1/19/22   Marcin Torres MD   naproxen (NAPROSYN) 500 MG tablet Take 1 tablet by mouth 2 times daily (with meals) 1/18/22   Tanner To MD   omeprazole (PRILOSEC) 20 MG delayed release capsule TAKE 1 CAPSULE BY MOUTH EVERY MORNING (BEFORE BREAKFAST) AS NEEDED FOR HEARTBURN  Patient not taking: Reported on 1/19/2022 1/17/22   Marcin Torres MD    MG tablet TAKE 1 TABLET BY MOUTH EVERY 8 HOURS AS NEEDED FOR PAIN 12/16/21   Regi Pham MD   Nebulizers (COMPRESSOR/NEBULIZER) MISC 1 Units by Does not apply route 4 times daily as needed (Wheezing SOB) 11/24/21   Regi Pham MD   Respiratory Therapy Supplies MISC 1 each by Does not apply route 4 times daily as needed (wheezing SOB) All Nebulizer supplies needed 11/24/21   Regi Pham MD   Bempedoic Acid-Ezetimibe 180-10 MG TABS Take 1 tablet by mouth nightly  Patient not taking: Reported on 1/19/2022 9/3/21   Regi Pham MD   ondansetron (ZOFRAN ODT) 4 MG disintegrating tablet Take 1 tablet by mouth every 8 hours as needed for Nausea or Vomiting  Patient not taking: Reported on 1/19/2022 7/14/21   Regi Pham MD   docusate sodium (STOOL SOFTENER) 100 MG capsule TAKE ONE CAPSULE BY MOUTH DAILY AS NEEDED FOR CONSTIPATION 6/4/21   Regi Pham MD   lactulose (CHRONULAC) 10 GM/15ML solution Take 15 mLs by mouth as needed (constipation)  Patient not taking: Reported on 1/19/2022 6/4/21   Regi Pham MD   LINZESS 145 MCG capsule Take 1 capsule by mouth as needed (constipation)  Patient not taking: Reported on 1/19/2022 6/4/21   Regi Pham MD   varenicline (CHANTIX) 1 MG tablet Take 1 tablet by mouth 2 times daily  Patient not taking: Reported on 1/19/2022 6/4/21   Regi Pham MD   [facility] 4 MG/0.1ML LIQD nasal spray Take by mouth as needed  Patient not taking: Reported on 1/19/2022 11/11/19   Historical Provider, MD   chlorhexidine (PERIDEX) 0.12 % solution Take 1 mL by mouth daily  Patient not taking: Reported on 1/19/2022 11/8/19   Historical Provider, MD   prednisoLONE acetate (PRED FORTE) 1 % ophthalmic suspension Place 1 drop into the left eye three times daily  Patient not taking: Reported on 1/19/2022 10/25/18   Historical Provider, MD   ketorolac (ACULAR) 0.5 % ophthalmic solution Place 1 drop into the left eye 3 times daily  Patient not taking: Reported on 1/19/2022 10/25/18   Historical Provider, MD       Social history:  reports that she quit smoking about 5 years ago. Her smoking use included cigarettes. She has a 8.00 pack-year smoking history. She has never used smokeless tobacco. She reports that she does not drink alcohol and does not use drugs. Family history:    Family History   Problem Relation Age of Onset    Diabetes Sister     Heart Disease Sister         chf    Cancer Sister 76        colon cancer  metastatic    Diabetes Sister     Kidney Disease Sister     Cancer Sister         brain cancer throat cancer and smoked    Cancer Mother 68        cerival cancer    Osteoarthritis Mother     Ovarian Cancer Mother     Heart Disease Father     High Blood Pressure Father     Heart Disease Brother 40        heart attack    High Blood Pressure Maternal Aunt     Cancer Other 46        liver cancer    Cancer Other 47        lung cancer and smoker, maternal neice    Cancer Other         bladder cancer, first cousin.  Diabetes Sister 46        complication of diabetes       Exam  ED Triage Vitals [01/27/22 1618]   BP Temp Temp Source Pulse Resp SpO2 Height Weight   134/70 99.4 °F (37.4 °C) Oral 111 26 96 % 5' 5\" (1.651 m) 186 lb 9 oz (84.6 kg)   Physical Exam  Vitals and nursing note reviewed. Constitutional:       General: She is in acute distress (patient appeared SOB with tachypnea). Appearance: Normal appearance. She is well-developed. She is not diaphoretic. HENT:      Head: Normocephalic and atraumatic. Right Ear: Tympanic membrane normal.      Left Ear: Tympanic membrane normal.      Mouth/Throat:      Mouth: Mucous membranes are moist.      Pharynx: No oropharyngeal exudate. Eyes:      General: Lids are normal. No scleral icterus. Right eye: No discharge. Left eye: No discharge. Conjunctiva/sclera: Conjunctivae normal.      Pupils: Pupils are equal, round, and reactive to light. Neck:      Trachea: No tracheal deviation.    Cardiovascular:      Rate and Rhythm: Normal rate and regular rhythm. Heart sounds: Normal heart sounds. No murmur heard. Pulmonary:      Effort: Pulmonary effort is normal. Tachypnea present. No accessory muscle usage. Breath sounds: No stridor. Decreased breath sounds, wheezing and rhonchi present. Chest:      Chest wall: No tenderness or crepitus. Abdominal:      General: There is no distension. Palpations: Abdomen is soft. Tenderness: There is no abdominal tenderness. There is no guarding or rebound. Musculoskeletal:         General: No deformity. Cervical back: Neck supple. Lymphadenopathy:      Cervical: No cervical adenopathy. Skin:     General: Skin is warm and dry. Coloration: Skin is not pale. Findings: No rash. Neurological:      Mental Status: She is alert and oriented to person, place, and time. Cranial Nerves: No dysarthria or facial asymmetry. Motor: Motor function is intact. No weakness or tremor.    Psychiatric:         Mood and Affect: Mood normal.         Speech: Speech normal.         Behavior: Behavior normal.         MDM/ED Course  ED Medication Orders (From admission, onward)    Start Ordered     Status Ordering Provider    01/27/22 1645 01/27/22 1632  predniSONE (DELTASONE) tablet 60 mg  ONCE         Last MAR action: Given - by Harrison Doherty on 01/27/22 at [address] [place] [address]    01/27/22 1645 01/27/22 1632  benzonatate (TESSALON) capsule 100 mg  ONCE         Last MAR action: Given - by Harrison Doherty on 01/27/22 at [address] [facility]    01/27/22 1645 01/27/22 1632  lidocaine viscous hcl (XYLOCAINE) 2 % solution 15 mL  ONCE         Last MAR action: Given - by Harrison Doherty on 01/27/22 at [address] [place] [address]    01/27/22 1630 01/27/22 1627  ipratropium-albuterol (DUONEB) nebulizer solution 1 ampule  ONCE        Question:  Initiate RT Bronchodilator Protocol  Answer:  No    Last MAR action: Given - by Levon Lion on 01/27/22 at [address] [facility]    01/27/22 1630 01/27/22 1627  albuterol (PROVENTIL) nebulizer solution 2.5 mg  ONCE        Question:  Initiate RT Bronchodilator Protocol  Answer:  No    Last MAR action: Given - by Trung Juarezr on 01/27/22 at [address] [facility]           Radiology  XR CHEST (2 VW)    Result Date: 1/27/2022  History: Shortness of breath. Cough. Wheezing. 2 views chest. COMPARISON: 1/18/2022. FINDINGS: Normal heart size. No effusion or consolidation. No acute osseous abnormality. Mediastinal contours are unremarkable. 1. No acute disease. Labs  Results for orders placed or performed during the hospital encounter of 01/27/22   Rapid influenza A/B antigens    Specimen: Nasopharyngeal   Result Value Ref Range    Rapid Influenza A Ag Negative Negative    Rapid Influenza B Ag Negative Negative   Urinalysis   Result Value Ref Range    Color, UA Yellow Straw/Yellow    Clarity, UA Clear Clear    Glucose, Ur Negative Negative mg/dL    Bilirubin Urine Negative Negative    Ketones, Urine Negative Negative mg/dL    Specific Gravity, UA 1.010 1.005 - 1.030    Blood, Urine Negative Negative    pH, UA 6.5 5.0 - 8.0    Protein, UA Negative Negative mg/dL    Urobilinogen, Urine 0.2 <2.0 E.U./dL    Nitrite, Urine Negative Negative    Leukocyte Esterase, Urine Negative Negative    Microscopic Examination Not Indicated     Urine Type NotGiven          - Patient seen and evaluated in room 6.  77 y.o. female presented for cough, SOB, diarrhea x 2 days. She also has some back pain with coughing. DDx included but not limited to COVID, flu, other viral URI, pneumonia, COPD exacerbation. - patient is not in acute respiratory failure  - initial triage HR of 111 noted. After steroid, duoneb, and then albuterol neb, patient reported feeling better. When I went to update patient her results, her RR has normalized and she appeared much more comfortable. Her HR also improved to 100 on the monitor. O2 sat also remained above 90%.   - Patient was placed on telemetry during his/her ED stay and no malignant dysrhythmia observed. - Pertinent old records reviewed. - Diagnostic studies reviewed. CXR did not show PNA, PTX, or other acute cardiopulmonary dz. rapid flu negative. Covid PCR pending at this time. - Urinalysis done out of patient's request because her PCP ordered it and she has not gotten it done. She otherwise had no urinary symptoms. Urinalysis normal.  Patient was informed of this result. - Plan to discharge the patient with outpatient treatment for COPD exacerbation/acute URI. Patient is felt comfortable with this plan. She knows she should isolate until her Covid PCR results is available. She knows she can see the result tomorrow in [address]  - Return precautions also discussed. patient verbalized understanding of care plan and agreed to follow-up with PCP as advised. I estimate there is LOW risk for ACUTE CORONARY SYNDROME, SEVERE COPD/ASTHMA EXACERBATION WITH HYPOXIA, ACUTE EXACERBATION OF CONGESTIVE HEART FAILURE, PERICARDIAL TAMPONADE, PNEUMONIA WITH HYPOXIA, PNEUMOTHORAX, PULMONARY EMBOLISM, SEPSIS, and THORACIC DISSECTION,  thus I consider the discharge disposition reasonable. Miguel Humphries and I have discussed the diagnosis and risks, and we agree with discharging home to follow-up with PCP. We also discussed returning to the Emergency Department immediately if new or worsening symptoms occur. We have discussed the symptoms which are most concerning (e.g., bloody sputum, fever, worsening pain or shortness of breath, vomiting) that necessitate immediate return. Clinical Impression:  1. COPD exacerbation ([address]    2. Person under investigation for COVID-19          Disposition:  Discharge to home in good condition. Patient was given scripts for the following medications. I counseled patient how to take these medications.    Discharge Medication List as of 1/27/2022  6:27 PM      START taking these medications    Details benzonatate (TESSALON PERLES) 100 MG capsule Take 1 capsule by mouth 3 times daily as needed for Cough, Disp-21 capsule, R-0Normal           predniSONE (DELTASONE) 20 MG tablet Take 3 tablets by mouth daily for 4 days, Disp-12 tablet, R-0Normal              Disposition referral (if applicable):  Keshav Thomas MD  [address] [address]  [phone]    Schedule an appointment as soon as possible for a visit in 3 days      This chart was generated in part by using Dragon Dictation system and may contain errors related to that system including errors in grammar, punctuation, and spelling, as well as words and phrases that may be inappropriate. If there are any questions or concerns please feel free to contact the dictating provider for clarification.      Esvin Lerma MD  [address] [place] [address] Kandi Kumar MD  01/28/22 6559

## 2022-01-28 ENCOUNTER — CARE COORDINATION (OUTPATIENT)
Dept: CARE COORDINATION | Age: 67
End: 2022-01-28

## 2022-01-28 LAB — SARS-COV-2, PCR: NOT DETECTED

## 2022-01-28 ASSESSMENT — ENCOUNTER SYMPTOMS
BACK PAIN: 1
COUGH: 1
VOMITING: 0
COLOR CHANGE: 0
SHORTNESS OF BREATH: 1
WHEEZING: 1
DIARRHEA: 1
EYE DISCHARGE: 0
ABDOMINAL PAIN: 0

## 2022-01-28 NOTE — CARE COORDINATION
Ambulatory Care Coordination ED COVID Follow up Call    Challenges to be reviewed by the provider   Additional needs identified to be addressed with provider: No  pt requested number for DR Rosalva Henry , Inf Disease, provided number and address listed                 Encounter was not routed to provider for escalation. Method of communication with provider: none    Discussed COVID-19 related testing which was: pending at this time. Test results were: pending. Patient informed of results, if available? Aware pending, has my chart and will check. Feeling much better   has concerns about exposure to red dye. .    Current Symptoms: no worsening symptoms    Reviewed New or Changed Meds: yes    Do you have what you need at home?  Durable Medical Equipment ordered at discharge: None   Home Health/Outpatient orders at discharge: none   Was patient discharged with a pulse oximeter? No Discussed and confirmed pulse oximeter discharge instructions and when to notify provider or seek emergency care. Patient education provided: Reviewed appropriate site of care based on symptoms and resources available to patient including: Specialist.     Follow up appointment recommended: yes. If no appointment scheduled, scheduling offered: yes  Future Appointments   Date Time Provider Annika Rodriguez   1/31/2022 10:30 AM JACKIE Edward - SVETLANA Schmidt RD  [place] - [place]   2/9/2022 10:30 AM MD Arlyn Oconnell RD  [place] - [place]D       Interventions: Obtained and reviewed discharge summary and/or continuity of care documents  Reviewed and followed up on pending diagnostic tests and treatments-covid pending  Education of patient/family/caregiver/guardian to support self-management-cdc guidelines, my chart follow up, has appt for AWV on Mon w pcp  Reviewed discharge instructions, medical action plan and red flags with patient who verbalized understanding.     No further follow-up call indicated based on severity of symptoms and risk factors. Plan for next call: none  Provided contact information for future needs.     Jasper Garcia RN

## 2022-01-31 ENCOUNTER — TELEPHONE (OUTPATIENT)
Dept: PRIMARY CARE CLINIC | Age: 67
End: 2022-01-31

## 2022-01-31 ENCOUNTER — OFFICE VISIT (OUTPATIENT)
Dept: PRIMARY CARE CLINIC | Age: 67
End: 2022-01-31
Payer: MEDICARE

## 2022-01-31 VITALS
OXYGEN SATURATION: 98 % | HEIGHT: 65 IN | TEMPERATURE: 97.1 F | SYSTOLIC BLOOD PRESSURE: 109 MMHG | HEART RATE: 90 BPM | DIASTOLIC BLOOD PRESSURE: 71 MMHG | BODY MASS INDEX: 30.99 KG/M2 | WEIGHT: 186 LBS

## 2022-01-31 DIAGNOSIS — Z00.00 ROUTINE GENERAL MEDICAL EXAMINATION AT A HEALTH CARE FACILITY: Primary | ICD-10-CM

## 2022-01-31 PROCEDURE — G0438 PPPS, INITIAL VISIT: HCPCS | Performed by: NURSE PRACTITIONER

## 2022-01-31 PROCEDURE — 1123F ACP DISCUSS/DSCN MKR DOCD: CPT | Performed by: NURSE PRACTITIONER

## 2022-01-31 PROCEDURE — 3017F COLORECTAL CA SCREEN DOC REV: CPT | Performed by: NURSE PRACTITIONER

## 2022-01-31 PROCEDURE — 4040F PNEUMOC VAC/ADMIN/RCVD: CPT | Performed by: NURSE PRACTITIONER

## 2022-01-31 PROCEDURE — G8484 FLU IMMUNIZE NO ADMIN: HCPCS | Performed by: NURSE PRACTITIONER

## 2022-01-31 RX ORDER — MOMETASONE FUROATE AND FORMOTEROL FUMARATE DIHYDRATE 200; 5 UG/1; UG/1
2 AEROSOL RESPIRATORY (INHALATION) EVERY 12 HOURS
COMMUNITY
Start: 2021-11-28 | End: 2022-10-07 | Stop reason: ALTCHOICE

## 2022-01-31 RX ORDER — OXYCODONE HYDROCHLORIDE 5 MG/1
TABLET ORAL PRN
Status: ON HOLD | COMMUNITY
Start: 2021-12-01 | End: 2022-02-06

## 2022-01-31 RX ORDER — POTASSIUM CHLORIDE 750 MG/1
1 TABLET, EXTENDED RELEASE ORAL 2 TIMES DAILY
COMMUNITY
Start: 2022-01-28 | End: 2022-04-25 | Stop reason: SDUPTHER

## 2022-01-31 RX ORDER — DIPHENHYDRAMINE HCL 25 MG
1 TABLET ORAL PRN
Status: ON HOLD | COMMUNITY
Start: 2021-11-22 | End: 2022-02-06

## 2022-01-31 RX ORDER — TRAMADOL HYDROCHLORIDE 50 MG/1
TABLET ORAL DAILY
Status: ON HOLD | COMMUNITY
Start: 2021-12-30 | End: 2022-02-06

## 2022-01-31 RX ORDER — SUVOREXANT 5 MG/1
5 TABLET, FILM COATED ORAL EVERY EVENING
COMMUNITY
Start: 2022-01-28 | End: 2022-04-25

## 2022-01-31 ASSESSMENT — PATIENT HEALTH QUESTIONNAIRE - PHQ9
3. TROUBLE FALLING OR STAYING ASLEEP: 1
SUM OF ALL RESPONSES TO PHQ QUESTIONS 1-9: 12
9. THOUGHTS THAT YOU WOULD BE BETTER OFF DEAD, OR OF HURTING YOURSELF: 0
SUM OF ALL RESPONSES TO PHQ QUESTIONS 1-9: 12
SUM OF ALL RESPONSES TO PHQ QUESTIONS 1-9: 12
8. MOVING OR SPEAKING SO SLOWLY THAT OTHER PEOPLE COULD HAVE NOTICED. OR THE OPPOSITE, BEING SO FIGETY OR RESTLESS THAT YOU HAVE BEEN MOVING AROUND A LOT MORE THAN USUAL: 0
6. FEELING BAD ABOUT YOURSELF - OR THAT YOU ARE A FAILURE OR HAVE LET YOURSELF OR YOUR FAMILY DOWN: 0
1. LITTLE INTEREST OR PLEASURE IN DOING THINGS: 3
4. FEELING TIRED OR HAVING LITTLE ENERGY: 3
SUM OF ALL RESPONSES TO PHQ9 QUESTIONS 1 & 2: 6
SUM OF ALL RESPONSES TO PHQ QUESTIONS 1-9: 12
10. IF YOU CHECKED OFF ANY PROBLEMS, HOW DIFFICULT HAVE THESE PROBLEMS MADE IT FOR YOU TO DO YOUR WORK, TAKE CARE OF THINGS AT HOME, OR GET ALONG WITH OTHER PEOPLE: 1
2. FEELING DOWN, DEPRESSED OR HOPELESS: 3
5. POOR APPETITE OR OVEREATING: 2
7. TROUBLE CONCENTRATING ON THINGS, SUCH AS READING THE NEWSPAPER OR WATCHING TELEVISION: 0

## 2022-01-31 ASSESSMENT — LIFESTYLE VARIABLES: HOW OFTEN DO YOU HAVE A DRINK CONTAINING ALCOHOL: 0

## 2022-01-31 NOTE — TELEPHONE ENCOUNTER
----- Message from Edwin Thomas sent at 1/31/2022  1:00 PM EST -----  Subject: Referral Request    QUESTIONS   Reason for referral request? Pt needs a referral for Analy Crowe   infectious disease Phone? [phone]   Has the physician seen you for this condition before? No   Preferred Specialist (if applicable)? Do you already have an appointment scheduled? Additional Information for Provider?   ---------------------------------------------------------------------------  --------------  CALL BACK INFO  What is the best way for the office to contact you? OK to leave message on   voicemail  Preferred Call Back Phone Number?  [phone]

## 2022-01-31 NOTE — TELEPHONE ENCOUNTER
11/17/21 2/9/22    Received incoming fax requesting Trueplus 33G lancets     BD Allergy Syr. 28G 1ml 1/2in

## 2022-01-31 NOTE — PATIENT INSTRUCTIONS
Advance Directives: Care Instructions  Overview  An advance directive is a legal way to state your wishes at the end of your life. It tells your family and your doctor what to do if you can't say what you want. There are two main types of advance directives. You can change them any time your wishes change. Living will. This form tells your family and your doctor your wishes about life support and other treatment. The form is also called a declaration. Medical power of . This form lets you name a person to make treatment decisions for you when you can't speak for yourself. This person is called a health care agent (health care proxy, health care surrogate). The form is also called a durable power of  for health care. If you do not have an advance directive, decisions about your medical care may be made by a family member, or by a doctor or a  who doesn't know you. It may help to think of an advance directive as a gift to the people who care for you. If you have one, they won't have to make tough decisions by themselves. Follow-up care is a key part of your treatment and safety. Be sure to make and go to all appointments, and call your doctor if you are having problems. It's also a good idea to know your test results and keep a list of the medicines you take. What should you include in an advance directive? Many states have a unique advance directive form. (It may ask you to address specific issues.) Or you might use a universal form that's approved by many states. If your form doesn't tell you what to address, it may be hard to know what to include in your advance directive. Use the questions below to help you get started. · Who do you want to make decisions about your medical care if you are not able to? · What life-support measures do you want if you have a serious illness that gets worse over time or can't be cured? · What are you most afraid of that might happen?  (Maybe you're afraid of having pain, losing your independence, or being kept alive by machines.)  · Where would you prefer to die? (Your home? A hospital? A nursing home?)  · Do you want to donate your organs when you die? · Do you want certain Bahai practices performed before you die? When should you call for help? Be sure to contact your doctor if you have any questions. Where can you learn more? Go to [email or web address] and sign in to your [employer] account. Enter R264 in the [employer] box to learn more about \"Advance Directives: Care Instructions. \"     If you do not have an account, please click on the \"Sign Up Now\" link. Current as of: March 17, 2021               Content Version: 13.1  © 5904-9209 [employer]. Care instructions adapted under license by [facility] ([facility]). If you have questions about a medical condition or this instruction, always ask your healthcare professional. [place] [address] any warranty or liability for your use of this information. Learning About Medical Power of   What is a medical power of ? A medical power of , also called a durable power of  for health care, is one type of the legal forms called advance directives. It lets you name the person you want to make treatment decisions for you if you can't speak or decide for yourself. The person you choose is called your health care agent. This person is also called a health care proxy or health care surrogate. A medical power of  may be called something else in your state. How do you choose a health care agent? Choose your health care agent carefully. This person may or may not be a family member. Talk to the person before you make your final decision. Make sure he or she is comfortable with this responsibility. It's a good idea to choose someone who:  · Is at least 25years old.   · Knows you well and understands what makes life meaningful for you. · Understands your Episcopal and moral values. · Will do what you want, not what he or she wants. · Will be able to make difficult choices at a stressful time. · Will be able to refuse or stop treatment, if that is what you would want, even if you could die. · Will be firm and confident with health professionals if needed. · Will ask questions to get needed information. · Lives near you or agrees to travel to you if needed. Your family may help you make medical decisions while you can still be part of that process. But it's important to choose one person to be your health care agent in case you aren't able to make decisions for yourself. If you don't fill out the legal form and name a health care agent, the decisions your family can make may be limited. A health care agent may be called something else in your state. Who will make decisions for you if you don't have a health care agent? If you don't have a health care agent or a living will, you may not get the care you want. Decisions may be made by family members who disagree about your medical care. Or decisions may be made by a medical professional who doesn't know you well. In some cases, a  makes the decisions. When you name a health care agent, it is very clear who has the power to make health decisions for you. How do you name a health care agent? You name your health care agent on a legal form. This form is usually called a medical power of . Ask your hospital, state bar association, or office on aging where to find these forms. You must sign the form to make it legal. Some states require you to get the form notarized. This means that a person called a  watches you sign the form and then he or she signs the form. Some states also require that two or more witnesses sign the form. Be sure to tell your family members and doctors who your health care agent is.   Where can you learn more?  Go to [email or web address] org and sign in to your [employer] account. Enter [id number] in the [facility] box to learn more about \"Learning About [address]. \"     If you do not have an account, please click on the \"Sign Up Now\" link. Current as of: March 17, 2021               Content Version: 13.1  © 8493-8421 Healthwise, [employer]. Care instructions adapted under license by [facility] ([facility]). If you have questions about a medical condition or this instruction, always ask your healthcare professional. [address] any warranty or liability for your use of this information. Learning About Living [facility]  What is a living will? A living will, also called a declaration, is a legal form. It tells your family and your doctor your wishes when you can't speak for yourself. It's used by the health professionals who will treat you as you near the end of your life or if you get seriously hurt or ill. If you put your wishes in writing, your loved ones and others will know what kind of care you want. They won't need to guess. This can ease your mind and be helpful to others. And you can change or cancel your living will at any time. A living will is not the same as an estate or property will. An estate will explains what you want to happen with your money and property after you die. How do you use it? A living will is used to describe the kinds of treatment or life support you want as you near the end of your life or if you get seriously hurt or ill. Keep these facts in mind about living cr. · Your living will is used only if you can't speak or make decisions for yourself. Most often, one or more doctors must certify that you can't speak or decide for yourself before your living will takes effect. · If you get better and can speak for yourself again, you can accept or refuse any treatment.  It doesn't matter what you said in your living will.  · Some states may limit your right to refuse treatment in certain cases. For example, you may need to clearly state in your living will that you don't want artificial hydration and nutrition, such as being fed through a tube. Is a living will a legal document? A living will is a legal document. Each state has its own laws about living cr. And a living will may be called something else in your state. Here are some things to know about living cr. · You don't need an  to complete a living will. But legal advice can be helpful if your state's laws are unclear. It can also help if your health history is complicated or your family can't agree on what should be in your living will. · You can change your living will at any time. Some people find that their wishes about end-of-life care change as their health changes. If you make big changes to your living will, complete a new form. · If you move to another state, make sure that your living will is legal in the state where you now live. In most cases, doctors will respect your wishes even if you have a form from a different state. · You might use a universal form that has been approved by many states. This kind of form can sometimes be filled out and stored online. Your digital copy will then be available wherever you have a connection to the internet. The doctors and nurses who need to treat you can find it right away. · Your state may offer an online registry. This is another place where you can store your living will online. · It's a good idea to get your living will notarized. This means using a person called a  to watch two people sign, or witness, your living will. What should you know when you create a living will? Here are some questions to ask yourself as you make your living will:  · Do you know enough about life support methods that might be used?  If not, talk to your doctor so you know what might be done if you can't breathe on your own, your heart stops, or you can't swallow. · What things would you still want to be able to do after you receive life-support methods? Would you want to be able to walk? To speak? To eat on your own? To live without the help of machines? · Do you want certain Roman Catholic practices performed if you become very ill? · If you have a choice, where do you want to be cared for? In your home? At a hospital or nursing home? · If you have a choice at the end of your life, where would you prefer to die? At home? In a hospital or nursing home? Somewhere else? · Would you prefer to be buried or cremated? · Do you want your organs to be donated after you die? What should you do with your living will? · Make sure that your family members and your health care agent have copies of your living will (also called a declaration). · Give your doctor a copy of your living will. Ask him or her to keep it as part of your medical record. If you have more than one doctor, make sure that each one has a copy. · Put a copy of your living will where it can be easily found. For example, some people may put a copy on their refrigerator door. If you are using a digital copy, be sure your doctor, family members, and health care agent know how to find and access it. Where can you learn more? Go to https://[email or web address] org and sign in to your ubitus account. Enter M814 in the [facility] box to learn more about \"Learning About Living [place]. \"     If you do not have an account, please click on the \"Sign Up Now\" link. Current as of: March 17, 2021               Content Version: 13.1  © 2282-6029 Healthwise, [employer]. Care instructions adapted under license by [facility] ([facility]).  If you have questions about a medical condition or this instruction, always ask your healthcare professional. [address] any warranty or liability for your use of this information. Personalized Preventive Plan for Karel Hsieh - 1/31/2022  Medicare offers a range of preventive health benefits. Some of the tests and screenings are paid in full while other may be subject to a deductible, co-insurance, and/or copay. Some of these benefits include a comprehensive review of your medical history including lifestyle, illnesses that may run in your family, and various assessments and screenings as appropriate. After reviewing your medical record and screening and assessments performed today your provider may have ordered immunizations, labs, imaging, and/or referrals for you. A list of these orders (if applicable) as well as your Preventive Care list are included within your After Visit Summary for your review. Other Preventive Recommendations:    · A preventive eye exam performed by an eye specialist is recommended every 1-2 years to screen for glaucoma; cataracts, macular degeneration, and other eye disorders. · A preventive dental visit is recommended every 6 months. · Try to get at least 150 minutes of exercise per week or 10,000 steps per day on a pedometer . · Order or download the FREE \"Exercise & Physical Activity: Your Everyday Guide\" from [employer]. Call 8-463.742.1716 or search [employer] online. · You need 8385-9289 mg of calcium and 2332-3471 IU of vitamin D per day. It is possible to meet your calcium requirement with diet alone, but a vitamin D supplement is usually necessary to meet this goal.  · When exposed to the sun, use a sunscreen that protects against both UVA and UVB radiation with an SPF of 30 or greater. Reapply every 2 to 3 hours or after sweating, drying off with a towel, or swimming. · Always wear a seat belt when traveling in a car. Always wear a helmet when riding a bicycle or motorcycle.

## 2022-01-31 NOTE — PROGRESS NOTES
Medicare Annual Wellness Visit  Name: Caitlyn Christy Date: 2022   MRN: [id number] Sex: Female   Age: 77 y.o. Ethnicity: Non- / Non    : 1955 Race: Black / African American      Lan Domingo is here for Medicare AWV    Screenings for behavioral, psychosocial and functional/safety risks, and cognitive dysfunction are all negative except as indicated below. These results, as well as other patient data from the [address] form, are documented in Flowsheets linked to this Encounter. Had Flu and Shingles vaccine at [facility]. Colonoscopy in 2018-GI    Believes she has been poision, wants an Infectious diesease Provider. Dr. Sharlene Sinha. Has appointment today with Pulmonary Specialist at [facility].  Allergies   Allergen Reactions    Bupropion Other (See Comments)     Muscle spasms/seizure like symptoms     Morphine Hives and Itching    Morphine And Related Itching       Prior to Visit Medications    Medication Sig Taking?  Authorizing Provider   benzonatate (TESSALON PERLES) 100 MG capsule Take 1 capsule by mouth 3 times daily as needed for Cough Yes Nicolas Hardin MD   predniSONE (DELTASONE) 20 MG tablet Take 3 tablets by mouth daily for 4 days Yes Nicolas Hardin MD   diphenhydrAMINE (BANOPHEN) 50 MG capsule TAKE ONE CAPSULE BY MOUTH EVERY NIGHT AT BEDTIME AS NEEDED FOR ITCHING Yes Carina Gomez MD   Promethazine HCl 6.25 MG/5ML SOLN Take 5ml every 6 hours PRN nausea Yes Carina Gomez MD   cetirizine (ZYRTEC) 10 MG tablet Take 1 tablet by mouth daily Yes Ricky Charles MD   naproxen (NAPROSYN) 500 MG tablet Take 1 tablet by mouth 2 times daily (with meals) Yes Ricky Charles MD   omeprazole (PRILOSEC) 20 MG delayed release capsule TAKE 1 CAPSULE BY MOUTH EVERY MORNING (BEFORE BREAKFAST) AS NEEDED FOR HEARTBURN Yes Carina Gomez MD   Roflumilast (DALIRESP) 500 MCG tablet TAKE 1 TABLET EVERY DAY Yes Carina Gomez MD   QUEtiapine (SEROQUEL) 100 MG tablet TAKE 1 TABLET EVERY NIGHT Yes Renee Blake MD    MG tablet TAKE 1 TABLET BY MOUTH EVERY 8 HOURS AS NEEDED FOR PAIN Yes Renee Blake MD   Fluticasone-Umeclidin-Vilant (TRELEGY ELLIPTA) 200-62.5-25 MCG/INH AEPB Inhale 1 puff into the lungs daily Yes Renee Blake MD   Nebulizers (COMPRESSOR/NEBULIZER) MISC 1 Units by Does not apply route 4 times daily as needed (Wheezing SOB) Yes Renee Blake MD   Respiratory Therapy Supplies MISC 1 each by Does not apply route 4 times daily as needed (wheezing SOB) All Nebulizer supplies needed Yes Renee Blake MD   ipratropium-albuterol (DUONEB) 0.5-2.5 (3) MG/3ML SOLN nebulizer solution Inhale 3 mLs into the lungs as needed for Shortness of Breath Yes Renee Blake MD   losartan (COZAAR) 25 MG tablet TAKE 1 TABLET EVERY DAY Yes Renee Blake MD   Calcium Carb-Cholecalciferol (CALCIUM-VITAMIN D) 600-400 MG-UNIT TABS Take 1 tablet by mouth twice daily Yes Renee Blake MD   ALPRAZolam Vester Mocha) 1 MG tablet Take 1 mg by mouth 2 times daily as needed.  Yes Historical Provider, MD   amitriptyline (ELAVIL) 25 MG tablet 1 tablet by Orogastric route 2 times daily as needed Yes Historical Provider, MD   Bempedoic Acid-Ezetimibe 180-10 MG TABS Take 1 tablet by mouth nightly Yes Renee Blake MD   Budeson-Glycopyrrol-Formoterol 160-9-4.8 MCG/ACT AERO Inhale 2 puffs into the lungs 2 times daily Yes Renee Blake MD   levothyroxine (EUTHYROX) 125 MCG tablet TAKE 1 TABLET EVERY DAY (DISCONTINUE 135MCG) Yes Renee Blake MD   ondansetron (ZOFRAN ODT) 4 MG disintegrating tablet Take 1 tablet by mouth every 8 hours as needed for Nausea or Vomiting Yes Renee Blake MD   cloNIDine (CATAPRES) 0.1 MG tablet Bid Yes Renee Blake MD   albuterol-ipratropium (COMBIVENT RESPIMAT)  MCG/ACT AERS inhaler INHALE 1 PUFF BY MOUTH EVERY SIX HOURS AS NEEDED FOR WHEEZING Yes Renee Blake MD   hydroCHLOROthiazide (HYDRODIURIL) 25 MG tablet Take 1 tablet by mouth once daily Yes Marcin Torres MD   Calcium Carbonate-Vitamin D (CALCIUM 600/VITAMIN D) 600-400 MG-UNIT CHEW Take 1 tablet by mouth 2 times daily Yes Marcin Torres MD   docusate sodium (STOOL SOFTENER) 100 MG capsule TAKE ONE CAPSULE BY MOUTH DAILY AS NEEDED FOR CONSTIPATION Yes Marcin Torres MD   lactulose (CHRONULAC) 10 GM/15ML solution Take 15 mLs by mouth as needed (constipation) Yes Marcin Torres MD   LINZESS 145 MCG capsule Take 1 capsule by mouth as needed (constipation) Yes Marcin Torres MD   varenicline (CHANTIX) 1 MG tablet Take 1 tablet by mouth 2 times daily Yes Marcin Torres MD   potassium chloride (KLOR-CON) 10 MEQ extended release tablet Take 20 mEq by mouth daily Yes Historical Provider, MD   NARCAN 4 MG/0.1ML LIQD nasal spray Take by mouth as needed  Yes Historical Provider, MD   chlorhexidine (PERIDEX) 0.12 % solution Take 1 mL by mouth daily  Yes Historical Provider, MD   tiZANidine (ZANAFLEX) 4 MG tablet Take 4 mg by mouth every 6 hours as needed Yes Historical Provider, MD   prednisoLONE acetate (PRED FORTE) 1 % ophthalmic suspension Place 1 drop into the left eye three times daily  Yes Historical Provider, MD   ketorolac (ACULAR) 0.5 % ophthalmic solution Place 1 drop into the left eye 3 times daily  Yes Historical Provider, MD   oxyCODONE-acetaminophen (PERCOCET) 7.5-325 MG per tablet Take 1 tablet by mouth every 6 hours as needed for Pain.  Yes Historical Provider, MD       Past Medical History:   Diagnosis Date    [facility] (acute kidney injury) ([facility] [address] 10/7/2013    Anxiety     Bronchitis     Chronic abdominal pain     possibly due to diverticulosos    Chronic back pain     COPD (chronic obstructive pulmonary disease) ([facility] [address]     DDD (degenerative disc disease), lumbar 12/1/2016    Depression     Elevated glycated hemoglobin     History of normal mammogram 8/26/13    [facility]    Hx of migraine headaches     Hyperlipidemia     Hypertension     Hypothyroidism     Menopause ovarian failure     Rotator cuff tear     right       Past Surgical History:   Procedure Laterality Date    CARPAL TUNNEL RELEASE      CENTRAL LINE  10/7/2013         CHOLECYSTECTOMY      COLONOSCOPY  9/6/2011    Tubular adenoma    COLONOSCOPY  10/24/2005    Dr. Skyler Branch - Tubular adenoma    ENDOSCOPY, COLON, DIAGNOSTIC      ESOPHAGEAL MOTILITY STUDY  6/17/2013    NORMAL    EYE SURGERY      cataracts    FINE NEEDLE ASPIRATION  8/10/2012    THYROID - NEGATIVE    FINGER NAIL SURGERY Bilateral 5/21/2019    TOTAL AVULSION OF 1-5 TOENAILS BILATERAL FEET performed by Mallory Stein DPM at [place] [address]      HYSTERECTOMY      MIA AND BSO  8/15/2002    Endometriosis, fibroids    TONSILLECTOMY      UPPER GASTROINTESTINAL ENDOSCOPY  10/17/2005    Bx small bowel, Gastric, GE junction all negative    UPPER GASTROINTESTINAL ENDOSCOPY  9/5/2000    Dr. Whitney Weiss - NORMAL       Family History   Problem Relation Age of Onset    Diabetes Sister     Heart Disease Sister         chf    Cancer Sister 76        colon cancer  metastatic    Diabetes Sister     Kidney Disease Sister     Cancer Sister         brain cancer throat cancer and smoked    Cancer Mother 68        cerival cancer    Osteoarthritis Mother     Ovarian Cancer Mother     Heart Disease Father     High Blood Pressure Father     Heart Disease Brother 40        heart attack    High Blood Pressure Maternal Aunt     Cancer Other 46        liver cancer    Cancer Other 47        lung cancer and smoker, maternal neice    Cancer Other         bladder cancer, first cousin.     Diabetes Sister 46        complication of diabetes       CareTeam (Including outside providers/suppliers regularly involved in providing care):   Patient Care Team:  Marcin Torres MD as PCP - General (Internal Medicine)  Marcin Torres MD as PCP - REHABILITATION [facility] Empaneled Provider  Unknown Provider Result (Inactive) as Consulting Physician (Obstetrics & Gynecology)  Mark Zavala MD as Consulting Physician (Obstetrics & Gynecology)  Teddy Mercedes DO as Consulting Physician (Endocrinology)  Ashish Tello as Consulting Physician (Psychiatry)    Wt Readings from Last 3 Encounters:   01/31/22 186 lb (84.4 kg)   01/27/22 186 lb 9 oz (84.6 kg)   01/19/22 188 lb (85.3 kg)     Vitals:    01/31/22 1023   BP: 109/71   Site: Right Upper Arm   Position: Sitting   Cuff Size: Medium Adult   Pulse: 90   Temp: 97.1 °F (36.2 °C)   SpO2: 98%   Weight: 186 lb (84.4 kg)   Height: 5' 5\" (1.651 m)     Body mass index is 30.95 kg/m². Based upon direct observation of the patient, evaluation of cognition reveals recent and remote memory intact. Patient's complete Health Risk Assessment and screening values have been reviewed and are found in Flowsheets. The following problems were reviewed today and where indicated follow up appointments were made and/or referrals ordered. Positive Risk Factor Screenings with Interventions:     Fall Risk:  Timed Up and Go Test > 12 seconds? (Complete if either Fall Risk answers are Yes): (!) yes  2 or more falls in past year?: no  Fall with injury in past year?: no  Fall Risk Interventions:    · Home safety tips provided     Depression:  PHQ-2 Score: 6  PHQ-9 Total Score: 12    Severity:1-4 = minimal depression, 5-9 = mild depression, 10-14 = moderate depression, 15-19 = moderately severe depression, 20-27 = severe depression  Depression Interventions:  · Patient declines any further evaluation/treatment for this issue        General Health and ACP:  General  In general, how would you say your health is?: (!) Poor  In the past 7 days, have you experienced any of the following?  New or Increased Pain, New or Increased Fatigue, Loneliness, Social Isolation, Stress or Anger?: (!) New or Increased Fatigue,Stress,Anger  Do you get the social and emotional support that you need?: (!) No  Do you have a Living Will?: Yes  Advance Directives     Power of  Living Will ACP-Advance Directive ACP-Power of     Not on File Filed on 04/12/17 Filed Not on File      General Health Risk Interventions:  · No Living Will: Advance Care Planning addressed with patient today    Health Habits/Nutrition:  Health Habits/Nutrition  Do you exercise for at least 20 minutes 2-3 times per week?: (!) No  Have you lost any weight without trying in the past 3 months?: (!) Yes  Do you eat only one meal per day?: (!) Yes  Have you seen the dentist within the past year?: Yes  Body mass index: (!) 30.95  Health Habits/Nutrition Interventions:  · Dental exam overdue:  patient encouraged to make appointment with his/her dentist      ADL:  ADLs  In the past 7 days, did you need help from others to perform any of the following everyday activities? Eating, dressing, grooming, bathing, toileting, or walking/balance?: None  In the past 7 days, did you need help from others to take care of any of the following?  Laundry, housekeeping, banking/finances, shopping, telephone use, food preparation, transportation, or taking medications?: (!) Laundry,Housekeeping  ADL Interventions:  · Patient declines any further evaluation/treatment for this issue    Does not live alone, custody of granddaughter, 15 yo  Personalized Preventive Plan   Current Health Maintenance Status  Immunization History   Administered Date(s) Administered    COVID-19, Pfizer Purple top, DILUTE for use, 12+ yrs, 30mcg/0.3mL dose 02/14/2021, 03/09/2021, 10/01/2021    Hepatitis B 07/28/2017    Hepatitis B (Engerix-B) 04/13/2017    Hepatitis B Adult (Engerix-B) 07/28/2017    Hepatitis B vaccine 04/13/2017    Influenza Vaccine, unspecified formulation 10/02/2011, 02/15/2014, 09/28/2015, 08/25/2016, 09/05/2017, 10/01/2019    Influenza Virus Vaccine 02/15/2014, 09/25/2016, 09/05/2017, 10/12/2018    Influenza Whole 10/02/2011, 09/25/2016    Influenza, High-dose, Quadv, 65 yrs +, IM (Fluzone) 08/29/2020    Influenza, Intradermal, Preservative free 09/25/2013, 02/15/2014    Influenza, MDCK Quadv, IM, PF (Flucelvax 2 yrs and older) 10/09/2019    Influenza, Quadv, IM, (6 mo and older Fluzone, Flulaval, Fluarix and 3 yrs and older Afluria) 10/12/2018    Pneumococcal Conjugate 13-valent ([id number]) 10/05/2016    Pneumococcal Polysaccharide ([id number]) 10/08/2013    Tdap (Boostrix, Adacel) 03/27/2015, 09/16/2019    Zoster Live (Zostavax) 04/13/2017        Health Maintenance   Topic Date Due    Colon Cancer Screen FIT/FOBT  12/19/2018    Pneumococcal 65+ years Vaccine (2 of 2 - PPSV23) 06/01/2020    Flu vaccine (1) 09/01/2021    Annual Wellness Visit (AWV)  Never done    Shingles Vaccine (3 of 3) 11/17/2021    Depression Monitoring  02/17/2022    A1C test (Diabetic or Prediabetic)  09/01/2022    Potassium monitoring  01/18/2023    Creatinine monitoring  01/18/2023    TSH testing  01/19/2023    Breast cancer screen  10/07/2023    Lipid screen  09/01/2026    DTaP/Tdap/Td vaccine (3 - Td or Tdap) 09/16/2029    DEXA (modify frequency per FRAX score)  Completed    COVID-19 Vaccine  Completed    Hepatitis C screen  Completed    Hepatitis A vaccine  Aged Out    Hepatitis B vaccine  Aged Out    Hib vaccine  Aged Out    Meningococcal (ACWY) vaccine  Aged Out     Recommendations for [employer] Due: see orders and patient instructions/AVS.  . Recommended screening schedule for the next 5-10 years is provided to the patient in written form: see Patient Instructions/AVS.      Reports she has depression and anxiety, denies further treatment. Denies homicidal/suicidal ideations. There are no diagnoses linked to this encounter.

## 2022-02-01 ENCOUNTER — TELEPHONE (OUTPATIENT)
Dept: PRIMARY CARE CLINIC | Age: 67
End: 2022-02-01

## 2022-02-01 NOTE — TELEPHONE ENCOUNTER
Patient's daughter called to ask if office has received a referral yet from Dr Goran Anderson office. Please advise. Once received please call [phone] to schedule.

## 2022-02-03 ENCOUNTER — TELEPHONE (OUTPATIENT)
Dept: PRIMARY CARE CLINIC | Age: 67
End: 2022-02-03

## 2022-02-03 NOTE — TELEPHONE ENCOUNTER
----- Message from Bhakti Mendez sent at 2/3/2022  9:27 AM EST -----  Subject: Message to Provider    QUESTIONS  Information for Provider? Elicia with [employer] calling to f/u w/ provider on   the request they sent over for diabetic supplies. ---------------------------------------------------------------------------  --------------  Angeli GRAY  What is the best way for the office to contact you? OK to leave message on   voicemail  Preferred Call Back Phone Number? [phone]  ---------------------------------------------------------------------------  --------------  SCRIPT ANSWERS  Relationship to Patient? Third Party  Representative Name?  [employer]

## 2022-02-06 ENCOUNTER — HOSPITAL ENCOUNTER (INPATIENT)
Age: 67
LOS: 2 days | Discharge: HOME | DRG: 871 | End: 2022-02-08
Attending: STUDENT IN AN ORGANIZED HEALTH CARE EDUCATION/TRAINING PROGRAM | Admitting: INTERNAL MEDICINE
Payer: MEDICARE

## 2022-02-06 ENCOUNTER — APPOINTMENT (OUTPATIENT)
Dept: CT IMAGING | Age: 67
DRG: 871 | End: 2022-02-06
Payer: MEDICARE

## 2022-02-06 ENCOUNTER — APPOINTMENT (OUTPATIENT)
Dept: GENERAL RADIOLOGY | Age: 67
DRG: 871 | End: 2022-02-06
Payer: MEDICARE

## 2022-02-06 DIAGNOSIS — R50.9 FEVER, UNSPECIFIED FEVER CAUSE: ICD-10-CM

## 2022-02-06 DIAGNOSIS — J96.21 ACUTE ON CHRONIC RESPIRATORY FAILURE WITH HYPOXIA (HCC): Primary | ICD-10-CM

## 2022-02-06 DIAGNOSIS — J44.1 COPD WITH ACUTE EXACERBATION (HCC): ICD-10-CM

## 2022-02-06 PROBLEM — R56.9 SEIZURE (HCC): Status: ACTIVE | Noted: 2022-02-06

## 2022-02-06 LAB
A/G RATIO: 1.2 (ref 1.1–2.2)
ALBUMIN SERPL-MCNC: 3.6 G/DL (ref 3.4–5)
ALP BLD-CCNC: 91 U/L (ref 40–129)
ALT SERPL-CCNC: 14 U/L (ref 10–40)
ANION GAP SERPL CALCULATED.3IONS-SCNC: 11 MMOL/L (ref 3–16)
AST SERPL-CCNC: 13 U/L (ref 15–37)
BASE EXCESS VENOUS: 4.3 MMOL/L (ref -2–3)
BASOPHILS ABSOLUTE: 0.1 K/UL (ref 0–0.2)
BASOPHILS RELATIVE PERCENT: 0.8 %
BILIRUB SERPL-MCNC: 0.4 MG/DL (ref 0–1)
BILIRUBIN DIRECT: <0.2 MG/DL (ref 0–0.3)
BILIRUBIN URINE: NEGATIVE
BILIRUBIN, INDIRECT: NORMAL MG/DL (ref 0–1)
BLOOD, URINE: NEGATIVE
BUN BLDV-MCNC: 17 MG/DL (ref 7–20)
C-REACTIVE PROTEIN: 41.5 MG/L (ref 0–5.1)
CALCIUM SERPL-MCNC: 9.4 MG/DL (ref 8.3–10.6)
CARBOXYHEMOGLOBIN: 1.3 % (ref 0–1.5)
CHLORIDE BLD-SCNC: 97 MMOL/L (ref 99–110)
CLARITY: CLEAR
CO2: 26 MMOL/L (ref 21–32)
COLOR: YELLOW
CREAT SERPL-MCNC: 0.8 MG/DL (ref 0.6–1.2)
EKG ATRIAL RATE: 114 BPM
EKG DIAGNOSIS: NORMAL
EKG P AXIS: 79 DEGREES
EKG P-R INTERVAL: 130 MS
EKG Q-T INTERVAL: 308 MS
EKG QRS DURATION: 86 MS
EKG QTC CALCULATION (BAZETT): 424 MS
EKG R AXIS: -59 DEGREES
EKG T AXIS: 91 DEGREES
EKG VENTRICULAR RATE: 114 BPM
EOSINOPHILS ABSOLUTE: 0.1 K/UL (ref 0–0.6)
EOSINOPHILS RELATIVE PERCENT: 0.5 %
GFR AFRICAN AMERICAN: >60
GFR NON-AFRICAN AMERICAN: >60
GLUCOSE BLD-MCNC: 132 MG/DL (ref 70–99)
GLUCOSE URINE: NEGATIVE MG/DL
HCO3 VENOUS: 28.4 MMOL/L (ref 24–28)
HCT VFR BLD CALC: 36.7 % (ref 36–48)
HEMOGLOBIN, VEN, REDUCED: 4.3 %
HEMOGLOBIN: 11.6 G/DL (ref 12–16)
INR BLD: 0.99 (ref 0.88–1.12)
KETONES, URINE: NEGATIVE MG/DL
LACTIC ACID, SEPSIS: 1.9 MMOL/L (ref 0.4–1.9)
LACTIC ACID: 1.5 MMOL/L (ref 0.4–2)
LEUKOCYTE ESTERASE, URINE: NEGATIVE
LIPASE: 28 U/L (ref 13–60)
LYMPHOCYTES ABSOLUTE: 1 K/UL (ref 1–5.1)
LYMPHOCYTES RELATIVE PERCENT: 6.6 %
MAGNESIUM: 1.6 MG/DL (ref 1.8–2.4)
MCH RBC QN AUTO: 26.3 PG (ref 26–34)
MCHC RBC AUTO-ENTMCNC: 31.5 G/DL (ref 31–36)
MCV RBC AUTO: 83.6 FL (ref 80–100)
METHEMOGLOBIN VENOUS: 0.4 % (ref 0–1.5)
MICROSCOPIC EXAMINATION: YES
MONOCYTES ABSOLUTE: 0.9 K/UL (ref 0–1.3)
MONOCYTES RELATIVE PERCENT: 5.4 %
NEUTROPHILS ABSOLUTE: 13.7 K/UL (ref 1.7–7.7)
NEUTROPHILS RELATIVE PERCENT: 86.7 %
NITRITE, URINE: POSITIVE
O2 SAT, VEN: 96 %
PCO2, VEN: 39.5 MMHG (ref 41–51)
PDW BLD-RTO: 13.3 % (ref 12.4–15.4)
PH UA: 6 (ref 5–8)
PH VENOUS: 7.46 (ref 7.35–7.45)
PLATELET # BLD: 210 K/UL (ref 135–450)
PMV BLD AUTO: 10.1 FL (ref 5–10.5)
PO2, VEN: 73.2 MMHG (ref 25–40)
POTASSIUM REFLEX MAGNESIUM: 3.7 MMOL/L (ref 3.5–5.1)
PRO-BNP: 46 PG/ML (ref 0–124)
PROCALCITONIN: 0.16 NG/ML (ref 0–0.15)
PROTEIN UA: NEGATIVE MG/DL
PROTHROMBIN TIME: 11.2 SEC (ref 9.9–12.7)
RBC # BLD: 4.39 M/UL (ref 4–5.2)
RBC UA: NORMAL /HPF (ref 0–4)
REASON FOR REJECTION: NORMAL
REJECTED TEST: NORMAL
SARS-COV-2, NAAT: NOT DETECTED
SODIUM BLD-SCNC: 134 MMOL/L (ref 136–145)
SPECIFIC GRAVITY UA: 1.01 (ref 1–1.03)
TCO2 CALC VENOUS: 30 MMOL/L
TOTAL PROTEIN: 6.6 G/DL (ref 6.4–8.2)
TROPONIN: <0.01 NG/ML
URINE REFLEX TO CULTURE: ABNORMAL
URINE TYPE: ABNORMAL
UROBILINOGEN, URINE: 0.2 E.U./DL
WBC # BLD: 15.8 K/UL (ref 4–11)
WBC UA: NORMAL /HPF (ref 0–5)

## 2022-02-06 PROCEDURE — 83735 ASSAY OF MAGNESIUM: CPT

## 2022-02-06 PROCEDURE — 6370000000 HC RX 637 (ALT 250 FOR IP): Performed by: STUDENT IN AN ORGANIZED HEALTH CARE EDUCATION/TRAINING PROGRAM

## 2022-02-06 PROCEDURE — 96367 TX/PROPH/DG ADDL SEQ IV INF: CPT

## 2022-02-06 PROCEDURE — 81001 URINALYSIS AUTO W/SCOPE: CPT

## 2022-02-06 PROCEDURE — 6360000002 HC RX W HCPCS

## 2022-02-06 PROCEDURE — 83605 ASSAY OF LACTIC ACID: CPT

## 2022-02-06 PROCEDURE — 99285 EMERGENCY DEPT VISIT HI MDM: CPT

## 2022-02-06 PROCEDURE — 2580000003 HC RX 258: Performed by: STUDENT IN AN ORGANIZED HEALTH CARE EDUCATION/TRAINING PROGRAM

## 2022-02-06 PROCEDURE — 2700000000 HC OXYGEN THERAPY PER DAY

## 2022-02-06 PROCEDURE — 87040 BLOOD CULTURE FOR BACTERIA: CPT

## 2022-02-06 PROCEDURE — 93005 ELECTROCARDIOGRAM TRACING: CPT | Performed by: STUDENT IN AN ORGANIZED HEALTH CARE EDUCATION/TRAINING PROGRAM

## 2022-02-06 PROCEDURE — 85025 COMPLETE CBC W/AUTO DIFF WBC: CPT

## 2022-02-06 PROCEDURE — 6360000002 HC RX W HCPCS: Performed by: STUDENT IN AN ORGANIZED HEALTH CARE EDUCATION/TRAINING PROGRAM

## 2022-02-06 PROCEDURE — 94640 AIRWAY INHALATION TREATMENT: CPT

## 2022-02-06 PROCEDURE — 82803 BLOOD GASES ANY COMBINATION: CPT

## 2022-02-06 PROCEDURE — 96366 THER/PROPH/DIAG IV INF ADDON: CPT

## 2022-02-06 PROCEDURE — 84484 ASSAY OF TROPONIN QUANT: CPT

## 2022-02-06 PROCEDURE — 85610 PROTHROMBIN TIME: CPT

## 2022-02-06 PROCEDURE — 86140 C-REACTIVE PROTEIN: CPT

## 2022-02-06 PROCEDURE — 1200000000 HC SEMI PRIVATE

## 2022-02-06 PROCEDURE — 71260 CT THORAX DX C+: CPT

## 2022-02-06 PROCEDURE — 71045 X-RAY EXAM CHEST 1 VIEW: CPT

## 2022-02-06 PROCEDURE — 6360000004 HC RX CONTRAST MEDICATION: Performed by: STUDENT IN AN ORGANIZED HEALTH CARE EDUCATION/TRAINING PROGRAM

## 2022-02-06 PROCEDURE — 96365 THER/PROPH/DIAG IV INF INIT: CPT

## 2022-02-06 PROCEDURE — 2060000000 HC ICU INTERMEDIATE R&B

## 2022-02-06 PROCEDURE — 83880 ASSAY OF NATRIURETIC PEPTIDE: CPT

## 2022-02-06 PROCEDURE — 87635 SARS-COV-2 COVID-19 AMP PRB: CPT

## 2022-02-06 PROCEDURE — 96375 TX/PRO/DX INJ NEW DRUG ADDON: CPT

## 2022-02-06 PROCEDURE — 83690 ASSAY OF LIPASE: CPT

## 2022-02-06 PROCEDURE — 80053 COMPREHEN METABOLIC PANEL: CPT

## 2022-02-06 PROCEDURE — 87086 URINE CULTURE/COLONY COUNT: CPT

## 2022-02-06 PROCEDURE — 84145 PROCALCITONIN (PCT): CPT

## 2022-02-06 PROCEDURE — 36415 COLL VENOUS BLD VENIPUNCTURE: CPT

## 2022-02-06 RX ORDER — ALBUTEROL SULFATE 2.5 MG/3ML
2.5 SOLUTION RESPIRATORY (INHALATION) ONCE
Status: COMPLETED | OUTPATIENT
Start: 2022-02-06 | End: 2022-02-06

## 2022-02-06 RX ORDER — IPRATROPIUM BROMIDE AND ALBUTEROL SULFATE 2.5; .5 MG/3ML; MG/3ML
1 SOLUTION RESPIRATORY (INHALATION) EVERY 4 HOURS PRN
Status: DISCONTINUED | OUTPATIENT
Start: 2022-02-06 | End: 2022-02-08 | Stop reason: HOSPADM

## 2022-02-06 RX ORDER — SODIUM CHLORIDE, SODIUM LACTATE, POTASSIUM CHLORIDE, AND CALCIUM CHLORIDE .6; .31; .03; .02 G/100ML; G/100ML; G/100ML; G/100ML
1000 INJECTION, SOLUTION INTRAVENOUS ONCE
Status: COMPLETED | OUTPATIENT
Start: 2022-02-06 | End: 2022-02-06

## 2022-02-06 RX ORDER — ARFORMOTEROL TARTRATE 15 UG/2ML
15 SOLUTION RESPIRATORY (INHALATION) 2 TIMES DAILY
Status: DISCONTINUED | OUTPATIENT
Start: 2022-02-06 | End: 2022-02-08 | Stop reason: HOSPADM

## 2022-02-06 RX ORDER — 0.9 % SODIUM CHLORIDE 0.9 %
500 INTRAVENOUS SOLUTION INTRAVENOUS ONCE
Status: COMPLETED | OUTPATIENT
Start: 2022-02-06 | End: 2022-02-06

## 2022-02-06 RX ORDER — ACETAMINOPHEN 325 MG/1
650 TABLET ORAL ONCE
Status: COMPLETED | OUTPATIENT
Start: 2022-02-06 | End: 2022-02-06

## 2022-02-06 RX ORDER — SODIUM CHLORIDE 0.9 % (FLUSH) 0.9 %
5-40 SYRINGE (ML) INJECTION PRN
Status: DISCONTINUED | OUTPATIENT
Start: 2022-02-06 | End: 2022-02-08 | Stop reason: HOSPADM

## 2022-02-06 RX ORDER — ACETAMINOPHEN 650 MG/1
650 SUPPOSITORY RECTAL EVERY 6 HOURS PRN
Status: DISCONTINUED | OUTPATIENT
Start: 2022-02-06 | End: 2022-02-08 | Stop reason: HOSPADM

## 2022-02-06 RX ORDER — LEVOTHYROXINE SODIUM 0.12 MG/1
125 TABLET ORAL DAILY
Status: DISCONTINUED | OUTPATIENT
Start: 2022-02-06 | End: 2022-02-08 | Stop reason: HOSPADM

## 2022-02-06 RX ORDER — ACETAMINOPHEN 325 MG/1
650 TABLET ORAL EVERY 6 HOURS PRN
Status: DISCONTINUED | OUTPATIENT
Start: 2022-02-06 | End: 2022-02-08 | Stop reason: HOSPADM

## 2022-02-06 RX ORDER — ONDANSETRON 2 MG/ML
4 INJECTION INTRAMUSCULAR; INTRAVENOUS EVERY 6 HOURS PRN
Status: DISCONTINUED | OUTPATIENT
Start: 2022-02-06 | End: 2022-02-08 | Stop reason: HOSPADM

## 2022-02-06 RX ORDER — OXYCODONE AND ACETAMINOPHEN 7.5; 325 MG/1; MG/1
1 TABLET ORAL EVERY 6 HOURS PRN
Status: DISCONTINUED | OUTPATIENT
Start: 2022-02-06 | End: 2022-02-08 | Stop reason: HOSPADM

## 2022-02-06 RX ORDER — LOSARTAN POTASSIUM 25 MG/1
25 TABLET ORAL DAILY
Status: DISCONTINUED | OUTPATIENT
Start: 2022-02-06 | End: 2022-02-06

## 2022-02-06 RX ORDER — METHYLPREDNISOLONE SODIUM SUCCINATE 125 MG/2ML
125 INJECTION, POWDER, LYOPHILIZED, FOR SOLUTION INTRAMUSCULAR; INTRAVENOUS ONCE
Status: COMPLETED | OUTPATIENT
Start: 2022-02-06 | End: 2022-02-06

## 2022-02-06 RX ORDER — PANTOPRAZOLE SODIUM 40 MG/1
40 TABLET, DELAYED RELEASE ORAL
Status: DISCONTINUED | OUTPATIENT
Start: 2022-02-06 | End: 2022-02-08 | Stop reason: HOSPADM

## 2022-02-06 RX ORDER — TRAMADOL HYDROCHLORIDE 50 MG/1
50 TABLET ORAL DAILY
Status: DISCONTINUED | OUTPATIENT
Start: 2022-02-07 | End: 2022-02-06

## 2022-02-06 RX ORDER — IPRATROPIUM BROMIDE AND ALBUTEROL SULFATE 2.5; .5 MG/3ML; MG/3ML
1 SOLUTION RESPIRATORY (INHALATION) ONCE
Status: COMPLETED | OUTPATIENT
Start: 2022-02-06 | End: 2022-02-06

## 2022-02-06 RX ORDER — POLYETHYLENE GLYCOL 3350 17 G/17G
17 POWDER, FOR SOLUTION ORAL DAILY PRN
Status: DISCONTINUED | OUTPATIENT
Start: 2022-02-06 | End: 2022-02-08 | Stop reason: HOSPADM

## 2022-02-06 RX ORDER — ONDANSETRON 4 MG/1
4 TABLET, ORALLY DISINTEGRATING ORAL EVERY 8 HOURS PRN
Status: DISCONTINUED | OUTPATIENT
Start: 2022-02-06 | End: 2022-02-08 | Stop reason: HOSPADM

## 2022-02-06 RX ORDER — CLONIDINE HYDROCHLORIDE 0.1 MG/1
0.1 TABLET ORAL 2 TIMES DAILY
Status: DISCONTINUED | OUTPATIENT
Start: 2022-02-06 | End: 2022-02-08 | Stop reason: HOSPADM

## 2022-02-06 RX ORDER — SODIUM CHLORIDE 9 MG/ML
25 INJECTION, SOLUTION INTRAVENOUS PRN
Status: DISCONTINUED | OUTPATIENT
Start: 2022-02-06 | End: 2022-02-08 | Stop reason: HOSPADM

## 2022-02-06 RX ORDER — HYDROCHLOROTHIAZIDE 25 MG/1
25 TABLET ORAL DAILY
Status: DISCONTINUED | OUTPATIENT
Start: 2022-02-06 | End: 2022-02-08 | Stop reason: HOSPADM

## 2022-02-06 RX ORDER — LOSARTAN POTASSIUM 25 MG/1
25 TABLET ORAL DAILY
Status: DISCONTINUED | OUTPATIENT
Start: 2022-02-06 | End: 2022-02-08 | Stop reason: HOSPADM

## 2022-02-06 RX ORDER — BUDESONIDE 0.5 MG/2ML
0.5 INHALANT ORAL 2 TIMES DAILY
Status: DISCONTINUED | OUTPATIENT
Start: 2022-02-06 | End: 2022-02-08 | Stop reason: HOSPADM

## 2022-02-06 RX ORDER — SODIUM CHLORIDE 0.9 % (FLUSH) 0.9 %
5-40 SYRINGE (ML) INJECTION EVERY 12 HOURS SCHEDULED
Status: DISCONTINUED | OUTPATIENT
Start: 2022-02-06 | End: 2022-02-08 | Stop reason: HOSPADM

## 2022-02-06 RX ORDER — MAGNESIUM SULFATE IN WATER 40 MG/ML
2000 INJECTION, SOLUTION INTRAVENOUS ONCE
Status: COMPLETED | OUTPATIENT
Start: 2022-02-06 | End: 2022-02-06

## 2022-02-06 RX ORDER — SODIUM CHLORIDE 9 MG/ML
INJECTION, SOLUTION INTRAVENOUS CONTINUOUS
Status: ACTIVE | OUTPATIENT
Start: 2022-02-06 | End: 2022-02-07

## 2022-02-06 RX ORDER — ALBUTEROL SULFATE 2.5 MG/3ML
2.5 SOLUTION RESPIRATORY (INHALATION)
Status: ACTIVE | OUTPATIENT
Start: 2022-02-06 | End: 2022-02-06

## 2022-02-06 RX ADMIN — IPRATROPIUM BROMIDE AND ALBUTEROL SULFATE 3 ML: 2.5; .5 SOLUTION RESPIRATORY (INHALATION) at 21:23

## 2022-02-06 RX ADMIN — VANCOMYCIN HYDROCHLORIDE 1750 MG: 10 INJECTION, POWDER, LYOPHILIZED, FOR SOLUTION INTRAVENOUS at 14:39

## 2022-02-06 RX ADMIN — OXYCODONE AND ACETAMINOPHEN 1 TABLET: 7.5; 325 TABLET ORAL at 21:01

## 2022-02-06 RX ADMIN — IOPAMIDOL 80 ML: 755 INJECTION, SOLUTION INTRAVENOUS at 12:14

## 2022-02-06 RX ADMIN — SODIUM CHLORIDE, PRESERVATIVE FREE 10 ML: 5 INJECTION INTRAVENOUS at 20:16

## 2022-02-06 RX ADMIN — CLONIDINE HYDROCHLORIDE 0.1 MG: 0.1 TABLET ORAL at 20:15

## 2022-02-06 RX ADMIN — ALBUTEROL SULFATE 2.5 MG: 2.5 SOLUTION RESPIRATORY (INHALATION) at 11:10

## 2022-02-06 RX ADMIN — SODIUM CHLORIDE 500 ML: 9 INJECTION, SOLUTION INTRAVENOUS at 20:25

## 2022-02-06 RX ADMIN — SODIUM CHLORIDE, POTASSIUM CHLORIDE, SODIUM LACTATE AND CALCIUM CHLORIDE 1000 ML: 600; 310; 30; 20 INJECTION, SOLUTION INTRAVENOUS at 11:30

## 2022-02-06 RX ADMIN — ARFORMOTEROL TARTRATE 15 MCG: 15 SOLUTION RESPIRATORY (INHALATION) at 21:23

## 2022-02-06 RX ADMIN — HYDROMORPHONE HYDROCHLORIDE 0.5 MG: 1 INJECTION, SOLUTION INTRAMUSCULAR; INTRAVENOUS; SUBCUTANEOUS at 14:50

## 2022-02-06 RX ADMIN — ENOXAPARIN SODIUM 40 MG: 100 INJECTION SUBCUTANEOUS at 20:15

## 2022-02-06 RX ADMIN — BUDESONIDE 500 MCG: 0.5 SUSPENSION RESPIRATORY (INHALATION) at 21:23

## 2022-02-06 RX ADMIN — ACETAMINOPHEN 650 MG: 325 TABLET ORAL at 11:29

## 2022-02-06 RX ADMIN — SODIUM CHLORIDE: 9 INJECTION, SOLUTION INTRAVENOUS at 20:23

## 2022-02-06 RX ADMIN — CEFTRIAXONE SODIUM 2000 MG: 2 INJECTION, POWDER, FOR SOLUTION INTRAMUSCULAR; INTRAVENOUS at 22:55

## 2022-02-06 RX ADMIN — CEFEPIME HYDROCHLORIDE 2000 MG: 2 INJECTION, POWDER, FOR SOLUTION INTRAVENOUS at 11:31

## 2022-02-06 RX ADMIN — AZITHROMYCIN MONOHYDRATE 500 MG: 500 INJECTION, POWDER, LYOPHILIZED, FOR SOLUTION INTRAVENOUS at 12:09

## 2022-02-06 RX ADMIN — METHYLPREDNISOLONE SODIUM SUCCINATE 125 MG: 125 INJECTION, POWDER, FOR SOLUTION INTRAMUSCULAR; INTRAVENOUS at 11:29

## 2022-02-06 RX ADMIN — IPRATROPIUM BROMIDE AND ALBUTEROL SULFATE 1 AMPULE: 2.5; .5 SOLUTION RESPIRATORY (INHALATION) at 11:10

## 2022-02-06 RX ADMIN — MAGNESIUM SULFATE HEPTAHYDRATE 2000 MG: 2 INJECTION, SOLUTION INTRAVENOUS at 20:46

## 2022-02-06 ASSESSMENT — PAIN DESCRIPTION - LOCATION: LOCATION: GENERALIZED

## 2022-02-06 ASSESSMENT — PAIN DESCRIPTION - PAIN TYPE: TYPE: ACUTE PAIN

## 2022-02-06 ASSESSMENT — PAIN SCALES - GENERAL
PAINLEVEL_OUTOF10: 4
PAINLEVEL_OUTOF10: 5
PAINLEVEL_OUTOF10: 6
PAINLEVEL_OUTOF10: 8
PAINLEVEL_OUTOF10: 8
PAINLEVEL_OUTOF10: 9
PAINLEVEL_OUTOF10: 6

## 2022-02-06 ASSESSMENT — ENCOUNTER SYMPTOMS
DIARRHEA: 0
NAUSEA: 1
VOMITING: 1
ABDOMINAL PAIN: 1
SHORTNESS OF BREATH: 1
COUGH: 0

## 2022-02-06 ASSESSMENT — PAIN DESCRIPTION - FREQUENCY
FREQUENCY: CONTINUOUS
FREQUENCY: CONTINUOUS

## 2022-02-06 ASSESSMENT — PAIN DESCRIPTION - DESCRIPTORS: DESCRIPTORS: ACHING

## 2022-02-06 NOTE — PROGRESS NOTES
New patient arrived to unit. Oriented to call light and room setup including how to use phone in room. Patient denies any questions or concerns. Safety interventions in place as applicable.  Will continue to monitor

## 2022-02-06 NOTE — ED NOTES
Bed: A01-01  Expected date:   Expected time:   Means of arrival:   Comments:  Medic 9 69/f sob+fever     Perla Kirk RN  02/06/22 1002

## 2022-02-06 NOTE — ED PROVIDER NOTES
[facility] [address] [facility]          EM RESIDENT NOTE       Date of evaluation: 2/6/2022    Chief Complaint     Shortness of Breath (sob for 5 weeks since having surgery) and Fever (intermittant fevers)      History of Present Illness     Rambo Bergeron is a 77 y.o. female with a PMHx of COPD, HTN, chronic pain and OA,  Hypothroidism, futher hx reviewed with recent surgical hx of L rotator cuff arthropathywho presents to the emergency department today complaining of multiple complaints that have been ongoing for the last 5 weeks. Patient states that 5 weeks ago, she had a left rotator cuff arthroplasty. Ever since then, she has been having headaches with lightheadedness, fevers off and on without complete resolution for more than a couple of days, and shortness of breath with a new oxygen requirement at home. Patient states that she does have an oxygen concentrator at home though this has been needed to be used for over 2 years. Patient is convinced that her symptoms are related to a new comforter that she bought over [place] with red dye. She is spoke to both the CDC as well as poison control centers and is convinced that the red dye on the comforter causing her symptoms. She has been evaluated by her primary care physician on multiple occasions in addition to the required emergency department twice, at which time she was treated for suspected COPD exacerbation but there was no evidence of significant cardiorespiratory disease. Urinalysis was normal at this time. Patient was ultimately suspected to have upper respiratory tract infection and COPD exacerbation and discharged. Chest heaviness as well as shortness of breath is worse with any sort of activity. She also endorses multiple episodes of vomiting though she has not had any in the last couple of days. She is not currently nauseous. No blood in her vomit, no bile. She has had off and on diarrhea.     Other than that mentioned above, there are no other alleviating or provoking factors associated with the patient's presentation today. Review of Systems     Review of Systems    Review of systems is positive for nausea, vomiting, fever, see HPI  Further review of systems is negative other than that mentioned in the HPI. Past Medical, Surgical, Family, and Social History     She has a past medical history of CAM[place] (acute kidney injury) ([address] Anxiety, Bronchitis, Chronic abdominal pain, Chronic back pain, COPD (chronic obstructive pulmonary disease) ([address] DDD (degenerative disc disease), lumbar, Depression, Elevated glycated hemoglobin, History of normal mammogram, Hx of migraine headaches, Hyperlipidemia, Hypertension, Hypothyroidism, Menopause ovarian failure, and Rotator cuff tear. She has a past surgical history that includes central line (10/7/2013); Colonoscopy (9/6/2011); kirsty and bso (cervix removed) (8/15/2002); Upper gastrointestinal endoscopy (10/17/2005); Colonoscopy (10/24/2005); fine needle aspiration (8/10/2012); esophageal motility study (6/17/2013); Upper gastrointestinal endoscopy (9/5/2000); Cholecystectomy; Tonsillectomy; Carpal tunnel release; hernia repair; Hysterectomy; Hemorrhoid surgery; eye surgery; Endoscopy, colon, diagnostic; and Finger nail surgery (Bilateral, 5/21/2019). Her family history includes Cancer in her sister and another family member; Cancer (age of onset: 46) in an other family member; Cancer (age of onset: 47) in an other family member; Cancer (age of onset: 76) in her sister; Cancer (age of onset: 68) in her mother; Diabetes in her sister and sister; Diabetes (age of onset: 46) in her sister; Heart Disease in her father and sister; Heart Disease (age of onset: 40) in her brother; High Blood Pressure in her father and maternal aunt; Kidney Disease in her sister; Osteoarthritis in her mother; Ovarian Cancer in her mother. She reports that she quit smoking about 5 years ago.  Her smoking use included cigarettes. She has a 8.00 pack-year smoking history. She has never used smokeless tobacco. She reports that she does not drink alcohol and does not use drugs. Medications     Previous Medications    ALBUTEROL-IPRATROPIUM (COMBIVENT RESPIMAT)  MCG/ACT AERS INHALER    INHALE 1 PUFF BY MOUTH EVERY SIX HOURS AS NEEDED FOR WHEEZING    ALPRAZOLAM (XANAX) 1 MG TABLET    Take 1 mg by mouth 2 times daily as needed. AMITRIPTYLINE (ELAVIL) 25 MG TABLET    1 tablet by Orogastric route 2 times daily as needed    BELSOMRA 5 MG TABS    daily.     BEMPEDOIC ACID-EZETIMIBE 180-10 MG TABS    Take 1 tablet by mouth nightly    BUDESON-GLYCOPYRROL-FORMOTEROL 160-9-4.8 MCG/ACT AERO    Inhale 2 puffs into the lungs 2 times daily    CALCIUM CARB-CHOLECALCIFEROL (CALCIUM-VITAMIN D) 600-400 MG-UNIT TABS    Take 1 tablet by mouth twice daily    CALCIUM CARBONATE-VITAMIN D (CALCIUM 600/VITAMIN D) 600-400 MG-UNIT CHEW    Take 1 tablet by mouth 2 times daily    CETIRIZINE (ZYRTEC) 10 MG TABLET    Take 1 tablet by mouth daily    CHLORHEXIDINE (PERIDEX) 0.12 % SOLUTION    Take 1 mL by mouth daily     CLONIDINE (CATAPRES) 0.1 MG TABLET    Bid    DIPHENHYDRAMINE (BANOPHEN) 50 MG CAPSULE    TAKE ONE CAPSULE BY MOUTH EVERY NIGHT AT BEDTIME AS NEEDED FOR ITCHING    DIPHENHYDRAMINE (BENADRYL) 25 MG TABLET    Take 1 tablet by mouth as needed    DOCUSATE SODIUM (STOOL SOFTENER) 100 MG CAPSULE    TAKE ONE CAPSULE BY MOUTH DAILY AS NEEDED FOR CONSTIPATION    DULERA 200-5 MCG/ACT INHALER    Inhale 2 puffs into the lungs every 12 hours    FLUTICASONE-UMECLIDIN-VILANT (TRELEGY ELLIPTA) 200-62.5-25 MCG/INH AEPB    Inhale 1 puff into the lungs daily    HYDROCHLOROTHIAZIDE (HYDRODIURIL) 25 MG TABLET    Take 1 tablet by mouth once daily     MG TABLET    TAKE 1 TABLET BY MOUTH EVERY 8 HOURS AS NEEDED FOR PAIN    IPRATROPIUM-ALBUTEROL (DUONEB) 0.5-2.5 (3) MG/3ML SOLN NEBULIZER SOLUTION    Inhale 3 mLs into the lungs as needed for Shortness of Breath    KETOROLAC (ACULAR) 0.5 % OPHTHALMIC SOLUTION    Place 1 drop into the left eye 3 times daily     LACTULOSE (CHRONULAC) 10 GM/15ML SOLUTION    Take 15 mLs by mouth as needed (constipation)    LEVOTHYROXINE (EUTHYROX) 125 MCG TABLET    TAKE 1 TABLET EVERY DAY (DISCONTINUE 135MCG)    LINZESS 145 MCG CAPSULE    Take 1 capsule by mouth as needed (constipation)    LOSARTAN (COZAAR) 25 MG TABLET    TAKE 1 TABLET EVERY DAY    NAPROXEN (NAPROSYN) 500 MG TABLET    Take 1 tablet by mouth 2 times daily (with meals)    NARCAN 4 MG/0.1ML LIQD NASAL SPRAY    Take by mouth as needed     NEBULIZERS (COMPRESSOR/NEBULIZER) MISC    1 Units by Does not apply route 4 times daily as needed (Wheezing SOB)    OMEPRAZOLE (PRILOSEC) 20 MG DELAYED RELEASE CAPSULE    TAKE 1 CAPSULE BY MOUTH EVERY MORNING (BEFORE BREAKFAST) AS NEEDED FOR HEARTBURN    ONDANSETRON (ZOFRAN ODT) 4 MG DISINTEGRATING TABLET    Take 1 tablet by mouth every 8 hours as needed for Nausea or Vomiting    OXYCODONE (ROXICODONE) 5 MG IMMEDIATE RELEASE TABLET    Take by mouth as needed. OXYCODONE-ACETAMINOPHEN (PERCOCET) 7.5-325 MG PER TABLET    Take 1 tablet by mouth every 6 hours as needed for Pain. POTASSIUM CHLORIDE (KLOR-CON M) 10 MEQ EXTENDED RELEASE TABLET    2 tablets daily    POTASSIUM CHLORIDE (KLOR-CON) 10 MEQ EXTENDED RELEASE TABLET    Take 20 mEq by mouth daily    PREDNISOLONE ACETATE (PRED FORTE) 1 % OPHTHALMIC SUSPENSION    Place 1 drop into the left eye three times daily     PREDNISONE (DELTASONE) 10 MG TABLET    4 tablets/40 mg daily for 3 days then 3 tablets/30 mg daily for 3 days then 20 mg daily for 3 days then 10 mg daily for 3 days then off.     PROMETHAZINE HCL 6.25 MG/5ML SOLN    Take 5ml every 6 hours PRN nausea    QUETIAPINE (SEROQUEL) 100 MG TABLET    TAKE 1 TABLET EVERY NIGHT    RESPIRATORY THERAPY SUPPLIES MISC    1 each by Does not apply route 4 times daily as needed (wheezing SOB) All Nebulizer supplies needed ROFLUMILAST (DALIRESP) 500 MCG TABLET    TAKE 1 TABLET EVERY DAY    TIZANIDINE (ZANAFLEX) 4 MG TABLET    Take 4 mg by mouth every 6 hours as needed    TRAMADOL (ULTRAM) 50 MG TABLET    daily. VARENICLINE (CHANTIX) 1 MG TABLET    Take 1 tablet by mouth 2 times daily       Allergies     She is allergic to bupropion, morphine, and morphine and related. Physical Exam     INITIAL VITALS: BP: (!) 152/94, Temp: 101.7 °F (38.7 °C), Pulse: 114, Resp: 28, SpO2: 96 %     Gen: NAD, in bed comfortably, non-diaphoretic   Head/Eyes: Atraumatic. PERRL. EOMI bilaterally. No conjunctival injection or scleral icterus   ENT: Neck supple, trachea midline, no LAD. MMM, no posterior oropharyngeal erythema or exudate. External auditory meatus clear without discharge or erythema. No nasal congestion or discharge. Cardiovascular: RRR no murmurs, rubs, or gallops. Pulmonary:Lungs with bilateral wheezing, no rales or rhonchi. No increased work of breathing. Speaking in full sentences. Abdominal: Soft, non-tender. No rebound or guarding. Non-peritoneal   MSK: no obvious long bone deformity. Skin:  Warm, dry. No rashes, ecchymosis or cyanosis. Neuro: Alert and oriented x 4. Cranial nerves grossly intact. Moving all extremities equally. Gait narrow. Extremities:  No peripheral edema. Psych: Euthymic affect. Normal rate and rhythm of speech with full prosody. Linear thought process. DiagnosticResults     EKG   Interpreted in conjunction with emergencydepartment physician No att. providers found    EKG with sinus tachycardia with a ventricular rate of 114 bpm.  Normal LA interval.  QRS is narrow. No QT or QTc prolongation. Normal axis however there is evidence of a left anterior fascicular block. Patient noted to have T wave inversion in aVL with T wave flattening in lead I. No ST elevation or depression. No STEMI. Unchanged from prior EKG 11/23/2021.   RADIOLOGY:  CT CHEST PULMONARY EMBOLISM W CONTRAST   Final Result 1. No evidence of pulmonary embolism to the segmental level. 2. Borderline mediastinal lymphadenopathy. This is nonspecific. 3. Emphysema.       XR CHEST PORTABLE   Final Result      No acute disease      XR CHEST PORTABLE    (Results Pending)       LABS:   Results for orders placed or performed during the hospital encounter of 02/06/22   SARS-CoV-2 NAAT (Rapid)    Specimen: Nasopharyngeal Swab   Result Value Ref Range    SARS-CoV-2, NAAT Not Detected Not Detected   Lactate, plasma   Result Value Ref Range    Lactic Acid 1.5 0.4 - 2.0 mmol/L   Comprehensive Metabolic Panel w/ Reflex to MG   Result Value Ref Range    Sodium 134 (L) 136 - 145 mmol/L    Potassium reflex Magnesium 3.7 3.5 - 5.1 mmol/L    Chloride 97 (L) 99 - 110 mmol/L    CO2 26 21 - 32 mmol/L    Anion Gap 11 3 - 16    Glucose 132 (H) 70 - 99 mg/dL    BUN 17 7 - 20 mg/dL    CREATININE 0.8 0.6 - 1.2 mg/dL    GFR Non-African American >60 >60    GFR African American >60 >60    Calcium 9.4 8.3 - 10.6 mg/dL    Total Protein 6.6 6.4 - 8.2 g/dL    Albumin 3.6 3.4 - 5.0 g/dL    Albumin/Globulin Ratio 1.2 1.1 - 2.2    Total Bilirubin 0.4 0.0 - 1.0 mg/dL    Alkaline Phosphatase 91 40 - 129 U/L    ALT 14 10 - 40 U/L    AST 13 (L) 15 - 37 U/L   Troponin   Result Value Ref Range    Troponin <0.01 <0.01 ng/mL   Brain Natriuretic Peptide   Result Value Ref Range    Pro-BNP 46 0 - 124 pg/mL   CBC auto differential   Result Value Ref Range    WBC 15.8 (H) 4.0 - 11.0 K/uL    RBC 4.39 4.00 - 5.20 M/uL    Hemoglobin 11.6 (L) 12.0 - 16.0 g/dL    Hematocrit 36.7 36.0 - 48.0 %    MCV 83.6 80.0 - 100.0 fL    MCH 26.3 26.0 - 34.0 pg    MCHC 31.5 31.0 - 36.0 g/dL    RDW 13.3 12.4 - 15.4 %    Platelets 276 435 - 469 K/uL    MPV 10.1 5.0 - 10.5 fL    Neutrophils % 86.7 %    Lymphocytes % 6.6 %    Monocytes % 5.4 %    Eosinophils % 0.5 %    Basophils % 0.8 %    Neutrophils Absolute 13.7 (H) 1.7 - 7.7 K/uL    Lymphocytes Absolute 1.0 1.0 - 5.1 K/uL    Monocytes Absolute 0.9 0.0 - 1.3 K/uL    Eosinophils Absolute 0.1 0.0 - 0.6 K/uL    Basophils Absolute 0.1 0.0 - 0.2 K/uL   Hepatic Function Panel   Result Value Ref Range    Bilirubin, Direct <0.2 0.0 - 0.3 mg/dL    Bilirubin, Indirect see below 0.0 - 1.0 mg/dL   Lipase   Result Value Ref Range    Lipase 28.0 13.0 - 60.0 U/L   Protime-INR   Result Value Ref Range    Protime 11.2 9.9 - 12.7 sec    INR 0.99 0.88 - 1.12   Urinalysis Reflex to Culture    Specimen: Urine, clean catch   Result Value Ref Range    Color, UA Yellow Straw/Yellow    Clarity, UA Clear Clear    Glucose, Ur Negative Negative mg/dL    Bilirubin Urine Negative Negative    Ketones, Urine Negative Negative mg/dL    Specific Gravity, UA 1.010 1.005 - 1.030    Blood, Urine Negative Negative    pH, UA 6.0 5.0 - 8.0    Protein, UA Negative Negative mg/dL    Urobilinogen, Urine 0.2 <2.0 E.U./dL    Nitrite, Urine POSITIVE (A) Negative    Leukocyte Esterase, Urine Negative Negative    Microscopic Examination YES     Urine Type NotGiven     Urine Reflex to Culture Not Indicated    SPECIMEN REJECTION   Result Value Ref Range    Rejected Test VBG     Reason for Rejection see below    Blood Gas, Venous   Result Value Ref Range    pH, [place] 7.464 (H) 7.350 - 7.450    pCO2, [place] 39.5 (L) 41.0 - 51.0 mmHg    pO2, [place] 73.2 (H) 25.0 - 40.0 mmHg    HCO3, Venous 28.4 (H) 24.0 - 28.0 mmol/L    Base Excess, [place] 4.3 (H) -2.0 - 3.0 mmol/L    O2 Sat, [place] 96 Not established %    Carboxyhemoglobin 1.3 0.0 - 1.5 %    MetHgb, [place] 0.4 0.0 - 1.5 %    TC02 (Calc), [place] 30 mmol/L    Hemoglobin, [place], Reduced 4.30 %   Microscopic Urinalysis   Result Value Ref Range    WBC, UA None seen 0 - 5 /HPF    RBC, UA None seen 0 - 4 /HPF   Troponin (lab)   Result Value Ref Range    Troponin <0.01 <0.01 ng/mL   EKG 12 Lead   Result Value Ref Range    Ventricular Rate 114 BPM    Atrial Rate 114 BPM    P-R Interval 130 ms    QRS Duration 86 ms    Q-T Interval 308 ms    QTc Calculation (Bazett) 424 ms    P Axis 79 degrees Culture sent. [JF]   1212 Troponin: <0.01  wnl [JF]   1223 XR CHEST PORTABLE  IMPRESSION:     No acute disease [JF]   1237 SARS-CoV-2, NAAT: Not Detected [JF]   1313 Troponin: <0.01  Stable and wnl. Low suspicion for ACS. [JF]   1411 Extrav event at the [facility] [JF]   1411 CT CHEST PULMONARY EMBOLISM W CONTRAST [JF]   1411 IMPRESSION:     1. No evidence of pulmonary embolism to the segmental level. 2. Borderline mediastinal lymphadenopathy. This is nonspecific. 3. Emphysema. [JF]   1431 Improved wheezing with treatments. Now complaining of intermittent epigastric abdominal pain. [JF]   1456 Patient accepted by hospitalist.  [JF]      ED Course User Index  [JF] Jed Sharp MD       MEDICAL DECISION MAKING / ASSESSMENT / PLAN     Shannen Mcgee is a 77 y.o. Bright [place]ws a history of present illness, physical examination, past medical history as noted above who presents to the emergency department today with multiple complaints. Patient presenting to the emergency department with multiple complaints that have been ongoing for the last 5 weeks. Patient has been evaluated on multiple occasions both on the outpatient as well as emergent basis. Recent emergency department visit revealed an unremarkable physical examination/laboratory work-up and patient was ultimately discharged with suspected COPD exacerbation. Patient states that she has been prescribed steroids but has not been taking these. However, she has been taking her inhalers at home with little alleviation of her shortness of breath and patient does have increased oxygen requirement at this time. On arrival here, the patient is febrile with a temperature of 101.7 and tachycardic to 110. Her oxygen saturation is 96% on 2 L of oxygen with increased oxygen requirement with activity. Patient states that she is COVID-19 vaccinated x3, and ultimately her COVID-19 test was negative here in the emergency department today.   Chest x-ray was obtained without evidence of acute cardiopulmonary process including pneumothorax, pneumonia, pleural effusion or otherwise. No mediastinal widening. On auscultation, the patient was noted to have bilateral wheezing, concerning for COPD exacerbation. As result, the patient was given methylprednisolone 125 mg IV x1 in addition to a DuoNeb with back-to-back albuterol nebs. On reevaluation, patient had improvement in her wheezing though was continuing to require 2 to 3 L of oxygen and thus, I do believe that patient requires admission for further management of likely COPD exacerbation. Further, the patient was given azithromycin in addition to broad-spectrum antibiotics due to concern for possible underlying sepsis. Lactate within normal limits. Patient given a liter of fluids for careful fluid resuscitation in the setting of multiple comorbidities. 30 cc/kg bolus was thus held. Patient also states that she has had intermittent nausea with vomiting in addition to a mix of diarrhea and constipation though does not have any nausea on presentation today and thus no antiemetic was administered. Urinalysis was notable for nitrite positive urine, concerning for underlying urinary tract infection. CTPA was obtained due to persistent tachycardia with shortness of breath requiring multiple ED visits. CTPA overall negative for pulmonary embolism or other acute cardiopulmonary processes. Given the above, I do believe patient requires admission for sepsis of unknown origin, possible urinary source in addition to COPD exacerbation. On reevaluation, the patient was noted to have acute onset, exquisite epigastric abdominal pain. As result, an additional troponin was ordered though previous troponins have been negative x2 without EKG changes concerning for ACS. Further, will obtain a chest x-ray upright to evaluate for air underneath the diaphragm to evaluate for acute intra-abdominal process including perforation.   However, abdomen was soft on my evaluation of the patient. Given patient is already received IV contrast bolus, unable to obtain CT of the abdomen pelvis with contrast at this time. The above findings were discussed with the admitting medical provider who is agreed to the plan as described and will follow up on repeat imaging/troponin/repeat EKG. Patient has been admitted to medicine for further evaluation and management of the above. This patient was also evaluated by the attending physician. All care plans werediscussed and agreed upon. Clinical Impression     1. Acute on chronic respiratory failure with hypoxia (HCC)    2. Fever, unspecified fever cause        Disposition     PATIENT REFERRED TO:  No follow-up provider specified.     DISCHARGE MEDICATIONS:  New Prescriptions    No medications on file       DISPOSITION Decision To Admit 02/06/2022 02:25:06 PM       Louise Toledo MD  02/06/22 4037

## 2022-02-06 NOTE — Clinical Note
Patient Class: Inpatient [101]   REQUIRED: Diagnosis: [facility] exacerbation [facility] [[id number]]   Estimated Length of Stay: Estimated stay of more than 2 midnights   Admitting Provider: Feliciano Huizar [[id number]]   Telemetry/Cardiac Monitoring Required?: Yes

## 2022-02-06 NOTE — ED PROVIDER NOTES
ED Attending Attestation Note     Date of evaluation: 2/6/2022    This patient was seen by the resident. I have seen and examined the patient, agree with the workup, evaluation, management and diagnosis. The care plan has been discussed. I have reviewed the ECG and concur with the resident's interpretation. My assessment reveals pleasant female on 2 L of supplemental oxygen with mild tachypnea complaining of shortness of breath for multiple months however has acutely been worsening. She was found to be febrile here and significantly tachycardic with a leukocytosis. She was covered for potential sepsis. On my exam, she has mild increased work of breathing with mild coarse breath sounds heard bilaterally. Abdomen is soft and nondistended without lower abdominal focal tenderness, however she notes some epigastric discomfort that is new as well.      Latonya Olvera MD  02/06/22 [id number]

## 2022-02-06 NOTE — CONSULTS
Clinical Pharmacy Progress Note  Medication History     Admit Date: 2/6/2022    List of of current medications patient is taking is complete. Home Medication list in EPIC updated to reflect changes noted below. Source of information: telephone interview with patient    [facility]: [facility] ([phone][facility] & [facility] mail order     Changes made to medication list:   Medications removed: (include reason, ex: therapy completed, inactive medication)   Oxycodone IR 5 mg tablets - last filled Oxycodone-APAP 10/325 mg tabs per [facility] report    Duplicate entry for calcium + vitamin D   Amitryptyline    Breztri   Cetirizine   Chlorhexidine   Trelegy   Ketorolac ophthalmic solution    Linzess   Losartan - stopped taking per PCP a few months ago   Naproxen - pt never filled prescription   Omeprazole - was instructed to stop taking by PCP a few months ago   Prednisolone ophthalmic solution    Duplicate entries for potassium   Tramadol- no longer takes   Chantix  Medication doses adjusted:    Oxycodone-APAP 10/325 mg - Qty #120 for 30-day supply filled 1/24/22   Clonidine   Potassium   Tizanidine  Other notes:    Prednisone - pt recently completed steroid taper (finished a few days ago). Her PCP did re-order this on 2/4/22 but she has not picked up from the pharmacy    Please call with any questions.   Loni Faust PharmD, BCPS  Wireless: [phone]   2/6/2022 6:21 PM

## 2022-02-06 NOTE — CONSULTS
Clinical Pharmacy Consult Note    Vancomycin - Management by Pharmacy    Consult Date(s): 2/6/22  Consulting Provider(s): Dr. Saida Enamorado / Plan    1)  Sepsis - Vancomycin   Concurrent Antimicrobials:   o Ceftriaxone - Day #1   Day of Vanc Therapy: #1   Current Dosing Method: Bayesian-Guided AUC Dosing   Therapeutic Goal: 400-600 mg/L*hr   Current Dose / Frequency: 1000 mg IV q12h   Plan / Rationale:   o Received vancomycin 1750 mg IV x1 in ED 2/6  o Will continue vancomycin 1000 mg IV q12h - based on Bayesian kinetics, estimated AUC is 527 mg/L*h with predicted trough of 17.2 mg/L.   o Vancomycin level ordered for tomorrow (2/7) to confirm kinetic estimates.  Will continue to monitor clinical condition and make adjustments to regimen as appropriate. Thank you for consulting Pharmacy! Jessica Hines, PharmD, [facility]  Wireless: [phone]   2/6/2022 6:18 PM          Interval update:     Subjective/Objective: Ms. Addie Murray is a 77 y.o. female with a PMHx significant for COPD, depression, HTN, hypothyroidism who presented to ED 2/6 with SOB. In ED, pt was noted to be tachycardic, febrile and hypoxic. Admitted with sepsis. Pharmacy has been consulted to dose vancomycin. Height:   Ht Readings from Last 1 Encounters:   02/06/22 5' 5\" (1.651 m)     Weight:   Wt Readings from Last 1 Encounters:   02/06/22 192 lb 6.4 oz (87.3 kg)       Current & Prior Antimicrobial Regimen(s):      Ceftriaxone 2000mg IV q24h (2/6-current)   Vancomycin - pharmacy to dose (2/6-current)  o 1750 mg IV x1 2/6  o 1000mg IV q12h (2/7-current)    Level(s) / Doses:    Date Time Dose Level / Type of Level Interpretation                 Note: Serum levels collected for AUC-based dosing may be high if collected in close proximity to the dose administered. This is not necessarily indicative of toxicity.     Cultures & Sensitivities:    Date Site Micro Susceptibility / Result                 Labs / Ancillary Data:    Estimated Creatinine Clearance: 75 mL/min (based on SCr of 0.8 mg/dL). Recent Labs     02/06/22  1030   CREATININE 0.8   BUN 17   WBC 15.8*       Additional Lab Values / Findings of Note:    Procalcitonin: No results for input(s): PROCAL in the last 72 hours.

## 2022-02-06 NOTE — H&P
Internal Medicine  PGY 1  History & Physical      CC SOB     History Obtained From:  Patient, EMR    HISTORY OF PRESENT ILLNESS:    Patient is a 76 yo female with a PMHx of COPD, depression, HLD, HTN, and hypothyroidism, who presents with SOB. She endorsees SOB for the past 5 weeks since having surgery of a her L rotator cuff. She also endorses intermittent fevers, headaches, lightheadedness, vomiting, and SOB. Her SOB is worse with exertion. Patient believes her symptoms to be caused by comforter she bought over [employer] with red dye. She was so concerned that she called [facility] and poison control. She has visits to her PCP and ED in past for suspected COPD exacerbations. Upon arrival to the ED, she was tachycardic at 114, febrile at 101.7, and hypoxic in high 80s and placed on 2L of NC and satting well. She was able to be weaned down to room air and was satting above 90%. BMP, Cbc, troponin, LFTs, and probnp were unremarkable. UA positive for nitrites, but negative for WBC and leukocytes. CXR indicated no acute disease. CTPE indicated no evidence of pulmonary embolism, borderline mediastinal lymphadenopathy, and emphysema. She received 1L bolus, steroids, inhalers, and abx in the ED, as well as one time dose of dilaudid.        Past Medical History:        Diagnosis Date    CAMDEN (acute kidney injury) ([facility] [address] 10/7/2013    Anxiety     Bronchitis     Chronic abdominal pain     possibly due to diverticulosos    Chronic back pain     COPD (chronic obstructive pulmonary disease) (HCC)     DDD (degenerative disc disease), lumbar 12/1/2016    Depression     Elevated glycated hemoglobin     History of normal mammogram 8/26/13    [facility]ually at [facility]    Hx of migraine headaches     Hyperlipidemia     Hypertension     Hypothyroidism     Menopause ovarian failure     Rotator cuff tear     right   ·     Past Surgical History:        Procedure Laterality Date    CARPAL TUNNEL RELEASE      CENTRAL LINE ·     Review of Systems   Constitutional: Positive for fatigue. Negative for fever. Eyes: Negative for visual disturbance. Respiratory: Positive for shortness of breath. Negative for cough. Cardiovascular: Negative for chest pain. Gastrointestinal: Positive for abdominal pain, nausea and vomiting. Negative for diarrhea. Genitourinary: Negative for difficulty urinating and dysuria. Neurological: Positive for dizziness, light-headedness and headaches. Negative for numbness. Psychiatric/Behavioral: The patient is nervous/anxious. ROS: A 10 point review of systems was conducted, significant findings as noted in HPI. Physical Exam  Constitutional:       General: She is not in acute distress. Appearance: Normal appearance. She is normal weight. HENT:      Head: Normocephalic and atraumatic. Right Ear: External ear normal.      Left Ear: External ear normal.      Nose: Nose normal.      Mouth/Throat:      Mouth: Mucous membranes are moist.      Pharynx: Oropharynx is clear. Eyes:      Extraocular Movements: Extraocular movements intact. Cardiovascular:      Rate and Rhythm: Normal rate and regular rhythm. Heart sounds: Normal heart sounds. Pulmonary:      Effort: Pulmonary effort is normal.      Comments: Diffuse inspiratory wheezing   Abdominal:      General: Abdomen is flat. Bowel sounds are normal.      Comments: (+) epigastric tenderness    Musculoskeletal:         General: No swelling. Normal range of motion. Right lower leg: No edema. Left lower leg: No edema. Skin:     General: Skin is warm and dry. Neurological:      General: No focal deficit present. Mental Status: She is oriented to person, place, and time.        Physical exam:       Vitals:    02/06/22 1111   BP:    Pulse:    Resp: 18   Temp:    SpO2: 96%       DATA:    Labs:  CBC:   Recent Labs     02/06/22  1030   WBC 15.8*   HGB 11.6*   HCT 36.7          BMP:   Recent Labs 02/06/22  1030   *   K 3.7   CL 97*   CO2 26   BUN 17   CREATININE 0.8   GLUCOSE 132*     LFT's:   Recent Labs     02/06/22  1030   AST 13*   ALT 14   BILITOT 0.4   ALKPHOS 91     Troponin:   Recent Labs     02/06/22  1030 02/06/22  1245   TROPONINI <0.01 <0.01     BNP:No results for input(s): BNP in the last 72 hours. ABGs: No results for input(s): PHART, HND6EZF, PO2ART in the last 72 hours. INR:   Recent Labs     02/06/22  1030   INR 0.99       U/A:  Recent Labs     02/06/22  1139   COLORU Yellow   PHUR 6.0   WBCUA None seen   RBCUA None seen   CLARITYU Clear   SPECGRAV 1.010   LEUKOCYTESUR Negative   UROBILINOGEN 0.2   BILIRUBINUR Negative   BLOODU Negative   GLUCOSEU Negative       CT CHEST PULMONARY EMBOLISM W CONTRAST   Final Result      1. No evidence of pulmonary embolism to the segmental level. 2. Borderline mediastinal lymphadenopathy. This is nonspecific. 3. Emphysema. XR CHEST PORTABLE   Final Result      No acute disease      XR CHEST PORTABLE    (Results Pending)           ASSESSMENT AND PLAN:    Sepsis 2/2 unknown source  Tachycardic at 114, febrile at 101.7, and hypoxic in high 80s and placed on 2L of NC and satting well. UA with positive nitrites, but Asx.  Low suspicion for PNA  -CMP  -CBC   -PT/OT  -procal  -lactate   -blood cultures pending  -urine cultures pending   -vanc-pharmacy to dose  -IV rocephin     COPD exacerbation  -resume home inhalers   -Roflumilast 500mcg daily     HTN  -resume home Hctz   -resume home cozaar     GERD  -protonix 40mg daily     Hypothyroidism  -continue home synthroid     Will discuss with attending physician Dr. Ana Lilia Cintron MD.    Code Status:Full code  FEN: Regular, low sodium  PPX: lovenox   DISPO: AMOR Jett DO  2/6/2022,  3:00 PM

## 2022-02-07 LAB
A/G RATIO: 1.6 (ref 1.1–2.2)
ALBUMIN SERPL-MCNC: 3.8 G/DL (ref 3.4–5)
ALP BLD-CCNC: 91 U/L (ref 40–129)
ALT SERPL-CCNC: 13 U/L (ref 10–40)
ANION GAP SERPL CALCULATED.3IONS-SCNC: 12 MMOL/L (ref 3–16)
AST SERPL-CCNC: 12 U/L (ref 15–37)
BASOPHILS ABSOLUTE: 0.1 K/UL (ref 0–0.2)
BASOPHILS RELATIVE PERCENT: 0.4 %
BILIRUB SERPL-MCNC: <0.2 MG/DL (ref 0–1)
BUN BLDV-MCNC: 17 MG/DL (ref 7–20)
CALCIUM SERPL-MCNC: 9.5 MG/DL (ref 8.3–10.6)
CHLORIDE BLD-SCNC: 103 MMOL/L (ref 99–110)
CO2: 23 MMOL/L (ref 21–32)
CREAT SERPL-MCNC: 0.8 MG/DL (ref 0.6–1.2)
EKG ATRIAL RATE: 88 BPM
EKG DIAGNOSIS: NORMAL
EKG P AXIS: 79 DEGREES
EKG P-R INTERVAL: 130 MS
EKG Q-T INTERVAL: 362 MS
EKG QRS DURATION: 96 MS
EKG QTC CALCULATION (BAZETT): 438 MS
EKG R AXIS: -28 DEGREES
EKG T AXIS: 64 DEGREES
EKG VENTRICULAR RATE: 88 BPM
EOSINOPHILS ABSOLUTE: 0 K/UL (ref 0–0.6)
EOSINOPHILS RELATIVE PERCENT: 0 %
GFR AFRICAN AMERICAN: >60
GFR NON-AFRICAN AMERICAN: >60
GLUCOSE BLD-MCNC: 144 MG/DL (ref 70–99)
HCT VFR BLD CALC: 34.7 % (ref 36–48)
HEMOGLOBIN: 11 G/DL (ref 12–16)
LYMPHOCYTES ABSOLUTE: 1.4 K/UL (ref 1–5.1)
LYMPHOCYTES RELATIVE PERCENT: 10.3 %
MCH RBC QN AUTO: 27 PG (ref 26–34)
MCHC RBC AUTO-ENTMCNC: 31.7 G/DL (ref 31–36)
MCV RBC AUTO: 85.2 FL (ref 80–100)
MONOCYTES ABSOLUTE: 1 K/UL (ref 0–1.3)
MONOCYTES RELATIVE PERCENT: 7.1 %
NEUTROPHILS ABSOLUTE: 11 K/UL (ref 1.7–7.7)
NEUTROPHILS RELATIVE PERCENT: 82.2 %
PDW BLD-RTO: 13.4 % (ref 12.4–15.4)
PLATELET # BLD: 217 K/UL (ref 135–450)
PMV BLD AUTO: 10.3 FL (ref 5–10.5)
POTASSIUM REFLEX MAGNESIUM: 4.2 MMOL/L (ref 3.5–5.1)
RBC # BLD: 4.08 M/UL (ref 4–5.2)
SODIUM BLD-SCNC: 138 MMOL/L (ref 136–145)
TOTAL PROTEIN: 6.2 G/DL (ref 6.4–8.2)
URINE CULTURE, ROUTINE: NORMAL
VANCOMYCIN RANDOM: 17.9 UG/ML
WBC # BLD: 13.4 K/UL (ref 4–11)

## 2022-02-07 PROCEDURE — 6370000000 HC RX 637 (ALT 250 FOR IP)

## 2022-02-07 PROCEDURE — 87641 MR-STAPH DNA AMP PROBE: CPT

## 2022-02-07 PROCEDURE — 97530 THERAPEUTIC ACTIVITIES: CPT

## 2022-02-07 PROCEDURE — 97165 OT EVAL LOW COMPLEX 30 MIN: CPT

## 2022-02-07 PROCEDURE — 85025 COMPLETE CBC W/AUTO DIFF WBC: CPT

## 2022-02-07 PROCEDURE — 6360000002 HC RX W HCPCS

## 2022-02-07 PROCEDURE — 94664 DEMO&/EVAL PT USE INHALER: CPT

## 2022-02-07 PROCEDURE — 6370000000 HC RX 637 (ALT 250 FOR IP): Performed by: STUDENT IN AN ORGANIZED HEALTH CARE EDUCATION/TRAINING PROGRAM

## 2022-02-07 PROCEDURE — 97161 PT EVAL LOW COMPLEX 20 MIN: CPT

## 2022-02-07 PROCEDURE — 6360000002 HC RX W HCPCS: Performed by: STUDENT IN AN ORGANIZED HEALTH CARE EDUCATION/TRAINING PROGRAM

## 2022-02-07 PROCEDURE — 2580000003 HC RX 258

## 2022-02-07 PROCEDURE — 2580000003 HC RX 258: Performed by: STUDENT IN AN ORGANIZED HEALTH CARE EDUCATION/TRAINING PROGRAM

## 2022-02-07 PROCEDURE — 80053 COMPREHEN METABOLIC PANEL: CPT

## 2022-02-07 PROCEDURE — 97116 GAIT TRAINING THERAPY: CPT

## 2022-02-07 PROCEDURE — 80202 ASSAY OF VANCOMYCIN: CPT

## 2022-02-07 PROCEDURE — 94640 AIRWAY INHALATION TREATMENT: CPT

## 2022-02-07 PROCEDURE — 2060000000 HC ICU INTERMEDIATE R&B

## 2022-02-07 PROCEDURE — 99222 1ST HOSP IP/OBS MODERATE 55: CPT | Performed by: INTERNAL MEDICINE

## 2022-02-07 PROCEDURE — 36415 COLL VENOUS BLD VENIPUNCTURE: CPT

## 2022-02-07 PROCEDURE — 6370000000 HC RX 637 (ALT 250 FOR IP): Performed by: INTERNAL MEDICINE

## 2022-02-07 RX ORDER — IPRATROPIUM BROMIDE AND ALBUTEROL SULFATE 2.5; .5 MG/3ML; MG/3ML
1 SOLUTION RESPIRATORY (INHALATION) 2 TIMES DAILY
Status: DISCONTINUED | OUTPATIENT
Start: 2022-02-07 | End: 2022-02-08

## 2022-02-07 RX ORDER — 0.9 % SODIUM CHLORIDE 0.9 %
500 INTRAVENOUS SOLUTION INTRAVENOUS ONCE
Status: COMPLETED | OUTPATIENT
Start: 2022-02-07 | End: 2022-02-07

## 2022-02-07 RX ORDER — PREDNISONE 20 MG/1
40 TABLET ORAL DAILY
Status: DISCONTINUED | OUTPATIENT
Start: 2022-02-07 | End: 2022-02-08 | Stop reason: HOSPADM

## 2022-02-07 RX ADMIN — OXYCODONE AND ACETAMINOPHEN 1 TABLET: 7.5; 325 TABLET ORAL at 12:30

## 2022-02-07 RX ADMIN — ROFLUMILAST 500 MCG: 500 TABLET ORAL at 09:35

## 2022-02-07 RX ADMIN — PANTOPRAZOLE SODIUM 40 MG: 40 TABLET, DELAYED RELEASE ORAL at 05:21

## 2022-02-07 RX ADMIN — SODIUM CHLORIDE, PRESERVATIVE FREE 10 ML: 5 INJECTION INTRAVENOUS at 23:28

## 2022-02-07 RX ADMIN — ARFORMOTEROL TARTRATE 15 MCG: 15 SOLUTION RESPIRATORY (INHALATION) at 08:16

## 2022-02-07 RX ADMIN — BUDESONIDE 500 MCG: 0.5 SUSPENSION RESPIRATORY (INHALATION) at 21:16

## 2022-02-07 RX ADMIN — POLYETHYLENE GLYCOL 3350 17 G: 17 POWDER, FOR SOLUTION ORAL at 19:53

## 2022-02-07 RX ADMIN — ARFORMOTEROL TARTRATE 15 MCG: 15 SOLUTION RESPIRATORY (INHALATION) at 21:16

## 2022-02-07 RX ADMIN — CEFTRIAXONE SODIUM 2000 MG: 2 INJECTION, POWDER, FOR SOLUTION INTRAMUSCULAR; INTRAVENOUS at 23:22

## 2022-02-07 RX ADMIN — CLONIDINE HYDROCHLORIDE 0.1 MG: 0.1 TABLET ORAL at 23:28

## 2022-02-07 RX ADMIN — VANCOMYCIN HYDROCHLORIDE 1000 MG: 10 INJECTION, POWDER, LYOPHILIZED, FOR SOLUTION INTRAVENOUS at 02:40

## 2022-02-07 RX ADMIN — VANCOMYCIN HYDROCHLORIDE 1000 MG: 10 INJECTION, POWDER, LYOPHILIZED, FOR SOLUTION INTRAVENOUS at 18:11

## 2022-02-07 RX ADMIN — OXYCODONE AND ACETAMINOPHEN 1 TABLET: 7.5; 325 TABLET ORAL at 05:20

## 2022-02-07 RX ADMIN — IPRATROPIUM BROMIDE AND ALBUTEROL SULFATE 1 AMPULE: .5; 2.5 SOLUTION RESPIRATORY (INHALATION) at 21:16

## 2022-02-07 RX ADMIN — SODIUM CHLORIDE 500 ML: 9 INJECTION, SOLUTION INTRAVENOUS at 03:43

## 2022-02-07 RX ADMIN — ENOXAPARIN SODIUM 40 MG: 100 INJECTION SUBCUTANEOUS at 19:45

## 2022-02-07 RX ADMIN — CLONIDINE HYDROCHLORIDE 0.1 MG: 0.1 TABLET ORAL at 09:35

## 2022-02-07 RX ADMIN — LEVOTHYROXINE SODIUM 125 MCG: 0.12 TABLET ORAL at 05:20

## 2022-02-07 RX ADMIN — BUDESONIDE 500 MCG: 0.5 SUSPENSION RESPIRATORY (INHALATION) at 08:16

## 2022-02-07 RX ADMIN — IPRATROPIUM BROMIDE AND ALBUTEROL SULFATE 1 AMPULE: .5; 2.5 SOLUTION RESPIRATORY (INHALATION) at 08:16

## 2022-02-07 RX ADMIN — OXYCODONE AND ACETAMINOPHEN 1 TABLET: 7.5; 325 TABLET ORAL at 19:53

## 2022-02-07 RX ADMIN — SODIUM CHLORIDE, PRESERVATIVE FREE 10 ML: 5 INJECTION INTRAVENOUS at 09:36

## 2022-02-07 RX ADMIN — PREDNISONE 40 MG: 20 TABLET ORAL at 12:30

## 2022-02-07 ASSESSMENT — PAIN SCALES - GENERAL
PAINLEVEL_OUTOF10: 8
PAINLEVEL_OUTOF10: 7
PAINLEVEL_OUTOF10: 8
PAINLEVEL_OUTOF10: 5
PAINLEVEL_OUTOF10: 10
PAINLEVEL_OUTOF10: 6
PAINLEVEL_OUTOF10: 6
PAINLEVEL_OUTOF10: 4

## 2022-02-07 ASSESSMENT — PAIN DESCRIPTION - FREQUENCY: FREQUENCY: CONTINUOUS

## 2022-02-07 ASSESSMENT — PAIN DESCRIPTION - PAIN TYPE
TYPE: ACUTE PAIN
TYPE: ACUTE PAIN

## 2022-02-07 ASSESSMENT — PAIN DESCRIPTION - DESCRIPTORS: DESCRIPTORS: ACHING

## 2022-02-07 ASSESSMENT — PAIN DESCRIPTION - LOCATION: LOCATION: SHOULDER

## 2022-02-07 ASSESSMENT — PAIN DESCRIPTION - ORIENTATION: ORIENTATION: LEFT

## 2022-02-07 NOTE — PROGRESS NOTES
Progress Note    Admit Date: 2/6/2022  Day: 2  Diet: ADULT DIET; Regular; Low Sodium (2 gm)    CC: SOB    Interval history:   Patient seen at bedside  No overnight events- Patient doing much better than when she first came in. She had severe chills and high fever at home. She has been afebrile overnight and denies any nausea, vomiting or diarrhea. HPI:   Patient is a 76 yo female with a PMHx of COPD, depression, HLD, HTN, and hypothyroidism, who presents with SOB. She endorsees SOB for the past 5 weeks since having surgery of a her L rotator cuff. She also endorses intermittent fevers, headaches, lightheadedness, vomiting, and SOB. Her SOB is worse with exertion. Patient believes her symptoms to be caused by comforter she bought over [employer] with red dye. She was so concerned that she called [facility] and poison control. She has visits to her PCP and ED in past for suspected COPD exacerbations.      Upon arrival to the ED, she was tachycardic at 114, febrile at 101.7, and hypoxic in high 80s and placed on 2L of NC and satting well. She was able to be weaned down to room air and was satting above 90%. BMP, Cbc, troponin, LFTs, and probnp were unremarkable. UA positive for nitrites, but negative for WBC and leukocytes. CXR indicated no acute disease. CTPE indicated no evidence of pulmonary embolism, borderline mediastinal lymphadenopathy, and emphysema. She received 1L bolus, steroids, inhalers, and abx in the ED, as well as one time dose of dilaudid.        Medications:     Scheduled Meds:   ipratropium-albuterol  1 ampule Inhalation BID    sodium chloride flush  5-40 mL IntraVENous 2 times per day    enoxaparin  40 mg SubCUTAneous Daily    Bempedoic Acid-Ezetimibe  1 tablet Oral Nightly    cloNIDine  0.1 mg Oral BID    hydroCHLOROthiazide  25 mg Oral Daily    levothyroxine  125 mcg Oral Daily    Roflumilast  500 mcg Oral Daily    pantoprazole  40 mg Oral QAM AC    cefTRIAXone (ROCEPHIN) IV  2,000 mg IntraVENous Q24H    vancomycin  1,000 mg IntraVENous Q12H    budesonide  0.5 mg Nebulization BID    Arformoterol Tartrate  15 mcg Nebulization BID    losartan  25 mg Oral Daily     Continuous Infusions:   sodium chloride       PRN Meds:sodium chloride flush, sodium chloride, ondansetron **OR** ondansetron, polyethylene glycol, acetaminophen **OR** acetaminophen, ipratropium-albuterol, oxyCODONE-acetaminophen    Objective:   Vitals:   T-max:  Patient Vitals for the past 8 hrs:   BP Temp Temp src Pulse Resp SpO2 Weight   02/07/22 0532 -- -- -- -- -- -- 192 lb 6 oz (87.3 kg)   02/07/22 0530 125/82 98 °F (36.7 °C) Oral 84 18 -- --   02/07/22 0236 103/67 -- -- -- -- -- --   02/07/22 0140 (!) 92/53 97.6 °F (36.4 °C) Oral 82 17 100 % --     No intake or output data in the 24 hours ending 02/07/22 0829     ROS: A 10 point review of systems was conducted, significant findings as noted in HPI.     Physical Exam  Constitutional:       General: She is not in acute distress. Appearance: Normal appearance. She is normal weight. HENT:      Head: Normocephalic and atraumatic. Right Ear: External ear normal.      Left Ear: External ear normal.      Nose: Nose normal.      Mouth/Throat:      Mouth: Mucous membranes are moist.      Pharynx: Oropharynx is clear. Eyes:      Extraocular Movements: Extraocular movements intact. Cardiovascular:      Rate and Rhythm: Normal rate and regular rhythm. Heart sounds: Normal heart sounds. Pulmonary:      Effort: Pulmonary effort is normal.      Comments: Diffuse inspiratory wheezing   Abdominal:      General: Abdomen is flat. Bowel sounds are normal.      Comments: (+) epigastric tenderness    Musculoskeletal:         General: No swelling. Normal range of motion. Right lower leg: No edema. Left lower leg: No edema. Skin:     General: Skin is warm and dry. Neurological:      General: No focal deficit present.       Mental Status: She is oriented to person, place, and time. LABS:    CBC:   Recent Labs     02/06/22  1030 02/07/22  0656   WBC 15.8* 13.4*   HGB 11.6* 11.0*   HCT 36.7 34.7*    217   MCV 83.6 85.2     Renal:    Recent Labs     02/06/22  1030 02/06/22  1737 02/07/22  0656   *  --  138   K 3.7  --  4.2   CL 97*  --  103   CO2 26  --  23   BUN 17  --  17   CREATININE 0.8  --  0.8   GLUCOSE 132*  --  144*   CALCIUM 9.4  --  9.5   MG  --  1.60*  --    ANIONGAP 11  --  12     Hepatic:   Recent Labs     02/06/22  1030 02/07/22  0656   AST 13* 12*   ALT 14 13   BILITOT 0.4 <0.2   BILIDIR <0.2  --    PROT 6.6 6.2*   LABALBU 3.6 3.8   ALKPHOS 91 91     Troponin:   Recent Labs     02/06/22  1030 02/06/22  1245 02/06/22  1737   TROPONINI <0.01 <0.01 <0.01     BNP: No results for input(s): BNP in the last 72 hours. Lipids: No results for input(s): CHOL, HDL in the last 72 hours. Invalid input(s): LDLCALCU, TRIGLYCERIDE  ABGs:  No results for input(s): PHART, BGF0UCV, PO2ART, HBS8IWR, BEART, THGBART, W9APGPAN, JEB5QLK in the last 72 hours. INR:   Recent Labs     02/06/22  1030   INR 0.99     Lactate: No results for input(s): LACTATE in the last 72 hours. Cultures:  -----------------------------------------------------------------  RAD:   XR CHEST PORTABLE   Final Result      No acute disease      CT CHEST PULMONARY EMBOLISM W CONTRAST   Final Result      1. No evidence of pulmonary embolism to the segmental level. 2. Borderline mediastinal lymphadenopathy. This is nonspecific. 3. Emphysema. XR CHEST PORTABLE   Final Result      No acute disease          Assessment/Plan:   Patient is a 78 yo female with a PMHx of COPD, depression, HLD, HTN, and hypothyroidism, who presents with SOB. Sepsis 2/2 unknown source  Tachycardic at 114, febrile at 101.7, and hypoxic in high 80s and placed on 2L of NC and satting well. UA with positive nitrites, but Asx.  Low suspicion for PNA    -PT/OT  -procal - 0.16  -lactate 1.9  -blood cultures pending  -urine cultures pending   -vanc and rocephin (2/7-   -MRSA swab sent     COPD exacerbation  Patient likely has an at home trigger that caused her to go into exacerbation.     -resume home inhalers   -Roflumilast 500mcg daily   -No steroids needed as of now.     HTN  -resume home Hctz   -resume home cozaar      GERD  -protonix 40mg daily      Hypothyroidism  -continue home synthroid         Code Status:Full code  FEN: Regular, low sodium  PPX: lovenox   DISPO: [facility]        Milton Vela MD, PGY-1  02/07/22  8:29 AM    This patient has been staffed and discussed with Candie Pereyra MD.   Patient seen and examined, labs and imaging studies reviewed, agree with assessment and plan as outlined above. Continue with current care and plan. Discussed case with patients nurse, discussed case with care team, discussed plan. Patient doesn't look that bad, has increased work of breathing when she ambulates or talks.       Candie Pereyra MD [address]

## 2022-02-07 NOTE — PROGRESS NOTES
Physical Therapy    Facility/Department: [address]  Initial Assessment and Treatment    NAME: Bryce Acevedo  : 1955  MRN: [id number]    Date of Service: 2022    Discharge Recommendations: Bryce Acevedo scored a 22/24 on the AM-PAC short mobility form. Current research shows that an AM-PAC score of 18 or greater is typically associated with a discharge to the patient's home setting. Based on the patient's AM-PAC score and their current functional mobility deficits, it is recommended that the patient have 2-3 sessions per week of Physical Therapy at d/c to increase the patient's independence. At this time, this patient demonstrates the endurance and safety to discharge home with OP PT and a follow up treatment frequency of 2-3x/wk. Please see assessment section for further patient specific details. If patient discharges prior to next session this note will serve as a discharge summary. Please see below for the latest assessment towards goals. PT Equipment Recommendations  Equipment Needed: No    Assessment   Body structures, Functions, Activity limitations: Decreased functional mobility ; Decreased endurance  Assessment: Pt with slightly decreased independent mobility from baseline. Pt reports normally independent with mobility however has been limited by SOB lately. Pt currently needing SB/supervision for mobility. Desats with ambulation. Utilizing pursed lip breathing techniques and recovers with rest.  Pt plans to return home with grand-daughter. Rec home with assist prn. Encouraged pt to gradually increase activity as tolerated. Pt verbalized understanding. Will follow in hospital to increase mobility and independence  Treatment Diagnosis: impaired functional mobility 2/2 decreased endurance  Decision Making: Low Complexity  PT Education: Goals;PT Role;General Safety; Functional Mobility Training  Patient Education: Pursed lip breathing and gradually increasing activity as tolerated. Pt verbalized understanding  REQUIRES PT FOLLOW UP: Yes       Patient Diagnosis(es): The primary encounter diagnosis was Acute on chronic respiratory failure with hypoxia ([facility] [address] A diagnosis of Fever, unspecified fever cause was also pertinent to this visit. has a past medical history of CAMDEN (acute kidney injury) ([facility] [address] Anxiety, Chronic abdominal pain, COPD (chronic obstructive pulmonary disease) ([facility] [address] DDD (degenerative disc disease), lumbar, Elevated glycated hemoglobin, Hyperlipidemia, Hypertension, Hypothyroidism, and Rotator cuff tear. has a past surgical history that includes central line (10/7/2013); Colonoscopy (9/6/2011); kirsty and bso (cervix removed) (8/15/2002); Upper gastrointestinal endoscopy (10/17/2005); Colonoscopy (10/24/2005); fine needle aspiration (8/10/2012); esophageal motility study (6/17/2013); Upper gastrointestinal endoscopy (9/5/2000); Cholecystectomy; Tonsillectomy; Carpal tunnel release; hernia repair; Hysterectomy; Hemorrhoid surgery; eye surgery; Endoscopy, colon, diagnostic; and Finger nail surgery (Bilateral, 5/21/2019). Restrictions  Position Activity Restriction  Other position/activity restrictions: up with assist  Vision/Hearing        Subjective  General  Chart Reviewed: Yes  Additional Pertinent Hx: Pt is a 77 y.o female adm 2/6 with multiple complaints. Patient states that 5 weeks ago, she had a left rotator cuff arthroplasty. Ever since then, she has been having headaches with lightheadedness, fevers off and on without complete resolution for more than a couple of days, and shortness of breath with a new oxygen requirement at home. CXR: neg.  CT Chest: neg. PMH: COPD, DDD, hyperlipidemia, HTN, CAMDEN, anxiety, L rotator cuff repair  Referring Practitioner: Dorothy Coronado DO  Referral Date : 02/06/22  Subjective  Subjective: Pt found supine. Agreeable to PT.   Pain Screening  Patient Currently in Pain: Yes (L shoulder and back, not rated, had pain meds earlier)  Vital Signs  Patient Currently in Pain: Yes (L shoulder and back, not rated, had pain meds earlier)       Orientation  Orientation  Overall Orientation Status: Within Functional Limits  Social/Functional History  Social/Functional History  Lives With: Other (comment) (has custody of 15 yr old granddaughter)  Type of Home: [address] [place] [address] Drive: One level (one bedroom on 2nd floor; looking for a two bedroom on first floor)  Home Access: Stairs to enter with rails  Entrance Stairs - Number of Steps: 32 steps  Bathroom Shower/Tub: Tub/Shower unit  Bathroom Toilet: Standard  Bathroom Equipment: Grab bars in [address] [place]: Cane,4 wheeled walker  ADL Assistance: Independent (granddaughter helps a little with dressing)  Homemaking Assistance:  (granddaughter has been helping)  Ambulation Assistance: Independent (without AD)  Transfer Assistance: Independent  Active : Yes  Occupation: Retired  Leisure & Hobbies: used to enjoy going on outings with granddaughter, they've been staying in more recently. Additional Comments: reports has mold in the bathroom, has lived there for 17 years. Was going to OP PT for L shoulder 2x/week unil she got sick. Cognition        Objective          AROM RLE (degrees)  RLE AROM: WFL  AROM LLE (degrees)  LLE AROM : WFL  Strength RLE  Strength RLE: WFL  Strength LLE  Strength LLE: WFL           Transfers  Sit to Stand: Supervision  Stand to sit: Supervision  Ambulation  Ambulation?: Yes  Ambulation 1  Device: No Device  Assistance: Supervision (to SBA)  Quality of Gait: mildly guarded with decreased mellisa, MORALES and c/o feeling lightheaded with increased distance  Distance: 60'  Comments: Pulse ox 93% prior to ambulating, 86% after ambulating in [place]. Increased back up to 99% after rest and cues for pursed lip breathing.        Treatment included: bed mobility, transfers, gt, pt education          Plan   Plan  Times per week: 2-5  Current Treatment Recommendations: Functional Mobility Training,Endurance Training,Patient/Caregiver Education & Training,Safety Education & Training  Safety Devices  Type of devices: Call light within reach,Left in chair,Nurse notified (no alarm in use)    G-Code       OutComes Score                                                  AM-PAC Score  AM-PAC Inpatient Mobility Raw Score : 22 (02/07/22 1121)  AM-PAC Inpatient T-Scale Score : 53.28 (02/07/22 1121)  Mobility Inpatient CMS 0-100% Score: 20.91 (02/07/22 1121)  Mobility Inpatient CMS G-Code Modifier : Shayy David (02/07/22 1121)          Goals  Short term goals  Time Frame for Short term goals: By discharge  Short term goal 1: Sit to stand independent  Short term goal 2: Pt will amb >80' with or without AD independent  Short term goal 3: Pt will up/down flight of steps with rail supervision       Therapy Time   Individual Concurrent Group Co-treatment   Time In 1044         Time Out 1112         Minutes 28              Timed Code Treatment Minutes: 13      Total Treatment Minutes:  [address] [address], PT

## 2022-02-07 NOTE — TELEPHONE ENCOUNTER
Called [employer] to request resend request for diabetic supplies Spoke rep who refaxed request. Walter Hammond

## 2022-02-07 NOTE — PROGRESS NOTES
Occupational Therapy   Occupational Therapy Initial Assessment/Tx Note  Date: 2022   Patient Name: Addie Murray  MRN: [id number]     : 1955    Date of Service: 2022     Assessment: Functional independence is decreased from baseline due to limitated activity tolerance. Pt can ambulate to/from bathroom for perform seated self care independently, but becomes quite SOB, fatigued and less steady with move functional mobility. She did desat to 86% on RA with ambulation. Recommend continued assist of family at home at d/c. Pt is working on obtaining a new apt and is aware of importance of finding a first floor unit. Discharge Recommendations: Addie Murray scored a 22/24 on the AM-PAC ADL Inpatient form. Current research shows that an AM-PAC score of 18 or greater is typically associated with a discharge to the patient's home setting. Based on the patient's AM-PAC score, and their current ADL deficits, it is recommended that the patient have 2-3 sessions per week of Occupational Therapy at d/c to increase the patient's independence. At this time, this patient demonstrates the endurance and safety to discharge home with assist and a follow up treatment frequency of 2-3x/wk. Please see assessment section for further patient specific details. OT Equipment Recommendations  Equipment Needed: Yes (shower chair - reports [facility] has offered this, encouraged to f/u)    Assessment   Performance deficits / Impairments: Decreased functional mobility ; Decreased endurance;Decreased balance;Decreased ADL status; Decreased high-level IADLs  Treatment Diagnosis: Decreased activity tolerance, impaired ADLs and mobility  Decision Making: Low Complexity  REQUIRES OT FOLLOW UP: Yes  Activity Tolerance  Activity Tolerance: Patient limited by fatigue;Patient Tolerated treatment well  Activity Tolerance: On RA on arrival. spO2 93% sitting EOB. Significant MORALES with walking.  spO2 86% upon return to chair, increasing to 99% within 2-3 min with pt initiated pursed lip breathing. Educated on appropriate activity levels during admit - verb understanding  Safety Devices  Safety Devices in place: Yes  Type of devices: Call light within reach; Left in chair;Nurse notified         Treatment Diagnosis: Decreased activity tolerance, impaired ADLs and mobility      Restrictions  Position Activity Restriction  Other position/activity restrictions: up with assist    Subjective   General  Chart Reviewed: Yes  Additional Pertinent Hx: 77 y.o. F admitted 2/6 with fever, headaches, SOB for 5 weeks. s/p recent rotator cuff surgery LUE. PMHx includes COPD, CAMDEN, anxiety, hysterectomy, carpal tunnel release  Family / Caregiver Present: No  Referring Practitioner: Johnie  Subjective  Subjective: Pt in bed on entry. Ice pack to L shoulder. \"I've been doing my shoulder therapy this morning. \"  General Comment  Comments: has pain in R shoulder, low back; reports being more comfortable in chair at end of session  Patient Currently in Pain: Yes (L shoulder and back, not rated, had pain meds earlier)    Social/Functional History  Social/Functional History  Lives With: Other (comment) (has custody of 15 yr old granddaughter)  Type of Home: [address] [place] [address] Drive: One level (one bedroom on 2nd floor; looking for a two bedroom on first floor)  Home Access: Stairs to enter with rails  Entrance Stairs - Number of Steps: 32 steps  Bathroom Shower/Tub: Tub/Shower unit  Bathroom Toilet: Standard  Bathroom Equipment: Grab bars in [address]: Cane,4 wheeled walker  ADL Assistance: Independent (granddaughter helps a little with dressing)  Homemaking Assistance:  (granddaughter has been helping)  Ambulation Assistance: Independent (without AD)  Transfer Assistance: Independent  Active : Yes  Occupation: Retired  Leisure & Hobbies: used to enjoy going on outings with granddaughter, they've been staying in more recently.   Additional Comments: reports has mold in the bathroom, has lived there for 17 years. Was going to OP PT for L shoulder 2x/week unil she got sick. Objective   Vision: Within Functional Limits  Hearing: Within functional limits    Orientation  Overall Orientation Status: Within Functional Limits     Balance  Sitting Balance: Independent  Standing Balance: Independent  Standing Balance  Time: 2-3 min  Functional Mobility  Functional - Mobility Device: No device  Activity: Other (in hallway)  Functional Mobility Comments: initially spvn declining to CGA with increased fatigue, SOB, mild lightheadedness. Pt educated on having assist when/if she walks farther than the bathroom - verb understanding  Toilet Transfers  Toilet Transfer: Modified independent  ADL  Feeding: Independent  Grooming: Modified independent   LE Dressing: Independent  Toileting: Independent  Additional Comments: Pt reports up to bathroom independently, no difficulty with bathing routine this morning. Granddaughter assists as needed at home since shoulder surgery. Bed mobility  Supine to Sit: Modified independent  Transfers  Sit to stand: Independent  Stand to sit: Independent     Cognition  Overall Cognitive Status: WFL     Hand Dominance  Hand Dominance: Left  Hand Assessment Comment  Hand Assessment Comment: pt working on her exercises for LUE. RUE also impaired due to tears. Plan  If pt discharges prior to next tx, this note will serve as d/c summary.  Continue per POC if pt does not d/c.     Plan  Times per week: 2-5x  Times per day: Daily  Current Treatment Recommendations: Endurance Training,Functional Mobility Training,Balance Training,Equipment Evaluation, Education, & procurement,Patient/Caregiver Education & Training,Self-Care / ADL,Safety Education & Training    AM-PAC Score     AM-PAC Inpatient Daily Activity Raw Score: 22 (02/07/22 1115)  AM-PAC Inpatient ADL T-Scale Score : 47.1 (02/07/22 1115)  ADL Inpatient CMS 0-100% Score: 25.8 (02/07/22 1115)  ADL Inpatient CMS G-Code Modifier : Laura Minor (02/07/22 1115)    Goals  Short term goals  Time Frame for Short term goals: by D/C  Short term goal 1:  Increase standing/mobility tolerance to 5 min - Not met  Short term goal 2: Functional mobility for ADLs/item retrieval mod I - Not met  Short term goal 3: Verbalize 3 energy conservation strategies to use at home - Not met       Therapy Time   Individual Concurrent Group Co-treatment   Time In 1045         Time Out 1113         Minutes 28          Timed Code Tx Min: 13  Total Tx Time: 28 min       Sudha Keith, OT

## 2022-02-07 NOTE — FLOWSHEET NOTE
02/07/22 1033   Encounter Summary   Services provided to: Patient   Referral/Consult From: [place]   Continue Visiting   (2/7, arun )   Complexity of Encounter Moderate   Length of Encounter 30 minutes   Spiritual/Confucianist   Type Spiritual support   Assessment Calm; Approachable; Hopeful   Intervention Active listening;Explored feelings, thoughts, concerns;Nurtured hope;Provided reading materials/devotional materials   Outcome Comfort;Expressed gratitude   Staff [place], [place]

## 2022-02-07 NOTE — RT PROTOCOL NOTE
RT Nebulizer Bronchodilator Protocol Note    There is a bronchodilator order in the chart from a provider indicating to follow the RT Bronchodilator Protocol and there is an Initiate RT Bronchodilator Protocol order as well (see protocol at bottom of note). CXR Findings:  XR CHEST PORTABLE    Result Date: 2/6/2022  No acute disease    XR CHEST PORTABLE    Result Date: 2/6/2022  No acute disease      The findings from the last RT Protocol Assessment were as follows:  Smoking: Chronic pulmonary disease  Respiratory Pattern: Dyspnea on exertion or RR 21-25 bpm  Breath Sounds: Slightly diminished and/or crackles  Cough: Strong, spontaneous, non-productive  Indication for Bronchodilator Therapy: On home bronchodilators  Bronchodilator Assessment Score: 6    Aerosolized bronchodilator medication orders have been revised according to the RT Nebulizer Bronchodilator Protocol below. Respiratory Therapist to perform RT Therapy Protocol Assessment initially then follow the protocol. Repeat RT Therapy Protocol Assessment PRN for score 0-3 or on second treatment, BID, and PRN for scores above 3. No Indications - adjust the frequency to every 6 hours PRN wheezing or bronchospasm, if no treatments needed after 48 hours then discontinue using Per Protocol order mode. If indication present, adjust the RT bronchodilator orders based on the Bronchodilator Assessment Score as indicated below. If a patient is on this medication at home then do not decrease Frequency below that used at home. 0-3 - enter or revise RT bronchodilator order(s) to equivalent RT Bronchodilator order with Frequency of every 4 hours PRN for wheezing or increased work of breathing using Per Protocol order mode.        4-6 - enter or revise RT Bronchodilator order(s) to two equivalent RT bronchodilator orders with one order with BID Frequency and one order with Frequency of every 4 hours PRN wheezing or increased work of breathing using Per Protocol order mode. 7-10 - enter or revise RT Bronchodilator order(s) to two equivalent RT bronchodilator orders with one order with TID Frequency and one order with Frequency of every 4 hours PRN wheezing or increased work of breathing using Per Protocol order mode. 11-13 - enter or revise RT Bronchodilator order(s) to one equivalent RT bronchodilator order with QID Frequency and an Albuterol order with Frequency of every 4 hours PRN wheezing or increased work of breathing using Per Protocol order mode. Greater than 13 - enter or revise RT Bronchodilator order(s) to one equivalent RT bronchodilator order with every 4 hours Frequency and an Albuterol order with Frequency of every 2 hours PRN wheezing or increased work of breathing using Per Protocol order mode. RT to enter RT Home Evaluation for COPD & MDI Assessment order using Per Protocol order mode.     Electronically signed by Danna Coyle RCP on 2/7/2022 at 4:34 AM

## 2022-02-07 NOTE — CONSULTS
Pulmonary Consult Note      Reason for Consult: COPD exacerbation   Requesting Physician: Toney Colmenares    Subjective:     CHIEF COMPLAINT / HPI:                The patient is a 77 y.o. female with significant past medical history of COPD, anxiety presented with complaints of shortness of breath. Patient states symptoms have been worsening for about 6 weeks and she is convinced it is because she's been inhaling red dye from a comforter set she started using at [place]. I haven't seen her in several years, but she continues on inhalers, currently using dulera and combivent for rescue. She's been having shortness of breath, worse when she goes in her bedroom, for the past month and a half, but the thing that made her scared and come to the hospital was the fever.        Past Medical History:      Diagnosis Date    CAMDEN (acute kidney injury) ([facility] [address] 10/07/2013    Anxiety     Chronic abdominal pain     possibly due to diverticulosos    COPD (chronic obstructive pulmonary disease) (HCC)     DDD (degenerative disc disease), lumbar 12/01/2016    Elevated glycated hemoglobin     Hyperlipidemia     Hypertension     Hypothyroidism     Rotator cuff tear     right      Past Surgical History:        Procedure Laterality Date    CARPAL TUNNEL RELEASE      CENTRAL LINE  10/7/2013         CHOLECYSTECTOMY      COLONOSCOPY  9/6/2011    Tubular adenoma    COLONOSCOPY  10/24/2005    Dr. Prosper Perkins - Tubular adenoma    ENDOSCOPY, COLON, DIAGNOSTIC      ESOPHAGEAL MOTILITY STUDY  6/17/2013    NORMAL    EYE SURGERY      cataracts    FINE NEEDLE ASPIRATION  8/10/2012    THYROID - NEGATIVE    FINGER NAIL SURGERY Bilateral 5/21/2019    TOTAL AVULSION OF 1-5 TOENAILS BILATERAL FEET performed by Verner Dames, DPM at [address] AND [facility]  8/15/2002    Endometriosis, fibroids    TONSILLECTOMY      UPPER GASTROINTESTINAL ENDOSCOPY  10/17/2005    Bx small bowel, Gastric, GE junction all negative    UPPER GASTROINTESTINAL ENDOSCOPY  9/5/2000    Dr. Isamar Umana - NORMAL     Current Medications:     ipratropium-albuterol  1 ampule Inhalation BID    predniSONE  40 mg Oral Daily    sodium chloride flush  5-40 mL IntraVENous 2 times per day    enoxaparin  40 mg SubCUTAneous Daily    Bempedoic Acid-Ezetimibe  1 tablet Oral Nightly    cloNIDine  0.1 mg Oral BID    hydroCHLOROthiazide  25 mg Oral Daily    levothyroxine  125 mcg Oral Daily    Roflumilast  500 mcg Oral Daily    pantoprazole  40 mg Oral QAM AC    cefTRIAXone (ROCEPHIN) IV  2,000 mg IntraVENous Q24H    vancomycin  1,000 mg IntraVENous Q12H    budesonide  0.5 mg Nebulization BID    Arformoterol Tartrate  15 mcg Nebulization BID    losartan  25 mg Oral Daily     Allergies: Allergies   Allergen Reactions    Bupropion Other (See Comments)     Muscle spasms/seizure like symptoms     Morphine Hives and Itching    Morphine And Related Itching     Social History:    TOBACCO:   reports that she quit smoking about 5 years ago. Her smoking use included cigarettes. She has a 8.00 pack-year smoking history. She has never used smokeless tobacco.  ETOH:   reports no history of alcohol use. Family History:       Problem Relation Age of Onset    Diabetes Sister     Heart Disease Sister         chf    Cancer Sister 76        colon cancer  metastatic    Diabetes Sister     Kidney Disease Sister     Cancer Sister         brain cancer throat cancer and smoked    Cancer Mother 68        cerival cancer    Osteoarthritis Mother     Ovarian Cancer Mother     Heart Disease Father     High Blood Pressure Father     Heart Disease Brother 40        heart attack    High Blood Pressure Maternal Aunt     Cancer Other 46        liver cancer    Cancer Other 47        lung cancer and smoker, maternal neice    Cancer Other         bladder cancer, first cousin.     Diabetes Sister 46        complication of diabetes REVIEW OF SYSTEMS:    CONSTITUTIONAL:  negative for chills, diaphoresis, activity change, appetite change, fatigue, night sweats and unexpected weight change. EYES:  negative for blurred vision, eye discharge, visual disturbance and icterus  HEENT:  negative for hearing loss, tinnitus, ear drainage, sinus pressure, nasal congestion, epistaxis and snoring  RESPIRATORY:  See HPI  CARDIOVASCULAR:  negative for chest pain, palpitations, exertional chest pressure/discomfort, edema, syncope  GASTROINTESTINAL:  negative for nausea, vomiting, diarrhea, constipation, blood in stool and abdominal pain  GENITOURINARY:  negative for frequency, dysuria, urinary incontinence, decreased urine volume, and hematuria  HEMATOLOGIC/LYMPHATIC:  negative for easy bruising, bleeding and lymphadenopathy  ALLERGIC/IMMUNOLOGIC:  negative for recurrent infections, angioedema, anaphylaxis and drug reactions  ENDOCRINE:  negative for weight changes and diabetic symptoms including polyuria, polydipsia and polyphagia  MUSCULOSKELETAL:  negative for  pain, joint swelling, decreased range of motion and muscle weakness  NEUROLOGICAL:  negative for headaches, slurred speech, unilateral weakness  PSYCHIATRIC/BEHAVIORAL: negative for hallucinations, behavioral problems, confusion and agitation.      Objective:   PHYSICAL EXAM:      VITALS:  /69   Pulse 82   Temp 97.9 °F (36.6 °C) (Oral)   Resp 20   Ht 5' 5\" (1.651 m)   Wt 192 lb 6 oz (87.3 kg)   LMP  (LMP Unknown)   SpO2 92%   BMI 32.01 kg/m²   24HR INTAKE/OUTPUT:      Intake/Output Summary (Last 24 hours) at 2022 1227  Last data filed at 2022 0831  Gross per 24 hour   Intake 250 ml   Output --   Net 250 ml     CURRENT PULSE OXIMETRY:  SpO2: 92 %  24HR PULSE OXIMETRY RANGE:  SpO2  Av %  Min: 92 %  Max: 100 % on room air    CONSTITUTIONAL:  awake, alert, cooperative, mild distress, and appears stated age  NECK:  Supple, symmetrical, trachea midline, no adenopathy, thyroid symmetric, not enlarged and no tenderness, skin normal  LUNGS:  mild increased work of breathing and clear to auscultation. no accessory muscle use  CARDIOVASCULAR:  normal S1 and S2, no edema and no JVD  ABDOMEN:  normal bowel sounds, non-distended and no masses palpated, and no tenderness to palpation. No hepatospleenomegaly  LYMPHADENOPATHY:  no axillary or supraclavicular adenopathy. No cervical adnenopathy  PSYCHIATRIC: Oriented to person place and time. No obvious depression, does appear anxious/aggravated. MUSCULOSKELETAL: No obvious misalignment or effusion of the joints. No clubbing, cyanosis of the digits. SKIN:  normal skin color, texture, turgor and no redness, warmth, or swelling.  No palpable nodules    DATA:    Old records have been reviewed  CBC with Differential:    Lab Results   Component Value Date    WBC 13.4 02/07/2022    RBC 4.08 02/07/2022    RBC 5.35 07/05/2017    HGB 11.0 02/07/2022    HCT 34.7 02/07/2022     02/07/2022    MCV 85.2 02/07/2022    MCH 27.0 02/07/2022    MCHC 31.7 02/07/2022    RDW 13.4 02/07/2022    NRBC 1 12/24/2013    SEGSPCT 63.9 05/12/2013    BANDSPCT 3 06/16/2019    BLASTSPCT 1 12/22/2013    METASPCT 2 12/24/2013    LYMPHOPCT 10.3 02/07/2022    LYMPHOPCT 41.2 07/05/2017    MONOPCT 7.1 02/07/2022    MYELOPCT 7 12/24/2013    BASOPCT 0.4 02/07/2022    MONOSABS 1.0 02/07/2022    LYMPHSABS 1.4 02/07/2022    EOSABS 0.0 02/07/2022    BASOSABS 0.1 02/07/2022    DIFFTYPE Auto-K 05/12/2013     BMP:    Lab Results   Component Value Date     02/07/2022    K 4.2 02/07/2022     02/07/2022    CO2 23 02/07/2022    BUN 17 02/07/2022    CREATININE 0.8 02/07/2022    CALCIUM 9.5 02/07/2022    GFRAA >60 02/07/2022    GFRAA >60 05/12/2013    LABGLOM >60 02/07/2022    GLUCOSE 144 02/07/2022     Hepatic Function Panel:    Lab Results   Component Value Date    ALKPHOS 91 02/07/2022    ALT 13 02/07/2022    AST 12 02/07/2022    PROT 6.2 02/07/2022    PROT 6.8 01/22/2013    BILITOT <0.2 02/07/2022    BILIDIR <0.2 02/06/2022    IBILI see below 02/06/2022     ABG:    Lab Results   Component Value Date    EFM8CXZ 17 12/19/2013    BEART -7.5 12/19/2013    T0CYVZXP 96 12/19/2013    PHART 7.32 12/19/2013    RCF3RZQ 34 12/19/2013    PO2ART 73 12/19/2013    QXA7ECP 18 12/19/2013       Cultures:   Blood Culture:    Sputum Culture:        Radiology Review:  All pertinent images / reports were reviewed as a part of this visit. imaging reveals the following:  XR CHEST PORTABLE   Final Result      No acute disease      CT CHEST PULMONARY EMBOLISM W CONTRAST   Final Result      1. No evidence of pulmonary embolism to the segmental level. 2. Borderline mediastinal lymphadenopathy. This is nonspecific. 3. Emphysema. XR CHEST PORTABLE   Final Result      No acute disease         Other diagnostic test:      PFTs: 2014: IMPRESSION:  Severe obstruction to airflow with a response to  bronchodilators. Assessment/Plan   77 y.o. female with COPD exacerbation    For all patient's concerns about inhaling Red 5 or 40 dye I've not heard of that causing any pulmonary problems before. There are a lot of otherwise inert substances that can cause hypersensitivity pneumonitis, but her CT is not consistent with HP and only shows emphysema. She's still complaining of a lot of exertional dyspnea, but she's not wheezing or coughing. She did have reversibility on PFTs many years ago so there may be an overlap with asthma. I'm going to check an IgE. I think a LAMA would probably serve her better than combivent rescue in the long run. Continue steroids, bronchodilators and daliresp.     Ceftriaxone for UTI, not pneumonia         Prateek Diaz MD

## 2022-02-07 NOTE — DISCHARGE SUMMARY
INTERNAL MEDICINE DEPARTMENT AT [address] [place] [address]  DISCHARGE SUMMARY    Patient ID: Eboni Agosto                                             Discharge Date: 2/8/2022   Patient's PCP: Verena Andersen MD                                          Discharge Physician: Feliberto Garza DO   Admit Date: 2/6/2022   Admitting Physician: Oscar Rivera MD    PROBLEMS DURING HOSPITALIZATION:  Present on Admission:   COPD with acute exacerbation ([facility]   Seizure ([facility]      DISCHARGE DIAGNOSES: acute on chronic COPD exacerbation     HPI:  Patient is a 78 yo female with a PMHx of COPD, depression, HLD, HTN, and hypothyroidism, who presents with SOB. She endorsees SOB for the past 5 weeks since having surgery of a her L rotator cuff. She also endorses intermittent fevers, headaches, lightheadedness, vomiting, and SOB. Her SOB is worse with exertion. Patient believes her symptoms to be caused by comforter she bought over [place] with red dye. She was so concerned that she called CDC and poison control. She has visits to her PCP and ED in past for suspected COPD exacerbations.      Upon arrival to the ED, she was tachycardic at 114, febrile at 101.7, and hypoxic in high 80s and placed on 2L of NC and satting well. She was able to be weaned down to room air and was satting above 90%. BMP, Cbc, troponin, LFTs, and probnp were unremarkable. UA positive for nitrites, but negative for WBC and leukocytes. CXR indicated no acute disease. CTPE indicated no evidence of pulmonary embolism, borderline mediastinal lymphadenopathy, and emphysema. She received 1L bolus, steroids, inhalers, and abx in the ED, as well as one time dose of dilaudid.       The following issues were addressed during hospitalization:    Sepsis 2/2 unknown source  Pt was Tachycardic at 114, febrile at 101.7, and hypoxic in high 80s and placed on 2L of NC and satting well. She was able to be transitioned to room air on admission and was satting above 90%. Charles Garcia with positive nitrites, but Asx. Procal - 0.16/lactate 1.9. Patient was started on vanc and rocephin. They were discontinued prior to admission and she was discharged with prednisone taper and Zithro pack.      COPD exacerbation  Patient likely has an at home trigger that caused her to go into exacerbation. Pt had his home inhalers resumed. Roflumilast 500mcg daily. She was started on prednisone daily and will taper for discharge.     HTN  Patient had his home HCTZ continued. GERD  Patient had protonix 40mg daily.      Hypothyroidism  Patient had her home synthroid continued throughout her stay.      Patient was seen at bedside and has no further complaints. Patient agrees with the plan we created together and will follow up in 2 week outpatient with PCP and pulmonologist as needed. Patient denies any further symptoms that need further inpatient hospitalization and endorses improvement in her SOB, fatigue, and nausea. . Physical exam, vitals and labs were all appreciated and negative for any further inpatient hospitalization. Pt is stable, understands plan of care, and agrees to be discharge. Time was allotted to further  the patient based on her health issues and recent hospitalization. She will be discharged with prednisone taper and zithro pack with meds to beds and was instructed to take it as prescribed      Physical Exam:  BP (!) 142/92   Pulse 91   Temp 98.3 °F (36.8 °C) (Oral)   Resp 20   Ht 5' 5\" (1.651 m)   Wt 198 lb 6.6 oz (90 kg)   LMP  (LMP Unknown)   SpO2 96%   BMI 33.02 kg/m²   Physical Exam  Constitutional:       General: She is not in acute distress. Appearance: Normal appearance. HENT:      Head: Normocephalic and atraumatic. Right Ear: External ear normal.      Left Ear: External ear normal.      Nose: Nose normal.      Mouth/Throat:      Mouth: Mucous membranes are moist.      Pharynx: Oropharynx is clear. Eyes:      Extraocular Movements: Extraocular movements intact. Cardiovascular:      Rate and Rhythm: Normal rate and regular rhythm. Heart sounds: Normal heart sounds. Pulmonary:      Effort: Pulmonary effort is normal.      Breath sounds: Normal breath sounds. Abdominal:      General: Abdomen is flat. Bowel sounds are normal.      Palpations: Abdomen is soft. Musculoskeletal:         General: No swelling. Normal range of motion. Right lower leg: No edema. Left lower leg: No edema. Skin:     General: Skin is warm and dry. Neurological:      General: No focal deficit present. Mental Status: She is oriented to person, place, and time. Psychiatric:         Mood and Affect: Mood normal.         Behavior: Behavior normal.       Consults: pulm   Significant Diagnostic Studies:  chest x-ray, CT chest   Treatments: antibiotics: vancomycin, rocephin   Disposition: home  Discharged Condition: Stable  Follow Up: Primary Care Physician in two weeks    DISCHARGE MEDICATION:     Medication List      START taking these medications    azithromycin 250 MG tablet  Commonly known as: ZITHROMAX  Take 1 tablet by mouth See Admin Instructions for 5 days 500mg on day 1 followed by 250mg on days 2 - 5     cyclobenzaprine 5 MG tablet  Commonly known as: FLEXERIL  Take 1 tablet by mouth three times daily as needed for muscle spasm        CHANGE how you take these medications    cloNIDine 0.1 MG tablet  Commonly known as: CATAPRES  Bid  What changed:   · how much to take  · how to take this  · when to take this     levothyroxine 125 MCG tablet  Commonly known as: Euthyrox  TAKE 1 TABLET EVERY DAY (DISCONTINUE 135MCG)  What changed:   · how much to take  · how to take this  · when to take this     * predniSONE 10 MG tablet  Commonly known as: DELTASONE  4 tablets/40 mg daily for 3 days then 3 tablets/30 mg daily for 3 days then 20 mg daily for 3 days then 10 mg daily for 3 days then off. What changed: Another medication with the same name was added.  Make sure you understand how and when to take each. * predniSONE 10 MG tablet  Commonly known as: DELTASONE  4 tablets once daily for 3 days then 3 tablets once daily for 3 days then 2 tablets once daily for 3 days then 1 tablet once daily for 3 days. What changed: You were already taking a medication with the same name, and this prescription was added. Make sure you understand how and when to take each. * This list has 2 medication(s) that are the same as other medications prescribed for you. Read the directions carefully, and ask your doctor or other care provider to review them with you.             CONTINUE taking these medications    * albuterol-ipratropium  MCG/ACT Aers inhaler  Commonly known as: Combivent Respimat  INHALE 1 PUFF BY MOUTH EVERY SIX HOURS AS NEEDED FOR WHEEZING     * ipratropium-albuterol 0.5-2.5 (3) MG/3ML Soln nebulizer solution  Commonly known as: DUONEB  Inhale 3 mLs into the lungs as needed for Shortness of Breath     ALPRAZolam 1 MG tablet  Commonly known as: XANAX     Belsomra 5 MG Tabs  Generic drug: Suvorexant     Bempedoic Acid-Ezetimibe 180-10 MG Tabs  Take 1 tablet by mouth nightly     Calcium-Vitamin D 600-400 MG-UNIT Tabs  Take 1 tablet by mouth twice daily     Compressor/Nebulizer Misc  1 Units by Does not apply route 4 times daily as needed (Wheezing SOB)     diphenhydrAMINE 50 MG capsule  Commonly known as: Banophen  TAKE ONE CAPSULE BY MOUTH EVERY NIGHT AT BEDTIME AS NEEDED FOR ITCHING     docusate sodium 100 MG capsule  Commonly known as: Stool Softener  TAKE ONE CAPSULE BY MOUTH DAILY AS NEEDED FOR CONSTIPATION     Dulera 200-5 MCG/ACT inhaler  Generic drug: mometasone-formoterol     hydroCHLOROthiazide 25 MG tablet  Commonly known as: HYDRODIURIL  Take 1 tablet by mouth once daily      MG tablet  Generic drug: ibuprofen  TAKE 1 TABLET BY MOUTH EVERY 8 HOURS AS NEEDED FOR PAIN     lactulose 10 GM/15ML solution  Commonly known as: CHRONULAC  Take 15 mLs by mouth as needed (constipation)     Narcan 4 MG/0.1ML Liqd nasal spray  Generic drug: naloxone     omeprazole 20 MG delayed release capsule  Commonly known as: PRILOSEC  Take 1 capsule by mouth every morning (before breakfast) As needed for heartburn     ondansetron 4 MG disintegrating tablet  Commonly known as: Zofran ODT  Take 1 tablet by mouth every 8 hours as needed for Nausea or Vomiting     oxyCODONE-acetaminophen  MG per tablet  Commonly known as: PERCOCET     potassium chloride 10 MEQ extended release tablet  Commonly known as: KLOR-CON M     Promethazine HCl 6.25 MG/5ML Soln  Take 5ml every 6 hours PRN nausea     QUEtiapine 100 MG tablet  Commonly known as: SEROQUEL  TAKE 1 TABLET EVERY NIGHT     Respiratory Therapy Supplies Misc  1 each by Does not apply route 4 times daily as needed (wheezing SOB) All Nebulizer supplies needed     Roflumilast 500 MCG tablet  Commonly known as: Daliresp  TAKE 1 TABLET EVERY DAY     tiZANidine 4 MG tablet  Commonly known as: Brandon Cárdenas         * This list has 2 medication(s) that are the same as other medications prescribed for you. Read the directions carefully, and ask your doctor or other care provider to review them with you. Where to Get Your Medications      These medications were sent to [place], [address] [facility] [address] Monica Lucas [phone] - [phone]  [address] [facility] [address]    Phone: [phone]   · azithromycin 250 MG tablet  · omeprazole 20 MG delayed release capsule  · predniSONE 10 MG tablet     These medications were sent to [address] [place] [address] [address] [address]    Phone: [phone]   · cyclobenzaprine 5 MG tablet       Activity: activity as tolerated  Diet: regular diet  Wound Care: none needed    Time Spent on discharge is more than 30 minutes.      Signed:  Misty Ji DO, PGY-1  2/8/2022

## 2022-02-07 NOTE — PROGRESS NOTES
Clinical Pharmacy Consult Note    Vancomycin - Management by Pharmacy    Consult Date(s): 2/6/22  Consulting Provider(s): Dr. Shweta Restrepo / Plan:  1)  Sepsis of unknown source - Vancomycin   Concurrent Antimicrobials: Ceftriaxone   Day of Vanc Therapy: #2   Current Dosing Method: Bayesian-Guided AUC Dosing  o Therapeutic Goal: AUC = 400-600 mg/L*hr  o Currently on 1000mg IV q12h. Random level this AM = 17.9mcg/mL - drawn just ~3h after dose finished infusing. Calculated AUC = 527 with trough 17.2. Will continue same dose for now.  o Will check repeat level prior to dose due 2/8 06:00 to further evaluate regimen.  Will continue to monitor clinical condition and make adjustments to regimen as appropriate. Please call with questions--  Thanks--  Freddy Evans, PharmD, BCPS, BCGP  [id number] ([facility])   2/7/2022 8:26 AM      Interval update:  Cultures pending. Afebrile overnight. Subjective/Objective: Candace Lewis is a 77 y.o. female with a PMHx significant for COPD, depression, HTN, hypothyroidism who presented to ED 2/6 with SOB, fevers, and headache. . In ED, pt was noted to be tachycardic, febrile and hypoxic. Admitted with sepsis. Pharmacy is consulted to dose vancomycin. Ht Readings from Last 1 Encounters:   02/06/22 5' 5\" (1.651 m)     Wt Readings from Last 1 Encounters:   02/07/22 192 lb 6 oz (87.3 kg)       Current & Prior Antimicrobial Regimen(s):      Ceftriaxone 2000mg IV q24h (2/6-current)   Vancomycin - pharmacy to dose (2/6-current)  o 1750 mg IV x1 2/6  o 1000mg IV q12h (2/7-current)    Vancomycin Level(s) / Doses:  Date Time Dose Level / Type of Level Interpretation   2/7 06:56 1000mg IV q12h Random = 17.9mcg/mL Calculated AUC = 527 with trough = 17.2          Note: Serum levels collected for AUC-based dosing may be high if collected in close proximity to the dose administered. This is not necessarily indicative of toxicity.     Cultures & Sensitivities:    Date Site Micro Susceptibility / Result   2/6 Rapid COVID Not detected    2/6 Blood x2 sent    2/6 Urine sent    2/6 MRSA Nasal PCR ordered            Labs / Ancillary Data:    Estimated Creatinine Clearance: 75 mL/min (based on SCr of 0.8 mg/dL).     Recent Labs     02/06/22  1030 02/07/22  0656   CREATININE 0.8 0.8   BUN 17 17   WBC 15.8* 13.4*       Additional Lab Values / Findings of Note:    Recent Labs     02/06/22  1737   PROCAL 0.16*

## 2022-02-07 NOTE — PROGRESS NOTES
Phone call received from Dr. Gretta Puente to hold losartan and hydrochlorothiazide this AM due to blood pressures.

## 2022-02-08 VITALS
WEIGHT: 198.41 LBS | BODY MASS INDEX: 33.06 KG/M2 | TEMPERATURE: 98.1 F | RESPIRATION RATE: 18 BRPM | HEIGHT: 65 IN | HEART RATE: 92 BPM | DIASTOLIC BLOOD PRESSURE: 76 MMHG | OXYGEN SATURATION: 98 % | SYSTOLIC BLOOD PRESSURE: 130 MMHG

## 2022-02-08 LAB
A/G RATIO: 1.5 (ref 1.1–2.2)
ALBUMIN SERPL-MCNC: 3.8 G/DL (ref 3.4–5)
ALP BLD-CCNC: 83 U/L (ref 40–129)
ALT SERPL-CCNC: 16 U/L (ref 10–40)
ANION GAP SERPL CALCULATED.3IONS-SCNC: 12 MMOL/L (ref 3–16)
AST SERPL-CCNC: 14 U/L (ref 15–37)
BASOPHILS ABSOLUTE: 0 K/UL (ref 0–0.2)
BASOPHILS RELATIVE PERCENT: 0.4 %
BILIRUB SERPL-MCNC: <0.2 MG/DL (ref 0–1)
BUN BLDV-MCNC: 19 MG/DL (ref 7–20)
CALCIUM SERPL-MCNC: 9.7 MG/DL (ref 8.3–10.6)
CHLORIDE BLD-SCNC: 104 MMOL/L (ref 99–110)
CO2: 22 MMOL/L (ref 21–32)
CREAT SERPL-MCNC: 0.9 MG/DL (ref 0.6–1.2)
EOSINOPHILS ABSOLUTE: 0 K/UL (ref 0–0.6)
EOSINOPHILS RELATIVE PERCENT: 0.2 %
GFR AFRICAN AMERICAN: >60
GFR NON-AFRICAN AMERICAN: >60
GLUCOSE BLD-MCNC: 121 MG/DL (ref 70–99)
HCT VFR BLD CALC: 33.6 % (ref 36–48)
HEMOGLOBIN: 10.7 G/DL (ref 12–16)
LYMPHOCYTES ABSOLUTE: 2.3 K/UL (ref 1–5.1)
LYMPHOCYTES RELATIVE PERCENT: 18.6 %
MAGNESIUM: 1.9 MG/DL (ref 1.8–2.4)
MCH RBC QN AUTO: 27 PG (ref 26–34)
MCHC RBC AUTO-ENTMCNC: 31.8 G/DL (ref 31–36)
MCV RBC AUTO: 85 FL (ref 80–100)
MONOCYTES ABSOLUTE: 0.8 K/UL (ref 0–1.3)
MONOCYTES RELATIVE PERCENT: 6.7 %
MRSA SCREEN RT-PCR: NORMAL
NEUTROPHILS ABSOLUTE: 9.1 K/UL (ref 1.7–7.7)
NEUTROPHILS RELATIVE PERCENT: 74.1 %
PDW BLD-RTO: 13.9 % (ref 12.4–15.4)
PLATELET # BLD: 219 K/UL (ref 135–450)
PMV BLD AUTO: 10 FL (ref 5–10.5)
POTASSIUM REFLEX MAGNESIUM: 3.9 MMOL/L (ref 3.5–5.1)
RBC # BLD: 3.95 M/UL (ref 4–5.2)
SODIUM BLD-SCNC: 138 MMOL/L (ref 136–145)
TOTAL PROTEIN: 6.4 G/DL (ref 6.4–8.2)
VANCOMYCIN TROUGH: 8.2 UG/ML (ref 10–20)
WBC # BLD: 12.3 K/UL (ref 4–11)

## 2022-02-08 PROCEDURE — 80053 COMPREHEN METABOLIC PANEL: CPT

## 2022-02-08 PROCEDURE — 6360000002 HC RX W HCPCS: Performed by: STUDENT IN AN ORGANIZED HEALTH CARE EDUCATION/TRAINING PROGRAM

## 2022-02-08 PROCEDURE — 82785 ASSAY OF IGE: CPT

## 2022-02-08 PROCEDURE — 6370000000 HC RX 637 (ALT 250 FOR IP)

## 2022-02-08 PROCEDURE — 99232 SBSQ HOSP IP/OBS MODERATE 35: CPT | Performed by: INTERNAL MEDICINE

## 2022-02-08 PROCEDURE — 2580000003 HC RX 258: Performed by: STUDENT IN AN ORGANIZED HEALTH CARE EDUCATION/TRAINING PROGRAM

## 2022-02-08 PROCEDURE — 6370000000 HC RX 637 (ALT 250 FOR IP): Performed by: STUDENT IN AN ORGANIZED HEALTH CARE EDUCATION/TRAINING PROGRAM

## 2022-02-08 PROCEDURE — 6360000002 HC RX W HCPCS

## 2022-02-08 PROCEDURE — 94640 AIRWAY INHALATION TREATMENT: CPT

## 2022-02-08 PROCEDURE — 85025 COMPLETE CBC W/AUTO DIFF WBC: CPT

## 2022-02-08 PROCEDURE — 36415 COLL VENOUS BLD VENIPUNCTURE: CPT

## 2022-02-08 PROCEDURE — 97116 GAIT TRAINING THERAPY: CPT

## 2022-02-08 PROCEDURE — 6370000000 HC RX 637 (ALT 250 FOR IP): Performed by: INTERNAL MEDICINE

## 2022-02-08 PROCEDURE — 80202 ASSAY OF VANCOMYCIN: CPT

## 2022-02-08 PROCEDURE — 94761 N-INVAS EAR/PLS OXIMETRY MLT: CPT

## 2022-02-08 PROCEDURE — 83735 ASSAY OF MAGNESIUM: CPT

## 2022-02-08 RX ORDER — OMEPRAZOLE 20 MG/1
20 CAPSULE, DELAYED RELEASE ORAL
Qty: 30 CAPSULE | Refills: 0 | Status: SHIPPED | OUTPATIENT
Start: 2022-02-08 | End: 2022-06-03

## 2022-02-08 RX ORDER — PREDNISONE 10 MG/1
TABLET ORAL
Qty: 30 TABLET | Refills: 0 | OUTPATIENT
Start: 2022-02-08 | End: 2022-02-18

## 2022-02-08 RX ORDER — OMEPRAZOLE 20 MG/1
20 CAPSULE, DELAYED RELEASE ORAL
Qty: 90 CAPSULE | Refills: 1 | OUTPATIENT
Start: 2022-02-08

## 2022-02-08 RX ORDER — AZITHROMYCIN 250 MG/1
250 TABLET, FILM COATED ORAL SEE ADMIN INSTRUCTIONS
Qty: 6 TABLET | Refills: 0 | Status: SHIPPED | OUTPATIENT
Start: 2022-02-08 | End: 2022-02-13

## 2022-02-08 RX ORDER — AZITHROMYCIN 250 MG/1
250 TABLET, FILM COATED ORAL SEE ADMIN INSTRUCTIONS
Qty: 6 TABLET | Refills: 0 | OUTPATIENT
Start: 2022-02-08 | End: 2022-02-13

## 2022-02-08 RX ORDER — PREDNISONE 10 MG/1
TABLET ORAL
Qty: 30 TABLET | Refills: 0 | Status: SHIPPED | OUTPATIENT
Start: 2022-02-08 | End: 2022-10-07 | Stop reason: ALTCHOICE

## 2022-02-08 RX ORDER — IPRATROPIUM BROMIDE AND ALBUTEROL SULFATE 2.5; .5 MG/3ML; MG/3ML
1 SOLUTION RESPIRATORY (INHALATION) 3 TIMES DAILY
Status: DISCONTINUED | OUTPATIENT
Start: 2022-02-08 | End: 2022-02-08 | Stop reason: HOSPADM

## 2022-02-08 RX ADMIN — LOSARTAN POTASSIUM 25 MG: 25 TABLET, FILM COATED ORAL at 09:43

## 2022-02-08 RX ADMIN — PREDNISONE 40 MG: 20 TABLET ORAL at 09:43

## 2022-02-08 RX ADMIN — LEVOTHYROXINE SODIUM 125 MCG: 0.12 TABLET ORAL at 06:29

## 2022-02-08 RX ADMIN — SODIUM CHLORIDE, PRESERVATIVE FREE 10 ML: 5 INJECTION INTRAVENOUS at 09:44

## 2022-02-08 RX ADMIN — IPRATROPIUM BROMIDE AND ALBUTEROL SULFATE 1 AMPULE: .5; 2.5 SOLUTION RESPIRATORY (INHALATION) at 09:03

## 2022-02-08 RX ADMIN — BUDESONIDE 500 MCG: 0.5 SUSPENSION RESPIRATORY (INHALATION) at 09:00

## 2022-02-08 RX ADMIN — OXYCODONE AND ACETAMINOPHEN 1 TABLET: 7.5; 325 TABLET ORAL at 10:05

## 2022-02-08 RX ADMIN — CLONIDINE HYDROCHLORIDE 0.1 MG: 0.1 TABLET ORAL at 09:43

## 2022-02-08 RX ADMIN — OXYCODONE AND ACETAMINOPHEN 1 TABLET: 7.5; 325 TABLET ORAL at 02:10

## 2022-02-08 RX ADMIN — ROFLUMILAST 500 MCG: 500 TABLET ORAL at 09:43

## 2022-02-08 RX ADMIN — PANTOPRAZOLE SODIUM 40 MG: 40 TABLET, DELAYED RELEASE ORAL at 06:29

## 2022-02-08 RX ADMIN — ARFORMOTEROL TARTRATE 15 MCG: 15 SOLUTION RESPIRATORY (INHALATION) at 09:00

## 2022-02-08 RX ADMIN — HYDROCHLOROTHIAZIDE 25 MG: 25 TABLET ORAL at 09:43

## 2022-02-08 ASSESSMENT — PAIN SCALES - GENERAL
PAINLEVEL_OUTOF10: 0
PAINLEVEL_OUTOF10: 4
PAINLEVEL_OUTOF10: 7
PAINLEVEL_OUTOF10: 8
PAINLEVEL_OUTOF10: 7
PAINLEVEL_OUTOF10: 4
PAINLEVEL_OUTOF10: 0
PAINLEVEL_OUTOF10: 4

## 2022-02-08 ASSESSMENT — PAIN DESCRIPTION - PAIN TYPE: TYPE: ACUTE PAIN

## 2022-02-08 ASSESSMENT — PAIN DESCRIPTION - ORIENTATION: ORIENTATION: LEFT

## 2022-02-08 ASSESSMENT — PAIN DESCRIPTION - LOCATION: LOCATION: SHOULDER

## 2022-02-08 NOTE — RT PROTOCOL NOTE
RT Inhaler-Nebulizer Bronchodilator Protocol Note    There is a bronchodilator order in the chart from a provider indicating to follow the RT Bronchodilator Protocol and there is an Initiate RT Inhaler-Nebulizer Bronchodilator Protocol order as well (see protocol at bottom of note). CXR Findings:  XR CHEST PORTABLE    Result Date: 2/6/2022  No acute disease    XR CHEST PORTABLE    Result Date: 2/6/2022  No acute disease      The findings from the last RT Protocol Assessment were as follows:   History Pulmonary Disease: Chronic pulmonary disease  Respiratory Pattern: Dyspnea on exertion or RR 21-25 bpm  Breath Sounds: Intermittent or unilateral wheezes  Cough: Strong, spontaneous, non-productive  Indication for Bronchodilator Therapy: On home bronchodilators  Bronchodilator Assessment Score: 8    Aerosolized bronchodilator medication orders have been revised according to the RT Inhaler-Nebulizer Bronchodilator Protocol below. Respiratory Therapist to perform RT Therapy Protocol Assessment initially then follow the protocol. Repeat RT Therapy Protocol Assessment PRN for score 0-3 or on second treatment, BID, and PRN for scores above 3. No Indications - adjust the frequency to every 6 hours PRN wheezing or bronchospasm, if no treatments needed after 48 hours then discontinue using Per Protocol order mode. If indication present, adjust the RT bronchodilator orders based on the Bronchodilator Assessment Score as indicated below. Use Inhaler orders unless patient has one or more of the following: on home nebulizer, not able to hold breath for 10 seconds, is not alert and oriented, cannot activate and use MDI correctly, or respiratory rate 25 breaths per minute or more, then use the equivalent nebulizer order(s) with same Frequency and PRN reasons based on the score. If a patient is on this medication at home then do not decrease Frequency below that used at home.     0-3 - enter or revise RT bronchodilator order(s) to equivalent RT Bronchodilator order with Frequency of every 4 hours PRN for wheezing or increased work of breathing using Per Protocol order mode. 4-6 - enter or revise RT Bronchodilator order(s) to two equivalent RT bronchodilator orders with one order with BID Frequency and one order with Frequency of every 4 hours PRN wheezing or increased work of breathing using Per Protocol order mode. 7-10 - enter or revise RT Bronchodilator order(s) to two equivalent RT bronchodilator orders with one order with TID Frequency and one order with Frequency of every 4 hours PRN wheezing or increased work of breathing using Per Protocol order mode. 11-13 - enter or revise RT Bronchodilator order(s) to one equivalent RT bronchodilator order with QID Frequency and an Albuterol order with Frequency of every 4 hours PRN wheezing or increased work of breathing using Per Protocol order mode. Greater than 13 - enter or revise RT Bronchodilator order(s) to one equivalent RT bronchodilator order with every 4 hours Frequency and an Albuterol order with Frequency of every 2 hours PRN wheezing or increased work of breathing using Per Protocol order mode. RT to enter RT Home Evaluation for COPD & MDI Assessment order using Per Protocol order mode.     Electronically signed by Daniel Bae RCP on 2/8/2022 at 9:14 AM

## 2022-02-08 NOTE — PROGRESS NOTES
Clinical Pharmacy Consult Note    Vancomycin - Management by Pharmacy    Consult Date(s): 2/6/22  Consulting Provider(s): Dr. David Jessica / Plan:  1)  Sepsis of unknown source - Vancomycin   Concurrent Antimicrobials: Ceftriaxone   Day of Vanc Therapy: #3   Current Dosing Method: Bayesian-Guided AUC Dosing  o Therapeutic Goal: AUC = 400-600 mg/L*hr  o Currently on 1000mg IV q12h. Random level this AM = 8.2mcg/mL. Calculated AUC = 470 with trough 14.1. Continue same regimen.  o Long course of Vancomycin will likely not be needed - if renal function remains stable and pt is clinically improving, repeat level likely won't be needed. Will monitor for changes in plan that may make repeat level necessary.  Will continue to monitor clinical condition and make adjustments to regimen as appropriate. · MRSA nasal swab negative. Low suspicion for pneumonia per notes. Urine cx with no growth thus far. May consider d/c'ing Vancomycin and continuing Ceftriaxone monotherapy for empiric coverage of UTI. Please call with questions--  Thanks--  Kirk Horn, PharmD, BCPS, BCGP  [id number] ([facility])   2/8/2022 8:30 AM      Interval update:  Cultures negative thus far. Afebrile. On room air. Subjective/Objective: Miguel Humphries is a 77 y.o. female with a PMHx significant for COPD, depression, HTN, hypothyroidism who presented to ED 2/6 with SOB, fevers, and headache. . In ED, pt was noted to be tachycardic, febrile and hypoxic. Admitted with sepsis. Pharmacy is consulted to dose vancomycin.     Ht Readings from Last 1 Encounters:   02/06/22 5' 5\" (1.651 m)     Wt Readings from Last 1 Encounters:   02/08/22 198 lb 6.6 oz (90 kg)       Current & Prior Antimicrobial Regimen(s):      Ceftriaxone 2000mg IV q24h (2/6-current)   Vancomycin - pharmacy to dose (2/6-current)  o 1750 mg IV x1 2/6  o 1000mg IV q12h (2/7-current)    Vancomycin Level(s) / Doses:  Date Time Dose Level / Type of Level Interpretation   2/7 06:56 1000mg IV q12h Random = 17.9mcg/mL Calculated AUC = 527 with trough = 17.2. Level drawn just 3 hr after end of prior dose infusing. 2/8 06:47 1000mg IV q12h Random = 8.2mcg/mL Calculated AUC = 470 with trough 14.1. Note: Serum levels collected for AUC-based dosing may be high if collected in close proximity to the dose administered. This is not necessarily indicative of toxicity. Cultures & Sensitivities:    Date Site Micro Susceptibility / Result   2/6 Rapid COVID Not detected    2/6 Blood x2 No growth to date    2/6 Urine No growth to date    2/6 MRSA Nasal PCR Negative            Labs / Ancillary Data:    Estimated Creatinine Clearance: 68 mL/min (based on SCr of 0.9 mg/dL).     Recent Labs     02/06/22  1030 02/07/22  0656 02/08/22  0643   CREATININE 0.8 0.8 0.9   BUN 17 17 19   WBC 15.8* 13.4* 12.3*       Additional Lab Values / Findings of Note:    Recent Labs     02/06/22  1737   PROCAL 0.16*

## 2022-02-08 NOTE — CARE COORDINATION
Case Management Assessment            Discharge Note                    Date / Time of Note: 2/8/2022 10:52 AM                  Discharge Note Completed by: MOHAN Fernandes LSW    Patient Name: Karel Hsieh   YOB: 1955  Diagnosis: Seizure ([facility] [R56.9]  COPD with acute exacerbation ([facility] [J44.1]  Acute on chronic respiratory failure with hypoxia ([facility] [J96.21]  Fever, unspecified fever cause [R50.9]   Date / Time: 2/6/2022 10:02 AM    Current PCP: Keshav Thomas MD  Clinic patient: No    Hospitalization in the last 30 days: No    Advance Directives:  Code Status: Full Code  [place] DNR form completed and on chart: Yes    Financial:  Payor: Opal Cruz / Plan: Emigdio Mills / Product Type: *No Product type* /      Pharmacy:    [address] [facility] [address] [phone]  [address] [place], [place] [address]d  [facility]  Phone: [phone] Fax: [phone]    [address] Mail Delivery - [facility] [phone] -  [phone]  [address] [place] [address]  Phone: [phone] Fax: [phone]      Assistance purchasing medications?:    Assistance provided by Case Management: None at this time    Does patient want to participate in local refill/ meds to beds program?:      Meds To Beds General Rules:  1. Can ONLY be done Monday- Friday between 8:30am-5pm  2. Prescription(s) must be in pharmacy by 3pm to be filled same day  3. Copy of patient's insurance/ prescription drug card and patient face sheet must be sent along with the prescription(s)  4. Cost of Rx cannot be added to hospital bill. If financial assistance is needed, please contact unit  or ;  or  CANNOT provide pharmacy voucher for patients co-pays  5.  Patients can then  the prescription on their way out of the hospital at discharge, or pharmacy can deliver to the bedside if staff is available. (payment due at time of pick-up or delivery - cash, check, or card accepted)     Able to afford home medications/ co-pay costs: Yes    ADLS:  Current PT AM-PAC Score: 22 /24  Current OT AM-PAC Score: 22 /24      DISCHARGE Disposition: Home- No Services Needed    LOC at discharge: Not Applicable  YASIR Completed: Not Indicated    Notification completed in HENS/PAS?:  Not Applicable    IMM Completed:   Not Indicated    Transportation:  Transportation PLAN for discharge: Lyft   Mode of Transport: Private Car    Home Care:  Home Care ordered at discharge: No    Durable Medical Equipment:  Equipment obtained during hospitalization: NA    Home Oxygen and Respiratory Equipment:  Oxygen needed at discharge?: No    Dialysis:  Dialysis patient: No      Referrals made at [facility] for outpatient continued care:  Not Applicable    Additional CM Notes:  CM met with pt at bedside to discuss DC plan. Pt reported being connected with [facility] and having needed DME already. Pt reported that she lives with her 15-yr-old granddaughter who is able to assist around the home if needed. Pt reported needing transportation home; CM will coordinate Lyft time with RN. Pt declined need for HHC. No other needs at this time. The Plan for Transition of Care is related to the following treatment goals of Seizure ([address] [R56.9]  COPD with acute exacerbation ([address] [J44.1]  Acute on chronic respiratory failure with hypoxia ([facility]) [J96.21]  Fever, unspecified fever cause [R50.9]    The Patient and/or patient representative Betty Mckeon and her family were provided with a choice of provider and agrees with the discharge plan Yes    Freedom of choice list was provided with basic dialogue that supports the patient's individualized plan of care/goals and shares the quality data associated with the providers.  Yes    Care Transitions patient: Yes    MOHAN Luis, [facility]  Case Management Department  Ph: [phone]  Fax: [phone]

## 2022-02-08 NOTE — PROGRESS NOTES
Progress Note    Admit Date: 2/6/2022  Diet: ADULT DIET; Regular; Low Sodium (2 gm)    CC: SOB     Interval history:pt seen and examined at bedside. IMANI Complaining of pain in her left hand where her IV went bad, and that she has a hard time getting her breath out. Medications:     Scheduled Meds:   ipratropium-albuterol  1 ampule Inhalation TID    predniSONE  40 mg Oral Daily    sodium chloride flush  5-40 mL IntraVENous 2 times per day    enoxaparin  40 mg SubCUTAneous Daily    Bempedoic Acid-Ezetimibe  1 tablet Oral Nightly    cloNIDine  0.1 mg Oral BID    hydroCHLOROthiazide  25 mg Oral Daily    levothyroxine  125 mcg Oral Daily    Roflumilast  500 mcg Oral Daily    pantoprazole  40 mg Oral QAM AC    budesonide  0.5 mg Nebulization BID    Arformoterol Tartrate  15 mcg Nebulization BID    losartan  25 mg Oral Daily     Continuous Infusions:   sodium chloride       PRN Meds:sodium chloride flush, sodium chloride, ondansetron **OR** ondansetron, polyethylene glycol, acetaminophen **OR** acetaminophen, ipratropium-albuterol, oxyCODONE-acetaminophen    Objective:   Vitals:   T-max:  Patient Vitals for the past 8 hrs:   BP Temp Temp src Pulse Resp SpO2 Weight   02/08/22 0905 -- -- -- -- -- 96 % --   02/08/22 0740 (!) 142/92 98.3 °F (36.8 °C) Oral 91 20 98 % --   02/08/22 0706 -- -- -- -- -- -- 198 lb 6.6 oz (90 kg)   02/08/22 0554 130/75 97.2 °F (36.2 °C) Oral 98 20 -- --       Intake/Output Summary (Last 24 hours) at 2/8/2022 0929  Last data filed at 2/8/2022 1086  Gross per 24 hour   Intake 50 ml   Output 3100 ml   Net -3050 ml         Physical Exam  Constitutional:       Appearance: Normal appearance. HENT:      Head: Normocephalic and atraumatic. Nose: Nose normal.      Mouth/Throat:      Mouth: Mucous membranes are moist.      Pharynx: Oropharynx is clear. Eyes:      Extraocular Movements: Extraocular movements intact.       Conjunctiva/sclera: Conjunctivae normal.      Pupils: Pupils are equal, round, and reactive to light. Cardiovascular:      Rate and Rhythm: Normal rate and regular rhythm. Pulses: Normal pulses. Heart sounds: Normal heart sounds. Pulmonary:      Effort: Pulmonary effort is normal.      Breath sounds: Wheezing present. Abdominal:      General: Bowel sounds are normal.      Palpations: Abdomen is soft. Neurological:      Mental Status: She is alert. LABS:    CBC:   Recent Labs     02/06/22  1030 02/07/22  0656 02/08/22  0643   WBC 15.8* 13.4* 12.3*   HGB 11.6* 11.0* 10.7*   HCT 36.7 34.7* 33.6*    217 219   MCV 83.6 85.2 85.0     Renal:    Recent Labs     02/06/22  1030 02/06/22  1737 02/07/22  0656 02/08/22  0643   *  --  138 138   K 3.7  --  4.2 3.9   CL 97*  --  103 104   CO2 26  --  23 22   BUN 17  --  17 19   CREATININE 0.8  --  0.8 0.9   GLUCOSE 132*  --  144* 121*   CALCIUM 9.4  --  9.5 9.7   MG  --  1.60*  --  1.90   ANIONGAP 11  --  12 12     Hepatic:   Recent Labs     02/06/22  1030 02/07/22  0656 02/08/22  0643   AST 13* 12* 14*   ALT 14 13 16   BILITOT 0.4 <0.2 <0.2   BILIDIR <0.2  --   --    PROT 6.6 6.2* 6.4   LABALBU 3.6 3.8 3.8   ALKPHOS 91 91 83     Troponin:   Recent Labs     02/06/22  1030 02/06/22  1245 02/06/22  1737   TROPONINI <0.01 <0.01 <0.01     BNP: No results for input(s): BNP in the last 72 hours. Lipids: No results for input(s): CHOL, HDL in the last 72 hours. Invalid input(s): LDLCALCU, TRIGLYCERIDE  ABGs:  No results for input(s): PHART, HVQ3UHE, PO2ART, MBJ6NZG, BEART, THGBART, K3TFPIBV, EBG7HSL in the last 72 hours. INR:   Recent Labs     02/06/22  1030   INR 0.99     Lactate: No results for input(s): LACTATE in the last 72 hours. Cultures:  -----------------------------------------------------------------  RAD:   XR CHEST PORTABLE   Final Result      No acute disease      CT CHEST PULMONARY EMBOLISM W CONTRAST   Final Result      1. No evidence of pulmonary embolism to the segmental level.     2. Borderline mediastinal lymphadenopathy. This is nonspecific. 3. Emphysema. XR CHEST PORTABLE   Final Result      No acute disease          Assessment/Plan:     COPD exacerbation  - currently stating 95% on RA  - cont brovana 15 mcg nebs  - cont pulmicort 500 mcg BID  - prednisone 40 mg daily       Code Status: full code   FEN: Regular diet   PPX: lovenox   DISPO: discharge     Marta Quinn MD, PGY-1  02/08/22  9:29 AM    This patient has been staffed and discussed with the attending physician. Patient seen, examined and discussed with the resident and I agree with the assessment and plan as edited above. Is breathing more comfortably and is saturating well on room air. She wheezes on exam, at end exhalation but it's forced. Otherwise clear. We discussed her outpatient regimen of dulera and combivent and she said she'd been offered trelegy in the past but it \"had too many side effects\" and she tried breztri but she said it made her more congested, so she went back to [facility]. I think part of the reason she has flares is because she's not on a LAMA which is a first line treatment for COPD  She can either take her combivent 4X/day or go on a LAMA (ie spiriva, tudorza or incruse) to be on a full controller regimen. I gave her my card so she can get re-established in my office. With her PFTs showing severe obstruction and upper lobe predominant emphysema, she may be a valve candidate    She's appropriate for discharge on either a burst or taper of prednisone. I suspect she will do better with a taper, mostly because it will match her expectations.     Claude Schmid, MD

## 2022-02-08 NOTE — CONSULTS
Clinical Pharmacy Progress Note    Vancomycin has been discontinued. Will sign off pharmacy to dose Vancomycin consult. If medication is restarted and pharmacy is to manage dosing, please re-consult at that time.     Please call with questions--  Thanks--  Antoinette Lopez, PharmD, BCPS, BCGP  [id number] ([facility])   2/8/2022 8:40 AM

## 2022-02-08 NOTE — TELEPHONE ENCOUNTER
1/31/22 2/21/22      Received incoming fax requesting also      Trueplus 33 G lancets    [employer] True Metrix Test Strip

## 2022-02-08 NOTE — PROGRESS NOTES
Physical Therapy  Facility/Department: [address]  Daily Treatment Note/Discharge  NAME: Lan Domingo  : 1955  MRN: [id number]    Date of Service: 2022    Discharge Recommendations: Lan Domingo scored a 23/24 on the AM-PAC short mobility form. At this time, no further PT is recommended upon discharge due to pt supervision. Recommend patient returns to prior setting with prior services. PT Equipment Recommendations  Equipment Needed: No    Assessment   Assessment: Decreased assist for transfers. Demonstrating safe mobility with supervision. Decreased activity tolerance 2/2 MORALES/SOB. Did not desat this date with ambulation. Pt plans to return home - family able to assist.  Discussed gradually increasing activity as tolerated and discussed energy conservation. Pt verbalized understanding. No further acute PT needs. Treatment Diagnosis: impaired functional mobility 2/2 decreased endurance  PT Education: Goals;PT Role;General Safety; Functional Mobility Training  Patient Education: Pursed lip breathing, gradually increasing activity as tolerated, energy conservation. Pt verbalized understanding  REQUIRES PT FOLLOW UP: No     Patient Diagnosis(es): The primary encounter diagnosis was Acute on chronic respiratory failure with hypoxia ([address] Diagnoses of Fever, unspecified fever cause and COPD with acute exacerbation ([address] were also pertinent to this visit. has a past medical history of CAMDEN (acute kidney injury) ([address] Anxiety, Chronic abdominal pain, COPD (chronic obstructive pulmonary disease) ([address] DDD (degenerative disc disease), lumbar, Elevated glycated hemoglobin, Hyperlipidemia, Hypertension, Hypothyroidism, and Rotator cuff tear. has a past surgical history that includes central line (10/7/2013); Colonoscopy (2011); kirsty and bso (cervix removed) (8/15/2002); Upper gastrointestinal endoscopy (10/17/2005);  Colonoscopy (10/24/2005); fine needle aspiration (8/10/2012); esophageal motility study (6/17/2013); Upper gastrointestinal endoscopy (9/5/2000); Cholecystectomy; Tonsillectomy; Carpal tunnel release; hernia repair; Hysterectomy; Hemorrhoid surgery; eye surgery; Endoscopy, colon, diagnostic; and Finger nail surgery (Bilateral, 5/21/2019). Restrictions  Position Activity Restriction  Other position/activity restrictions: up with assist  Subjective   General  Chart Reviewed: Yes  Additional Pertinent Hx: Pt is a 77 y.o female adm 2/6 with multiple complaints. Patient states that 5 weeks ago, she had a left rotator cuff arthroplasty. Ever since then, she has been having headaches with lightheadedness, fevers off and on without complete resolution for more than a couple of days, and shortness of breath with a new oxygen requirement at home. CXR: neg.  CT Chest: neg. PMH: COPD, DDD, hyperlipidemia, HTN, CAMDEN, anxiety, L rotator cuff repair  Subjective  Subjective: Pt found sitting EOB. Agreeable to PT. Ready to go home. \"I hope i don't have to come back. \"  Pain Screening  Patient Currently in Pain: Denies  Vital Signs  Patient Currently in Pain: Denies       Orientation     Cognition      Objective      Transfers  Sit to Stand: Independent  Stand to sit: Independent  Ambulation 1  Device: No Device  Assistance: Supervision  Quality of Gait: decreased mellisa, mild MORALES, no LOB  Distance: 61'  Comments: Pulse ox 92-93% after ambulation. G-Code     OutComes Score                                                     AM-PAC Score  AM-PAC Inpatient Mobility Raw Score : 23 (02/08/22 1543)  -PAC Inpatient T-Scale Score : 56.93 (02/08/22 1543)  Mobility Inpatient CMS 0-100% Score: 11.2 (02/08/22 1543)  Mobility Inpatient CMS G-Code Modifier : CI (02/08/22 1543)          Goals  Short term goals  Time Frame for Short term goals: By discharge  Short term goal 1: Sit to stand independent.   MET 2.8  Short term goal 2: Pt will amb >150' with or without AD independent. Ongoing  Short term goal 3: Pt will up/down flight of steps with rail supervision.   Ongoing    Plan    Plan  Times per week: D/C PT  Current Treatment Recommendations: Functional Mobility Training,Endurance Training,Patient/Caregiver Education & Training,Safety Education & Training  Safety Devices  Type of devices: Call light within reach (left sitting EOB, no alarms in use)     Therapy Time   Individual Concurrent Group Co-treatment   Time In 1525         Time Out 1537         Minutes 12              Timed Code Treatment Minutes:12       Total Treatment Minutes:  12      Pilar Armando PT

## 2022-02-09 ENCOUNTER — CARE COORDINATION (OUTPATIENT)
Dept: CASE MANAGEMENT | Age: 67
End: 2022-02-09

## 2022-02-09 DIAGNOSIS — J44.1 COPD EXACERBATION (HCC): Primary | ICD-10-CM

## 2022-02-09 PROCEDURE — 1111F DSCHRG MED/CURRENT MED MERGE: CPT | Performed by: INTERNAL MEDICINE

## 2022-02-09 RX ORDER — CALCIUM CARBONATE/VITAMIN D3 600 MG-10
TABLET ORAL
Qty: 180 TABLET | Refills: 3 | Status: SHIPPED | OUTPATIENT
Start: 2022-02-09 | End: 2022-04-08

## 2022-02-09 NOTE — PROGRESS NOTES
Physician Progress Note      PATIENTGeorparis Armstrong  [facility]:                  [id number]  :                       1955  ADMIT DATE:       2022 10:02 AM  [facility] DATE:        2022 6:14 PM  RESPONDING  PROVIDER #:        Triny Bender          QUERY TEXT:    Pt admitted with sepsis, Copd exacerbation. Pt noted to have increased work   of breathing. If possible, please document in the progress notes and discharge   summary if you are evaluating and/or treating any of the following: The medical record reflects the following:  Risk Factors: 76 yo w/ sepsis, COPD exacerbation  Clinical Indicators: Per ED:  Acute on chronic respiratory failure with   hypoxia. female on 2 L of supplemental oxygen with mild tachypnea complaining   of shortness of breath for multiple months however has acutely been worsening. On my exam, she has mild increased work of breathing with mild coarse breath   sounds heard bilaterally. Treatment: 2L 02  Options provided:  -- No respiratory failure  -- Acute respiratory failure  -- Other - I will add my own diagnosis  -- Disagree - Not applicable / Not valid  -- Disagree - Clinically unable to determine / Unknown  -- Refer to Clinical Documentation Reviewer    PROVIDER RESPONSE TEXT:    This patient is in acute respiratory failure.     Query created by: Chao Navarro on 2022 11:44 AM      Electronically signed by:  Triny Bender 2022 12:00 PM

## 2022-02-09 NOTE — CARE COORDINATION
[facility] Transitions Initial Follow Up Call    Call within 2 business days of discharge: Yes    Patient: Raine Cortez Patient : 1955   MRN: <[id number]>  Reason for Admission: COPD with acute exacerbation   Discharge Date: 22 RARS: Readmission Risk Score: 13.2 ( )      Last Discharge [facility]       Complaint Diagnosis Description Type Department Provider    22 Shortness of Breath; Fever Acute on chronic respiratory failure with hypoxia (HCC) . .. ED to Hosp-Admission (Discharged) (ADMITTED) Amanda Dutton MD; Fidel Cruz MD; ... Spoke with: Raine Cortez who reports that she is about the same as when she went to hospital minus that fever, headaches and n/v. Patient denies cp, cough, dizziness, headache, n/v, diarrhea, abdominal pains, fever, or chills. Patient report that appetite and fluid intake is good and denies any problems with bowel or bladder. Patient is taking all medications as ordered. Writer and patient did a complete medication review and 1111f was completed. Denies any needs at this time. Patient instructed to continue to monitor s/s, reporting any that may present to MD immediately for early intervention. Reminded of COVID 19 precautions. Agreeable to f/u calls. Facility: [facility]      Transitions of Care Initial Call    Was this an external facility discharge? No Discharge     Challenges to be reviewed by the provider   Additional needs identified to be addressed with provider: No  none             Method of communication with provider : phone      Advance Care Planning:   Does patient have an Advance Directive: reviewed and needs to be updated. Advance Care Planning   Healthcare Decision Maker:    Primary Decision Maker: Malia Jiang - [phone]    Secondary Decision Maker: Mahogany New - [phone]    Was this a readmission?  No  Patient stated reason for admission: fever, headache and sob  Patients top risk factors for readmission: medical condition-COPD    Care Transition Nurse (CTN) contacted the patient by telephone to perform post hospital discharge assessment. Verified name and  with patient as identifiers. Provided introduction to self, and explanation of the CTN role. CTN reviewed discharge instructions, medical action plan and red flags with patient who verbalized understanding. Patient given an opportunity to ask questions and does not have any further questions or concerns at this time. Were discharge instructions available to patient? Yes. Reviewed appropriate site of care based on symptoms and resources available to patient including: PCP, Specialist and When to call 911. The patient agrees to contact the PCP office for questions related to their healthcare. Medication reconciliation was performed with patient, who verbalizes understanding of administration of home medications. Advised obtaining a 90-day supply of all daily and as-needed medications. Covid Risk Education     Educated patient about risk for severe COVID-19 due to risk factors according to CDC guidelines. LPN CC reviewed discharge instructions, medical action plan and red flag symptoms with the patient who verbalized understanding. Discussed COVID vaccination status: Yes. Education provided on COVID-19 vaccination as appropriate. Discussed exposure protocols and quarantine with CDC Guidelines. Patient was given an opportunity to verbalize any questions and concerns and agrees to contact LPN CC or health care provider for questions related to their healthcare. Reviewed and educated patient on any new and changed medications related to discharge diagnosis. Was patient discharged with a pulse oximeter? No Discussed and confirmed pulse oximeter discharge instructions and when to notify provider or seek emergency care. LPN CC provided contact information.  Plan for follow-up call in 5-7 days based on severity of symptoms

## 2022-02-10 ENCOUNTER — TELEPHONE (OUTPATIENT)
Dept: PRIMARY CARE CLINIC | Age: 67
End: 2022-02-10

## 2022-02-10 LAB
BLOOD CULTURE, ROUTINE: NORMAL
CULTURE, BLOOD 2: NORMAL

## 2022-02-10 NOTE — TELEPHONE ENCOUNTER
[facility] Transitions Initial Follow Up Call    Outreach made within 2 business days of discharge: Yes    Patient: Miguel Humphries Patient : 1955   MRN: [id number]  Reason for Admission: There are no discharge diagnoses documented for the most recent discharge. Discharge Date: 22       Spoke with: Abilio Fan    Discharge department/facility: [facility] Interactive Patient Contact:  Was patient able to fill all prescriptions: Yes  Was patient instructed to bring all medications to the follow-up visit: Yes  Is patient taking all medications as directed in the discharge summary?  Yes  Does patient understand their discharge instructions: Yes  Does patient have questions or concerns that need addressed prior to 7-14 day follow up office visit: no    Scheduled appointment with PCP within 7-14 days    Follow Up  Future Appointments   Date Time Provider Annika Rodriguez   2/15/2022 11:40 AM Ana María Francisco MD [facility] P/CC [facility]   2022 10:15 AM Ginette Ruiz MD [address]       Sanjeev Zarco MA

## 2022-02-11 LAB — IGE: 15 KU/L

## 2022-02-15 ENCOUNTER — OFFICE VISIT (OUTPATIENT)
Dept: PULMONOLOGY | Age: 67
End: 2022-02-15
Payer: MEDICARE

## 2022-02-15 VITALS
OXYGEN SATURATION: 94 % | TEMPERATURE: 96.8 F | SYSTOLIC BLOOD PRESSURE: 117 MMHG | HEIGHT: 65 IN | BODY MASS INDEX: 31.65 KG/M2 | WEIGHT: 190 LBS | RESPIRATION RATE: 16 BRPM | DIASTOLIC BLOOD PRESSURE: 75 MMHG

## 2022-02-15 DIAGNOSIS — J43.2 CENTRILOBULAR EMPHYSEMA (HCC): Primary | ICD-10-CM

## 2022-02-15 PROCEDURE — 1111F DSCHRG MED/CURRENT MED MERGE: CPT | Performed by: INTERNAL MEDICINE

## 2022-02-15 PROCEDURE — 1036F TOBACCO NON-USER: CPT | Performed by: INTERNAL MEDICINE

## 2022-02-15 PROCEDURE — 99214 OFFICE O/P EST MOD 30 MIN: CPT | Performed by: INTERNAL MEDICINE

## 2022-02-15 PROCEDURE — G8484 FLU IMMUNIZE NO ADMIN: HCPCS | Performed by: INTERNAL MEDICINE

## 2022-02-15 PROCEDURE — 3017F COLORECTAL CA SCREEN DOC REV: CPT | Performed by: INTERNAL MEDICINE

## 2022-02-15 PROCEDURE — 1090F PRES/ABSN URINE INCON ASSESS: CPT | Performed by: INTERNAL MEDICINE

## 2022-02-15 PROCEDURE — 1123F ACP DISCUSS/DSCN MKR DOCD: CPT | Performed by: INTERNAL MEDICINE

## 2022-02-15 PROCEDURE — 4040F PNEUMOC VAC/ADMIN/RCVD: CPT | Performed by: INTERNAL MEDICINE

## 2022-02-15 PROCEDURE — G8417 CALC BMI ABV UP PARAM F/U: HCPCS | Performed by: INTERNAL MEDICINE

## 2022-02-15 PROCEDURE — G8427 DOCREV CUR MEDS BY ELIG CLIN: HCPCS | Performed by: INTERNAL MEDICINE

## 2022-02-15 PROCEDURE — G8399 PT W/DXA RESULTS DOCUMENT: HCPCS | Performed by: INTERNAL MEDICINE

## 2022-02-15 PROCEDURE — 3023F SPIROM DOC REV: CPT | Performed by: INTERNAL MEDICINE

## 2022-02-15 NOTE — PROGRESS NOTES
[facility] Pulmonary and Critical Care    Outpatient Follow Up Note    Subjective:   CHIEF COMPLAINT / HPI:     The patient is 77 y.o. female who presents today for hospital discharge follow up for COPD exacerbation. Patient's COPD had been controlled with dulera and prn combivent but she says she got a comforter with red dye for [place] and the dust from it has been killing her breathing. She improved with steroids, nebs and antibiotics in the hospital and is now following up. She's doing better, but still gets short of breath with exertion. She has been on oxygen for many years, but her supply company, [employer], won't return her calls or get her new supplies.     Past Medical History:    Past Medical History:   Diagnosis Date    [place] (acute kidney injury) ([facility] [address] 10/07/2013    Anxiety     Chronic abdominal pain     possibly due to diverticulosos    [facility]ch[facility] obstructive [facility]     DDD (degenerative disc disease), lumbar 2016    Elevated glycated hemoglobin     Hyperlipidemia     Hypertension     Hypothyroidism     Rotator cuff tear     right       Social History:    Social History     Tobacco Use   Smoking Status Former Smoker    Packs/day: 0.25    Years: 32.00    Pack years: 8.00    Types: Cigarettes    Quit date: 1/10/2017    Years since quittin.1   Smokeless Tobacco Never Used   Tobacco Comment    30 years        Current Medications:  Current Outpatient Medications on File Prior to Visit   Medication Sig Dispense Refill    Calcium Carb-Cholecalciferol (CALCIUM-VITAMIN D) 600-400 MG-UNIT TABS Take 1 tablet by mouth twice daily 180 tablet 3    omeprazole (PRILOSEC) 20 MG delayed release capsule Take 1 capsule by mouth every morning (before breakfast) As needed for heartburn 30 capsule 0    predniSONE (DELTASONE) 10 MG tablet 4 tablets once daily for 3 days then 3 tablets once daily for 3 days then 2 tablets once daily for 3 days then 1 tablet once daily for 3 days. 30 tablet 0    cyclobenzaprine (FLEXERIL) 5 MG tablet Take 1 tablet by mouth three times daily as needed for muscle spasm 30 tablet 5    predniSONE (DELTASONE) 10 MG tablet 4 tablets/40 mg daily for 3 days then 3 tablets/30 mg daily for 3 days then 20 mg daily for 3 days then 10 mg daily for 3 days then off. 30 tablet 0    BELSOMRA 5 MG TABS Take 5 mg by mouth every evening.  potassium chloride (KLOR-CON M) 10 MEQ extended release tablet Take 1 tablet by mouth 2 times daily       DULERA 200-5 MCG/ACT inhaler Inhale 2 puffs into the lungs every 12 hours      diphenhydrAMINE (BANOPHEN) 50 MG capsule TAKE ONE CAPSULE BY MOUTH EVERY NIGHT AT BEDTIME AS NEEDED FOR ITCHING 30 capsule 4    Promethazine HCl 6.25 MG/5ML SOLN Take 5ml every 6 hours PRN nausea 600 mL 0    Roflumilast (DALIRESP) 500 MCG tablet TAKE 1 TABLET EVERY DAY 90 tablet 1    QUEtiapine (SEROQUEL) 100 MG tablet TAKE 1 TABLET EVERY NIGHT 90 tablet 1     MG tablet TAKE 1 TABLET BY MOUTH EVERY 8 HOURS AS NEEDED FOR PAIN 270 tablet 1    Nebulizers (COMPRESSOR/NEBULIZER) MISC 1 Units by Does not apply route 4 times daily as needed (Wheezing SOB) 1 each 0    Respiratory Therapy Supplies MISC 1 each by Does not apply route 4 times daily as needed (wheezing SOB) All Nebulizer supplies needed 50 each 5    ipratropium-albuterol (DUONEB) 0.5-2.5 (3) MG/3ML SOLN nebulizer solution Inhale 3 mLs into the lungs as needed for Shortness of Breath 360 mL 11    ALPRAZolam (XANAX) 1 MG tablet Take 1 mg by mouth daily as needed.        Bempedoic Acid-Ezetimibe 180-10 MG TABS Take 1 tablet by mouth nightly 90 tablet 3    levothyroxine (EUTHYROX) 125 MCG tablet TAKE 1 TABLET EVERY DAY (DISCONTINUE 135MCG) (Patient taking differently: Take 125 mcg by mouth Daily TAKE 1 TABLET EVERY DAY (DISCONTINUE 135MCG)) 90 tablet 1    ondansetron (ZOFRAN ODT) 4 MG disintegrating tablet Take 1 tablet by mouth every 8 hours as needed for Nausea or Vomiting 15 tablet 1    cloNIDine (CATAPRES) 0.1 MG tablet Bid (Patient taking differently: Take 0.1 mg by mouth 2 times daily Bid) 180 tablet 1    albuterol-ipratropium (COMBIVENT RESPIMAT)  MCG/ACT AERS inhaler INHALE 1 PUFF BY MOUTH EVERY SIX HOURS AS NEEDED FOR WHEEZING 3 Inhaler 1    hydroCHLOROthiazide (HYDRODIURIL) 25 MG tablet Take 1 tablet by mouth once daily 90 tablet 1    docusate sodium (STOOL SOFTENER) 100 MG capsule TAKE ONE CAPSULE BY MOUTH DAILY AS NEEDED FOR CONSTIPATION 90 capsule 3    lactulose (CHRONULAC) 10 GM/15ML solution Take 15 mLs by mouth as needed (constipation) 1892 mL 1    NARCAN 4 MG/0.1ML LIQD nasal spray Take by mouth as needed   0    tiZANidine (ZANAFLEX) 4 MG tablet Take 4 mg by mouth 2 times daily as needed       oxyCODONE-acetaminophen (PERCOCET)  MG per tablet Take 1 tablet by mouth every 6 hours as needed for Pain. No current facility-administered medications on file prior to visit.        REVIEW OF SYSTEMS:    CONSTITUTIONAL: Negative for fevers and chills  HEENT: Negative for oropharyngeal exudate, post nasal drip, sinus pain / pressure, nasal congestion, ear pain  RESPIRATORY:  See HPI  CARDIOVASCULAR: Negative for chest pain, palpitations, edema  GASTROINTESTINAL: Negative for nausea, vomiting, diarrhea, constipation and abdominal pain  HEMATOLOGICAL: Negative for adenopathy  SKIN: Negative for clubbing, cyanosis, skin lesions  EXTREMITIES: Negative for weakness, decreased ROM  NEUROLOGICAL: Negative for unilateral weakness, speech or gait abnormalities  PSYCH: Negative for anxiety, depression    Objective:   PHYSICAL EXAM:        VITALS:  /75 (Site: Right Upper Arm, Position: Sitting, Cuff Size: Medium Adult)   Temp 96.8 °F (36 °C) (Infrared)   Resp 16   Ht 5' 5\" (1.651 m)   Wt 190 lb (86.2 kg)   LMP  (LMP Unknown)   SpO2 94%   Breastfeeding No   BMI 31.62 kg/m²     CONSTITUTIONAL:  Awake, alert, cooperative, no apparent distress, and appears stated age  [de-identified]: No oropharyngeal exudate, PERRL, no cervical adenopathy, no tracheal deviation, thyroid size normal  LUNGS:  No increased work of breathing and clear to auscultation, no crackles or wheezing  CARDIOVASCULAR:  normal S1 and S2 and no JVD  ABDOMEN:  Normal bowel sounds, non-distended and non-tender to palpation  EXT: No edema, no calf tenderness. Pulses are present bilaterally. NEUROLOGIC:  Mental Status Exam:  Level of Alertness:   awake  Orientation:   person, place, time. SKIN:  normal skin color, texture, turgor, no redness, warmth, or swelling     DATA:      Radiology Review:  Pertinent images / reports were reviewed as a part of this visit. Ct chest reveals the following:    Impression       1. No evidence of pulmonary embolism to the segmental level. 2. Borderline mediastinal lymphadenopathy. This is nonspecific. 3. Emphysema.        Last PFTs:  Pending    Immunization History   Administered Date(s) Administered    COVID-19, Pfizer Purple top, DILUTE for use, 12+ yrs, 30mcg/0.3mL dose 02/14/2021, 03/09/2021, 10/01/2021    Hepatitis B 07/28/2017    Hepatitis B (Engerix-B) 04/13/2017    Hepatitis B Adult (Engerix-B) 07/28/2017    Hepatitis B vaccine 04/13/2017    Influenza Vaccine, unspecified formulation 10/02/2011, 02/15/2014, 09/28/2015, 08/25/2016, 09/05/2017, 10/01/2019    Influenza Virus Vaccine 02/15/2014, 09/25/2016, 09/05/2017, 10/12/2018    Influenza Whole 10/02/2011, 09/25/2016    Influenza, High-dose, Quadv, 65 yrs +, IM (Fluzone) 08/29/2020    Influenza, Intradermal, Preservative free 09/25/2013, 02/15/2014    Influenza, MDCK Quadv, IM, PF (Flucelvax 2 yrs and older) 10/09/2019    Influenza, Quadv, IM, (6 mo and older Fluzone, Flulaval, Fluarix and 3 yrs and older Afluria) 10/12/2018    Pneumococcal Conjugate 13-valent ([id number]) 10/05/2016    Pneumococcal Polysaccharide ([id number]) 10/08/2013    Tdap (Boostrix, Adacel) 03/27/2015, 09/16/2019    Zoster Live (Zostavax) 04/13/2017         Assessment: This is a 77 y.o. female with COPD and chronic hypoxemic respiratory failure    Plan:   Continue dulera, will try to add spiriva. She can continue prn combivent despite the overlap with AMA. 6 minute walk in the office showed that she desaturates so will send new oxygen supply orders. If spiriva is too expensive I will try a different LAMA. She tried trelegy and breztri in the past and one had \"too many side effects\", so she didn't take it, and the other made her feel more short of breath. I'd like repeat PFTs as she may be a valve candidate with her heterogeneous emphysema. Orders Placed This Encounter   Medications    tiotropium (SPIRIVA RESPIMAT) 2.5 MCG/ACT AERS inhaler     Sig: Inhale 2 puffs into the lungs daily     Dispense:  1 each     Refill:  5         Diagnosis Orders   1. Centrilobular emphysema (HCC)  6 Minute Walk Test    Full PFT Study With Bronchodilator    Carbon Monoxide Diffusing Capacity          The patient is not currently smoking. The risks related to smoking were reviewed with the patient. Recommend maintaining a smoke-free lifestyle. RTC 2 months  w/ MD. Call or RTC sooner if symptoms persist or worsen acutely.       Omkar Beasley MD

## 2022-02-16 ENCOUNTER — CARE COORDINATION (OUTPATIENT)
Dept: CASE MANAGEMENT | Age: 67
End: 2022-02-16

## 2022-02-16 NOTE — CARE COORDINATION
[facility] Trans[facility] Initial Follow Up Call    Call within 2 business days of discharge: Yes    Patient:  Bina Dowd   Patient :  1955  MRN:  [id number]    Reason for Admission: COPD, Seizures  Discharge Date:  22   RARS:       Non-face-to-face services provided:    1ST CTC attempt to reach Pt regarding recent hospital discharge. CTC left voice recording with call back number requesting a call back.     Follow up appointments:    Future Appointments   Date Time Provider Annika Rodriguez   2022 10:15 AM MD Kandi Benavides RD PC [place] - [facility]   2022  5:15 PM SCHEDULE, TJ PFT [facility] [place] [facility]   2022  9:40 AM Claude Schmid, MD [facility] P/CC [facility]       Thank You,    Nidhi Carter RN  Care Transition Coordinator  Contact VOYB:[phone]

## 2022-02-16 NOTE — CARE COORDINATION
[facility] Trans[facility] Follow Up Call    2022    Patient: Riccardo Hurtado  Patient : 1955   MRN: [id number]   Reason for Admission: COPD, Seizures  Discharge Date: 22 RARS: Readmission Risk Score: 13.2 ( )         Spoke with: Brooke Middleton [facility] Transitions Subsequent and Final Call    Schedule Follow Up Appointment with PCP: Declined  Subsequent and Final Calls  Do you have any ongoing symptoms?: Yes  Onset of Patient-reported symptoms: Other  Patient-reported symptoms: Abdominal Pain  Have your medications changed?: No  Do you have any questions related to your medications?: No  Do you currently have any active services?: No  Do you have any needs or concerns that I can assist you with?: No  Identified Barriers: None  Care Transitions Interventions  No Identified Needs  Other Interventions:         Summary  CTN received return phone call from patient this afternoon for follow up CTN call. Patient states she is doing well, still having ABD pain, but states it is a little better. No reports of any increased difficulty with breathing, or SOB/SOBE. Patient instructed to continue to monitor for any of the above s/s, reporting any that may present to MD immediately. No other issues or concerns, no new or changed medications at this time. CTN provided education on s/s that require medical attention and when to seek medical attention. CTN advised Pt of use urgent care or physicians 24 hr access line if assistance is needed after hours or on the weekend. Pt denies any needs or concerns and is agreeable with additional calls. Patient instructed to continue to monitor for any of the above s/s, reporting any that may present to MD immediately.       Follow Up  Future Appointments   Date Time Provider Annika Rodriguez   2022 10:15 AM MD Juana Ingram RD [facility] [place] [facility]   2022  5:15 PM SCHEDULE, [facility]  [address] PFT [facility]   2022  9:40 AM MD Amrit Simonwall P/CC JANINE Vergara, RN

## 2022-02-18 NOTE — PROGRESS NOTES
2022    Ghulam Neves (:  1955) is a 77 y.o. female, here for evaluation of the following medical concerns:    No chief complaint on file. Other      42-year-old -American female with COPD using supplemental oxygen intermittently at night, previous 30 year history quit , hypothyroidism hypertension hyperlipidemia mechanical low back pain on chronic narcotic complicated by chronic constipation and chronic nausea, obstructive sleep apnea, insomnia who attends for hospital follow-up. She reported installing a new bedspread and pillowcases, both bright red, before the holidays, and shows me a picture of the bedspread with what appears to be a large water stain, a well demarcated light patch surrounded by darker red. She showed me her oxygen cannula hoses which are stained pink. She reports feeling more dyspneic than usual feels that her recent breathing difficulties have stemmed from inhaling the red dye #40 from her comforter. Less clear is whether the staining was on the inside versus outside as presumably the system was only open on on her nostrils. We first received a message that she thought her comforter was \"poisoning her\" on , though she was maintaining oxygen saturations in the low to mid 90s. Over the past year oxygen saturations on room air have run 95 to 98%, pulse  on clinic visits. PFTs  FEV1 1.16 L 49% of predicted, improving 10% with bronchodilator challenge, FVC 2.1 L 66% predicted, FEV1 FVC ratio 55% predicted, RV:TLC ratio substantial air trapping . In the ED or admitted to the hospital for January  17th January 27 sixth, hospitalized  for modest hypoxia fever to 101.7, cytosis having finished 5 days of prednisone is prior, with unremarkable CT PE protocol and chest x-ray other than emphysema. Calcitonin was trivially elevated along with lactate. No organism was isolated from sputum blood or urine.   She was treated with steroids nebs antibiotics dismissed to pulmonology follow-up. They regenerated a oxygen prescription, ordered PFTs 6-minute walk test, and added a LAMA to her therapy. Main goal for PFTs is to determine if she would be a valve candidate, as her emphysema seemed more apical.    Review of Systems   Constitutional: Negative for activity change, appetite change, fatigue and unexpected weight change. HENT: Negative for dental problem, sinus pain, sore throat and trouble swallowing. Eyes: Negative for pain and visual disturbance. Respiratory: Negative for apnea, cough, chest tightness,  and wheezing. Cardiovascular: Negative for chest pain and palpitations. Gastrointestinal: Negative for negative for nausea and vomiting positives listed above in HPI. Endocrine: Negative for cold intolerance, heat intolerance, polydipsia, polyphagia and polyuria. Genitourinary: Negative for difficulty urinating, dysuria, flank pain, frequency, hematuria, pelvic pain, urgency, vaginal bleeding and vaginal discharge. Musculoskeletal: Negative for arthralgias, back froylan, gait problem, joint swelling, myalgias, neck pain and neck stiffness. Skin: Negative for color change and rash. Neurological: Negative for dizziness, tremors, syncope, speech difficulty, weakness, light-headedness and headaches. Hematological: Negative for adenopathy. Does not bruise/bleed easily. Psychiatric/Behavioral: Negative for agitation, behavioral problems, decreased concentration, sleep disturbance and suicidal ideas. The patient is not nervous/anxious and is not hyperactive. Prior to Visit Medications    Medication Sig Taking?  Authorizing Provider   tiotropium (SPIRIVA RESPIMAT) 2.5 MCG/ACT AERS inhaler Inhale 2 puffs into the lungs daily  Ana María Francisco MD   Calcium Carb-Cholecalciferol (CALCIUM-VITAMIN D) 600-400 MG-UNIT TABS Take 1 tablet by mouth twice daily  Ginette Ruiz MD   omeprazole (PRILOSEC) 20 MG delayed release capsule Take 1 capsule by mouth every morning (before breakfast) As needed for heartburn  Jeison Jett DO   predniSONE (DELTASONE) 10 MG tablet 4 tablets once daily for 3 days then 3 tablets once daily for 3 days then 2 tablets once daily for 3 days then 1 tablet once daily for 3 days. Jeison Jett DO   cyclobenzaprine (FLEXERIL) 5 MG tablet Take 1 tablet by mouth three times daily as needed for muscle spasm  Dino Hill MD   predniSONE (DELTASONE) 10 MG tablet 4 tablets/40 mg daily for 3 days then 3 tablets/30 mg daily for 3 days then 20 mg daily for 3 days then 10 mg daily for 3 days then off. Dino Hill MD   BELSOMRA 5 MG TABS Take 5 mg by mouth every evening. Historical Provider, MD   potassium chloride (KLOR-CON M) 10 MEQ extended release tablet Take 1 tablet by mouth 2 times daily   Historical Provider, MD   DULERA 200-5 MCG/ACT inhaler Inhale 2 puffs into the lungs every 12 hours  Historical Provider, MD   diphenhydrAMINE (BANOPHEN) 50 MG capsule TAKE ONE CAPSULE BY MOUTH EVERY NIGHT AT BEDTIME AS NEEDED FOR ITCHING  Dino Hill MD   Promethazine HCl 6.25 MG/5ML SOLN Take 5ml every 6 hours PRN nausea  Dino Hill MD   Roflumilast (DALIRESP) 500 MCG tablet TAKE 1 TABLET EVERY DAY  Dino Hill MD   QUEtiapine (SEROQUEL) 100 MG tablet TAKE 1 TABLET EVERY NIGHT  Dino Hill MD    MG tablet TAKE 1 TABLET BY MOUTH EVERY 8 HOURS AS NEEDED FOR PAIN  Dino Hill MD   Nebulizers (COMPRESSOR/NEBULIZER) MISC 1 Units by Does not apply route 4 times daily as needed (Wheezing SOB)  Dino Hill MD   Respiratory Therapy Supplies MISC 1 each by Does not apply route 4 times daily as needed (wheezing SOB) All Nebulizer supplies needed  Dino Hill MD   ipratropium-albuterol (DUONEB) 0.5-2.5 (3) MG/3ML SOLN nebulizer solution Inhale 3 mLs into the lungs as needed for Shortness of Breath  Dino Hill MD   ALPRAZolam Doralee Lexy) 1 MG tablet Take 1 mg by mouth daily as needed. Historical Provider, MD   Bempedoic Acid-Ezetimibe 180-10 MG TABS Take 1 tablet by mouth nightly  Keshav Thomas MD   levothyroxine (EUTHYROX) 125 MCG tablet TAKE 1 TABLET EVERY DAY (DISCONTINUE 135MCG)  Patient taking differently: Take 125 mcg by mouth Daily TAKE 1 TABLET EVERY DAY (DISCONTINUE 135MCG)  Keshav Thomas MD   ondansetron (ZOFRAN ODT) 4 MG disintegrating tablet Take 1 tablet by mouth every 8 hours as needed for Nausea or Vomiting  Keshav Thomas MD   cloNIDine (CATAPRES) 0.1 MG tablet Bid  Patient taking differently: Take 0.1 mg by mouth 2 times daily Bid  Keshav Thomas MD   albuterol-ipratropium (COMBIVENT RESPIMAT)  MCG/ACT AERS inhaler INHALE 1 PUFF BY MOUTH EVERY SIX HOURS AS NEEDED FOR WHEEZING  Keshav Thomas MD   hydroCHLOROthiazide (HYDRODIURIL) 25 MG tablet Take 1 tablet by mouth once daily  Keshav Thomas MD   docusate sodium (STOOL SOFTENER) 100 MG capsule TAKE ONE CAPSULE BY MOUTH DAILY AS NEEDED FOR CONSTIPATION  Keshav Thomas MD   lactulose (CHRONULAC) 10 GM/15ML solution Take 15 mLs by mouth as needed (constipation)  Keshav Thomas MD   [facility] 4 MG/0.1ML LIQD nasal spray Take by mouth as needed   Historical Provider, MD   tiZANidine (ZANAFLEX) 4 MG tablet Take 4 mg by mouth 2 times daily as needed   Historical Provider, MD   oxyCODONE-acetaminophen (PERCOCET)  MG per tablet Take 1 tablet by mouth every 6 hours as needed for Pain.    Historical Provider, MD        Allergies   Allergen Reactions    Bupropion Other (See Comments)     Muscle spasms/seizure like symptoms     Morphine Hives and Itching    Morphine And Related Itching       Past Medical History:   Diagnosis Date    CAM[place] (acute kidney injury) ([facility] [address] 10/07/2013    Anxiety     Chronic abdominal pain     possibly due to diverticulosos    [facility]     DDD (degenerative disc disease), lumbar 12/01/2016    Elevated glycated hemoglobin     Hyperlipidemia     Hypertension     Hypothyroidism     Rotator cuff tear     right       Past Surgical History:   Procedure Laterality Date    CARPAL TUNNEL RELEASE      CENTRAL LINE  10/7/2013         CHOLECYSTECTOMY      COLONOSCOPY  2011    Tubular adenoma    COLONOSCOPY  10/24/2005    Dr. Floyd - Tubular adenoma    ENDOSCOPY, COLON, DIAGNOSTIC      ESOPHAGEAL MOTILITY STUDY  2013    NORMAL    EYE SURGERY      cataracts    FINE NEEDLE ASPIRATION  8/10/2012    THYROID - NEGATIVE    FINGER NAIL SURGERY Bilateral 2019    TOTAL AVULSION OF 1-5 TOENAILS BILATERAL FEET performed by Wilmar Jameson DPM at [address] AND [facility]  8/15/2002    Endometriosis, fibroids    TONSILLECTOMY      UPPER GASTROINTESTINAL ENDOSCOPY  10/17/2005    Bx small bowel, Gastric, GE junction all negative    UPPER GASTROINTESTINAL ENDOSCOPY  2000    Dr. Yancy Garcia - NORMAL       Social History     Socioeconomic History    Marital status: Single     Spouse name: Not on file    Number of children: Not on file    Years of education: Not on file    Highest education level: Not on file   Occupational History    Not on file   Tobacco Use    Smoking status: Former Smoker     Packs/day: 0.25     Years: 32.00     Pack years: 8.00     Types: Cigarettes     Quit date: 1/10/2017     Years since quittin.1    Smokeless tobacco: Never Used    Tobacco comment: 30 years    Vaping Use    Vaping Use: Never used   Substance and Sexual Activity    Alcohol use: No    Drug use: No    Sexual activity: Not on file   Other Topics Concern    Not on file   Social History Narrative    Not on file     Social Determinants of Health     Financial Resource Strain: Low Risk     Difficulty of Paying Living Expenses: Not hard at all   Food Insecurity: No Food Insecurity    Worried About [address] [employer] in the Last Year: Never true    Tiffani of Food in the Last Year: Never true Transportation Needs:     Lack of Transportation (Medical): Not on file    Lack of Transportation (Non-Medical): Not on file   Physical Activity:     Days of Exercise per Week: Not on file    Minutes of Exercise per Session: Not on file   Stress:     Feeling of Stress : Not on file   Social Connections:     Frequency of Communication with Friends and Family: Not on file    Frequency of Social Gatherings with Friends and Family: Not on file    Attends Taoism Services: Not on file    Active Member of Clubs or Organizations: Not on file    Attends Club or Organization Meetings: Not on file    Marital Status: Not on file   Intimate Partner Violence:     Fear of Current or Ex-Partner: Not on file    Emotionally Abused: Not on file    Physically Abused: Not on file    Sexually Abused: Not on file   Housing Stability:     Unable to Pay for Housing in the Last Year: Not on file    Number of [place] in the Last Year: Not on file    Unstable Housing in the Last Year: Not on file        Family History   Problem Relation Age of Onset    Diabetes Sister     Heart Disease Sister         Iram Border Cancer Sister 76        colon cancer  metastatic    Diabetes Sister     Kidney Disease Sister     Cancer Sister         brain cancer throat cancer and smoked    Cancer Mother 68        cerival cancer    Osteoarthritis Mother     Ovarian Cancer Mother     Heart Disease Father     High Blood Pressure Father     Heart Disease Brother 40        heart attack    High Blood Pressure Maternal Aunt     Cancer Other 46        liver cancer    Cancer Other 47        lung cancer and smoker, maternal neice    Cancer Other         bladder cancer, first cousin.  Diabetes Sister 46        complication of diabetes       There were no vitals filed for this visit. Estimated body mass index is 31.62 kg/m² as calculated from the following:    Height as of 2/15/22: 5' 5\" (1.651 m).     Weight as of 2/15/22: 190 lb (86.2 kg). PHYSICAL EXAM  GENERAL:  Pleasant  female who looks her stated age, awake alert and oriented x3, in mild distress. HEENT: Clear no JVD  Lungs: Severely decreased air entry, but no inspiratory crackles or expiratory wheeze  Heart: Borderline tachycardia no pathologic murmur no S3 no S4  Abdomen: Benign  Extremities: No edema  PSYCH:  No psychomotor retardation or agitation. Good eye contact. Unrestricted affect range. Mood congruent with affect. Linear thought. LABS  Lab Review   Admission on 02/06/2022, Discharged on 02/08/2022   Component Date Value    Blood Culture, Routine 02/06/2022 No Growth after 4 days of incubation.  Culture, Blood 2 02/06/2022 No Growth after 4 days of incubation.      Lactic Acid 02/06/2022 1.5     Sodium 02/06/2022 134*    Potassium reflex Magnesi* 02/06/2022 3.7     Chloride 02/06/2022 97*    CO2 02/06/2022 26     Anion Gap 02/06/2022 11     Glucose 02/06/2022 132*    BUN 02/06/2022 17     CREATININE 02/06/2022 0.8     GFR Non- 02/06/2022 >60     GFR  02/06/2022 >60     Calcium 02/06/2022 9.4     Total Protein 02/06/2022 6.6     Albumin 02/06/2022 3.6     Albumin/Globulin Ratio 02/06/2022 1.2     Total Bilirubin 02/06/2022 0.4     Alkaline Phosphatase 02/06/2022 91     ALT 02/06/2022 14     AST 02/06/2022 13*    Troponin 02/06/2022 <0.01     Pro-BNP 02/06/2022 46     WBC 02/06/2022 15.8*    RBC 02/06/2022 4.39     Hemoglobin 02/06/2022 11.6*    Hematocrit 02/06/2022 36.7     MCV 02/06/2022 83.6     MCH 02/06/2022 26.3     MCHC 02/06/2022 31.5     RDW 02/06/2022 13.3     Platelets 20/90/3912 210     MPV 02/06/2022 10.1     Neutrophils % 02/06/2022 86.7     Lymphocytes % 02/06/2022 6.6     Monocytes % 02/06/2022 5.4     Eosinophils % 02/06/2022 0.5     Basophils % 02/06/2022 0.8     Neutrophils Absolute 02/06/2022 13.7*    Lymphocytes Absolute 02/06/2022 1.0     Monocytes Absolute 02/06/2022 0.9     Eosinophils Absolute 02/06/2022 0.1     Basophils Absolute 02/06/2022 0.1     Ventricular Rate 02/06/2022 114     Atrial Rate 02/06/2022 114     P-R Interval 02/06/2022 130     QRS Duration 02/06/2022 86     Q-T Interval 02/06/2022 308     QTc Calculation (Bazett) 02/06/2022 424     P Axis 02/06/2022 79     R Axis 02/06/2022 -61     T Axis 02/06/2022 91     Diagnosis 02/06/2022 EKG performed in ER and to be interpreted by ER physician. Confirmed by MD, ER ([id number]),  Caitlyn Lazaro ([phone]) on 2/6/2022 10:33:46 AM     Bilirubin, Direct 02/06/2022 <0.2     Bilirubin, Indirect 02/06/2022 see below     Lipase 02/06/2022 28.0     Protime 02/06/2022 11.2     INR 02/06/2022 0.99     Color, UA 02/06/2022 Yellow     Clarity, UA 02/06/2022 Clear     Glucose, Ur 02/06/2022 Negative     Bilirubin Urine 02/06/2022 Negative     Ketones, Urine 02/06/2022 Negative     Specific Gravity, UA 02/06/2022 1.010     Blood, Urine 02/06/2022 Negative     pH, UA 02/06/2022 6.0     Protein, UA 02/06/2022 Negative     Urobilinogen, Urine 02/06/2022 0.2     Nitrite, Urine 02/06/2022 POSITIVE*    Leukocyte Esterase, Urine 02/06/2022 Negative     Microscopic Examination 02/06/2022 YES     Urine Type 02/06/2022 NotGiven     Urine Reflex to Culture 02/06/2022 Not Indicated     Lactic Acid, Sepsis 02/06/2022 1.9     SARS-CoV-2, NAAT 02/06/2022 Not Detected     Rejected Test 02/06/2022 VBG     Reason for Rejection 02/06/2022 see below     pH, Pola 02/06/2022 7.464*    pCO2, Pola 02/06/2022 39.5*    pO2, Pola 02/06/2022 73.2*    HCO3, Venous 02/06/2022 28.4*    Base Excess, Pola 02/06/2022 4.3*    O2 Sat, Pola 02/06/2022 96     Carboxyhemoglobin 02/06/2022 1.3     MetHgb, Pola 02/06/2022 0.4     TC02 (Calc), Pola 02/06/2022 30     Hemoglobin, Pola, Reduced 02/06/2022 4.30     WBC, UA 02/06/2022 None seen     RBC, UA 02/06/2022 None seen     Troponin 02/06/2022 <0.01     Urine Culture, Routine 02/06/2022 No growth at 18 to 36 hours     Troponin 02/06/2022 <0.01     Ventricular Rate 02/06/2022 88     Atrial Rate 02/06/2022 88     P-R Interval 02/06/2022 130     QRS Duration 02/06/2022 96     Q-T Interval 02/06/2022 362     QTc Calculation (Bazett) 02/06/2022 438     P Axis 02/06/2022 79     R Axis 02/06/2022 -28     T Axis 02/06/2022 64     Diagnosis 02/06/2022 EKG performed in ER and to be interpreted by ER physician. Confirmed by MD, ER ([id number]),  King Kerr ([id number]) on 2/7/2022 6:06:36 AM     CRP 02/06/2022 41.5*    Procalcitonin 02/06/2022 0.16*    Magnesium 02/06/2022 1.60*    Sodium 02/07/2022 138     Potassium reflex Magnesi* 02/07/2022 4.2     Chloride 02/07/2022 103     CO2 02/07/2022 23     Anion Gap 02/07/2022 12     Glucose 02/07/2022 144*    BUN 02/07/2022 17     CREATININE 02/07/2022 0.8     GFR Non- 02/07/2022 >60     GFR  02/07/2022 >60     Calcium 02/07/2022 9.5     Total Protein 02/07/2022 6.2*    Albumin 02/07/2022 3.8     Albumin/Globulin Ratio 02/07/2022 1.6     Total Bilirubin 02/07/2022 <0.2     Alkaline Phosphatase 02/07/2022 91     ALT 02/07/2022 13     AST 02/07/2022 12*    WBC 02/07/2022 13.4*    RBC 02/07/2022 4.08     Hemoglobin 02/07/2022 11.0*    Hematocrit 02/07/2022 34.7*    MCV 02/07/2022 85.2     MCH 02/07/2022 27.0     MCHC 02/07/2022 31.7     RDW 02/07/2022 13.4     Platelets 52/85/0227 217     MPV 02/07/2022 10.3     Neutrophils % 02/07/2022 82.2     Lymphocytes % 02/07/2022 10.3     Monocytes % 02/07/2022 7.1     Eosinophils % 02/07/2022 0.0     Basophils % 02/07/2022 0.4     Neutrophils Absolute 02/07/2022 11.0*    Lymphocytes Absolute 02/07/2022 1.4     Monocytes Absolute 02/07/2022 1.0     Eosinophils Absolute 02/07/2022 0.0     Basophils Absolute 02/07/2022 0.1     Vancomycin Rm 02/07/2022 17.9     MRSA SCREEN RT-PCR 02/07/2022                      Value:Negative MRSA DNA not detected.   Normal Range: Not detected      Vancomycin Tr 02/08/2022 8.2*    Sodium 02/08/2022 138     Potassium reflex Magnesi* 02/08/2022 3.9     Chloride 02/08/2022 104     CO2 02/08/2022 22     Anion Gap 02/08/2022 12     Glucose 02/08/2022 121*    BUN 02/08/2022 19     CREATININE 02/08/2022 0.9     GFR Non- 02/08/2022 >60     GFR  02/08/2022 >60     Calcium 02/08/2022 9.7     Total Protein 02/08/2022 6.4     Albumin 02/08/2022 3.8     Albumin/Globulin Ratio 02/08/2022 1.5     Total Bilirubin 02/08/2022 <0.2     Alkaline Phosphatase 02/08/2022 83     ALT 02/08/2022 16     AST 02/08/2022 14*    WBC 02/08/2022 12.3*    RBC 02/08/2022 3.95*    Hemoglobin 02/08/2022 10.7*    Hematocrit 02/08/2022 33.6*    MCV 02/08/2022 85.0     MCH 02/08/2022 27.0     MCHC 02/08/2022 31.8     RDW 02/08/2022 13.9     Platelets 11/81/5540 219     MPV 02/08/2022 10.0     Neutrophils % 02/08/2022 74.1     Lymphocytes % 02/08/2022 18.6     Monocytes % 02/08/2022 6.7     Eosinophils % 02/08/2022 0.2     Basophils % 02/08/2022 0.4     Neutrophils Absolute 02/08/2022 9.1*    Lymphocytes Absolute 02/08/2022 2.3     Monocytes Absolute 02/08/2022 0.8     Eosinophils Absolute 02/08/2022 0.0     Basophils Absolute 02/08/2022 0.0     IgE 02/08/2022 15     Magnesium 02/08/2022 1.90    Admission on 01/27/2022, Discharged on 01/27/2022   Component Date Value    Rapid Influenza A Ag 01/27/2022 Negative     Rapid Influenza B Ag 01/27/2022 Negative     Color, UA 01/27/2022 Yellow     Clarity, UA 01/27/2022 Clear     Glucose, Ur 01/27/2022 Negative     Bilirubin Urine 01/27/2022 Negative     Ketones, Urine 01/27/2022 Negative     Specific Gravity, UA 01/27/2022 1.010     Blood, Urine 01/27/2022 Negative     pH, UA 01/27/2022 6.5     Protein, UA 01/27/2022 Negative     Urobilinogen, Urine 01/27/2022 0.2     Nitrite, Urine 01/27/2022 Negative     Leukocyte Esterase, Urine 01/27/2022 Negative     Microscopic Examination 01/27/2022 Not Indicated     Urine Type 01/27/2022 NotGiven     SARS-CoV-2, PCR 01/27/2022 Not Detected    Orders Only on 01/19/2022   Component Date Value    TSH 01/19/2022 1.16    Admission on 01/17/2022, Discharged on 01/18/2022   Component Date Value    WBC 01/18/2022 10.6     RBC 01/18/2022 4.43     Hemoglobin 01/18/2022 11.8*    Hematocrit 01/18/2022 37.2     MCV 01/18/2022 84.0     MCH 01/18/2022 26.8     MCHC 01/18/2022 31.8     RDW 01/18/2022 12.8     Platelets 29/95/6199 305     MPV 01/18/2022 9.9     Neutrophils % 01/18/2022 64.7     Lymphocytes % 01/18/2022 23.7     Monocytes % 01/18/2022 9.0     Eosinophils % 01/18/2022 1.6     Basophils % 01/18/2022 1.0     Neutrophils Absolute 01/18/2022 6.9     Lymphocytes Absolute 01/18/2022 2.5     Monocytes Absolute 01/18/2022 1.0     Eosinophils Absolute 01/18/2022 0.2     Basophils Absolute 01/18/2022 0.1     Sodium 01/18/2022 141     Potassium reflex Magnesi* 01/18/2022 4.1     Chloride 01/18/2022 102     CO2 01/18/2022 24     Anion Gap 01/18/2022 15     Glucose 01/18/2022 97     BUN 01/18/2022 16     CREATININE 01/18/2022 0.9     GFR Non- 01/18/2022 >60     GFR  01/18/2022 >60     Calcium 01/18/2022 10.7*    Total Protein 01/18/2022 7.7     Albumin 01/18/2022 4.5     Albumin/Globulin Ratio 01/18/2022 1.4     Total Bilirubin 01/18/2022 0.3     Alkaline Phosphatase 01/18/2022 94     ALT 01/18/2022 17     AST 01/18/2022 25     Lipase 01/18/2022 27.0     Troponin 01/18/2022 <0.01     Pro-BNP 01/18/2022 39     pH, Pola 01/18/2022 7. 402     pCO2, Pola 01/18/2022 40.6     pO2, Pola 01/18/2022 30.1     HCO3, Venous 01/18/2022 25.2     Base Excess, Pola 01/18/2022 0.5     O2 Sat, Pola 01/18/2022 58     TC02 (Calc), Pola 01/18/2022 26     O2 Therapy 01/18/2022 Unknown     SARS-CoV-2 01/18/2022 Not Detected    Hospital Outpatient Visit on 11/23/2021   Component Date Value    Ventricular Rate 11/23/2021 95     Atrial Rate 11/23/2021 95     P-R Interval 11/23/2021 140     QRS Duration 11/23/2021 88     Q-T Interval 11/23/2021 348     QTc Calculation (Bazett) 11/23/2021 437     P Axis 11/23/2021 77     R Axis 11/23/2021 -52     T Axis 11/23/2021 87     Diagnosis 11/23/2021 Normal sinus rhythmLeft anterior fascicular blockAbnormal ECGWarly transitionConfirmed by Tony Andrade MD, Paola Holt ([id number]) on 11/23/2021 2:07:47 PM    Orders Only on 09/01/2021   Component Date Value    Hemoglobin A1C 09/01/2021 5.6     eAG 09/01/2021 114.0     Fructosamine 09/01/2021 297*    Total CK 09/01/2021 283*    ALT 09/01/2021 12     AST 09/01/2021 14*    Cholesterol, Total 09/01/2021 183     Triglycerides 09/01/2021 175*    HDL 09/01/2021 51     LDL Calculated 09/01/2021 97     VLDL Cholesterol Calcula* 09/01/2021 1850 [facility] Outpatient Visit on 03/25/2021   Component Date Value    pH, Pola 03/25/2021 7.410     pCO2, Pola 03/25/2021 51.3*    pO2, Pola 03/25/2021 38     HCO3, Venous 03/25/2021 33*    Base Excess, Pola 03/25/2021 6.5     O2 Sat, Pola 03/25/2021 73     Carboxyhemoglobin 03/25/2021 2.3     MetHgb, Pola 03/25/2021 0.5     TC02 (Calc), Pola 03/25/2021 34     O2 Content, Pola 03/25/2021 13     O2 Therapy 03/25/2021 Unknown    Orders Only on 03/25/2021   Component Date Value    TSH 03/25/2021 1.38     DOMENICA 03/25/2021 Negative     Anti-JO1 IgG 03/25/2021 <0.2     Cholesterol, Total 03/25/2021 126     Triglycerides 03/25/2021 138     HDL 03/25/2021 44     LDL Calculated 03/25/2021 54     VLDL Cholesterol Calcula* 03/25/2021 28    Office Visit on 03/25/2021   Component Date Value    Total CK 03/25/2021 188     WBC 03/25/2021 9.2     RBC 03/25/2021 4.45     Hemoglobin 03/25/2021 12.8     Hematocrit 03/25/2021 39.2     MCV 03/25/2021 88.2     MCH 03/25/2021 28.7     MCHC 03/25/2021 32.5     RDW 03/25/2021 12.9     Platelets 03/25/2021 171     MPV 03/25/2021 11.0*    CRP 03/25/2021 <3.0    Orders Only on 02/22/2021   Component Date Value    Total CK 02/22/2021 315*    Vit D, 25-Hydroxy 02/22/2021 46.1     Sodium 02/22/2021 143     Potassium 02/22/2021 4.1     Chloride 02/22/2021 102     CO2 02/22/2021 28     Anion Gap 02/22/2021 13     Glucose 02/22/2021 136*    BUN 02/22/2021 10     CREATININE 02/22/2021 1.0     GFR Non- 02/22/2021 56*    GFR  02/22/2021 >60     Calcium 02/22/2021 10.1     ALT 02/22/2021 28     AST 02/22/2021 32     Cholesterol, Total 02/22/2021 119     Triglycerides 02/22/2021 108     HDL 02/22/2021 54     LDL Calculated 02/22/2021 43     VLDL Cholesterol Calcula* 02/22/2021 22     Vitamin B-12 02/22/2021 >2000*    Hemoglobin A1C 02/22/2021 5.5     eAG 02/22/2021 111.2    There may be more visits with results that are not included. ASSESSMENT/PLAN  1. Severe COPD without complication  GVF6/IJB= 68/76%, FEV1 1.2 L, with 110 mL / 10% FEV1 augmentation with bronchodilatorPFT 2014 Dulera, Umeclidinium versus Spiriva, Daliresp DuoNeb. inconsistent nocturnal O2. The tubing is clearly discolored. We will have her bring the tubing into that we can cut open to tubing to see if the discoloration was on the outside versus inside. Do wonder about some home exposure furnace carbon oxide etc.    2. Pruritus  Wonder if this is related to her chronic narcotic use. No daytime sedation. Hydroxyzine another treatment option. 3. Acquired hypothyroidism  Up to date TSH, 3/2021, recheck 3/2022    4. Slow transit constipation  Uses Linzess 2x/week, lactulose qod, discouraged docusate if feels it doesn't help. Chronic narcotic use. 5. Vitamin B 12 deficiency   Over 2000 February 22, 2021; lowest level 523 in 2014, stopped in Spring 2021. Had been injecting for many years. Doing trial off of injections follow-up level document adequacy.   Patient did not do B12 test 7 months ago, will update with next draw. 6.  Mixed hyperlipidemia  Suboptimal control. Normal LFTs however elevated CK after 3 months off rosuvastatin prescribed bempedoic acid/zetia but not covered by insurance. Offer Zetia on return. 8. Breast lipoma  6-month follow-up surveillance included targeted ultrasound to look at the breast mass thought to be lipoma though not biopsied. No evidence of interval regression. Get follow-up imaging this spring. 9.  Nausea   Patient has severe nausea even vomiting a couple times a week, which she treats with promethazine. She is using 800 mg of ibuprofen 5 times a week, no alcohol. Not clearly related to meals. No solid food dysphagia. Resume Prilosec refer to GI, last seen 2018 but never followed up. 10. Impaired fasting glucose  ReAssuring A1c 5.6%, glucose in the low 130s. 11.  Essential hypertension  Encourage home surveillance, adequate control, reassuring BMP recently. 13. Vitamin D deficiency  Adequate repletion February 2021. Chronic inhaled steroid use. Occasional systemic glucocorticoid use for exacerbations. Spine -0.3 worst hip -1.0 DEXA July 2021 on calcium vitamin D therapy. 16. Healthcare maintenance. Discussed Shingrix will check with the pharmacy. Completed [address] vaccination series, 2021. Pap 2018. PSG mild AVRIL 2019. Tdap Pneumovax 23 influenza up-to-date. Up to date with TSH A1c lipid panel mammogram (left breast lipoma). 17.  History of colon adenoma. Colonoscopy  2021 2011 and 2005 both with adenoma excision. Hyperplastic 2021. Dr. Gretta Polo gastroenterologist.    18.  Left shoulder rotator cuff arthropathy. MRI showed left full-thickness anterior supraspinatus tendon tear with some acromioclavicular DJD. Seeing Pain Med Dr Edna Rosenbaum. Dr Kelly Waller. Doing better    19. Mildly elevated CK. Asx, still elevated off statin . [place] -1 negative. 20. Insomnia.  Ambien, seroquel Belsomra ineffective or not

## 2022-02-21 ENCOUNTER — HOSPITAL ENCOUNTER (OUTPATIENT)
Dept: PULMONOLOGY | Age: 67
Discharge: HOME | End: 2022-02-21
Payer: MEDICARE

## 2022-02-21 ENCOUNTER — OFFICE VISIT (OUTPATIENT)
Dept: PRIMARY CARE CLINIC | Age: 67
End: 2022-02-21
Payer: MEDICARE

## 2022-02-21 VITALS
HEIGHT: 65 IN | RESPIRATION RATE: 13 BRPM | HEART RATE: 94 BPM | SYSTOLIC BLOOD PRESSURE: 127 MMHG | OXYGEN SATURATION: 97 % | DIASTOLIC BLOOD PRESSURE: 75 MMHG | BODY MASS INDEX: 31.46 KG/M2 | WEIGHT: 188.8 LBS | TEMPERATURE: 96.6 F

## 2022-02-21 VITALS — OXYGEN SATURATION: 95 %

## 2022-02-21 DIAGNOSIS — J43.2 CENTRILOBULAR EMPHYSEMA (HCC): ICD-10-CM

## 2022-02-21 DIAGNOSIS — R11.2 NON-INTRACTABLE VOMITING WITH NAUSEA, UNSPECIFIED VOMITING TYPE: Primary | ICD-10-CM

## 2022-02-21 PROCEDURE — 94664 DEMO&/EVAL PT USE INHALER: CPT

## 2022-02-21 PROCEDURE — 6360000002 HC RX W HCPCS: Performed by: INTERNAL MEDICINE

## 2022-02-21 PROCEDURE — 94060 EVALUATION OF WHEEZING: CPT

## 2022-02-21 PROCEDURE — 94760 N-INVAS EAR/PLS OXIMETRY 1: CPT

## 2022-02-21 PROCEDURE — 94729 DIFFUSING CAPACITY: CPT

## 2022-02-21 PROCEDURE — 99495 TRANSJ CARE MGMT MOD F2F 14D: CPT | Performed by: INTERNAL MEDICINE

## 2022-02-21 PROCEDURE — 94726 PLETHYSMOGRAPHY LUNG VOLUMES: CPT

## 2022-02-21 PROCEDURE — 94618 PULMONARY STRESS TESTING: CPT

## 2022-02-21 RX ORDER — ONDANSETRON 4 MG/1
TABLET, ORALLY DISINTEGRATING ORAL
Qty: 15 TABLET | Refills: 1 | OUTPATIENT
Start: 2022-02-21

## 2022-02-21 RX ORDER — ALBUTEROL SULFATE 2.5 MG/3ML
2.5 SOLUTION RESPIRATORY (INHALATION) ONCE
Status: COMPLETED | OUTPATIENT
Start: 2022-02-21 | End: 2022-02-21

## 2022-02-21 RX ORDER — COVID-19 TEST SPECIMEN COLLECT
MISCELLANEOUS MISCELLANEOUS
COMMUNITY
Start: 2022-02-15

## 2022-02-21 RX ADMIN — ALBUTEROL SULFATE 2.5 MG: 2.5 SOLUTION RESPIRATORY (INHALATION) at 17:40

## 2022-02-21 NOTE — TELEPHONE ENCOUNTER
Patient is already on Phenergan with prescription to support 3-4 times daily. She probably should not mix Phenergan with Zofran for her nausea. How often does she use the Zofran?

## 2022-02-21 NOTE — PROGRESS NOTES
Six Minute Walk     []  Desaturation Screening []  Endurance Test       Name:  Samia [address] Record Number:  [id number]  Age: 77 y.o.    Gender: female  : 1955  Today's Date:  2022  Pulmonary History:COPD  Home Oxygen Therapy:  room air  Home Respiratory Therapy:Dulera                    6 MINUTE WALK DATA    Modality Time (Minutes) SPO2 RR B/P Heart Rate O2 SOB Pain Level   Rest 2 95 20 115/63 92 RA 3 4   Walk 1 94   104 RA 4 4   Walk 2 92   106 RA 3 4   Walk 3 90   107 RA 3 4   Walk 4 89   109 RA 3 4   Walk 5 88   109 RA 3 4   Walk 6 90   109 RA 4 4   Recovery 2 97 20 110/69 102 RA 2 4     [place] SOB Scale:  0=Nothing at all; 0.5=Very, very slight; 1=Very slight; 2=Slight; 3=Moderate; 4=Somewhat severe; 5=Severe; 7=Very Severe; 10=Very, very Severe    Exercise Summary:  Distance Walked (feet): 540  Lowest SpO2 During Walk:  88  (FIO2)  21  Walking Pace (Steps per Minute)  78  Stopped or Paused during Walk:  yes    Comments / Reason: Patient stopped after each lap to rest.     Electronically signed by Jericho Morales RCP on 2022 at 5:44 PM

## 2022-02-21 NOTE — TELEPHONE ENCOUNTER
Medication:   Requested Prescriptions     Pending Prescriptions Disp Refills    Promethazine HCl 6.25 MG/5ML SOLN [Pharmacy Med Name: Promethazine HCl 6.25 MG/5ML Oral Solution] 600 mL 0     Sig: TAKE 5 ML BY MOUTH  EVERY 6 HOURS AS NEEDED FOR NAUSEA        Last Filled:      Patient Phone Number: [phone] (home)     Last appt: 1/31/2022   Next appt: 2/21/2022    Last OARRS:   RX Monitoring 1/30/2017   Attestation The Prescription Monitoring Report for this patient was reviewed today.

## 2022-02-23 ENCOUNTER — TELEPHONE (OUTPATIENT)
Dept: PULMONOLOGY | Age: 67
End: 2022-02-23

## 2022-02-23 NOTE — TELEPHONE ENCOUNTER
Richy Morse, Dr. Lan Beyer is asking for a pulm clearance letter. Pt would like letter faxed to [phone].

## 2022-02-23 NOTE — TELEPHONE ENCOUNTER
If someone draws up a letter that says that as long as patient is not in an acute exacerbation of her COPD/asthma then she is appropriate for endoscopic procedures I will sign it.

## 2022-02-24 ENCOUNTER — CARE COORDINATION (OUTPATIENT)
Dept: CASE MANAGEMENT | Age: 67
End: 2022-02-24

## 2022-02-24 NOTE — PROCEDURES
[address]               [address] [place], [address]                               PULMONARY FUNCTION    PATIENT NAME: Tariq Hatfield                      :        1955  MED REC NO:   [id number]                          ROOM:  ACCOUNT NO:   [de-identified]                           ADMIT DATE: 2022  PROVIDER:     Jay Jacobsen MD    DATE OF PROCEDURE:  2022    FINDINGS:  Spirometry for this patient shows an FEV1 of 1.14 which is  56% of predicted and a forced vital capacity of 2.45 which is 95% of  predicted giving a ratio of 46. There is no response to  bronchodilators. Total lung capacity is in normal range of 95% of  predicted and residual volume was 113% of predicted. Diffusion capacity  was severely decreased to 36% and improved to 48% with alveolar  ventilation. CONCLUSION:  Moderate obstructive defect without bronchodilator response  and severely decreased diffusion. Findings are most consistent with a  diagnosis of COPD.         Siena Marcelino MD    D: 2022 8:17:21       T: 2022 8:58:30     [email or web address]  Job#: [id number]     Doc#: [id number]    CC:

## 2022-02-24 NOTE — PROCEDURES
[address]               [address] [place], [address]                               PULMONARY FUNCTION    PATIENT NAME: Osei Vallejo                      :        1955  MED REC NO:   [id number]                          ROOM:  ACCOUNT NO:   [de-identified]                           ADMIT DATE: 2022  PROVIDER:     Fox Storm MD    DATE OF PROCEDURE:  2022    SIX-MINUTE WALK TEST    FINDINGS:  Study was performed on room air. The patient's resting  oxygen saturation was 95%. The patient was able to walk 540 feet, and  her lowest oxygen saturation occurred at 5 minutes, which was 88%. The  patient had to stop frequently during the test.    CONCLUSION:  Normal resting O2 saturations with significant  desaturations with ambulation and borderline qualification for oxygen. If the patient could walk continuously, she would qualify oxygen.         Norm Benitez MD    D: 2022 8:18:57       T: 2022 8:50:46     [email or web address]  Job#: [id number]     Doc#: [id number]    CC:

## 2022-02-24 NOTE — CARE COORDINATION
[facility] Transitions Follow Up Call    2022    Patient: Parisa Lyon  Patient : 1955   MRN: [id number]   Reason for Admission: COPD, Seizure  Discharge Date: 22 RARS: Readmission Risk Score: 13.2 ( )         Spoke with: Brooke Renteria Transitions Subsequent and Final Call    Schedule Follow Up Appointment with PCP: Declined  Subsequent and Final Calls  Do you have any ongoing symptoms?: Yes  Onset of Patient-reported symptoms: Other  Patient-reported symptoms: Abdominal Pain  Have your medications changed?: No  Do you have any questions related to your medications?: No  Do you currently have any active services?: No  Do you have any needs or concerns that I can assist you with?: No  Identified Barriers: None  Care Transitions Interventions  No Identified Needs  Other Interventions:         Summary  CTN spoke with patient this afternoon for follow up CTN call. Patient states she is doing well, other than having ABD Pain, some good days and some bad days, she states. No reports of any fever, chills, nausea, vomiting, chest pain, or cough. Patient with no congestion, difficulty emptying bladder, LE edema, feeling lightheaded, dizziness, and heart palpitations. Patient states breathing is better, she feels she is at baseline. CTN encouraged patient to continue to monitor for any of the above s/s, reporting any that may present to MD immediately. No other issues or concerns, no new or changed medications at this time. CTN provided education on s/s that require medical attention and when to seek medical attention. CTN advised Pt of use urgent care or physicians 24 hr access line if assistance is needed after hours or on the weekend. Pt denies any needs or concerns and is agreeable with additional calls. Patient instructed to continue to monitor for any of the above s/s, reporting any that may present to MD immediately.       Follow Up  Future Appointments   Date Time Provider Annika Rodriguez   4/12/2022  9:40 AM Philomena Whitmore MD [facility] P/CC JANINE Jasso RN

## 2022-02-28 NOTE — TELEPHONE ENCOUNTER
Pt called asking if pulmonary clearance letter was written, I advised it was and faxed 2/25/22. I mailed pt a copy of clearance letter as well.

## 2022-03-01 ENCOUNTER — TELEPHONE (OUTPATIENT)
Dept: ADMINISTRATIVE | Age: 67
End: 2022-03-01

## 2022-03-02 ENCOUNTER — CARE COORDINATION (OUTPATIENT)
Dept: CASE MANAGEMENT | Age: 67
End: 2022-03-02

## 2022-03-02 NOTE — CARE COORDINATION
[facility] Transitions Initial Follow Up Call    Call within 2 business days of discharge: Yes    Patient:  Madan Aaron   Patient :  1955  MRN:  [id number]    Reason for Admission: COPD, Seizures  Discharge Date:  22   RARS:       Non-face-to-face services provided:    1ST CTC attempt to reach Pt regarding recent hospital discharge. CTC left voice recording with call back number requesting a call back.     Follow up appointments:    Future Appointments   Date Time Provider Annika Rodriguez   2022  9:40 AM MD Dianne Bianchi P/CC MMA       Thank Paola Mcclellan RN  Care Transition Coordinator  Contact [facility]:[phone]

## 2022-03-03 NOTE — TELEPHONE ENCOUNTER
Received DENIAL for Promethazine HCl 6.25MG/5ML solution. Denial letter attached. Please notify patient. Thank you.

## 2022-03-07 DIAGNOSIS — J45.40 MODERATE PERSISTENT ASTHMA WITHOUT COMPLICATION: Primary | ICD-10-CM

## 2022-03-07 RX ORDER — FLUTICASONE FUROATE AND VILANTEROL 200; 25 UG/1; UG/1
1 POWDER RESPIRATORY (INHALATION) DAILY
Qty: 1 EACH | Refills: 11 | Status: SHIPPED | OUTPATIENT
Start: 2022-03-07

## 2022-03-08 ENCOUNTER — CARE COORDINATION (OUTPATIENT)
Dept: CASE MANAGEMENT | Age: 67
End: 2022-03-08

## 2022-03-08 NOTE — CARE COORDINATION
[facility] Trans[facility] Initial Follow Up Call    Call within 2 business days of discharge: Yes    Patient:  Franco Osman   Patient :  1955  MRN:  [id number]    Reason for Admission: COPD, Seizures  Discharge Date:  22   RARS:       Non-face-to-face services provided:    2ND CTC attempt to reach Pt regarding recent hospital discharge. CTC left voice recording with call back number requesting a call back. CTN will close out CTN episode, as today was scheduled for final follow up CTN call.     Follow up appointments:    Future Appointments   Date Time Provider Annika Rodriguez   2022  9:40 AM Artie Berrios MD [facility] P/CC MMA       Thank Jessee Jo RN  Care Transition Coordinator  Contact [facility]:[phone]

## 2022-03-25 DIAGNOSIS — E03.9 ACQUIRED HYPOTHYROIDISM: ICD-10-CM

## 2022-03-25 RX ORDER — LEVOTHYROXINE SODIUM 0.12 MG/1
125 TABLET ORAL DAILY
Qty: 90 TABLET | Refills: 1 | Status: SHIPPED | OUTPATIENT
Start: 2022-03-25 | End: 2022-09-20 | Stop reason: SDUPTHER

## 2022-04-06 RX ORDER — CYCLOBENZAPRINE HCL 5 MG
TABLET ORAL
Qty: 90 TABLET | Refills: 5 | Status: SHIPPED | OUTPATIENT
Start: 2022-04-06 | End: 2022-09-26

## 2022-04-07 NOTE — TELEPHONE ENCOUNTER
Last OV 2/21/22.      Pt currently have refills at mail order but she is requesting for it to be sent to [facility]

## 2022-04-08 RX ORDER — CALCIUM CARBONATE/VITAMIN D3 600 MG-10
TABLET ORAL
Qty: 60 TABLET | Refills: 11 | Status: SHIPPED | OUTPATIENT
Start: 2022-04-08

## 2022-04-12 ENCOUNTER — OFFICE VISIT (OUTPATIENT)
Dept: PULMONOLOGY | Age: 67
End: 2022-04-12
Payer: MEDICARE

## 2022-04-12 VITALS
OXYGEN SATURATION: 97 % | HEIGHT: 65 IN | RESPIRATION RATE: 16 BRPM | DIASTOLIC BLOOD PRESSURE: 76 MMHG | HEART RATE: 99 BPM | TEMPERATURE: 96.5 F | SYSTOLIC BLOOD PRESSURE: 120 MMHG | BODY MASS INDEX: 31.82 KG/M2 | WEIGHT: 191 LBS

## 2022-04-12 DIAGNOSIS — J43.2 CENTRILOBULAR EMPHYSEMA (HCC): Primary | ICD-10-CM

## 2022-04-12 PROCEDURE — 3017F COLORECTAL CA SCREEN DOC REV: CPT | Performed by: INTERNAL MEDICINE

## 2022-04-12 PROCEDURE — 4040F PNEUMOC VAC/ADMIN/RCVD: CPT | Performed by: INTERNAL MEDICINE

## 2022-04-12 PROCEDURE — 99213 OFFICE O/P EST LOW 20 MIN: CPT | Performed by: INTERNAL MEDICINE

## 2022-04-12 PROCEDURE — G8399 PT W/DXA RESULTS DOCUMENT: HCPCS | Performed by: INTERNAL MEDICINE

## 2022-04-12 PROCEDURE — G8427 DOCREV CUR MEDS BY ELIG CLIN: HCPCS | Performed by: INTERNAL MEDICINE

## 2022-04-12 PROCEDURE — 1036F TOBACCO NON-USER: CPT | Performed by: INTERNAL MEDICINE

## 2022-04-12 PROCEDURE — 3023F SPIROM DOC REV: CPT | Performed by: INTERNAL MEDICINE

## 2022-04-12 PROCEDURE — 1090F PRES/ABSN URINE INCON ASSESS: CPT | Performed by: INTERNAL MEDICINE

## 2022-04-12 PROCEDURE — 1123F ACP DISCUSS/DSCN MKR DOCD: CPT | Performed by: INTERNAL MEDICINE

## 2022-04-12 PROCEDURE — G8417 CALC BMI ABV UP PARAM F/U: HCPCS | Performed by: INTERNAL MEDICINE

## 2022-04-12 NOTE — PROGRESS NOTES
[facility]    Outpatient Follow Up Note    Subjective:   CHIEF COMPLAINT / HPI:     The patient is 77 y.o. female who presents today for follow up of COPD. Patient was unable to get [facility] or Spiriva but is on Breo. She also has Combivent which she does not use very often. She reports her breathing is about the same. She is still pursuing issues with the red dye exposure from the blanket she bought for [employer]. Interval history:  Patient's COPD had been controlled with dulera and prn combivent but she says she got a comforter with red dye for [place] and the dust from it has been killing her breathing. She improved with steroids, nebs and antibiotics in the hospital and is now following up. She's doing better, but still gets short of breath with exertion. She has been on oxygen for many years, but her supply company, [employer], won't return her calls or get her new supplies.     Past Medical History:    Past Medical History:   Diagnosis Date    CAMDEN (acute kidney injury) ([facility] [address] 10/07/2013    Anxiety     Chronic abdominal pain     possibly due to diverticulosos    [facility]     DDD (degenerative disc disease), lumbar 2016    Elevated glycated hemoglobin     Hyperlipidemia     Hypertension     Hypothyroidism     Rotator cuff tear     right       Social History:    Social History     Tobacco Use   Smoking Status Former Smoker    Packs/day: 0.25    Years: 32.00    Pack years: 8.00    Types: Cigarettes    Quit date: 1/10/2017    Years since quittin.2   Smokeless Tobacco Never Used   Tobacco Comment    30 years        Current Medications:  Current Outpatient Medications on File Prior to Visit   Medication Sig Dispense Refill    Calcium Carb-Cholecalciferol (CALCIUM-VITAMIN D) 600-400 MG-UNIT TABS Take 1 tablet by mouth twice daily 60 tablet 11    cyclobenzaprine (FLEXERIL) 5 MG tablet Take 1 tablet by mouth three times daily as needed for muscle spasm 90 tablet 5    levothyroxine (SYNTHROID) 125 MCG tablet Take 1 tablet by mouth Daily TAKE 1 TABLET EVERY DAY (DISCONTINUE 135MCG) 90 tablet 1    Promethazine HCl 6.25 MG/5ML SOLN TAKE 5ML BY MOUTH EVERY 6 HOURS AS NEEDED FOR NAUSEA 600 mL 3    Fluticasone furoate-vilanterol (BREO ELLIPTA) 200-25 MCG/INH AEPB inhaler Inhale 1 puff into the lungs daily 1 each 11    COVID-19 Specimen Collection KIT TEST AS DIRECTED TODAY      tiotropium (SPIRIVA RESPIMAT) 2.5 MCG/ACT AERS inhaler Inhale 2 puffs into the lungs daily 1 each 5    predniSONE (DELTASONE) 10 MG tablet 4 tablets once daily for 3 days then 3 tablets once daily for 3 days then 2 tablets once daily for 3 days then 1 tablet once daily for 3 days. 30 tablet 0    predniSONE (DELTASONE) 10 MG tablet 4 tablets/40 mg daily for 3 days then 3 tablets/30 mg daily for 3 days then 20 mg daily for 3 days then 10 mg daily for 3 days then off. 30 tablet 0    BELSOMRA 5 MG TABS Take 5 mg by mouth every evening.        potassium chloride (KLOR-CON M) 10 MEQ extended release tablet Take 1 tablet by mouth 2 times daily       DULERA 200-5 MCG/ACT inhaler Inhale 2 puffs into the lungs every 12 hours      diphenhydrAMINE (BANOPHEN) 50 MG capsule TAKE ONE CAPSULE BY MOUTH EVERY NIGHT AT BEDTIME AS NEEDED FOR ITCHING 30 capsule 4    Roflumilast (DALIRESP) 500 MCG tablet TAKE 1 TABLET EVERY DAY 90 tablet 1    QUEtiapine (SEROQUEL) 100 MG tablet TAKE 1 TABLET EVERY NIGHT 90 tablet 1     MG tablet TAKE 1 TABLET BY MOUTH EVERY 8 HOURS AS NEEDED FOR PAIN 270 tablet 1    Nebulizers (COMPRESSOR/NEBULIZER) MISC 1 Units by Does not apply route 4 times daily as needed (Wheezing SOB) 1 each 0    Respiratory Therapy Supplies MISC 1 each by Does not apply route 4 times daily as needed (wheezing SOB) All Nebulizer supplies needed 50 each 5    ipratropium-albuterol (DUONEB) 0.5-2.5 (3) MG/3ML SOLN nebulizer solution Inhale 3 mLs into the lungs as needed for Shortness of Breath 360 mL 11    ALPRAZolam (XANAX) 1 MG tablet Take 1 mg by mouth daily as needed.  Bempedoic Acid-Ezetimibe 180-10 MG TABS Take 1 tablet by mouth nightly 90 tablet 3    ondansetron (ZOFRAN ODT) 4 MG disintegrating tablet Take 1 tablet by mouth every 8 hours as needed for Nausea or Vomiting 15 tablet 1    cloNIDine (CATAPRES) 0.1 MG tablet Bid (Patient taking differently: Take 0.1 mg by mouth 2 times daily Bid) 180 tablet 1    albuterol-ipratropium (COMBIVENT RESPIMAT)  MCG/ACT AERS inhaler INHALE 1 PUFF BY MOUTH EVERY SIX HOURS AS NEEDED FOR WHEEZING 3 Inhaler 1    hydroCHLOROthiazide (HYDRODIURIL) 25 MG tablet Take 1 tablet by mouth once daily 90 tablet 1    docusate sodium (STOOL SOFTENER) 100 MG capsule TAKE ONE CAPSULE BY MOUTH DAILY AS NEEDED FOR CONSTIPATION 90 capsule 3    lactulose (CHRONULAC) 10 GM/15ML solution Take 15 mLs by mouth as needed (constipation) 1892 mL 1    NARCAN 4 MG/0.1ML LIQD nasal spray Take by mouth as needed   0    tiZANidine (ZANAFLEX) 4 MG tablet Take 4 mg by mouth 2 times daily as needed       oxyCODONE-acetaminophen (PERCOCET)  MG per tablet Take 1 tablet by mouth every 6 hours as needed for Pain.  omeprazole (PRILOSEC) 20 MG delayed release capsule Take 1 capsule by mouth every morning (before breakfast) As needed for heartburn 30 capsule 0     No current facility-administered medications on file prior to visit.        REVIEW OF SYSTEMS:    CONSTITUTIONAL: Negative for fevers and chills  HEENT: Negative for oropharyngeal exudate, post nasal drip, sinus pain / pressure, nasal congestion, ear pain  RESPIRATORY:  See HPI  CARDIOVASCULAR: Negative for chest pain, palpitations, edema  GASTROINTESTINAL: Negative for nausea, vomiting, diarrhea, constipation and abdominal pain  HEMATOLOGICAL: Negative for adenopathy  SKIN: Negative for clubbing, cyanosis, skin lesions  EXTREMITIES: Negative for weakness, decreased ROM  NEUROLOGICAL: Negative for unilateral weakness, speech or gait abnormalities  PSYCH: Negative for anxiety, depression    Objective:   PHYSICAL EXAM:        VITALS:  /76 (Site: Left Upper Arm, Position: Sitting, Cuff Size: Medium Adult)   Pulse 99   Temp 96.5 °F (35.8 °C) (Infrared)   Resp 16   Ht 5' 5\" (1.651 m)   Wt 191 lb (86.6 kg)   LMP  (LMP Unknown)   SpO2 97%   Breastfeeding No   BMI 31.78 kg/m²     CONSTITUTIONAL:  Awake, alert, cooperative, no apparent distress, and appears stated age  HEENT: No oropharyngeal exudate, PERRL, no cervical adenopathy, no tracheal deviation, thyroid size normal  LUNGS:  No increased work of breathing and clear to auscultation, no crackles or wheezing  CARDIOVASCULAR:  normal S1 and S2 and no JVD  ABDOMEN:  Normal bowel sounds, non-distended and non-tender to palpation  EXT: No edema, no calf tenderness. Pulses are present bilaterally. NEUROLOGIC:  Mental Status Exam:  Level of Alertness:   awake  Orientation:   person, place, time. SKIN:  normal skin color, texture, turgor, no redness, warmth, or swelling     DATA:      Radiology Review:  Pertinent images / reports were reviewed as a part of this visit. Ct chest reveals the following:    Impression       1. No evidence of pulmonary embolism to the segmental level. 2. Borderline mediastinal lymphadenopathy. This is nonspecific. 3. Emphysema. Last PFTs:  2/2022  FINDINGS:  Spirometry for this patient shows an FEV1 of 1.14 which is  56% of predicted and a forced vital capacity of 2.45 which is 95% of  predicted giving a ratio of 46. There is no response to  bronchodilators. Total lung capacity is in normal range of 95% of  predicted and residual volume was 113% of predicted. Diffusion capacity  was severely decreased to 36% and improved to 48% with alveolar  ventilation.     CONCLUSION:  Moderate obstructive defect without bronchodilator response  and severely decreased diffusion. Findings are most consistent with a  diagnosis of COPD. Immunization History   Administered Date(s) Administered    COVID-19, Pfizer Purple top, DILUTE for use, 12+ yrs, 30mcg/0.3mL dose 02/14/2021, 03/09/2021, 10/01/2021    Hepatitis B 07/28/2017    Hepatitis B (Engerix-B) 04/13/2017    Hepatitis B Adult (Engerix-B) 07/28/2017    Hepatitis B vaccine 04/13/2017    Influenza Vaccine, unspecified formulation 10/02/2011, 02/15/2014, 09/28/2015, 08/25/2016, 09/05/2017, 10/01/2019    Influenza Virus Vaccine 02/15/2014, 09/25/2016, 09/05/2017, 10/12/2018    Influenza Whole 10/02/2011, 09/25/2016    Influenza, High-dose, Quadv, 65 yrs +, IM (Fluzone) 08/29/2020    Influenza, Intradermal, Preservative free 09/25/2013, 02/15/2014    Influenza, MDCK Quadv, IM, PF (Flucelvax 2 yrs and older) 10/09/2019    Influenza, Quadv, IM, (6 mo and older Fluzone, Flulaval, Fluarix and 3 yrs and older Afluria) 10/12/2018    Pneumococcal Conjugate 13-valent ([id number]) 10/05/2016    Pneumococcal Polysaccharide ([id number]) 10/08/2013    Tdap (Boostrix, Adacel) 03/27/2015, 09/16/2019    Zoster Live (Zostavax) 04/13/2017         Assessment: This is a 77 y.o. female with COPD and chronic hypoxemic respiratory failure    Plan:   COPD: Currently on Breo, which I am not sure how that got there as I had written for [facility] previously. She was not able to get Spiriva although did not know why. She previously tried trelegy and breztri and one had \"too many side effects\", so she didn't take it, and the other made her feel more short of breath. It is unclear why that would be the case. Does not correlate with when she was exposed to the red dye dust.    Repeat PFTs showed moderate obstruction but did not show significant hyperinflation or air trapping. Patient is not a candidate for endobronchial valves. The patient is not currently smoking. Recommend maintaining a smoke-free lifestyle.       RTC 4 months  w/ MD. Call or RTC sooner if symptoms persist or worsen acutely.       Huan Bello MD

## 2022-04-22 RX ORDER — POTASSIUM CHLORIDE 750 MG/1
TABLET, EXTENDED RELEASE ORAL
Qty: 18 TABLET | Refills: 0 | OUTPATIENT
Start: 2022-04-22

## 2022-04-22 NOTE — TELEPHONE ENCOUNTER
Medication:   Requested Prescriptions     Pending Prescriptions Disp Refills    potassium chloride (KLOR-CON M) 10 MEQ extended release tablet [Pharmacy Med Name: Potassium Chloride Monik ER 10 MEQ Oral Tablet Extended Release] 18 tablet 0     Sig: Take 2 tablets by mouth once daily        Last Filled:      Patient Phone Number: [phone] (home)     Last appt: 2/21/2022   Next appt: 5/2/2022    Last OARRS:   RX Monitoring 1/30/2017   Attestation The Prescription Monitoring Report for this patient was reviewed today.

## 2022-04-22 NOTE — TELEPHONE ENCOUNTER
Please asked patient if she has been taking potassium all this time. Ask how much she is taking 10 or 20 mEq if she is. We received an on prescription for 9 days of potassium 2 tablets daily.   Her potassium level has historically been normal.

## 2022-04-25 DIAGNOSIS — G47.00 INSOMNIA, UNSPECIFIED TYPE: Primary | ICD-10-CM

## 2022-04-25 RX ORDER — SUVOREXANT 5 MG/1
TABLET, FILM COATED ORAL
Qty: 30 TABLET | Refills: 1 | Status: SHIPPED | OUTPATIENT
Start: 2022-04-25 | End: 2022-06-24

## 2022-04-25 RX ORDER — POTASSIUM CHLORIDE 750 MG/1
10 TABLET, EXTENDED RELEASE ORAL 2 TIMES DAILY
Qty: 180 TABLET | Refills: 1 | Status: SHIPPED | OUTPATIENT
Start: 2022-04-25 | End: 2022-08-01

## 2022-04-25 NOTE — TELEPHONE ENCOUNTER
Thank you for the information. It is just that we got this very odd prescription request For only 9 days worth. We will send prescription for potassium 10 mill equivalents twice daily number 180 tablets with 1 refill to her pharmacy.

## 2022-04-25 NOTE — TELEPHONE ENCOUNTER
Patient states she is still taking Potassium 10 mEq 2 times a day. She states that you spoke with her last time and told her that her potassium levels were normal but you did not tell her to stop taking them so she continued to take them.   Please advise

## 2022-05-02 ENCOUNTER — OFFICE VISIT (OUTPATIENT)
Dept: PRIMARY CARE CLINIC | Age: 67
End: 2022-05-02
Payer: MEDICARE

## 2022-05-02 VITALS
BODY MASS INDEX: 31.12 KG/M2 | TEMPERATURE: 97 F | DIASTOLIC BLOOD PRESSURE: 87 MMHG | WEIGHT: 187 LBS | OXYGEN SATURATION: 98 % | HEART RATE: 93 BPM | SYSTOLIC BLOOD PRESSURE: 127 MMHG

## 2022-05-02 DIAGNOSIS — D17.20 LIPOMA OF UPPER ARM: Primary | ICD-10-CM

## 2022-05-02 DIAGNOSIS — E53.8 B12 DEFICIENCY: ICD-10-CM

## 2022-05-02 DIAGNOSIS — Z00.00 ROUTINE ADULT HEALTH MAINTENANCE: ICD-10-CM

## 2022-05-02 LAB
TSH REFLEX: 0.78 UIU/ML (ref 0.27–4.2)
VITAMIN B-12: >2000 PG/ML (ref 211–911)

## 2022-05-02 PROCEDURE — 4040F PNEUMOC VAC/ADMIN/RCVD: CPT | Performed by: INTERNAL MEDICINE

## 2022-05-02 PROCEDURE — G8417 CALC BMI ABV UP PARAM F/U: HCPCS | Performed by: INTERNAL MEDICINE

## 2022-05-02 PROCEDURE — 3017F COLORECTAL CA SCREEN DOC REV: CPT | Performed by: INTERNAL MEDICINE

## 2022-05-02 PROCEDURE — 99212 OFFICE O/P EST SF 10 MIN: CPT | Performed by: INTERNAL MEDICINE

## 2022-05-02 PROCEDURE — 1123F ACP DISCUSS/DSCN MKR DOCD: CPT | Performed by: INTERNAL MEDICINE

## 2022-05-02 PROCEDURE — 1036F TOBACCO NON-USER: CPT | Performed by: INTERNAL MEDICINE

## 2022-05-02 PROCEDURE — 1090F PRES/ABSN URINE INCON ASSESS: CPT | Performed by: INTERNAL MEDICINE

## 2022-05-02 PROCEDURE — G8399 PT W/DXA RESULTS DOCUMENT: HCPCS | Performed by: INTERNAL MEDICINE

## 2022-05-02 PROCEDURE — G8427 DOCREV CUR MEDS BY ELIG CLIN: HCPCS | Performed by: INTERNAL MEDICINE

## 2022-05-02 RX ORDER — EZETIMIBE 10 MG/1
10 TABLET ORAL DAILY
Qty: 90 TABLET | Refills: 3 | Status: SHIPPED | OUTPATIENT
Start: 2022-05-02 | End: 2022-10-07 | Stop reason: ALTCHOICE

## 2022-05-02 NOTE — PATIENT INSTRUCTIONS
1. B12 AND THYROID BLOOD TEST TODAY  2. REF DR Autumn Puri FOR LIPOMAS  3.  RX [address] [facility]

## 2022-05-02 NOTE — PROGRESS NOTES
2022    Francisco Mendoza (:  1955) is a 77 y.o. female, here for evaluation of the following medical concerns:    No chief complaint on file. Other      51-year-old -American female with COPD using supplemental oxygen intermittently at night, 30 packyear history quit , hypothyroidism hypertension hyperlipidemia mechanical low back pain on chronic narcotic complicated by chronic constipation and chronic nausea, obstructive sleep apnea, insomnia who attends for painful arm lipomas desiring excision. She was troubled by increasing dyspnea which she attributed to contamination of her oxygen hose by the dye in her bedding. However I dissected the hose and determined that it was all on the outside of the hose. She saw pulmonology 2 weeks ago who prescribed Dulera. PFTs  FEV1 1.16 L 49% of predicted, improving 10% with bronchodilator challenge, FVC 2.1 L 66% predicted, FEV1 FVC ratio 55% predicted, RV:TLC ratio substantial air trapping . Repeat PFTs  moderate obstruction without significant air trapping or hyperinflation, good news: FEV1 1.14 L, ratio 46% without bronchodilator response. TLC 95% of predicted with residual volume only 113% of predicted. DLCO 38%. She attends follow-up mammogram B12 level.  mammogram showed no evidence of breast cancer. Need check B12, TSH needs zetia. Review of Systems   Constitutional: Negative for activity change, appetite change, fatigue and unexpected weight change. HENT: Negative for dental problem, sinus pain, sore throat and trouble swallowing. Eyes: Negative for pain and visual disturbance. Respiratory: Negative for apnea, cough, chest tightness,  and wheezing. Cardiovascular: Negative for chest pain and palpitations. Gastrointestinal: Negative for negative for nausea and vomiting positives listed above in HPI. Endocrine: Negative for cold intolerance, heat intolerance, polydipsia, polyphagia and polyuria. Genitourinary: Negative for difficulty urinating, dysuria, flank pain, frequency, hematuria, pelvic pain, urgency, vaginal bleeding and vaginal discharge. Musculoskeletal: Negative for arthralgias, back froylan, gait problem, joint swelling, myalgias, neck pain and neck stiffness. Skin: Negative for color change and rash. Neurological: Negative for dizziness, tremors, syncope, speech difficulty, weakness, light-headedness and headaches. Hematological: Negative for adenopathy. Does not bruise/bleed easily. Psychiatric/Behavioral: Negative for agitation, behavioral problems, decreased concentration, sleep disturbance and suicidal ideas. The patient is not nervous/anxious and is not hyperactive. Prior to Visit Medications    Medication Sig Taking?  Authorizing Provider   BELSOMRA 5 MG TABS TAKE 1 TABLET BY MOUTH NIGHTLY AS NEEDED (INSOMNIA)  FOR  UP  TO  [address] [place] MD Niya   potassium chloride (KLOR-CON M) 10 MEQ extended release tablet Take 1 tablet by mouth 2 times daily  Avinash Garcia MD   Calcium Carb-Cholecalciferol (CALCIUM-VITAMIN D) 600-400 MG-UNIT TABS Take 1 tablet by mouth twice daily  Avinash Garcia MD   cyclobenzaprine (FLEXERIL) 5 MG tablet Take 1 tablet by mouth three times daily as needed for muscle spasm  Avinash Garcia MD   levothyroxine (SYNTHROID) 125 MCG tablet Take 1 tablet by mouth Daily TAKE 1 TABLET EVERY DAY (DISCONTINUE 135MCG)  Avinash Garcia MD   Promethazine HCl 6.25 MG/5ML SOLN TAKE 5ML BY MOUTH EVERY 6 HOURS AS NEEDED FOR NAUSEA  Avinash Garcia MD   Fluticasone furoate-vilanterol (BREO ELLIPTA) 200-25 MCG/INH AEPB inhaler Inhale 1 puff into the lungs daily  Avinash Garcia MD   COVID-19 Specimen Collection KIT TEST AS DIRECTED TODAY  Historical Provider, MD   tiotropium (SPIRIVA RESPIMAT) 2.5 MCG/ACT AERS inhaler Inhale 2 puffs into the lungs daily  Aakash Donaldson MD   omeprazole (PRILOSEC) 20 MG delayed release capsule Take 1 capsule by mouth every morning (before breakfast) As needed for heartburn  Jeison Jett DO   predniSONE (DELTASONE) 10 MG tablet 4 tablets once daily for 3 days then 3 tablets once daily for 3 days then 2 tablets once daily for 3 days then 1 tablet once daily for 3 days. Jeison Jett DO   predniSONE (DELTASONE) 10 MG tablet 4 tablets/40 mg daily for 3 days then 3 tablets/30 mg daily for 3 days then 20 mg daily for 3 days then 10 mg daily for 3 days then off. Kwan Alonso MD   DULERA 200-5 MCG/ACT inhaler Inhale 2 puffs into the lungs every 12 hours  Historical Provider, MD   diphenhydrAMINE (BANOPHEN) 50 MG capsule TAKE ONE CAPSULE BY MOUTH EVERY NIGHT AT BEDTIME AS NEEDED FOR ITCHING  Kwan Alonso MD   Roflumilast (DALIRESP) 500 MCG tablet TAKE 1 TABLET EVERY DAY  Kwan Alonso MD   QUEtiapine (SEROQUEL) 100 MG tablet TAKE 1 TABLET EVERY NIGHT  Kwan Alonso MD    MG tablet TAKE 1 TABLET BY MOUTH EVERY 8 HOURS AS NEEDED FOR PAIN  Kwan Alonso MD   Nebulizers (COMPRESSOR/NEBULIZER) MISC 1 Units by Does not apply route 4 times daily as needed (Wheezing SOB)  Kwan Alonso MD   Respiratory Therapy Supplies MISC 1 each by Does not apply route 4 times daily as needed (wheezing SOB) All Nebulizer supplies needed  Kwan Alonso MD   ipratropium-albuterol (DUONEB) 0.5-2.5 (3) MG/3ML SOLN nebulizer solution Inhale 3 mLs into the lungs as needed for Shortness of Breath  Kwan Alonso MD   ALPRAZolam Nahomy Estrada) 1 MG tablet Take 1 mg by mouth daily as needed.    Historical Provider, MD   Bempedoic Acid-Ezetimibe 180-10 MG TABS Take 1 tablet by mouth nightly  Kwan Alonso MD   ondansetron (ZOFRAN ODT) 4 MG disintegrating tablet Take 1 tablet by mouth every 8 hours as needed for Nausea or Vomiting  Kwan Alonso MD   cloNIDine (CATAPRES) 0.1 MG tablet Bid  Patient taking differently: Take 0.1 mg by mouth 2 times daily Bid  Kwan Alonso MD   albuterol-ipratropium (COMBIVENT RESPIMAT)  MCG/ACT AERS inhaler INHALE 1 PUFF BY MOUTH EVERY SIX HOURS AS NEEDED FOR WHEEZING  Liyah Davis MD   hydroCHLOROthiazide (HYDRODIURIL) 25 MG tablet Take 1 tablet by mouth once daily  Liyah Davis MD   docusate sodium (STOOL SOFTENER) 100 MG capsule TAKE ONE CAPSULE BY MOUTH DAILY AS NEEDED FOR CONSTIPATION  Liyah Davis MD   lactulose (CHRONULAC) 10 GM/15ML solution Take 15 mLs by mouth as needed (constipation)  Liyah Davis MD   [facility] 4 MG/0.1ML LIQD nasal spray Take by mouth as needed   Historical Provider, MD   tiZANidine (ZANAFLEX) 4 MG tablet Take 4 mg by mouth 2 times daily as needed   Historical Provider, MD   oxyCODONE-acetaminophen (PERCOCET)  MG per tablet Take 1 tablet by mouth every 6 hours as needed for Pain.    Historical Provider, MD        Allergies   Allergen Reactions    Bupropion Other (See Comments)     Muscle spasms/seizure like symptoms     Morphine Hives and Itching    Morphine And Related Itching       Past Medical History:   Diagnosis Date    CAM[place] (acute kidney injury) ([address] 10/07/2013    Anxiety     Chronic abdominal pain     possibly due to diverticulosos    COPD (chronic obstructive pulmonary disease) ([address]     DDD (degenerative disc disease), lumbar 12/01/2016    Elevated glycated hemoglobin     Hyperlipidemia     Hypertension     Hypothyroidism     Rotator cuff tear     right       Past Surgical History:   Procedure Laterality Date    CARPAL TUNNEL RELEASE      CENTRAL LINE  10/7/2013         CHOLECYSTECTOMY      COLONOSCOPY  9/6/2011    Tubular adenoma    COLONOSCOPY  10/24/2005    Dr. Arthur Stern - Tubular adenoma    ENDOSCOPY, COLON, DIAGNOSTIC      ESOPHAGEAL MOTILITY STUDY  6/17/2013    NORMAL    EYE SURGERY      cataracts    FINE NEEDLE ASPIRATION  8/10/2012    THYROID - NEGATIVE    FINGER NAIL SURGERY Bilateral 5/21/2019    TOTAL AVULSION OF 1-5 TOENAILS BILATERAL FEET performed by Sneha Frank DPM at [address] [place] [address] HYSTERECTOMY      MIA AND BSO  8/15/2002    Endometriosis, fibroids    TONSILLECTOMY      UPPER GASTROINTESTINAL ENDOSCOPY  10/17/2005    Bx small bowel, Gastric, GE junction all negative    UPPER GASTROINTESTINAL ENDOSCOPY  2000    Dr. Shaye Valencia - NORMAL       Social History     Socioeconomic History    Marital status: Single     Spouse name: Not on file    Number of children: Not on file    Years of education: Not on file    Highest education level: Not on file   Occupational History    Not on file   Tobacco Use    Smoking status: Former Smoker     Packs/day: 0.25     Years: 32.00     Pack years: 8.00     Types: Cigarettes     Quit date: 1/10/2017     Years since quittin.3    Smokeless tobacco: Never Used    Tobacco comment: 30 years    Vaping Use    Vaping Use: Never used   Substance and Sexual Activity    Alcohol use: No    Drug use: No    Sexual activity: Not on file   Other Topics Concern    Not on file   Social History Narrative    Not on file     Social Determinants of Health     Financial Resource Strain: Low Risk     Difficulty of Paying Living Expenses: Not hard at all   Food Insecurity: No Food Insecurity    Worried About [address] in the Last Year: Never true    [facility] in the Last Year: Never true   Transportation Needs:     Lack of Transportation (Medical): Not on file    Lack of Transportation (Non-Medical):  Not on file   Physical Activity:     Days of Exercise per Week: Not on file    Minutes of Exercise per Session: Not on file   Stress:     Feeling of Stress : Not on file   Social Connections:     Frequency of Communication with Friends and Family: Not on file    Frequency of Social Gatherings with Friends and Family: Not on file    Attends [employer] Services: Not on file    Active Member of Clubs or Organizations: Not on file    Attends Club or Organization Meetings: Not on file    Marital Status: Not on file   Intimate Partner Violence:     Fear of Current or Ex-Partner: Not on file    Emotionally Abused: Not on file    Physically Abused: Not on file    Sexually Abused: Not on file   Housing Stability:     Unable to Pay for Housing in the Last Year: Not on file    Number of [place] in the Last Year: Not on file    Unstable Housing in the Last Year: Not on file        Family History   Problem Relation Age of Onset    Diabetes Sister     Heart Disease Sister         Renuka Doon Cancer Sister 76        colon cancer  metastatic    Diabetes Sister     Kidney Disease Sister     Cancer Sister         brain cancer throat cancer and smoked    Cancer Mother 68        cerival cancer    Osteoarthritis Mother     Ovarian Cancer Mother     Heart Disease Father     High Blood Pressure Father     Heart Disease Brother 40        heart attack    High Blood Pressure Maternal Aunt     Cancer Other 46        liver cancer    Cancer Other 47        lung cancer and smoker, maternal neice    Cancer Other         bladder cancer, first cousin.  Diabetes Sister 46        complication of diabetes       There were no vitals filed for this visit. Estimated body mass index is 31.78 kg/m² as calculated from the following:    Height as of 4/12/22: 5' 5\" (1.651 m). Weight as of 4/12/22: 191 lb (86.6 kg). PHYSICAL EXAM  GENERAL:  Pleasant  female who looks her stated age, awake alert and oriented x3, in mild distress. HEENT: Clear no JVD  Lungs: Severely decreased air entry, but no inspiratory crackles or expiratory wheeze  Heart: Borderline tachycardia no pathologic murmur no S3 no S4  Abdomen: Benign  Extremities: Tender palpable masses, not truly classic for lipomas bilateral upper extremities, chronic. PSYCH:  No psychomotor retardation or agitation. Good eye contact. Unrestricted affect range. Mood congruent with affect. Linear thought.     LABS  Lab Review   Admission on 02/06/2022, Discharged on 02/08/2022   Component Date Value  Blood Culture, Routine 02/06/2022 No Growth after 4 days of incubation.  Culture, Blood 2 02/06/2022 No Growth after 4 days of incubation.  Lactic Acid 02/06/2022 1.5     Sodium 02/06/2022 134*    Potassium reflex Magnesi* 02/06/2022 3.7     Chloride 02/06/2022 97*    CO2 02/06/2022 26     Anion Gap 02/06/2022 11     Glucose 02/06/2022 132*    BUN 02/06/2022 17     CREATININE 02/06/2022 0.8     GFR Non- 02/06/2022 >60     GFR  02/06/2022 >60     Calcium 02/06/2022 9.4     Total Protein 02/06/2022 6.6     Albumin 02/06/2022 3.6     Albumin/Globulin Ratio 02/06/2022 1.2     Total Bilirubin 02/06/2022 0.4     Alkaline Phosphatase 02/06/2022 91     ALT 02/06/2022 14     AST 02/06/2022 13*    Troponin 02/06/2022 <0.01     Pro-BNP 02/06/2022 46     WBC 02/06/2022 15.8*    RBC 02/06/2022 4.39     Hemoglobin 02/06/2022 11.6*    Hematocrit 02/06/2022 36.7     MCV 02/06/2022 83.6     MCH 02/06/2022 26.3     MCHC 02/06/2022 31.5     RDW 02/06/2022 13.3     Platelets 35/24/2106 210     MPV 02/06/2022 10.1     Neutrophils % 02/06/2022 86.7     Lymphocytes % 02/06/2022 6.6     Monocytes % 02/06/2022 5.4     Eosinophils % 02/06/2022 0.5     Basophils % 02/06/2022 0.8     Neutrophils Absolute 02/06/2022 13.7*    Lymphocytes Absolute 02/06/2022 1.0     Monocytes Absolute 02/06/2022 0.9     Eosinophils Absolute 02/06/2022 0.1     Basophils Absolute 02/06/2022 0.1     Ventricular Rate 02/06/2022 114     Atrial Rate 02/06/2022 114     P-R Interval 02/06/2022 130     QRS Duration 02/06/2022 86     Q-T Interval 02/06/2022 308     QTc Calculation (Bazett) 02/06/2022 424     P Axis 02/06/2022 79     R Axis 02/06/2022 -61     T Axis 02/06/2022 91     Diagnosis 02/06/2022 EKG performed in ER and to be interpreted by ER physician. Confirmed by MD, ER ([id number]),  Shanel Jamison ([id number]) on 2/6/2022 10:33:46 AM     Bilirubin, Direct 02/06/2022 <0.2     Bilirubin, Indirect 02/06/2022 see below     Lipase 02/06/2022 28.0     Protime 02/06/2022 11.2     INR 02/06/2022 0.99     Color, UA 02/06/2022 Yellow     Clarity, UA 02/06/2022 Clear     Glucose, Ur 02/06/2022 Negative     Bilirubin Urine 02/06/2022 Negative     Ketones, Urine 02/06/2022 Negative     Specific Gravity, UA 02/06/2022 1.010     Blood, Urine 02/06/2022 Negative     pH, UA 02/06/2022 6.0     Protein, UA 02/06/2022 Negative     Urobilinogen, Urine 02/06/2022 0.2     Nitrite, Urine 02/06/2022 POSITIVE*    Leukocyte Esterase, Urine 02/06/2022 Negative     Microscopic Examination 02/06/2022 YES     Urine Type 02/06/2022 NotGiven     Urine Reflex to Culture 02/06/2022 Not Indicated     Lactic Acid, Sepsis 02/06/2022 1.9     SARS-CoV-2, NAAT 02/06/2022 Not Detected     Rejected Test 02/06/2022 VBG     Reason for Rejection 02/06/2022 see below     pH, [place] 02/06/2022 7.464*    pCO2, [place] 02/06/2022 39.5*    pO2, [place] 02/06/2022 73.2*    HCO3, Venous 02/06/2022 28.4*    Base Excess, [place] 02/06/2022 4.3*    O2 Sat, [place] 02/06/2022 96     Carboxyhemoglobin 02/06/2022 1.3     MetHgb, [place] 02/06/2022 0.4     TC02 (Calc), [place] 02/06/2022 30     Hemoglobin, [place], Reduced 02/06/2022 4.30     WBC, UA 02/06/2022 None seen     RBC, UA 02/06/2022 None seen     Troponin 02/06/2022 <0.01     Urine Culture, Routine 02/06/2022 No growth at 18 to 36 hours     Troponin 02/06/2022 <0.01     Ventricular Rate 02/06/2022 88     Atrial Rate 02/06/2022 88     P-R Interval 02/06/2022 130     QRS Duration 02/06/2022 96     Q-T Interval 02/06/2022 362     QTc Calculation (Bazett) 02/06/2022 438     P Axis 02/06/2022 79     R Axis 02/06/2022 -28     T Axis 02/06/2022 64     Diagnosis 02/06/2022 EKG performed in ER and to be interpreted by ER physician. Confirmed by MD, ER ([id number]),  Sai Holman ([id number]) on 2/7/2022 6:06:36 AM     CRP 02/06/2022 41.5*    Procalcitonin 02/06/2022 0.16*    Magnesium 02/06/2022 1.60*    Sodium 02/07/2022 138     Potassium reflex Magnesi* 02/07/2022 4.2     Chloride 02/07/2022 103     CO2 02/07/2022 23     Anion Gap 02/07/2022 12     Glucose 02/07/2022 144*    BUN 02/07/2022 17     CREATININE 02/07/2022 0.8     GFR Non- 02/07/2022 >60     GFR  02/07/2022 >60     Calcium 02/07/2022 9.5     Total Protein 02/07/2022 6.2*    Albumin 02/07/2022 3.8     Albumin/Globulin Ratio 02/07/2022 1.6     Total Bilirubin 02/07/2022 <0.2     Alkaline Phosphatase 02/07/2022 91     ALT 02/07/2022 13     AST 02/07/2022 12*    WBC 02/07/2022 13.4*    RBC 02/07/2022 4.08     Hemoglobin 02/07/2022 11.0*    Hematocrit 02/07/2022 34.7*    MCV 02/07/2022 85.2     MCH 02/07/2022 27.0     MCHC 02/07/2022 31.7     RDW 02/07/2022 13.4     Platelets 61/08/0371 217     MPV 02/07/2022 10.3     Neutrophils % 02/07/2022 82.2     Lymphocytes % 02/07/2022 10.3     Monocytes % 02/07/2022 7.1     Eosinophils % 02/07/2022 0.0     Basophils % 02/07/2022 0.4     Neutrophils Absolute 02/07/2022 11.0*    Lymphocytes Absolute 02/07/2022 1.4     Monocytes Absolute 02/07/2022 1.0     Eosinophils Absolute 02/07/2022 0.0     Basophils Absolute 02/07/2022 0.1     Vancomycin Rm 02/07/2022 17.9     MRSA SCREEN RT-PCR 02/07/2022                      Value:Negative  MRSA DNA not detected.   Normal Range: Not detected      Vancomycin Tr 02/08/2022 8.2*    Sodium 02/08/2022 138     Potassium reflex Magnesi* 02/08/2022 3.9     Chloride 02/08/2022 104     CO2 02/08/2022 22     Anion Gap 02/08/2022 12     Glucose 02/08/2022 121*    BUN 02/08/2022 19     CREATININE 02/08/2022 0.9     GFR Non- 02/08/2022 >60     GFR  02/08/2022 >60     Calcium 02/08/2022 9.7     Total Protein 02/08/2022 6.4     Albumin 02/08/2022 3.8     Albumin/Globulin Ratio 02/08/2022 1.5     Total Bilirubin 02/08/2022 <0.2     Alkaline Phosphatase 02/08/2022 83     ALT 02/08/2022 16     AST 02/08/2022 14*    WBC 02/08/2022 12.3*    RBC 02/08/2022 3.95*    Hemoglobin 02/08/2022 10.7*    Hematocrit 02/08/2022 33.6*    MCV 02/08/2022 85.0     MCH 02/08/2022 27.0     MCHC 02/08/2022 31.8     RDW 02/08/2022 13.9     Platelets 94/97/1394 219     MPV 02/08/2022 10.0     Neutrophils % 02/08/2022 74.1     Lymphocytes % 02/08/2022 18.6     Monocytes % 02/08/2022 6.7     Eosinophils % 02/08/2022 0.2     Basophils % 02/08/2022 0.4     Neutrophils Absolute 02/08/2022 9.1*    Lymphocytes Absolute 02/08/2022 2.3     Monocytes Absolute 02/08/2022 0.8     Eosinophils Absolute 02/08/2022 0.0     Basophils Absolute 02/08/2022 0.0     IgE 02/08/2022 15     Magnesium 02/08/2022 1.90    Admission on 01/27/2022, Discharged on 01/27/2022   Component Date Value    Rapid Influenza A Ag 01/27/2022 Negative     Rapid Influenza B Ag 01/27/2022 Negative     Color, UA 01/27/2022 Yellow     Clarity, UA 01/27/2022 Clear     Glucose, Ur 01/27/2022 Negative     Bilirubin Urine 01/27/2022 Negative     Ketones, Urine 01/27/2022 Negative     Specific Gravity, UA 01/27/2022 1.010     Blood, Urine 01/27/2022 Negative     pH, UA 01/27/2022 6.5     Protein, UA 01/27/2022 Negative     Urobilinogen, Urine 01/27/2022 0.2     Nitrite, Urine 01/27/2022 Negative     Leukocyte Esterase, Urine 01/27/2022 Negative     Microscopic Examination 01/27/2022 Not Indicated     Urine Type 01/27/2022 NotGiven     SARS-CoV-2, PCR 01/27/2022 Not Detected    Orders Only on 01/19/2022   Component Date Value    TSH 01/19/2022 1.16    Admission on 01/17/2022, Discharged on 01/18/2022   Component Date Value    WBC 01/18/2022 10.6     RBC 01/18/2022 4.43     Hemoglobin 01/18/2022 11.8*    Hematocrit 01/18/2022 37.2     MCV 01/18/2022 84.0     MCH 01/18/2022 26.8     MCHC 01/18/2022 31.8     RDW 01/18/2022 12.8     Platelets 87/38/9009 305     MPV 01/18/2022 9.9  Neutrophils % 01/18/2022 64.7     Lymphocytes % 01/18/2022 23.7     Monocytes % 01/18/2022 9.0     Eosinophils % 01/18/2022 1.6     Basophils % 01/18/2022 1.0     Neutrophils Absolute 01/18/2022 6.9     Lymphocytes Absolute 01/18/2022 2.5     Monocytes Absolute 01/18/2022 1.0     Eosinophils Absolute 01/18/2022 0.2     Basophils Absolute 01/18/2022 0.1     Sodium 01/18/2022 141     Potassium reflex Magnesi* 01/18/2022 4.1     Chloride 01/18/2022 102     CO2 01/18/2022 24     Anion Gap 01/18/2022 15     Glucose 01/18/2022 97     BUN 01/18/2022 16     CREATININE 01/18/2022 0.9     GFR Non- 01/18/2022 >60     GFR  01/18/2022 >60     Calcium 01/18/2022 10.7*    Total Protein 01/18/2022 7.7     Albumin 01/18/2022 4.5     Albumin/Globulin Ratio 01/18/2022 1.4     Total Bilirubin 01/18/2022 0.3     Alkaline Phosphatase 01/18/2022 94     ALT 01/18/2022 17     AST 01/18/2022 25     Lipase 01/18/2022 27.0     Troponin 01/18/2022 <0.01     Pro-BNP 01/18/2022 39     pH, Pol[place] 01/18/2022 7. 402     pCO2, [place] 01/18/2022 40.6     pO2, [place] 01/18/2022 30.1     HCO3, Venous 01/18/2022 25.2     Base Excess, [place] 01/18/2022 0.5     O2 Sat, [place] 01/18/2022 58     TC02 (Calc), [place] 01/18/2022 26     O2 Therapy 01/18/2022 Unknown     SARS-CoV-2 01/18/2022 Not Detected    Hospital Outpatient Visit on 11/23/2021   Component Date Value    Ventricular Rate 11/23/2021 95     Atrial Rate 11/23/2021 95     P-R Interval 11/23/2021 140     QRS Duration 11/23/2021 88     Q-T Interval 11/23/2021 348     QTc Calculation (Bazett) 11/23/2021 437     P Axis 11/23/2021 77     R Axis 11/23/2021 -52     T Axis 11/23/2021 87     Diagnosis 11/23/2021 Normal sinus rhythmLeft anterior fascicular blockAbnormal ECGWarly transitionConfirmed by Serena Hernandez MD, Obdulio Cárdenas ([id number]) on 11/23/2021 2:07:47 PM    Orders Only on 09/01/2021   Component Date Value    Hemoglobin A1C 09/01/2021 5.6     eAG 09/01/2021 114.0     Fructosamine 09/01/2021 297*    Total CK 09/01/2021 283*    ALT 09/01/2021 12     AST 09/01/2021 14*    Cholesterol, Total 09/01/2021 183     Triglycerides 09/01/2021 175*    HDL 09/01/2021 51     LDL Calculated 09/01/2021 97     VLDL Cholesterol Calcula* 09/01/2021 1850 [facility] Outpatient Visit on 03/25/2021   Component Date Value    pH, Pol[place] 03/25/2021 7.410     pCO2, [place] 03/25/2021 51.3*    pO2, [place] 03/25/2021 38     HCO3, Venous 03/25/2021 33*    Base Excess, [place] 03/25/2021 6.5     O2 Sat, [place] 03/25/2021 73     Carboxyhemoglobin 03/25/2021 2.3     MetHgb, [place] 03/25/2021 0.5     TC02 (Calc), [place] 03/25/2021 34     O2 Content, [place] 03/25/2021 13     O2 Therapy 03/25/2021 Unknown    Orders Only on 03/25/2021   Component Date Value    TSH 03/25/2021 1.38     DOMENICA 03/25/2021 Negative     Anti-JO1 IgG 03/25/2021 <0.2     Cholesterol, Total 03/25/2021 126     Triglycerides 03/25/2021 138     HDL 03/25/2021 44     LDL Calculated 03/25/2021 54     VLDL Cholesterol Calcula* 03/25/2021 28    Office Visit on 03/25/2021   Component Date Value    Total CK 03/25/2021 188     WBC 03/25/2021 9.2     RBC 03/25/2021 4.45     Hemoglobin 03/25/2021 12.8     Hematocrit 03/25/2021 39.2     MCV 03/25/2021 88.2     MCH 03/25/2021 28.7     MCHC 03/25/2021 32.5     RDW 03/25/2021 12.9     Platelets 73/63/8418 171     MPV 03/25/2021 11.0*    CRP 03/25/2021 <3.0          ASSESSMENT/PLAN  1. Severe COPD without complication  EEN7/IZC= 19/75%, FEV1 1.2 L, with 110 mL / 10% FEV1 augmentation with bronchodilatorPFT 2014 Dulera, Umeclidinium versus Spiriva, Daliresp DuoNeb. inconsistent nocturnal O2. PFTs seemingly worse, consistent with progression of COPD but declines Dulera. 2. Pruritus  Wonder if this is related to her chronic narcotic use. No daytime sedation. Hydroxyzine another treatment option.     3. Acquired hypothyroidism  Up to date TSH, 3/2021, recheck overdue will check today    4. Slow transit constipation  Uses Linzess 2x/week, lactulose qod, discouraged docusate if feels it doesn't help. Chronic narcotic use. 5. Vitamin B 12 deficiency   Over 2000 February 22, 2021; lowest level 523 in 2014, stopped in Spring 2021. Had been injecting for many years. Doing trial off of injections follow-up level document adequacy. Check today  6. Mixed hyperlipidemia  Suboptimal control. Normal LFTs however elevated CK after 3 months off rosuvastatin prescribed bempedoic acid/zetia but not covered by insurance. Trial Zetia. 8. Breast lipoma  6-month follow-up surveillance included targeted ultrasound to look at the breast mass thought to be lipoma though not biopsied. No evidence of interval regression. Get follow-up imaging this spring. 9.  Nausea   Patient has severe nausea even vomiting a couple times a week, which she treats with promethazine. She is using 800 mg of ibuprofen 5 times a week, no alcohol. Not clearly related to meals. No solid food dysphagia. Resume Prilosec with improvement. 10. Impaired fasting glucose  ReAssuring A1c 5.6%, glucose in the low 130s. 11.  Essential hypertension  Encourage home surveillance, adequate control, reassuring BMP recently. 13. Vitamin D deficiency  Adequate repletion February 2021. Chronic inhaled steroid use. Occasional systemic glucocorticoid use for exacerbations. Spine -0.3 worst hip -1.0 DEXA July 2021 on calcium vitamin D therapy. 16. Healthcare maintenance. Discussed Shingrix will check with the pharmacy. Completed [address] vaccination series, 2021. Pap 2018. PSG mild AVRIL 2019. Tdap Pneumovax 23 influenza up-to-date. Up to date with TSH A1c lipid panel mammogram (left breast lipoma). 17.  History of colon adenoma. Colonoscopy  2021 2011 and 2005 both with adenoma excision. Hyperplastic 2021. Dr. Jojo Gamboa gastroenterologist.    18.  Left shoulder rotator cuff arthropathy.   MRI showed left full-thickness anterior supraspinatus tendon tear with some acromioclavicular DJD. Seeing Pain Med Dr Berl Cranker. Dr Viktor Todd. Doing shots will eventually need TSA. 19. Mildly elevated CK. Asx, still elevated off statin . Maria Dolores Burn -1 negative. 20. Insomnia. Ambien, seroquel Belsomra ineffective or not covered. 21.  Painful bilateral upper extremity cysts. Patient called some lipomas, they are pretty small in that case. No prior diagnosis of hidradenitis suppurativa. Refer to general surgery for consideration of excision. No follow-ups on file. 3 months  It was a pleasure to visit with Ms. Heriberto Gorge today. Answered all questions as best I could.   Rafi Yeh MD   15min

## 2022-05-04 RX ORDER — POTASSIUM CHLORIDE 750 MG/1
TABLET, EXTENDED RELEASE ORAL
Qty: 180 TABLET | Refills: 0 | OUTPATIENT
Start: 2022-05-04

## 2022-05-05 ENCOUNTER — TELEPHONE (OUTPATIENT)
Dept: PULMONOLOGY | Age: 67
End: 2022-05-05

## 2022-05-05 NOTE — TELEPHONE ENCOUNTER
Mrs. Pebbles Julian called to comment on the after visit summary she received from her appt with Dr. Verena Weiss. She had anticipated it stating a diagnosis related to her exposure of red dye. It was suggested that she open a MyChart account so that she see Dr. Viktor Baker entire note, as well a those of her other physicians. She was given the phone # for [phone] ([phone]) so that she can have assistance setting up her account.

## 2022-05-16 ENCOUNTER — OFFICE VISIT (OUTPATIENT)
Dept: SURGERY | Age: 67
End: 2022-05-16
Payer: MEDICARE

## 2022-05-16 VITALS
BODY MASS INDEX: 31.45 KG/M2 | HEART RATE: 97 BPM | DIASTOLIC BLOOD PRESSURE: 76 MMHG | WEIGHT: 189 LBS | SYSTOLIC BLOOD PRESSURE: 112 MMHG

## 2022-05-16 DIAGNOSIS — D17.21 LIPOMA OF RIGHT UPPER EXTREMITY: ICD-10-CM

## 2022-05-16 DIAGNOSIS — D17.22 LIPOMA OF LEFT UPPER EXTREMITY: Primary | ICD-10-CM

## 2022-05-16 DIAGNOSIS — D17.1 LIPOMA OF ABDOMINAL WALL: ICD-10-CM

## 2022-05-16 PROCEDURE — 1090F PRES/ABSN URINE INCON ASSESS: CPT | Performed by: SURGERY

## 2022-05-16 PROCEDURE — G8417 CALC BMI ABV UP PARAM F/U: HCPCS | Performed by: SURGERY

## 2022-05-16 PROCEDURE — G8427 DOCREV CUR MEDS BY ELIG CLIN: HCPCS | Performed by: SURGERY

## 2022-05-16 PROCEDURE — 99203 OFFICE O/P NEW LOW 30 MIN: CPT | Performed by: SURGERY

## 2022-05-16 NOTE — PATIENT INSTRUCTIONS
Surgeon: Obinna Caraballo MD     Your surgery will be at [facility] [place]  First floor of the [address] [place] [address]. [address]    Date:    Arrival time:    Surgery time:     (   ) Outpatient with general anesthesia   You will need to have a  drive you home due to anesthesia. Nothing to eat or drink after midnight the night before. FASTING SURGERY    COVID-19 testing protocol  This is up to the desecration of the surgeon. If you are showing symptoms, we will do a RAPID test the morning of surgery. You may be asked to stop blood thinners 5 days prior to surgery such as Coumadin, Plavix, Ricardo Lemuel, Eliquis,and over the counter fish oil (Omega 3)     The surgery scheduler, [facility] will call you a few days prior to surgery to confirm date and time if any changes. Please make a History and Physical (surgery clearance) by your primary care physician prior to your surgery date. *Within 30 days of surgery*     Please contact Karis if you need to reschedule your surgery or have any questions.   Office phone number:     [phone]  Fax number for [facility]:    [phone]

## 2022-05-16 NOTE — PROGRESS NOTES
Severe COPD which we cannot fix so good to go as make sure that she is ambulating without hypoxia when she is ready to leave.

## 2022-05-16 NOTE — Clinical Note
Cindy Anderson,    I just saw Ms. Cyndee Banuelos for her upper extremity and abdominal wall lipomas. I am going to excise them in the OR under MAC anesthesia in July per the patient's timing preference. Would you like to see her for a preoperative H&P, or is she cleared for surgery based on your last visit? Thank you for allowing me to take care of Ms. Corby Godoy with you.    Jordin Benz

## 2022-05-16 NOTE — PROGRESS NOTES
New Patient [address] General and Vascular Surgery   Beulah Dejesus MD    [address] [facility], [address] [place] [address] Drive  [address]  Phone: [phone]  Fax: [phone]    Beto Peres   YOB: 1955    Date of Visit:  5/16/2022    No ref. provider found  Candace Cuevas MD    HPI:   Lipoma: Beto Peres presents for evaluation of lipomas as a referral from Candace Cuevas MD. Lesion is located in the bilateral upper extremities along with her anterior abdominal wall. Onset was several years ago. The lumps have slowly enlarged and are causing pain with palpation. She has these lumps distributed throughout her body, but has three masses of her left upper extremity, four masses of her right upper extremity, and one of her anterior abdominal wall. Patient does have previous history of lipomas with the last excision performed 10-12 years ago. Patient does not have diabetes. Hx of open cholecystectomy in 1980s.        Allergies   Allergen Reactions    Bupropion Other (See Comments)     Muscle spasms/seizure like symptoms     Morphine Hives and Itching    Morphine And Related Itching     Outpatient Medications Marked as Taking for the 5/16/22 encounter (Office Visit) with Ramona Jasmine MD   Medication Sig Dispense Refill    ezetimibe (ZETIA) 10 MG tablet Take 1 tablet by mouth daily 90 tablet 3    BELSOMRA 5 MG TABS TAKE 1 TABLET BY MOUTH NIGHTLY AS NEEDED (INSOMNIA)  FOR  UP  TO  30  DAYS 30 tablet 1    potassium chloride (KLOR-CON M) 10 MEQ extended release tablet Take 1 tablet by mouth 2 times daily 180 tablet 1    Calcium Carb-Cholecalciferol (CALCIUM-VITAMIN D) 600-400 MG-UNIT TABS Take 1 tablet by mouth twice daily 60 tablet 11    cyclobenzaprine (FLEXERIL) 5 MG tablet Take 1 tablet by mouth three times daily as needed for muscle spasm 90 tablet 5    levothyroxine (SYNTHROID) 125 MCG tablet Take 1 tablet by mouth Daily TAKE 1 TABLET EVERY DAY (DISCONTINUE 135MCG) 90 tablet 1    Promethazine HCl 6.25 MG/5ML SOLN TAKE 5ML BY MOUTH EVERY 6 HOURS AS NEEDED FOR NAUSEA 600 mL 3    Fluticasone furoate-vilanterol (BREO ELLIPTA) 200-25 MCG/INH AEPB inhaler Inhale 1 puff into the lungs daily 1 each 11    COVID-19 Specimen Collection KIT TEST AS DIRECTED TODAY      tiotropium (SPIRIVA RESPIMAT) 2.5 MCG/ACT AERS inhaler Inhale 2 puffs into the lungs daily 1 each 5    predniSONE (DELTASONE) 10 MG tablet 4 tablets once daily for 3 days then 3 tablets once daily for 3 days then 2 tablets once daily for 3 days then 1 tablet once daily for 3 days. 30 tablet 0    predniSONE (DELTASONE) 10 MG tablet 4 tablets/40 mg daily for 3 days then 3 tablets/30 mg daily for 3 days then 20 mg daily for 3 days then 10 mg daily for 3 days then off. 30 tablet 0    DULERA 200-5 MCG/ACT inhaler Inhale 2 puffs into the lungs every 12 hours      diphenhydrAMINE (BANOPHEN) 50 MG capsule TAKE ONE CAPSULE BY MOUTH EVERY NIGHT AT BEDTIME AS NEEDED FOR ITCHING 30 capsule 4    Roflumilast (DALIRESP) 500 MCG tablet TAKE 1 TABLET EVERY DAY 90 tablet 1    QUEtiapine (SEROQUEL) 100 MG tablet TAKE 1 TABLET EVERY NIGHT 90 tablet 1     MG tablet TAKE 1 TABLET BY MOUTH EVERY 8 HOURS AS NEEDED FOR PAIN 270 tablet 1    Nebulizers (COMPRESSOR/NEBULIZER) MISC 1 Units by Does not apply route 4 times daily as needed (Wheezing SOB) 1 each 0    Respiratory Therapy Supplies MISC 1 each by Does not apply route 4 times daily as needed (wheezing SOB) All Nebulizer supplies needed 50 each 5    ipratropium-albuterol (DUONEB) 0.5-2.5 (3) MG/3ML SOLN nebulizer solution Inhale 3 mLs into the lungs as needed for Shortness of Breath 360 mL 11    ALPRAZolam (XANAX) 1 MG tablet Take 1 mg by mouth daily as needed.        Bempedoic Acid-Ezetimibe 180-10 MG TABS Take 1 tablet by mouth nightly 90 tablet 3    ondansetron (ZOFRAN ODT) 4 MG disintegrating tablet Take 1 tablet by mouth every 8 hours as needed for Nausea or Vomiting 15 tablet 1    cloNIDine (CATAPRES) 0.1 MG tablet Bid (Patient taking differently: Take 0.1 mg by mouth 2 times daily Bid) 180 tablet 1    albuterol-ipratropium (COMBIVENT RESPIMAT)  MCG/ACT AERS inhaler INHALE 1 PUFF BY MOUTH EVERY SIX HOURS AS NEEDED FOR WHEEZING 3 Inhaler 1    hydroCHLOROthiazide (HYDRODIURIL) 25 MG tablet Take 1 tablet by mouth once daily 90 tablet 1    docusate sodium (STOOL SOFTENER) 100 MG capsule TAKE ONE CAPSULE BY MOUTH DAILY AS NEEDED FOR CONSTIPATION 90 capsule 3    lactulose (CHRONULAC) 10 GM/15ML solution Take 15 mLs by mouth as needed (constipation) 1892 mL 1    NARCAN 4 MG/0.1ML LIQD nasal spray Take by mouth as needed   0    tiZANidine (ZANAFLEX) 4 MG tablet Take 4 mg by mouth 2 times daily as needed       oxyCODONE-acetaminophen (PERCOCET)  MG per tablet Take 1 tablet by mouth every 6 hours as needed for Pain.           Past Medical History:   Diagnosis Date    CAMDEN (acute kidney injury) ([facility] [address] 10/07/2013    Anxiety     Chronic abdominal pain     possibly due to diverticulosos    COPD [facility]chronic obstructive pulmonary disease) ([facility]     DDD (degenerative disc disease), lumbar 12/01/2016    Elevated glycated hemoglobin     Hyperlipidemia     Hypertension     Hypothyroidism     Rotator cuff tear     right     Past Surgical History:   Procedure Laterality Date    CARPAL TUNNEL RELEASE      CENTRAL LINE  10/7/2013         CHOLECYSTECTOMY      COLONOSCOPY  9/6/2011    Tubular adenoma    COLONOSCOPY  10/24/2005    Dr. Sol Dang - Tubular adenoma    ENDOSCOPY, COLON, DIAGNOSTIC      ESOPHAGEAL MOTILITY STUDY  6/17/2013    NORMAL    EYE SURGERY      cataracts    FINE NEEDLE ASPIRATION  8/10/2012    THYROID - NEGATIVE    FINGER NAIL SURGERY Bilateral 5/21/2019    TOTAL AVULSION OF 1-5 TOENAILS BILATERAL FEET performed by Kami Malcolm DPM at [address] [facility]  8/15/2002    Endometriosis, fibroids    TONSILLECTOMY      UPPER GASTROINTESTINAL ENDOSCOPY  10/17/2005    Bx small bowel, Gastric, GE junction all negative    UPPER GASTROINTESTINAL ENDOSCOPY  2000    Dr. Zavala Pro - NORMAL     Family History   Problem Relation Age of Onset    Diabetes Sister     Heart Disease Sister         chf    Cancer Sister 76        colon cancer  metastatic    Diabetes Sister     Kidney Disease Sister     Cancer Sister         brain cancer throat cancer and smoked    Cancer Mother 68        cerival cancer    Osteoarthritis Mother     Ovarian Cancer Mother     Heart Disease Father     High Blood Pressure Father     Heart Disease Brother 40        heart attack    High Blood Pressure Maternal Aunt     Cancer Other 46        liver cancer    Cancer Other 47        lung cancer and smoker, maternal neice    Cancer Other         bladder cancer, first cousin.     Diabetes Sister 46        complication of diabetes     Social History     Socioeconomic History    Marital status: Single     Spouse name: Not on file    Number of children: Not on file    Years of education: Not on file    Highest education level: Not on file   Occupational History    Not on file   Tobacco Use    Smoking status: Former Smoker     Packs/day: 0.25     Years: 32.00     Pack years: 8.00     Types: Cigarettes     Quit date: 1/10/2017     Years since quittin.3    Smokeless tobacco: Never Used    Tobacco comment: 30 years    Vaping Use    Vaping Use: Never used   Substance and Sexual Activity    Alcohol use: No    Drug use: No    Sexual activity: Not on file   Other Topics Concern    Not on file   Social History Narrative    Not on file     Social Determinants of Health     Financial Resource Strain: Low Risk     Difficulty of Paying Living Expenses: Not hard at all   Food Insecurity: No Food Insecurity    Worried About [address] [employer] in the Last Year: Never true    T[employer]ani of [employer] in the Last Year: Never true   Transportation Needs:     Lack of Transportation (Medical): Not on file    Lack of Transportation (Non-Medical): Not on file   Physical Activity:     Days of Exercise per Week: Not on file    Minutes of Exercise per Session: Not on file   Stress:     Feeling of Stress : Not on file   Social Connections:     Frequency of Communication with Friends and Family: Not on file    Frequency of Social Gatherings with Friends and Family: Not on file    Attends Jehovah's witness Services: Not on file    Active Member of [address] [place] [address] [employer] or Organizations: Not on file    Attends Club or Organization Meetings: Not on file    Marital Status: Not on file   Intimate Partner Violence:     Fear of Current or Ex-Partner: Not on file    Emotionally Abused: Not on file    Physically Abused: Not on file    Sexually Abused: Not on file   Housing Stability:     Unable to Pay for Housing in the Last Year: Not on file    Number of [place] in the Last Year: Not on file    Unstable Housing in the Last Year: Not on file          Vitals:    05/16/22 0958   BP: 112/76   Pulse: 97   Weight: 189 lb (85.7 kg)     Body mass index is 31.45 kg/m².      Wt Readings from Last 3 Encounters:   05/16/22 189 lb (85.7 kg)   05/02/22 187 lb (84.8 kg)   04/12/22 191 lb (86.6 kg)     BP Readings from Last 3 Encounters:   05/16/22 112/76   05/02/22 127/87   04/12/22 120/76          REVIEW OF SYSTEMS:   Pertinent positives are in HPI, otherwise all systems reviewed and negative    PHYSICAL EXAM:    CONSTITUTIONAL:  awake, alert, no apparent distress and mildly obese BMI 31  ENT:  normocephalic, without obvious abnormality  NECK:  supple, symmetrical, trachea midline   LUNGS:  Resp easy and unlabored  CARDIOVASCULAR:  regular rate and rhythm  ABDOMEN:  Well healed right subcostal scar, soft, non-distended, non-tender, voluntary guarding absent, hernia absent; superficial subcutaneous 2 cm nodule near transverse epigastric scar, no overlying skin changes, no enlargement with valsalva  MUSCULOSKELETAL: No edema  NEUROLOGIC:  Mental Status Exam:  Level of Alertness:   awake  Orientation:   person, place, time  SKIN: left upper arm with three subcutaneous nodules measuring approximately 1-1.5 cm without overlying skin changes, very tender to palpation  Right upper arm with four subcutaneous nodules measuring 1-1.5 cm without overlying skin changes, very tender to palpation        DATA:  CT abd/pelvis from 6/16/2019 personally reviewed, showing no obvious epigastric hernia. ASSESSMENT:     Diagnosis Orders   1. Lipoma of left upper extremity     2. Lipoma of right upper extremity     3. Lipoma of abdominal wall           PLAN:    Ms. Brie Kaur has multiple subcutaneous nodules of her upper extremities and abdominal wall consistent with lipomas. Will plan to excise the left upper extremity lipomas x 3, right upper extremity lipomas x 4, and epigastric abdominal wall lipoma under MAC anesthesia. The risks, benefits, and alternatives were discussed with the patient and she is willing to consent for the operation. Will defer to Dr. Ronald Arias for medical clearance.     Electronically signed by Shari Moy MD on 5/16/2022 at 10:27 AM

## 2022-06-02 ENCOUNTER — TELEPHONE (OUTPATIENT)
Dept: ADMINISTRATIVE | Age: 67
End: 2022-06-02

## 2022-06-02 NOTE — TELEPHONE ENCOUNTER
Submitted PA for Nexlizet 180-10MG tablets, via FAX to [facility]. STATUS: \" We do not process Prior Auth request for your patient's prescription benefit program.\"     I also tried for Hope Aldrich and got \"Eligibility could not be verified for this patient - patient not found. \"     Seems to be a COB issue. Patient needs to contact insurance plan.

## 2022-06-03 RX ORDER — OMEPRAZOLE 20 MG/1
CAPSULE, DELAYED RELEASE ORAL
Qty: 90 CAPSULE | Refills: 1 | Status: SHIPPED | OUTPATIENT
Start: 2022-06-03 | End: 2022-10-07 | Stop reason: ALTCHOICE

## 2022-06-03 NOTE — TELEPHONE ENCOUNTER
Medication:   Requested Prescriptions     Pending Prescriptions Disp Refills    omeprazole (PRILOSEC) 20 MG delayed release capsule [Pharmacy Med Name: Omeprazole 20 MG Oral Capsule Delayed Release] 90 capsule 0     Sig: TAKE 1 CAPSULE BY MOUTH IN THE MORNING BEFORE BREAKFAST AS NEEDED FOR HEARTBURN        Last Filled:      Patient Phone Number: [phone] (home)     Last appt: 5/2/2022   Next appt: Visit date not found    Last OARRS:   RX Monitoring 1/30/2017   Attestation The Prescription Monitoring Report for this patient was reviewed today.

## 2022-06-10 NOTE — TELEPHONE ENCOUNTER
Ask pt to call her insurance company to see what they need to evaluate our request for coverage of NEXLIZET. We are getting odd messages back from them in response to our requests to approve this prescription.

## 2022-06-13 ENCOUNTER — TELEPHONE (OUTPATIENT)
Dept: PRIMARY CARE CLINIC | Age: 67
End: 2022-06-13

## 2022-06-13 NOTE — TELEPHONE ENCOUNTER
----- Message from [facility] sent at 6/13/2022  3:08 PM EDT -----  Subject: Message to Provider    QUESTIONS  Information for Provider? patient returned called spoke with Akua Candelario we got   disconnected has talked to insurance companies no longer have [employer] has   Dorys Dia is updated in EPIC and verified that all medication should   be covered no longer wants mail order  ---------------------------------------------------------------------------  --------------  [address]  What is the best way for the office to contact you? OK to leave message on   voicemail  Preferred Call Back Phone Number?  [phone]  ---------------------------------------------------------------------------  --------------  SCRIPT ANSWERS  undefined

## 2022-06-29 RX ORDER — QUETIAPINE FUMARATE 100 MG/1
TABLET, FILM COATED ORAL
Qty: 90 TABLET | Refills: 1 | Status: SHIPPED | OUTPATIENT
Start: 2022-06-29

## 2022-07-07 ENCOUNTER — TELEPHONE (OUTPATIENT)
Dept: PRIMARY CARE CLINIC | Age: 67
End: 2022-07-07

## 2022-07-07 NOTE — TELEPHONE ENCOUNTER
Lior the pharmacist from [facility] called concerning the patient's long term use of promethazine. He says they have been filling it for 2 years and sometimes there's a possibility of abuse. He would like to speak with Dr. Jeovanny Sandoval concerning the prognosis of her abdominal issues. Please advise ASAP.

## 2022-08-01 ENCOUNTER — NURSE TRIAGE (OUTPATIENT)
Dept: OTHER | Facility: CLINIC | Age: 67
End: 2022-08-01

## 2022-08-01 RX ORDER — POTASSIUM CHLORIDE 750 MG/1
TABLET, EXTENDED RELEASE ORAL
Qty: 180 TABLET | Refills: 1 | Status: SHIPPED | OUTPATIENT
Start: 2022-08-01

## 2022-08-01 NOTE — TELEPHONE ENCOUNTER
Received call from yunior at [facility] with Red Flag Complaint. Subjective: Caller states \"cough congestion cold symptoms\"     Current Symptoms: cough cold symptoms, fever , mucus yellow, sore throat, body aches, covid test was negative on Saturday, nausea no appetite hx copd and uses oxygen so mild sob, also feels dizzy hx htn and copd with 02 use    Onset: 4 days ago; gradual    Associated Symptoms: NA    Pain Severity: 3/10; aching; intermittent,body aches    Temperature:  100.4 orally    What has been tried: muccinex    LMP: NA Pregnant: NA    Recommended disposition: See PCP within 24 Hours    Care advice provided, patient verbalizes understanding; denies any other questions or concerns; instructed to call back for any new or worsening symptoms. Patient/Caller agrees with recommended disposition; writer provided warm transfer to [facility] for appointment scheduling     Attention Provider: Thank you for allowing me to participate in the care of your patient. The patient was connected to triage in response to information provided to the ECC/PSC. Please do not respond through this encounter as the response is not directed to a shared pool.        Reason for Disposition   SEVERE coughing spells (e.g., whooping sound after coughing, vomiting after coughing)    Protocols used: Cough - Acute Productive-ADULT-AH

## 2022-08-01 NOTE — TELEPHONE ENCOUNTER
Future Appointments    Encounter Information    Provider Department Appt Notes   8/22/2022 Heriberto Ellis MD [address] mo f/u        Past Visits    Date Provider Specialty Visit Type Primary Dx   05/16/2022 Carmen Fuentes MD General Surgery Office Visit Lipoma of left upper extremity   05/02/2022 Fransisco Boogie MD Primary Care Office Visit Lipoma of upper arm   04/12/2022 Heriberto Ellis MD Pulmonology Office Visit Centrilobular emphysema [facility])   02/21/2022 Fransisco Boogie MD Primary Care Office Visit Non-intractable vomiting with nausea, unspecified vomiting type

## 2022-08-03 ENCOUNTER — TELEPHONE (OUTPATIENT)
Dept: PRIMARY CARE CLINIC | Age: 67
End: 2022-08-03

## 2022-08-03 NOTE — TELEPHONE ENCOUNTER
Pt notified. Pt states that her medication went to the wrong pharmacy.  Pt will like the medication to go to     [facility]  [address] [facility] [address], [address]  [phone]    Please advise

## 2022-08-03 NOTE — TELEPHONE ENCOUNTER
Pt called in stating that she had an appointment yesterday but was told that she needed to take a covid test which she did and is Positive. Wants to know what she should do at this point.     Please advise

## 2022-08-08 ENCOUNTER — TELEPHONE (OUTPATIENT)
Dept: PRIMARY CARE CLINIC | Age: 67
End: 2022-08-08

## 2022-08-08 ENCOUNTER — NURSE TRIAGE (OUTPATIENT)
Dept: OTHER | Facility: CLINIC | Age: 67
End: 2022-08-08

## 2022-08-08 NOTE — TELEPHONE ENCOUNTER
Pt called in to let the doctor know that she will be going to [facility] emergency in the morning to get a chest x-ray and blood drawn. Would like for the the doctor to send the orders there.     Please advise

## 2022-08-08 NOTE — TELEPHONE ENCOUNTER
Received call from Pawan Vickers at [facility] with Red Flag Complaint. Subjective: Caller states \"I was feeling bad. I thought I was having an exacerbation. I called up there and asked him (Dr. Enriqueta Rai) if he would call me in some Prednisone and a Z-pack. That's what I take when I'm having an exacerbation. They asked me if I had taken a Covid test. I told her I had taken one a few days ago and it was negative. They asked me if I had another one. So I took that one and it came out positive so I called her and told her. I was feeling really bad and really fast. Even with the Oxygen on I still couldn't breathe. I've never seen mucous that thick in my life. I took my last dose of Paxlovid last night. This morning I've got a little lightheadedness. On the way back from the bathroom I got some SOB. I checked my oxygen levels and it was 94%. I don't have a lot of congestion. I cough it on up. I've been taking Mucinex and Robitussin. That's been helping. I don't have much chest congestion and I haven't had to cough this morning at all. \"     Current Symptoms: Covid +, SOB-improved from last week, weakness, lightheadedness    Patient spoke with nurse in PCP office after consulting with Dr. Enriqueta Rai  on 8/3/22  and was prescribed Paxlovid-finished last night    Pulse Ox: 94% without O2, 98% with O2 on  Hx of COPD    Onset: 5 days ago; improving    Associated Symptoms: reduced activity    What has been tried: Paxloivd, Mucinex, Robitssuin, Home O2-All has helped    LMP: NA Pregnant: NA    Care advice provided, patient verbalizes understanding; denies any other questions or concerns; instructed to call back for any new or worsening symptoms. Writer provided warm transfer to Billy Box at [email or web address] for further care. Attention Provider: Thank you for allowing me to participate in the care of your patient. The patient was connected to triage in response to information provided to the ECC/PSC.   Please do not respond through this encounter as the response is not directed to a shared pool. Reason for Disposition   [1] Caller requesting NON-URGENT health information AND [2] PCP's office is the best resource    Protocols used:  Information Only Call - No Triage-ADULT-

## 2022-08-08 NOTE — TELEPHONE ENCOUNTER
Pt calling in stating that she finished the Paxlovid as of yesterday and has a new onset of SOB. Pt states that she is coughing up a really thick mucus and her O2 is 97 on room air, she is also having some lightheadedness.

## 2022-08-09 ENCOUNTER — TELEPHONE (OUTPATIENT)
Dept: PRIMARY CARE CLINIC | Age: 67
End: 2022-08-09

## 2022-08-09 NOTE — TELEPHONE ENCOUNTER
Pt called in stating that she is at the hospital in the Emergency room. Stated that she called the office yesterday and was told that the doctor had already put orders in. She states that she called the office 3 times.     Please advise

## 2022-08-09 NOTE — TELEPHONE ENCOUNTER
Patient says she went to ED and they would not do chest Xray or blood work without orders from Dr. Andra Mcdonough. She was seen at [facility] ED. Please call and advise ASAP.

## 2022-08-29 ENCOUNTER — TELEPHONE (OUTPATIENT)
Dept: PRIMARY CARE CLINIC | Age: 67
End: 2022-08-29

## 2022-08-29 ENCOUNTER — NURSE TRIAGE (OUTPATIENT)
Dept: OTHER | Facility: CLINIC | Age: 67
End: 2022-08-29

## 2022-08-29 NOTE — TELEPHONE ENCOUNTER
Tried to reach patient at listed # but no one answered the phone.   I do feel that patient should make a fu appointment or be seen at urgent care  She should get someone to drive her if she does not feel comfortable driving

## 2022-08-29 NOTE — TELEPHONE ENCOUNTER
Received call from [facility] at [facility] with [employer]. Subjective: Caller states \"having dizziness and nausea for a couple of weeks when I get covid. Had an appointment at 9:30 today but cancelled because didn't want to drive due to the dizziness. Sometimes will get these palpitations and feels my organs are vibrating. \"     Current Symptoms: Dizziness, nausea, body aches    Onset: 2 weeks ago;     Associated Symptoms: NA    Pain Severity: 8/10; aching; constant body aches    Temperature:   Denies Fever    What has been tried: rest, sitting down    LMP: NA Pregnant: NA    Recommended disposition: Go to ED/UCC Now (Or to Office with PCP Approval)    Care advice provided, patient verbalizes understanding; denies any other questions or concerns; instructed to call back for any new or worsening symptoms. Writer provided warm transfer to [facility] at [facility] for 2nd level triage      Attention Provider: Thank you for allowing me to participate in the care of your patient. The patient was connected to triage in response to information provided to the ECC/PSC. Please do not respond through this encounter as the response is not directed to a shared pool.           Reason for Disposition   SEVERE dizziness (e.g., unable to stand, requires support to walk, feels like passing out now)    Protocols used: Dizziness-ADULT-[place]

## 2022-08-29 NOTE — TELEPHONE ENCOUNTER
Patient had appt this morning at 9:30 am but she cancel b/c having dizziness & nausea, patient does not feel comfortable driving, Sometimes will get these palpitations and feels my organs are vibrating; patient would like to have second opinion if she should come into office; please call and advise [phone].

## 2022-09-09 ENCOUNTER — HOSPITAL ENCOUNTER (EMERGENCY)
Age: 67
Discharge: HOME | End: 2022-09-09
Attending: EMERGENCY MEDICINE
Payer: MEDICARE

## 2022-09-09 ENCOUNTER — APPOINTMENT (OUTPATIENT)
Dept: GENERAL RADIOLOGY | Age: 67
End: 2022-09-09
Payer: MEDICARE

## 2022-09-09 VITALS
HEART RATE: 96 BPM | OXYGEN SATURATION: 98 % | RESPIRATION RATE: 20 BRPM | BODY MASS INDEX: 29.12 KG/M2 | WEIGHT: 175 LBS | SYSTOLIC BLOOD PRESSURE: 120 MMHG | TEMPERATURE: 99.1 F | DIASTOLIC BLOOD PRESSURE: 65 MMHG

## 2022-09-09 DIAGNOSIS — J44.1 COPD EXACERBATION (HCC): Primary | ICD-10-CM

## 2022-09-09 LAB
A/G RATIO: 1.3 (ref 1.1–2.2)
ALBUMIN SERPL-MCNC: 4.3 G/DL (ref 3.4–5)
ALP BLD-CCNC: 93 U/L (ref 40–129)
ALT SERPL-CCNC: 8 U/L (ref 10–40)
ANION GAP SERPL CALCULATED.3IONS-SCNC: 17 MMOL/L (ref 3–16)
AST SERPL-CCNC: 18 U/L (ref 15–37)
BILIRUB SERPL-MCNC: 0.3 MG/DL (ref 0–1)
BUN BLDV-MCNC: 12 MG/DL (ref 7–20)
CALCIUM SERPL-MCNC: 11.2 MG/DL (ref 8.3–10.6)
CHLORIDE BLD-SCNC: 105 MMOL/L (ref 99–110)
CO2: 17 MMOL/L (ref 21–32)
CREAT SERPL-MCNC: 0.7 MG/DL (ref 0.6–1.2)
D DIMER: <0.27 UG/ML FEU (ref 0–0.6)
GFR AFRICAN AMERICAN: >60
GFR NON-AFRICAN AMERICAN: >60
GLUCOSE BLD-MCNC: 114 MG/DL (ref 70–99)
HCT VFR BLD CALC: 40.5 % (ref 36–48)
HEMOGLOBIN: 13 G/DL (ref 12–16)
MCH RBC QN AUTO: 27 PG (ref 26–34)
MCHC RBC AUTO-ENTMCNC: 32.2 G/DL (ref 31–36)
MCV RBC AUTO: 84 FL (ref 80–100)
PDW BLD-RTO: 14 % (ref 12.4–15.4)
PLATELET # BLD: 254 K/UL (ref 135–450)
PMV BLD AUTO: 10.2 FL (ref 5–10.5)
POTASSIUM REFLEX MAGNESIUM: 4.5 MMOL/L (ref 3.5–5.1)
RBC # BLD: 4.82 M/UL (ref 4–5.2)
SODIUM BLD-SCNC: 139 MMOL/L (ref 136–145)
TOTAL PROTEIN: 7.6 G/DL (ref 6.4–8.2)
TROPONIN: <0.01 NG/ML
WBC # BLD: 12.7 K/UL (ref 4–11)

## 2022-09-09 PROCEDURE — 80053 COMPREHEN METABOLIC PANEL: CPT

## 2022-09-09 PROCEDURE — 85027 COMPLETE CBC AUTOMATED: CPT

## 2022-09-09 PROCEDURE — 71045 X-RAY EXAM CHEST 1 VIEW: CPT

## 2022-09-09 PROCEDURE — 6360000002 HC RX W HCPCS: Performed by: EMERGENCY MEDICINE

## 2022-09-09 PROCEDURE — 84484 ASSAY OF TROPONIN QUANT: CPT

## 2022-09-09 PROCEDURE — 96374 THER/PROPH/DIAG INJ IV PUSH: CPT

## 2022-09-09 PROCEDURE — 85379 FIBRIN DEGRADATION QUANT: CPT

## 2022-09-09 PROCEDURE — 93005 ELECTROCARDIOGRAM TRACING: CPT | Performed by: EMERGENCY MEDICINE

## 2022-09-09 PROCEDURE — 6370000000 HC RX 637 (ALT 250 FOR IP): Performed by: EMERGENCY MEDICINE

## 2022-09-09 PROCEDURE — 94640 AIRWAY INHALATION TREATMENT: CPT

## 2022-09-09 PROCEDURE — 99285 EMERGENCY DEPT VISIT HI MDM: CPT

## 2022-09-09 RX ORDER — METHYLPREDNISOLONE SODIUM SUCCINATE 125 MG/2ML
125 INJECTION, POWDER, LYOPHILIZED, FOR SOLUTION INTRAMUSCULAR; INTRAVENOUS ONCE
Status: COMPLETED | OUTPATIENT
Start: 2022-09-09 | End: 2022-09-09

## 2022-09-09 RX ORDER — PREDNISONE 50 MG/1
50 TABLET ORAL DAILY
Qty: 5 TABLET | Refills: 0 | Status: SHIPPED | OUTPATIENT
Start: 2022-09-09 | End: 2022-09-14

## 2022-09-09 RX ORDER — IPRATROPIUM BROMIDE AND ALBUTEROL SULFATE 2.5; .5 MG/3ML; MG/3ML
1 SOLUTION RESPIRATORY (INHALATION) ONCE
Status: COMPLETED | OUTPATIENT
Start: 2022-09-09 | End: 2022-09-09

## 2022-09-09 RX ORDER — DOXYCYCLINE HYCLATE 100 MG
100 TABLET ORAL 2 TIMES DAILY
Qty: 14 TABLET | Refills: 0 | Status: SHIPPED | OUTPATIENT
Start: 2022-09-09 | End: 2022-09-16

## 2022-09-09 RX ADMIN — IPRATROPIUM BROMIDE AND ALBUTEROL SULFATE 1 AMPULE: .5; 3 SOLUTION RESPIRATORY (INHALATION) at 17:46

## 2022-09-09 RX ADMIN — METHYLPREDNISOLONE SODIUM SUCCINATE 125 MG: 125 INJECTION, POWDER, FOR SOLUTION INTRAMUSCULAR; INTRAVENOUS at 17:54

## 2022-09-09 ASSESSMENT — PAIN DESCRIPTION - LOCATION: LOCATION: GENERALIZED

## 2022-09-09 ASSESSMENT — PAIN DESCRIPTION - DESCRIPTORS: DESCRIPTORS: ACHING

## 2022-09-09 ASSESSMENT — PAIN SCALES - GENERAL: PAINLEVEL_OUTOF10: 9

## 2022-09-09 ASSESSMENT — PAIN - FUNCTIONAL ASSESSMENT: PAIN_FUNCTIONAL_ASSESSMENT: 0-10

## 2022-09-09 NOTE — ED NOTES
Unable to collect 2nd troponin after one stick. Pt declines further attempts. Dr. Muñoz Silverman aware.      Griffin Badillo, SHASHANK  09/09/22 4895

## 2022-09-09 NOTE — ED PROVIDER NOTES
CHIEF COMPLAINT  Shortness of Breath and Tachycardia (Pt states she has had shortness of breath and heart racing x past week. Recently had Covid and states she's not improved back to her normal. H/o COPD.)      HISTORY OF PRESENT ILLNESS  Nahun Mejia is a 79 y.o. female with a history of COPD not on home oxygen, hypertension, hyperlipidemia presenting for evaluation of shortness of breath, heart palpitations. Patient states that she has had shortness of breath, heart racing for the past 1 week. She states that she had COVID a few weeks ago and she states that she has not felt back to baseline since then. She denies any fevers no chest pain. She does not have history of arrhythmia. No swelling in her legs. No history of DVT or PE. No other complaints, modifying factors or associated symptoms. I have reviewed the following from the nursing documentation.    Past Medical History:   Diagnosis Date    CAMDEN (acute kidney injury) ([facility] [address] 10/07/2013    Anxiety     Chronic abdominal pain     possibly due to diverticulosos    COPD (chronic obstructive pulmonary disease) (HCC)     DDD (degenerative disc disease), lumbar 12/01/2016    Elevated glycated hemoglobin     Hyperlipidemia     Hypertension     Hypothyroidism     Rotator cuff tear     right     Past Surgical History:   Procedure Laterality Date    CARPAL TUNNEL RELEASE      CENTRAL LINE  10/7/2013         CHOLECYSTECTOMY      COLONOSCOPY  9/6/2011    Tubular adenoma    COLONOSCOPY  10/24/2005    Dr. Tracy Staff - Tubular adenoma    ENDOSCOPY, COLON, DIAGNOSTIC      ESOPHAGEAL MOTILITY STUDY  6/17/2013    NORMAL    EYE SURGERY      cataracts    FINE NEEDLE ASPIRATION  8/10/2012    THYROID - NEGATIVE    FINGER NAIL SURGERY Bilateral 5/21/2019    TOTAL AVULSION OF 1-5 TOENAILS BILATERAL FEET performed by Reyes Monroy DPM at [facility] (CERVIX STATUS UNKNOWN)      [facility] (CERVIX REMOVED)  8/15/2002 Endometriosis, fibroids    TONSILLECTOMY      UPPER GASTROINTESTINAL ENDOSCOPY  10/17/2005    Bx small bowel, Gastric, GE junction all negative    UPPER GASTROINTESTINAL ENDOSCOPY  2000    Dr. Washington Russian - NORMAL     Family History   Problem Relation Age of Onset    Diabetes Sister     Heart Disease Sister         chf    Cancer Sister 76        colon cancer  metastatic    Diabetes Sister     Kidney Disease Sister     Cancer Sister         brain cancer throat cancer and smoked    Cancer Mother 68        cerival cancer    Osteoarthritis Mother     Ovarian Cancer Mother     Heart Disease Father     High Blood Pressure Father     Heart Disease Brother 40        heart attack    High Blood Pressure Maternal Aunt     Cancer Other 46        liver cancer    Cancer Other 47        lung cancer and smoker, maternal neice    Cancer Other         bladder cancer, first cousin.     Diabetes Sister 46        complication of diabetes     Social History     Socioeconomic History    Marital status: Single     Spouse name: Not on file    Number of children: Not on file    Years of education: Not on file    Highest education level: Not on file   Occupational History    Not on file   Tobacco Use    Smoking status: Former     Packs/day: 0.25     Years: 32.00     Pack years: 8.00     Types: Cigarettes     Quit date: 1/10/2017     Years since quittin.6    Smokeless tobacco: Never    Tobacco comments:     30 years    Vaping Use    Vaping Use: Never used   Substance and Sexual Activity    Alcohol use: No    Drug use: No    Sexual activity: Not on file   Other Topics Concern    Not on file   Social History Narrative    Not on file     Social Determinants of Health     Financial Resource Strain: Not on file   Food Insecurity: Not on file   Transportation Needs: Not on file   Physical Activity: Not on file   Stress: Not on file   Social Connections: Not on file   Intimate Partner Violence: Not on file   Housing Stability: Not on file     No current facility-administered medications for this encounter. Current Outpatient Medications   Medication Sig Dispense Refill    doxycycline hyclate (VIBRA-TABS) 100 MG tablet Take 1 tablet by mouth 2 times daily for 7 days 14 tablet 0    predniSONE (DELTASONE) 50 MG tablet Take 1 tablet by mouth daily for 5 days 5 tablet 0    potassium chloride (KLOR-CON M) 10 MEQ extended release tablet Take 2 tablets by mouth once daily 180 tablet 1    Promethazine HCl 6.25 MG/5ML SOLN TAKE 5ML BY MOUTH EVERY 6 HOURS AS NEEDED FOR NAUSEA 600 mL 3    QUEtiapine (SEROQUEL) 100 MG tablet TAKE 1 TABLET EVERY NIGHT 90 tablet 1    omeprazole (PRILOSEC) 20 MG delayed release capsule TAKE 1 CAPSULE BY MOUTH IN THE MORNING BEFORE BREAKFAST AS NEEDED FOR HEARTBURN 90 capsule 1    ezetimibe (ZETIA) 10 MG tablet Take 1 tablet by mouth daily 90 tablet 3    Calcium Carb-Cholecalciferol (CALCIUM-VITAMIN D) 600-400 MG-UNIT TABS Take 1 tablet by mouth twice daily 60 tablet 11    cyclobenzaprine (FLEXERIL) 5 MG tablet Take 1 tablet by mouth three times daily as needed for muscle spasm 90 tablet 5    levothyroxine (SYNTHROID) 125 MCG tablet Take 1 tablet by mouth Daily TAKE 1 TABLET EVERY DAY (DISCONTINUE 135MCG) 90 tablet 1    Fluticasone furoate-vilanterol (BREO ELLIPTA) 200-25 MCG/INH AEPB inhaler Inhale 1 puff into the lungs daily 1 each 11    COVID-19 Specimen Collection KIT TEST AS DIRECTED TODAY      tiotropium (SPIRIVA RESPIMAT) 2.5 MCG/ACT AERS inhaler Inhale 2 puffs into the lungs daily 1 each 5    predniSONE (DELTASONE) 10 MG tablet 4 tablets once daily for 3 days then 3 tablets once daily for 3 days then 2 tablets once daily for 3 days then 1 tablet once daily for 3 days. 30 tablet 0    predniSONE (DELTASONE) 10 MG tablet 4 tablets/40 mg daily for 3 days then 3 tablets/30 mg daily for 3 days then 20 mg daily for 3 days then 10 mg daily for 3 days then off.  30 tablet 0    DULERA 200-5 MCG/ACT inhaler Inhale 2 puffs into the lungs every 12 hours      diphenhydrAMINE (BANOPHEN) 50 MG capsule TAKE ONE CAPSULE BY MOUTH EVERY NIGHT AT BEDTIME AS NEEDED FOR ITCHING 30 capsule 4    Roflumilast (DALIRESP) 500 MCG tablet TAKE 1 TABLET EVERY DAY 90 tablet 1     MG tablet TAKE 1 TABLET BY MOUTH EVERY 8 HOURS AS NEEDED FOR PAIN 270 tablet 1    Nebulizers (COMPRESSOR/NEBULIZER) MISC 1 Units by Does not apply route 4 times daily as needed (Wheezing SOB) 1 each 0    Respiratory Therapy Supplies MISC 1 each by Does not apply route 4 times daily as needed (wheezing SOB) All Nebulizer supplies needed 50 each 5    ipratropium-albuterol (DUONEB) 0.5-2.5 (3) MG/3ML SOLN nebulizer solution Inhale 3 mLs into the lungs as needed for Shortness of Breath 360 mL 11    ALPRAZolam (XANAX) 1 MG tablet Take 1 mg by mouth daily as needed.   (Patient not taking: Reported on 9/9/2022)      Bempedoic Acid-Ezetimibe 180-10 MG TABS Take 1 tablet by mouth nightly (Patient not taking: Reported on 9/9/2022) 90 tablet 3    ondansetron (ZOFRAN ODT) 4 MG disintegrating tablet Take 1 tablet by mouth every 8 hours as needed for Nausea or Vomiting 15 tablet 1    cloNIDine (CATAPRES) 0.1 MG tablet Bid (Patient taking differently: Take 0.1 mg by mouth 2 times daily Bid) 180 tablet 1    albuterol-ipratropium (COMBIVENT RESPIMAT)  MCG/ACT AERS inhaler INHALE 1 PUFF BY MOUTH EVERY SIX HOURS AS NEEDED FOR WHEEZING 3 Inhaler 1    hydroCHLOROthiazide (HYDRODIURIL) 25 MG tablet Take 1 tablet by mouth once daily 90 tablet 1    docusate sodium (STOOL SOFTENER) 100 MG capsule TAKE ONE CAPSULE BY MOUTH DAILY AS NEEDED FOR CONSTIPATION 90 capsule 3    lactulose (CHRONULAC) 10 GM/15ML solution Take 15 mLs by mouth as needed (constipation) 1892 mL 1    NARCAN 4 MG/0.1ML LIQD nasal spray Take by mouth as needed   0    tiZANidine (ZANAFLEX) 4 MG tablet Take 4 mg by mouth 2 times daily as needed       oxyCODONE-acetaminophen (PERCOCET)  MG per tablet Take 1 tablet by mouth every 6 hours as needed for Pain. Allergies   Allergen Reactions    Bupropion Other (See Comments)     Muscle spasms/seizure like symptoms     Morphine Hives and Itching    Morphine And Related Itching       REVIEW OF SYSTEMS  Positive and pertinent negatives as per HPI. All other systems were reviewed and are negative. PHYSICAL EXAM  /65   Pulse 96   Temp 99.1 °F (37.3 °C) (Oral)   Resp 20   Wt 175 lb (79.4 kg)   LMP  (LMP Unknown)   SpO2 98%   BMI 29.12 kg/m²   GENERAL APPEARANCE: Awake and alert. Cooperative. HEAD: Normocephalic. Atraumatic. HEART: RRR. No harsh murmurs. Intact radial pulses 2+ bilaterally. LUNGS: Respirations unlabored without accessory muscle use. CTAB. Good air exchange. No wheezes, rales, or rhonchi. Speaking comfortably in full sentences. ABDOMEN: Soft. Non-distended. Non-tender. No guarding or rebound. EXTREMITIES: No peripheral edema. No acute deformities. SKIN: Warm and dry. No acute rashes. NEUROLOGICAL: Alert and oriented X 3. No focal deficit  LABS  I have reviewed all labs for this visit.    Results for orders placed or performed during the hospital encounter of 09/09/22   CBC   Result Value Ref Range    WBC 12.7 (H) 4.0 - 11.0 K/uL    RBC 4.82 4.00 - 5.20 M/uL    Hemoglobin 13.0 12.0 - 16.0 g/dL    Hematocrit 40.5 36.0 - 48.0 %    MCV 84.0 80.0 - 100.0 fL    MCH 27.0 26.0 - 34.0 pg    MCHC 32.2 31.0 - 36.0 g/dL    RDW 14.0 12.4 - 15.4 %    Platelets 622 829 - 298 K/uL    MPV 10.2 5.0 - 10.5 fL   CMP w/ Reflex to MG   Result Value Ref Range    Sodium 139 136 - 145 mmol/L    Potassium reflex Magnesium 4.5 3.5 - 5.1 mmol/L    Chloride 105 99 - 110 mmol/L    CO2 17 (L) 21 - 32 mmol/L    Anion Gap 17 (H) 3 - 16    Glucose 114 (H) 70 - 99 mg/dL    BUN 12 7 - 20 mg/dL    Creatinine 0.7 0.6 - 1.2 mg/dL    GFR Non-African American >60 >60    GFR African American >60 >60    Calcium 11.2 (H) 8.3 - 10.6 mg/dL    Total Protein 7.6 6.4 - 8.2 g/dL    Albumin 4.3 3.4 - 5.0 g/dL Albumin/Globulin Ratio 1.3 1.1 - 2.2    Total Bilirubin 0.3 0.0 - 1.0 mg/dL    Alkaline Phosphatase 93 40 - 129 U/L    ALT 8 (L) 10 - 40 U/L    AST 18 15 - 37 U/L   Troponin   Result Value Ref Range    Troponin <0.01 <0.01 ng/mL   D-Dimer, Quantitative   Result Value Ref Range    D-Dimer, Quant <0.27 0.00 - 0.60 ug/mL FEU   EKG 12 Lead   Result Value Ref Range    Ventricular Rate 90 BPM    Atrial Rate 90 BPM    P-R Interval 142 ms    QRS Duration 88 ms    Q-T Interval 338 ms    QTc Calculation (Bazett) 413 ms    P Axis 80 degrees    R Axis -57 degrees    T Axis 71 degrees    Diagnosis       Normal sinus rhythmLeft axis deviationInferior infarct , age undeterminedAbnormal ECG       EKG  The Ekg interpreted by myself in the emergency department in the absence of a cardiologist.  normal sinus rhythm with a rate of 90  Axis is   Left axis deviation  QTc is  within an acceptable range  Intervals and Durations are unremarkable. No specific ST-T wave changes appreciated. Nonspecific ST changes leads I, aVL, V2  No evidence of acute ischemia. No significant change from prior EKG dated 2/6/2022      RADIOLOGY  X-RAYS:  I have reviewed radiologic plain film image(s). ALL OTHER NON-PLAIN FILM IMAGES SUCH AS CT, ULTRASOUND AND MRI HAVE BEEN READ BY THE RADIOLOGIST. XR CHEST PORTABLE   Final Result      1. Mild atelectasis left base. No results found. During the patient's ED course, the patient was given:  Medications   ipratropium-albuterol (DUONEB) nebulizer solution 1 ampule (1 ampule Inhalation Given 9/9/22 1746)   methylPREDNISolone sodium (SOLU-MEDROL) injection 125 mg (125 mg IntraVENous Given 9/9/22 1754)        ED COURSE/MDM  Patient seen and evaluated. Old records reviewed. Labs and imaging reviewed and results discussed with patient. 72-year-old female presenting for evaluation of shortness of breath, palpitations the setting of recent COVID illness.   She arrives with stable vital signs, heart rate in the 90s,, sinus rhythm. No focal breath sounds. No significant swelling no stigmata DVT. We will obtain laboratory evaluation, cardiac panel, D-dimer, chest x-ray. Chest x-ray without focal process. Patient does feel improved after breathing treatment, steroids. D-dimer was negative. Patient was poked multiple times for attempt of repeat troponin however patient does not want to be poked anymore for lab redraw. Patient advised to follow-up with primary care doctor for consideration of heart monitor she will be started on doxycycline, steroids for presumed COPD exacerbation. The patient will be discharged from the emergency department. The patient was counseled on their diagnosis and any medications prescribed. They were advised on the need for PCP followup. They were counseled on the need to return to the emergency department if any of their symptoms were to worsen, change or have any other concerns. Discharged in stable condition. Patient was given scripts for the following medications. I counseled patient how to take these medications. Discharge Medication List as of 9/9/2022  6:53 PM        START taking these medications    Details   doxycycline hyclate (VIBRA-TABS) 100 MG tablet Take 1 tablet by mouth 2 times daily for 7 days, Disp-14 tablet, R-0Normal      !! predniSONE (DELTASONE) 50 MG tablet Take 1 tablet by mouth daily for 5 days, Disp-5 tablet, R-0Normal       !! - Potential duplicate medications found. Please discuss with provider. CLINICAL IMPRESSION  1. COPD exacerbation (HCC)        Blood pressure 120/65, pulse 96, temperature 99.1 °F (37.3 °C), temperature source Oral, resp. rate 20, weight 175 lb (79.4 kg), SpO2 98 %, not currently breastfeeding. DISPOSITION  Sheila Roque was discharged to homein stable condition.      This chart was generated in part by using Dragon Dictation system and may contain errors related to that system including errors in grammar, punctuation, and spelling, as well as words and phrases that may be inappropriate. If there are any questions or concerns please feel free to contact the dictating provider for clarification.         Terrance Begum MD  09/09/22 2487

## 2022-09-09 NOTE — DISCHARGE INSTRUCTIONS
You have been prescribed steroids and antibiotics to help with potential COPD flareup. Please follow-up with your primary care doctor. Please return for worsening symptoms.

## 2022-09-10 LAB
EKG ATRIAL RATE: 90 BPM
EKG DIAGNOSIS: NORMAL
EKG P AXIS: 80 DEGREES
EKG P-R INTERVAL: 142 MS
EKG Q-T INTERVAL: 338 MS
EKG QRS DURATION: 88 MS
EKG QTC CALCULATION (BAZETT): 413 MS
EKG R AXIS: -57 DEGREES
EKG T AXIS: 71 DEGREES
EKG VENTRICULAR RATE: 90 BPM

## 2022-09-10 PROCEDURE — 93010 ELECTROCARDIOGRAM REPORT: CPT | Performed by: INTERNAL MEDICINE

## 2022-09-15 ENCOUNTER — TELEPHONE (OUTPATIENT)
Dept: SURGERY | Age: 67
End: 2022-09-15

## 2022-09-15 NOTE — TELEPHONE ENCOUNTER
Pt would like to schedule lipoma removal. Does she need seen again or can she just schedule surgery.

## 2022-09-16 NOTE — TELEPHONE ENCOUNTER
Called to schedule. Patient needs clearance from PCP before surgery, but does not need to come back to see Dr. Le Vila. Phone kept ringing. No way to leave VM. Will try again.

## 2022-09-20 ENCOUNTER — OFFICE VISIT (OUTPATIENT)
Dept: PRIMARY CARE CLINIC | Age: 67
End: 2022-09-20
Payer: MEDICARE

## 2022-09-20 VITALS
TEMPERATURE: 97.9 F | RESPIRATION RATE: 18 BRPM | HEART RATE: 100 BPM | WEIGHT: 176 LBS | SYSTOLIC BLOOD PRESSURE: 119 MMHG | DIASTOLIC BLOOD PRESSURE: 80 MMHG | BODY MASS INDEX: 29.29 KG/M2

## 2022-09-20 DIAGNOSIS — R73.03 PREDIABETES: ICD-10-CM

## 2022-09-20 DIAGNOSIS — Z91.09 ENVIRONMENTAL ALLERGIES: ICD-10-CM

## 2022-09-20 DIAGNOSIS — E03.9 ACQUIRED HYPOTHYROIDISM: ICD-10-CM

## 2022-09-20 DIAGNOSIS — J44.1 COPD EXACERBATION (HCC): ICD-10-CM

## 2022-09-20 LAB — HBA1C MFR BLD: 5.8 %

## 2022-09-20 PROCEDURE — 3017F COLORECTAL CA SCREEN DOC REV: CPT | Performed by: FAMILY MEDICINE

## 2022-09-20 PROCEDURE — 83036 HEMOGLOBIN GLYCOSYLATED A1C: CPT | Performed by: FAMILY MEDICINE

## 2022-09-20 PROCEDURE — 99214 OFFICE O/P EST MOD 30 MIN: CPT | Performed by: FAMILY MEDICINE

## 2022-09-20 PROCEDURE — 1036F TOBACCO NON-USER: CPT | Performed by: FAMILY MEDICINE

## 2022-09-20 PROCEDURE — 3023F SPIROM DOC REV: CPT | Performed by: FAMILY MEDICINE

## 2022-09-20 PROCEDURE — G8417 CALC BMI ABV UP PARAM F/U: HCPCS | Performed by: FAMILY MEDICINE

## 2022-09-20 PROCEDURE — G8428 CUR MEDS NOT DOCUMENT: HCPCS | Performed by: FAMILY MEDICINE

## 2022-09-20 PROCEDURE — G8399 PT W/DXA RESULTS DOCUMENT: HCPCS | Performed by: FAMILY MEDICINE

## 2022-09-20 PROCEDURE — 1123F ACP DISCUSS/DSCN MKR DOCD: CPT | Performed by: FAMILY MEDICINE

## 2022-09-20 PROCEDURE — 1090F PRES/ABSN URINE INCON ASSESS: CPT | Performed by: FAMILY MEDICINE

## 2022-09-20 RX ORDER — HYDROCHLOROTHIAZIDE 25 MG/1
TABLET ORAL
Qty: 90 TABLET | Refills: 1 | Status: SHIPPED | OUTPATIENT
Start: 2022-09-20

## 2022-09-20 RX ORDER — ONDANSETRON 4 MG/1
4 TABLET, ORALLY DISINTEGRATING ORAL EVERY 8 HOURS PRN
Qty: 15 TABLET | Refills: 1 | Status: SHIPPED | OUTPATIENT
Start: 2022-09-20

## 2022-09-20 RX ORDER — LEVOTHYROXINE SODIUM 0.12 MG/1
125 TABLET ORAL DAILY
Qty: 90 TABLET | Refills: 1 | Status: SHIPPED | OUTPATIENT
Start: 2022-09-20

## 2022-09-20 RX ORDER — CLONIDINE HYDROCHLORIDE 0.1 MG/1
0.1 TABLET ORAL 2 TIMES DAILY
Qty: 60 TABLET | Refills: 1 | Status: SHIPPED | OUTPATIENT
Start: 2022-09-20

## 2022-09-20 ASSESSMENT — ENCOUNTER SYMPTOMS
BLOOD IN STOOL: 0
SHORTNESS OF BREATH: 0
CONSTIPATION: 0
ABDOMINAL PAIN: 0
DIARRHEA: 0

## 2022-09-20 NOTE — PROGRESS NOTES
2022     Geremias Chapman (:  1955) is a 79 y.o. female, here for evaluation of the following medical concerns:    HPI  Patient presented to the office follow-up from recent emergency room visit. She was seen at [facility] emergency room last 2022 due to COPD exacerbation. Noted that she has short of breath with heart racing for about 1 week. No associated fever or chills. Chest x-ray did not show pneumonia. Patient was given nebulizer treatment and IV steroids Solu-Medrol. She was discharged on doxycycline and taper dose of prednisone. She is better now, breathing is almost baseline. But she reported that she has intermittent trouble breathing since she was exposed to a \"red dye #40 ' in the beddings which she bought before [place]. She stated that she reported the case to the store, the venipuncture as well as [facility] in [place] but nobody could give her a good answer and she thought about calling infectious diseases specialist.  She is here also for refill of thyroid medication and Zofran. She also reported that her sugar readings  has been running high in the 120s and would like to have HbA1c rechecked. She has history of prediabetes but no overt sign of diabetes denies polyuria polydipsia. Review of Systems   Constitutional:  Negative for activity change and appetite change. Eyes:  Negative for visual disturbance. Respiratory:  Negative for shortness of breath. Cardiovascular:  Negative for chest pain and leg swelling. Gastrointestinal:  Negative for abdominal pain, blood in stool, constipation and diarrhea. Genitourinary:  Negative for difficulty urinating, frequency, hematuria, menstrual problem and urgency. Neurological:  Negative for dizziness and syncope. Psychiatric/Behavioral:  Negative for behavioral problems. Prior to Visit Medications    Medication Sig Taking?  Authorizing Provider   cloNIDine (CATAPRES) 0.1 MG tablet Take 1 tablet by mouth 2 times daily Bid then 20 mg daily for 3 days then 10 mg daily for 3 days then off. Jamil Muse MD   DULERA 200-5 MCG/ACT inhaler Inhale 2 puffs into the lungs every 12 hours  Historical Provider, MD   diphenhydrAMINE (BANOPHEN) 50 MG capsule TAKE ONE CAPSULE BY MOUTH EVERY NIGHT AT BEDTIME AS NEEDED FOR ITCHING  Jamil Muse MD    MG tablet TAKE 1 TABLET BY MOUTH EVERY 8 HOURS AS NEEDED FOR PAIN  Jamil Muse MD   Nebulizers (COMPRESSOR/NEBULIZER) MISC 1 Units by Does not apply route 4 times daily as needed (Wheezing SOB)  Jamil Muse MD   Respiratory Therapy Supplies MISC 1 each by Does not apply route 4 times daily as needed (wheezing SOB) All Nebulizer supplies needed  Jamil Muse MD   ipratropium-albuterol (DUONEB) 0.5-2.5 (3) MG/3ML SOLN nebulizer solution Inhale 3 mLs into the lungs as needed for Shortness of Breath  Jamil Muse MD   ALPRAZolam Sheeba Lux) 1 MG tablet Take 1 mg by mouth daily as needed. Patient not taking: Reported on 9/9/2022  Historical Provider, MD   Bempedoic Acid-Ezetimibe 180-10 MG TABS Take 1 tablet by mouth nightly  Patient not taking: Reported on 9/9/2022  Jamil Muse MD   albuterol-ipratropium (COMBIVENT RESPIMAT)  MCG/ACT AERS inhaler INHALE 1 PUFF BY MOUTH EVERY SIX HOURS AS NEEDED FOR WHEEZING  Jamil Muse MD   docusate sodium (STOOL SOFTENER) 100 MG capsule TAKE ONE CAPSULE BY MOUTH DAILY AS NEEDED FOR CONSTIPATION  Jamil Muse MD   lactulose (CHRONULAC) 10 GM/15ML solution Take 15 mLs by mouth as needed (constipation)  Jamil Muse MD   [facility] 4 MG/0.1ML LIQD nasal spray Take by mouth as needed   Historical Provider, MD   tiZANidine (ZANAFLEX) 4 MG tablet Take 4 mg by mouth 2 times daily as needed   Historical Provider, MD   oxyCODONE-acetaminophen (PERCOCET)  MG per tablet Take 1 tablet by mouth every 6 hours as needed for Pain.    Historical Provider, MD        Social History     Tobacco Use    Smoking status: Former     Packs/day: 0.25 (DISCONTINUE 135MCG)  Dispense: 90 tablet; Refill: 1    3. Environmental allergies  Patient reported exposure to a \" red dye  #40 \"  on her bedding sometime before Christmas 2021 and since then has intermittent breathing problem. He reported the case to the store and manufacture as well as [facility] but they could not give her a good answer. He would like to talk to the infectious diseases specialist but instead he was redirected to see  allergist  - Belem Fu MD, Allergy & Immunology, [facility]    4. Prediabetes  No Overt sign of diabetes, denies polyuria or polydipsia.   HbA1c  today is 5.8%  - POCT glycosylated hemoglobin (Hb A1C)      Follow up with her PCP    An electronic signature was used to authenticate this note.    --Gabriella Walter MD on 9/20/2022 at 4:55 PM

## 2022-09-20 NOTE — TELEPHONE ENCOUNTER
Patient is seeing her PCP today. She wants to discuss with him more and call back if she wants to proceed.

## 2022-09-25 DIAGNOSIS — L29.9 PRURITUS: ICD-10-CM

## 2022-09-26 RX ORDER — CYCLOBENZAPRINE HCL 5 MG
TABLET ORAL
Qty: 90 TABLET | Refills: 0 | Status: SHIPPED | OUTPATIENT
Start: 2022-09-26

## 2022-09-26 RX ORDER — DIPHENHYDRAMINE HCL 50 MG
CAPSULE ORAL
Qty: 30 CAPSULE | Refills: 0 | Status: SHIPPED | OUTPATIENT
Start: 2022-09-26

## 2022-09-26 NOTE — TELEPHONE ENCOUNTER
Future Appointments    Encounter Information    Provider Department Appt Notes   9/27/2022 Madhuri Oneil MD [address] 4 mo f/u    9/29/2022 Ignacio Griggs MD [address] [place] [address] Surgery discuss surgery sites and date.  (8 on right arm, 5 on left, 1 on abdomen)     Past Visits    Date Provider Specialty Visit Type Primary Dx   09/20/2022 Zoe Dozier MD Primary Care Office Visit COPD exacerbation [facility])   05/16/2022 Ignacio Griggs MD General Surgery Office Visit Lipoma of left upper extremity   05/02/2022 Akash Oneill MD Primary Care Office Visit Lipoma of upper arm

## 2022-09-27 ENCOUNTER — TELEPHONE (OUTPATIENT)
Dept: PULMONOLOGY | Age: 67
End: 2022-09-27

## 2022-09-27 NOTE — TELEPHONE ENCOUNTER
Patient left msg on office voice mail at 8:30 AM that she was not coming to 9:40 AM appt, and that she would no longer be returning to Dr. Omar Colunga as her pulmonary physician.

## 2022-10-02 DIAGNOSIS — L29.9 PRURITUS: ICD-10-CM

## 2022-10-03 RX ORDER — DIPHENHYDRAMINE HCL 50 MG
CAPSULE ORAL
Qty: 30 CAPSULE | Refills: 0 | OUTPATIENT
Start: 2022-10-03

## 2022-10-03 RX ORDER — CYCLOBENZAPRINE HCL 5 MG
TABLET ORAL
Qty: 90 TABLET | Refills: 0 | OUTPATIENT
Start: 2022-10-03

## 2022-10-03 NOTE — TELEPHONE ENCOUNTER
Future Appointments    Encounter Information    Provider Department Appt Notes   10/6/2022 Yaritza Romero MD [address] [place] [address] Surgery discuss surgery sites and date.  (8 on right arm, 5 on left, 1 on abdomen)     Past Visits    Date Provider Specialty Visit Type Primary Dx   09/20/2022 Sarah Valladares MD Primary Care Office Visit COPD exacerbation [facility])

## 2022-10-03 NOTE — TELEPHONE ENCOUNTER
Just filled September 26, please check with pharmacy to make sure that I got the refill, while receiving duplicate request?

## 2022-10-06 ENCOUNTER — OFFICE VISIT (OUTPATIENT)
Dept: SURGERY | Age: 67
End: 2022-10-06
Payer: MEDICARE

## 2022-10-06 VITALS
SYSTOLIC BLOOD PRESSURE: 153 MMHG | DIASTOLIC BLOOD PRESSURE: 97 MMHG | WEIGHT: 176 LBS | HEART RATE: 111 BPM | BODY MASS INDEX: 29.29 KG/M2

## 2022-10-06 DIAGNOSIS — D17.1 LIPOMA OF LOWER BACK: ICD-10-CM

## 2022-10-06 DIAGNOSIS — D17.21 LIPOMA OF RIGHT UPPER EXTREMITY: ICD-10-CM

## 2022-10-06 DIAGNOSIS — D17.22 LIPOMA OF LEFT UPPER EXTREMITY: Primary | ICD-10-CM

## 2022-10-06 DIAGNOSIS — D17.1 LIPOMA OF ABDOMINAL WALL: ICD-10-CM

## 2022-10-06 PROCEDURE — G8484 FLU IMMUNIZE NO ADMIN: HCPCS | Performed by: SURGERY

## 2022-10-06 PROCEDURE — 1090F PRES/ABSN URINE INCON ASSESS: CPT | Performed by: SURGERY

## 2022-10-06 PROCEDURE — G8417 CALC BMI ABV UP PARAM F/U: HCPCS | Performed by: SURGERY

## 2022-10-06 PROCEDURE — 1123F ACP DISCUSS/DSCN MKR DOCD: CPT | Performed by: SURGERY

## 2022-10-06 PROCEDURE — 1036F TOBACCO NON-USER: CPT | Performed by: SURGERY

## 2022-10-06 PROCEDURE — G8399 PT W/DXA RESULTS DOCUMENT: HCPCS | Performed by: SURGERY

## 2022-10-06 PROCEDURE — 3017F COLORECTAL CA SCREEN DOC REV: CPT | Performed by: SURGERY

## 2022-10-06 PROCEDURE — 99213 OFFICE O/P EST LOW 20 MIN: CPT | Performed by: SURGERY

## 2022-10-06 PROCEDURE — G8427 DOCREV CUR MEDS BY ELIG CLIN: HCPCS | Performed by: SURGERY

## 2022-10-06 NOTE — LETTER
CONSENT TO OPERATION         Excision of left upper extremity lipomas x5      Excision of left lower back lipoma x1    PATIENT : Maryam Greer OF BIRTH:  1955             DATE : 10/6/22    1. I request and consent that Dr. Gabby Hollingsworth and/or his associates or assistants perform an operation and/or procedures on the above patient at [facility], to treat the condition(s) which appear indicated by the diagnostic studies already performed. 2. It has been explained to me by the informing physician that during the course of the operation and/or procedure(s) unforeseen conditions may be revealed that necessitate an extension of the original operation and/or procedure(s) or different operation and/or procedures than those set forth in Paragraph 1. I therefore authorize and request that my physician and/or his associates or assistants perform such operations and/or procedures as are necessary and desirable in the exercise of professional judgment. The authority granted under this Paragraph 2 shall extend to all conditions that require treatment and are known to my physician at the time the operation is commenced. 3. I have been made aware by the informing physician of certain risks and consequences that are associated with the operation and/or procedure(s) described in Paragraph 1, the reasonable alternative methods or treatment, the possible consequences, the possibility that the operation and/or procedure(s) may be unsuccessful and the possibility of complications. I understand the reasonably known risks to be:  - Bleeding  - Infection  - Poor Healing  - Possible Damage to Nerve, Vessel, Tendon/Muscle or Bone  - Need for further Treatment/Surgery  - Stiffness  - Pain  - Residual or Recurrent Symptoms  - Anesthetic and/or Medical Risks     4.  I have also been informed by the informing physician that there are other risks from both known and unknown causes that are attendant to the performance of any surgical procedure. I am aware that the practice of medicine and surgery is not an exact science, and that no guarantees have been made to me concerning the results of the operation and/or procedure(s). 5. I consent to the administration of anesthesia and to the use of such anesthetics as may be deemed advisable by the anesthesiologist who has been engaged by me or my physician. 6. I certify that I have read and understand the above consent to operation and/or other procedure(s); that the explanations therein referred to were made to me by the informing physician in advance of my signing this consent; that all blanks or statements requiring insertion or completion were filled in and inapplicable paragraphs, if any, were stricken before I signed; and that all questions asked by me about the operation and/or procedure(s) which I have consented to have been fully answered in a satisfactory manner.            10/6/22                    _______________________  Signature Of Patient   Date              Witness                     OR Date:  ___________

## 2022-10-06 NOTE — PROGRESS NOTES
Established Patient Visit    [facility]  [place] and V[facility] Surgery   Tova Marte MD    [address], [address]  [place] [address]  Phone: [phone]  Fax: [phone]    Honorio Fermin   YOB: 1955    Date of Visit:  10/6/2022    No ref. provider found  Lora Goldberg MD    Subjective: Honorio Fermin presents for a follow-up of her lipomas. Overall she has been doing about the same since her last visit, still with pain at all of her lipoma sites. She has found additional ones of her bilateral upper extremities along with her left lower back. These are increasing in size and causing pain. She has not had any overlying skin changes. She denies any fevers or chills.          Allergies   Allergen Reactions    Bupropion Other (See Comments)     Muscle spasms/seizure like symptoms     Morphine Hives and Itching    Morphine And Related Itching     Outpatient Medications Marked as Taking for the 10/6/22 encounter (Office Visit) with Charmaine Phillips MD   Medication Sig Dispense Refill    cyclobenzaprine (FLEXERIL) 5 MG tablet Take 1 tablet by mouth three times daily as needed for muscle spasm 90 tablet 0    diphenhydrAMINE (BANOPHEN) 50 MG capsule TAKE 1 CAPSULE BY MOUTH ONCE DAILY AT NIGHT AT BEDTIME AS NEEDED FOR ITCHING 30 capsule 0    cloNIDine (CATAPRES) 0.1 MG tablet Take 1 tablet by mouth 2 times daily Bid 60 tablet 1    hydroCHLOROthiazide (HYDRODIURIL) 25 MG tablet Take 1 tablet by mouth once daily 90 tablet 1    Roflumilast (DALIRESP) 500 MCG tablet TAKE 1 TABLET EVERY DAY 90 tablet 1    ondansetron (ZOFRAN ODT) 4 MG disintegrating tablet Take 1 tablet by mouth every 8 hours as needed for Nausea or Vomiting 15 tablet 1    levothyroxine (SYNTHROID) 125 MCG tablet Take 1 tablet by mouth Daily TAKE 1 TABLET EVERY DAY (DISCONTINUE 135MCG) 90 tablet 1    potassium chloride (KLOR-CON M) 10 MEQ extended release tablet Take 2 tablets by mouth once daily 180 tablet 1    Promethazine HCl 6.25 MG/5ML SOLN TAKE 5ML BY MOUTH EVERY 6 HOURS AS NEEDED FOR NAUSEA 600 mL 3    QUEtiapine (SEROQUEL) 100 MG tablet TAKE 1 TABLET EVERY NIGHT 90 tablet 1    omeprazole (PRILOSEC) 20 MG delayed release capsule TAKE 1 CAPSULE BY MOUTH IN THE MORNING BEFORE BREAKFAST AS NEEDED FOR HEARTBURN 90 capsule 1    ezetimibe (ZETIA) 10 MG tablet Take 1 tablet by mouth daily 90 tablet 3    Calcium Carb-Cholecalciferol (CALCIUM-VITAMIN D) 600-400 MG-UNIT TABS Take 1 tablet by mouth twice daily 60 tablet 11    Fluticasone furoate-vilanterol (BREO ELLIPTA) 200-25 MCG/INH AEPB inhaler Inhale 1 puff into the lungs daily 1 each 11    COVID-19 Specimen Collection KIT TEST AS DIRECTED TODAY      tiotropium (SPIRIVA RESPIMAT) 2.5 MCG/ACT AERS inhaler Inhale 2 puffs into the lungs daily 1 each 5    predniSONE (DELTASONE) 10 MG tablet 4 tablets once daily for 3 days then 3 tablets once daily for 3 days then 2 tablets once daily for 3 days then 1 tablet once daily for 3 days. 30 tablet 0    predniSONE (DELTASONE) 10 MG tablet 4 tablets/40 mg daily for 3 days then 3 tablets/30 mg daily for 3 days then 20 mg daily for 3 days then 10 mg daily for 3 days then off. 30 tablet 0    DULERA 200-5 MCG/ACT inhaler Inhale 2 puffs into the lungs every 12 hours       MG tablet TAKE 1 TABLET BY MOUTH EVERY 8 HOURS AS NEEDED FOR PAIN 270 tablet 1    Nebulizers (COMPRESSOR/NEBULIZER) MISC 1 Units by Does not apply route 4 times daily as needed (Wheezing SOB) 1 each 0    Respiratory Therapy Supplies MISC 1 each by Does not apply route 4 times daily as needed (wheezing SOB) All Nebulizer supplies needed 50 each 5    ipratropium-albuterol (DUONEB) 0.5-2.5 (3) MG/3ML SOLN nebulizer solution Inhale 3 mLs into the lungs as needed for Shortness of Breath 360 mL 11    ALPRAZolam (XANAX) 1 MG tablet Take 1 mg by mouth daily as needed.       Bempedoic Acid-Ezetimibe 180-10 MG TABS Take 1 tablet by mouth nightly 90 tablet 3    albuterol-ipratropium (COMBIVENT RESPIMAT)  MCG/ACT AERS inhaler INHALE 1 PUFF BY MOUTH EVERY SIX HOURS AS NEEDED FOR WHEEZING 3 Inhaler 1    docusate sodium (STOOL SOFTENER) 100 MG capsule TAKE ONE CAPSULE BY MOUTH DAILY AS NEEDED FOR CONSTIPATION 90 capsule 3    lactulose (CHRONULAC) 10 GM/15ML solution Take 15 mLs by mouth as needed (constipation) 1892 mL 1    NARCAN 4 MG/0.1ML LIQD nasal spray Take by mouth as needed   0    tiZANidine (ZANAFLEX) 4 MG tablet Take 4 mg by mouth 2 times daily as needed       oxyCODONE-acetaminophen (PERCOCET)  MG per tablet Take 1 tablet by mouth every 6 hours as needed for Pain. Vitals:    10/06/22 1324   BP: (!) 153/97   Pulse: (!) 111   Weight: 176 lb (79.8 kg)     Body mass index is 29.29 kg/m². Wt Readings from Last 3 Encounters:   10/06/22 176 lb (79.8 kg)   09/20/22 176 lb (79.8 kg)   09/09/22 175 lb (79.4 kg)     BP Readings from Last 3 Encounters:   10/06/22 (!) 153/97   09/20/22 119/80   09/09/22 120/65          Objective:    CONSTITUTIONAL:  awake, alert, no apparent distress    LUNGS:  Resp easy and unlabored  ABDOMEN:  soft, ND, minimally tender at epigastric mass sites, described as below  MUSCULOSKELETAL: No edema  NEUROLOGIC:  Mental Status Exam:  Level of Alertness:   awake  Orientation:   person, place, time  SKIN: right upper arm with 4 subcutaneous masses without overlying skin changes, left upper arm with 5 subcutaneous masses without overlying skin changes, epigastrium with two 0.5 to 1 cm subcutaneous masses, left lower back with 1 x 2 cm subcutaneous mass    ASSESSMENT:     Diagnosis Orders   1. Lipoma of left upper extremity        2. Lipoma of right upper extremity        3. Lipoma of abdominal wall        4. Lipoma of lower back              PLAN:    Abhi Cruz has multiple tender, enlarging subcutaneous masses consistent with lipomas.   Will plan to perform excision of the right upper arm lipomas x 4 and abdominal wall lipomas x 2 next week, followed by the left upper arm lipomas x 5 and left lower back lipoma x 1 the following week under MAC anesthesia. The risks, benefits, and alternatives were discussed with the patient and she is willing to consent for the operation. I will update her H&P on the day of surgery.     Electronically signed by Taylor Condon MD on 10/6/2022

## 2022-10-06 NOTE — LETTER
CONSENT TO OPERATION              Excision of right upper extremity lipoma x4         Excision of epigastric lipoma x2     PATIENT : Washington Tucker OF BIRTH:  1955             DATE : 10/6/22    1. I request and consent that Dr. Monty Narvaez and/or his associates or assistants perform an operation and/or procedures on the above patient at [facility], to treat the condition(s) which appear indicated by the diagnostic studies already performed. 2. It has been explained to me by the informing physician that during the course of the operation and/or procedure(s) unforeseen conditions may be revealed that necessitate an extension of the original operation and/or procedure(s) or different operation and/or procedures than those set forth in Paragraph 1. I therefore authorize and request that my physician and/or his associates or assistants perform such operations and/or procedures as are necessary and desirable in the exercise of professional judgment. The authority granted under this Paragraph 2 shall extend to all conditions that require treatment and are known to my physician at the time the operation is commenced. 3. I have been made aware by the informing physician of certain risks and consequences that are associated with the operation and/or procedure(s) described in Paragraph 1, the reasonable alternative methods or treatment, the possible consequences, the possibility that the operation and/or procedure(s) may be unsuccessful and the possibility of complications. I understand the reasonably known risks to be:  - Bleeding  - Infection  - Poor Healing  - Possible Damage to Nerve, Vessel, Tendon/Muscle or Bone  - Need for further Treatment/Surgery  - Stiffness  - Pain  - Residual or Recurrent Symptoms  - Anesthetic and/or Medical Risks     4.  I have also been informed by the informing physician that there are other risks from both known and unknown causes that are attendant to the performance of any surgical procedure. I am aware that the practice of medicine and surgery is not an exact science, and that no guarantees have been made to me concerning the results of the operation and/or procedure(s). 5. I consent to the administration of anesthesia and to the use of such anesthetics as may be deemed advisable by the anesthesiologist who has been engaged by me or my physician. 6. I certify that I have read and understand the above consent to operation and/or other procedure(s); that the explanations therein referred to were made to me by the informing physician in advance of my signing this consent; that all blanks or statements requiring insertion or completion were filled in and inapplicable paragraphs, if any, were stricken before I signed; and that all questions asked by me about the operation and/or procedure(s) which I have consented to have been fully answered in a satisfactory manner.            10/6/22                    _______________________  Signature Of Patient   Date              Witness                     OR Date:  ___________

## 2022-10-06 NOTE — LETTER
Surgery Scheduling Form:      DEMOGRAPHICS:                                                                                                         .    Patient Name:  Primitivo Will  Patient :  1955   Patient SS#:      Patient Phone:  [phone] (home)  Alt. Patient Phone:                     Patient Address:  [address]  [address]    PCP:  Jose Young MD  Insurance:  Payor: Kevon Cliffwood / Plan: [employer] / Product Type: *No Product type* /   Insurance ID Number:    Payer/Plan Subscr  Sex Relation Sub. Ins. ID Effective Group Num   1. [facility] MEDICARE * Liz Bumps 1955 Female Self [id number]                                   [address]   2. [place] L 1955 Female Self [id number]                                    [address] [address]         DIAGNOSIS & PROCEDURE:                                                                                       .    Diagnosis:   D17.21 Lipoma of right upper extremity            D17.1 Lipoma of lower back    Operation:  Excision of left upper extremity lipomas x5                      Excision of left lower back lipoma x1  Location: [facility] OR   Surgeon:    Rizwana Stein MD        [facility] INFORMATION:                                                                                    .    Surgeon's Scheduling Instruction:  elective  Requested Date: 10/18/2022    OR Time: 1:10pm          Patient Arrival Time: 11:30am  OR Time Required:  60  Minutes  Anesthesia:  MAC/TIVA  Equipment:                                                             SA Required:  Yes     Status:  Outpatient           Standard C-Arm:  no  PAT Required:   Yes                               Best Time to Call:  ANY  Patient Requested to see PCP for Pre-op H & P:  No   Special Comments:                              Rizwana Stein MD    10/07/22 at 10:21 AM PRE-CERTIFICATION INFORMATION:                                                                           .    Procedure:       CPT Code Modifier          [id number]          [id number]

## 2022-10-06 NOTE — LETTER
Surgery Scheduling Form:      DEMOGRAPHICS:                                                                                                         .    Patient Name:  Lupillo Melvin  Patient :  1955   Patient SS#:      Patient Phone:  [phone] (home)  Alt. Patient Phone:                     Patient Address:  [address]  [address]    PCP:  Misha Welch MD  Insurance:  Payor: Michele Skaggs / Plan: Jacquelyn [employer]lerk[employer]i / Product Type: *No Product type* /   Insurance ID Number:    Payer/Plan Subscr  Sex Relation Sub. Ins. ID Effective Group Num   1. [facility] MEDICARE * Veena Bennett 1955 Female Self [id number]                                   [address]   2. [place] L 1955 Female Self [id number] 22                                    [address] [address]         DIAGNOSIS & PROCEDURE:                                                                                       .    Diagnosis:   D17.21 Lipoma of right upper extremity     D17.1 Lipoma of abdominal wall   Operation:  Excision of right upper extremity lipoma x4            Excision of epigastric lipoma x2   Location: [facility] OR   Surgeon:    Malissa Dickey MD        [facility] INFORMATION:                                                                                    .    Surgeon's Scheduling Instruction:  elective  Requested Date: 10/11/2022    OR Time: 9:00am           Patient Arrival Time: 7:30am  OR Time Required:  60  Minutes  Anesthesia:  MAC/TIVA  Equipment:                                                             SA Required:  Yes     Status:  Outpatient           Standard C-Arm:  No   PAT Required: Yes                               Best Time to Call:   Any   Patient Requested to see PCP for Pre-op H & P:  No   Special Comments:                              Malissa Dickey MD    10/06/22 at 7:87 PM        PRE-CERTIFICATION INFORMATION:                                                                           .    Procedure:       CPT Code Modifier          26808          [id number]

## 2022-10-07 NOTE — PROGRESS NOTES
[address] [facility] time ___0730_________        Surgery time ___0900_________    Take the following medications with a sip of water: Follow your MD/Surgeons pre-procedure instructions regarding your medications     Do not eat or drink anything after 12:00 midnight prior to your surgery. This includes water chewing gum, mints and ice chips. You may brush your teeth and gargle the morning of your surgery, but do not swallow the water     Please see your family doctor/pediatrician for a history and physical and/or concerning medications. Bring any test results/reports from your physicians office. If you are under the care of a heart doctor or specialist doctor, please be aware that you may be asked to them for clearance    You may be asked to stop blood thinners such as Coumadin, Plavix, Fragmin, Lovenox, etc., or any anti-inflammatories such as:  Aspirin, Ibuprofen, Advil, Naproxen prior to your surgery. We also ask that you stop any OTC medications such as fish oil, vitamin E, glucosamine, garlic, Multivitamins, COQ 10, etc.    We ask that you do not smoke 24 hours prior to surgery  We ask that you do not  drink any alcoholic beverages 24 hours prior to surgery     You must make arrangements for a responsible adult to take you home after your surgery. For your safety you will not be allowed to leave alone or drive yourself home. Your surgery will be cancelled if you do not have a ride home. Also for your safety, it is strongly suggested that someone stay with you the first 24 hours after your surgery. A parent or legal guardian must accompany a child scheduled for surgery and plan to stay at the hospital until the child is discharged. Please do not bring other children with you. For your comfort, please wear simple loose fitting clothing to the hospital.  Please do not bring valuables.     Do not wear any make-up or nail polish on your fingers or toes      For your safety, please do not wear any jewelry or body piercing's on the day of surgery. All jewelry must be removed. If you have dentures, they will be removed before going to operating room. For your convenience, we will provide you with a container. If you wear contact lenses or glasses, they will be removed, please bring a case for them. If you have a living will and a durable power of  for healthcare, please bring in a copy. As part of our patient safety program to minimize surgical site infections, we ask you to do the following:    Please notify your surgeon if you develop any illness between         now and the  day of your surgery. This includes a cough, cold, fever, sore throat, nausea,         or vomiting, and diarrhea, etc.   Please notify your surgeon if you experience dizziness, shortness         of breath or blurred vision between now and the time of your surgery. Do not shave your operative site 96 hours prior to surgery. For face and neck surgery, men may use an electric razor 48 hours   prior to surgery. You may shower the night before surgery or the morning of   your surgery with an antibacterial soap. You will need to bring a photo ID and insurance card    [facility] has an onsite pharmacy, would you like to utilize our pharmacy     If you will be staying overnight and use a C-pap machine, please bring   your C-pap to hospital     Our goal is to provide you with excellent care, therefore, visitors will be limited to two(2) in the room at a time so that we may focus on providing this care for you. Please contact pre-admission testing if you have any further questions. [facility] phone number:  [phone] PAT fax number:  [phone]  Please note these are generalized instructions for all surgical cases, you may be provided with more specific instructions according to your surgery.     C-Difficile admission screening and protocol:       * Admitted with diarrhea? [] YES    [x]  NO     *Prior history of C-Diff. In last 3 months? [] YES    [x]  NO     *Antibiotic use in the past 6-8 weeks? [x]  NO    []  YES                 If yes, which ANTIBIOTIC AND REASON______     *Prior hospitalization or nursing home in the last month? []  YES    [x]  NO        SAFETY FIRST. .call before you fall

## 2022-10-10 ENCOUNTER — ANESTHESIA EVENT (OUTPATIENT)
Dept: OPERATING ROOM | Age: 67
End: 2022-10-10
Payer: MEDICARE

## 2022-10-11 ENCOUNTER — ANESTHESIA (OUTPATIENT)
Dept: OPERATING ROOM | Age: 67
End: 2022-10-11
Payer: MEDICARE

## 2022-10-11 ENCOUNTER — HOSPITAL ENCOUNTER (OUTPATIENT)
Age: 67
Setting detail: OUTPATIENT SURGERY
Discharge: HOME | End: 2022-10-11
Attending: SURGERY | Admitting: SURGERY
Payer: MEDICARE

## 2022-10-11 VITALS
RESPIRATION RATE: 18 BRPM | HEIGHT: 65 IN | WEIGHT: 175.04 LBS | DIASTOLIC BLOOD PRESSURE: 74 MMHG | OXYGEN SATURATION: 92 % | HEART RATE: 82 BPM | BODY MASS INDEX: 29.16 KG/M2 | TEMPERATURE: 97.1 F | SYSTOLIC BLOOD PRESSURE: 128 MMHG

## 2022-10-11 DIAGNOSIS — D17.21 LIPOMA OF RIGHT SHOULDER: ICD-10-CM

## 2022-10-11 DIAGNOSIS — K59.01 SLOW TRANSIT CONSTIPATION: Chronic | ICD-10-CM

## 2022-10-11 DIAGNOSIS — D17.1 LIPOMA OF ABDOMINAL WALL: ICD-10-CM

## 2022-10-11 DIAGNOSIS — D17.20 LIPOMA OF UPPER ARM: Primary | ICD-10-CM

## 2022-10-11 PROCEDURE — 3700000001 HC ADD 15 MINUTES (ANESTHESIA): Performed by: SURGERY

## 2022-10-11 PROCEDURE — 22902 EXC ABD LES SC < 3 CM: CPT | Performed by: SURGERY

## 2022-10-11 PROCEDURE — 2580000003 HC RX 258: Performed by: ANESTHESIOLOGY

## 2022-10-11 PROCEDURE — 7100000010 HC PHASE II RECOVERY - FIRST 15 MIN: Performed by: SURGERY

## 2022-10-11 PROCEDURE — 2709999900 HC NON-CHARGEABLE SUPPLY: Performed by: SURGERY

## 2022-10-11 PROCEDURE — 6360000002 HC RX W HCPCS

## 2022-10-11 PROCEDURE — 2580000003 HC RX 258: Performed by: SURGERY

## 2022-10-11 PROCEDURE — 88304 TISSUE EXAM BY PATHOLOGIST: CPT

## 2022-10-11 PROCEDURE — 2500000003 HC RX 250 WO HCPCS

## 2022-10-11 PROCEDURE — 3600000002 HC SURGERY LEVEL 2 BASE: Performed by: SURGERY

## 2022-10-11 PROCEDURE — 6360000002 HC RX W HCPCS: Performed by: SURGERY

## 2022-10-11 PROCEDURE — 7100000000 HC PACU RECOVERY - FIRST 15 MIN: Performed by: SURGERY

## 2022-10-11 PROCEDURE — 2500000003 HC RX 250 WO HCPCS: Performed by: SURGERY

## 2022-10-11 PROCEDURE — 7100000001 HC PACU RECOVERY - ADDTL 15 MIN: Performed by: SURGERY

## 2022-10-11 PROCEDURE — A4217 STERILE WATER/SALINE, 500 ML: HCPCS | Performed by: SURGERY

## 2022-10-11 PROCEDURE — 3600000012 HC SURGERY LEVEL 2 ADDTL 15MIN: Performed by: SURGERY

## 2022-10-11 PROCEDURE — 24075 EXC ARM/ELBOW LES SC < 3 CM: CPT | Performed by: SURGERY

## 2022-10-11 PROCEDURE — 3700000000 HC ANESTHESIA ATTENDED CARE: Performed by: SURGERY

## 2022-10-11 PROCEDURE — 7100000011 HC PHASE II RECOVERY - ADDTL 15 MIN: Performed by: SURGERY

## 2022-10-11 RX ORDER — SODIUM CHLORIDE 0.9 % (FLUSH) 0.9 %
5-40 SYRINGE (ML) INJECTION EVERY 12 HOURS SCHEDULED
Status: DISCONTINUED | OUTPATIENT
Start: 2022-10-11 | End: 2022-10-11 | Stop reason: HOSPADM

## 2022-10-11 RX ORDER — GLYCOPYRROLATE 0.2 MG/ML
INJECTION INTRAMUSCULAR; INTRAVENOUS PRN
Status: DISCONTINUED | OUTPATIENT
Start: 2022-10-11 | End: 2022-10-11 | Stop reason: SDUPTHER

## 2022-10-11 RX ORDER — MIDAZOLAM HYDROCHLORIDE 1 MG/ML
INJECTION INTRAMUSCULAR; INTRAVENOUS PRN
Status: DISCONTINUED | OUTPATIENT
Start: 2022-10-11 | End: 2022-10-11 | Stop reason: SDUPTHER

## 2022-10-11 RX ORDER — SODIUM CHLORIDE 9 MG/ML
INJECTION, SOLUTION INTRAVENOUS PRN
Status: DISCONTINUED | OUTPATIENT
Start: 2022-10-11 | End: 2022-10-11 | Stop reason: HOSPADM

## 2022-10-11 RX ORDER — MAGNESIUM HYDROXIDE 1200 MG/15ML
LIQUID ORAL CONTINUOUS PRN
Status: COMPLETED | OUTPATIENT
Start: 2022-10-11 | End: 2022-10-11

## 2022-10-11 RX ORDER — FAMOTIDINE 10 MG/ML
INJECTION, SOLUTION INTRAVENOUS PRN
Status: DISCONTINUED | OUTPATIENT
Start: 2022-10-11 | End: 2022-10-11 | Stop reason: SDUPTHER

## 2022-10-11 RX ORDER — PROPOFOL 10 MG/ML
INJECTION, EMULSION INTRAVENOUS CONTINUOUS PRN
Status: DISCONTINUED | OUTPATIENT
Start: 2022-10-11 | End: 2022-10-11 | Stop reason: SDUPTHER

## 2022-10-11 RX ORDER — SODIUM CHLORIDE 9 MG/ML
INJECTION, SOLUTION INTRAVENOUS CONTINUOUS
Status: DISCONTINUED | OUTPATIENT
Start: 2022-10-11 | End: 2022-10-11 | Stop reason: HOSPADM

## 2022-10-11 RX ORDER — FENTANYL CITRATE 50 UG/ML
INJECTION, SOLUTION INTRAMUSCULAR; INTRAVENOUS PRN
Status: DISCONTINUED | OUTPATIENT
Start: 2022-10-11 | End: 2022-10-11 | Stop reason: SDUPTHER

## 2022-10-11 RX ORDER — SODIUM CHLORIDE 0.9 % (FLUSH) 0.9 %
5-40 SYRINGE (ML) INJECTION PRN
Status: DISCONTINUED | OUTPATIENT
Start: 2022-10-11 | End: 2022-10-11 | Stop reason: HOSPADM

## 2022-10-11 RX ORDER — FENTANYL CITRATE 50 UG/ML
50 INJECTION, SOLUTION INTRAMUSCULAR; INTRAVENOUS EVERY 5 MIN PRN
Status: DISCONTINUED | OUTPATIENT
Start: 2022-10-11 | End: 2022-10-11 | Stop reason: HOSPADM

## 2022-10-11 RX ORDER — DOCUSATE SODIUM 100 MG/1
CAPSULE, LIQUID FILLED ORAL
Qty: 90 CAPSULE | Refills: 3 | Status: SHIPPED
Start: 2022-10-11

## 2022-10-11 RX ORDER — FENTANYL CITRATE 50 UG/ML
25 INJECTION, SOLUTION INTRAMUSCULAR; INTRAVENOUS EVERY 5 MIN PRN
Status: DISCONTINUED | OUTPATIENT
Start: 2022-10-11 | End: 2022-10-11 | Stop reason: HOSPADM

## 2022-10-11 RX ORDER — ONDANSETRON 2 MG/ML
4 INJECTION INTRAMUSCULAR; INTRAVENOUS
Status: DISCONTINUED | OUTPATIENT
Start: 2022-10-11 | End: 2022-10-11 | Stop reason: HOSPADM

## 2022-10-11 RX ORDER — HYDROCODONE BITARTRATE AND ACETAMINOPHEN 5; 325 MG/1; MG/1
1 TABLET ORAL EVERY 6 HOURS PRN
Qty: 20 TABLET | Refills: 0 | Status: SHIPPED | OUTPATIENT
Start: 2022-10-11 | End: 2022-10-16

## 2022-10-11 RX ORDER — PROPOFOL 10 MG/ML
INJECTION, EMULSION INTRAVENOUS PRN
Status: DISCONTINUED | OUTPATIENT
Start: 2022-10-11 | End: 2022-10-11 | Stop reason: SDUPTHER

## 2022-10-11 RX ORDER — LIDOCAINE HYDROCHLORIDE 20 MG/ML
INJECTION, SOLUTION EPIDURAL; INFILTRATION; INTRACAUDAL; PERINEURAL PRN
Status: DISCONTINUED | OUTPATIENT
Start: 2022-10-11 | End: 2022-10-11 | Stop reason: SDUPTHER

## 2022-10-11 RX ADMIN — PROPOFOL 30 MG: 10 INJECTION, EMULSION INTRAVENOUS at 11:40

## 2022-10-11 RX ADMIN — FENTANYL CITRATE 25 MCG: 50 INJECTION INTRAMUSCULAR; INTRAVENOUS at 12:00

## 2022-10-11 RX ADMIN — PROPOFOL 140 MCG/KG/MIN: 10 INJECTION, EMULSION INTRAVENOUS at 11:40

## 2022-10-11 RX ADMIN — FAMOTIDINE 20 MG: 10 INJECTION, SOLUTION INTRAVENOUS at 11:34

## 2022-10-11 RX ADMIN — MIDAZOLAM 2 MG: 1 INJECTION INTRAMUSCULAR; INTRAVENOUS at 11:34

## 2022-10-11 RX ADMIN — FENTANYL CITRATE 25 MCG: 50 INJECTION INTRAMUSCULAR; INTRAVENOUS at 12:06

## 2022-10-11 RX ADMIN — FENTANYL CITRATE 25 MCG: 50 INJECTION INTRAMUSCULAR; INTRAVENOUS at 11:34

## 2022-10-11 RX ADMIN — GLYCOPYRROLATE 0.1 MG: 0.2 INJECTION, SOLUTION INTRAMUSCULAR; INTRAVENOUS at 11:34

## 2022-10-11 RX ADMIN — CEFAZOLIN 2000 MG: 2 INJECTION, POWDER, FOR SOLUTION INTRAMUSCULAR; INTRAVENOUS at 11:34

## 2022-10-11 RX ADMIN — LIDOCAINE HYDROCHLORIDE 80 MG: 20 INJECTION, SOLUTION EPIDURAL; INFILTRATION; INTRACAUDAL; PERINEURAL at 11:40

## 2022-10-11 RX ADMIN — FENTANYL CITRATE 25 MCG: 50 INJECTION INTRAMUSCULAR; INTRAVENOUS at 11:40

## 2022-10-11 RX ADMIN — SODIUM CHLORIDE: 9 INJECTION, SOLUTION INTRAVENOUS at 08:02

## 2022-10-11 ASSESSMENT — PAIN DESCRIPTION - FREQUENCY: FREQUENCY: INTERMITTENT

## 2022-10-11 ASSESSMENT — LIFESTYLE VARIABLES: SMOKING_STATUS: 0

## 2022-10-11 ASSESSMENT — PAIN DESCRIPTION - ORIENTATION: ORIENTATION: MID

## 2022-10-11 ASSESSMENT — PAIN DESCRIPTION - DESCRIPTORS: DESCRIPTORS: ACHING

## 2022-10-11 ASSESSMENT — ENCOUNTER SYMPTOMS: SHORTNESS OF BREATH: 1

## 2022-10-11 ASSESSMENT — PAIN - FUNCTIONAL ASSESSMENT
PAIN_FUNCTIONAL_ASSESSMENT: PREVENTS OR INTERFERES SOME ACTIVE ACTIVITIES AND ADLS
PAIN_FUNCTIONAL_ASSESSMENT: 0-10

## 2022-10-11 ASSESSMENT — PAIN DESCRIPTION - LOCATION: LOCATION: ABDOMEN

## 2022-10-11 ASSESSMENT — PAIN DESCRIPTION - ONSET: ONSET: ON-GOING

## 2022-10-11 ASSESSMENT — PAIN SCALES - GENERAL: PAINLEVEL_OUTOF10: 5

## 2022-10-11 NOTE — PROGRESS NOTES
Discharge instructions reviewed with cousin. Education provided to patient about not driving for 24 hours. Patient understood. No questions at this time.        Electronically signed by Ulysses Furnace, RN on 10/11/2022 at 1:23 PM

## 2022-10-11 NOTE — DISCHARGE INSTRUCTIONS
Kobe Bar MD     General and Vascular Surgery   Genesis Emerson MD     [address] [facility] [address]     [address] [address], [place]mani Hernandez [facility]  Lalitha SaraviaJACKIE CNP   Phone: [phone]           Fax: [phone]                                               POST-OPERATIVE INSTRUCTIONS FOR LIPOMA REMOVAL    Call the office to schedule your post-operative appointment with your surgeon for two (2) weeks. You will have water occlusive glue closing your incision(s). You may shower. Wash incision(s) gently, and pat dry. Do not rub your incision(s). Do not soak incision(s) in tub baths, hot tubs or pools    General guidelines for activity:  Do what is comfortable: stop and rest when you feel tired. Drink plenty of fluids after surgery. Do NOT drive while taking your narcotic pain medicine. Watch for signs of infection:   Fever over 100.5°   Excessive warmth or bright redness around your incision(s)  Leakage of bloody or cloudy fluid from your incision(s)    You will have pain medicine ordered. Take as directed. If you experience constipation  Increase your water intake, and activity; walking is best.  A stool softener or mild laxative may be necessary if you still have not had a bowel  movement ; call the office for further instructions.

## 2022-10-11 NOTE — BRIEF OP NOTE
Brief Postoperative Note      Patient: Aleks Castillo  YOB: 1955  MRN: [id number]    Date of Procedure: 10/11/2022    Pre-Op Diagnosis: LIPOMA OF RIGHT UPPER EXTREMITY, LIPOMA OF ABDOMINAL WALL    Post-Op Diagnosis: Same       Procedure(s):  EXCISION OF RIGHT UPPER EXTREMITY LIPOMA X 5  EXCISION OF EPIGASTRIC LIPOMA X 2    Surgeon(s):  Carmen Fuentes MD    Assistant:  Surgical Assistant: Nikolai Lieberman    Anesthesia: Monitor Anesthesia Care    Estimated Blood Loss (mL): less than 50     Complications: None    Specimens:   ID Type Source Tests Collected by Time Destination   A : A. Epigastric lipomas Specimen Abdomen SURGICAL PATHOLOGY Carmen Fuentes MD 10/11/2022 1211    B : B.  Right upper arm lipomas Specimen Arm SURGICAL PATHOLOGY Carmen Fuentes MD 10/11/2022 1209        Implants:  * No implants in log *      Drains: * No LDAs found *    Findings: excision of right upper arm lipomas measuring 0.8 x 1.1 cm, 1.6 x 2.5 cm, 1 x 1.3 cm, 1.2 x 1.5 cm, and 0.9 x 1.2 cm; excision of epigastric lipomas measuring 1.5 x 1.7 and 2 x 2.2 cm    Electronically signed by Carmen Fuentes MD on 10/11/2022 at 12:31 PM

## 2022-10-11 NOTE — PROGRESS NOTES
To pacu from OR. PT asleep, opens eyes to name, denies pain. Skin glue to 5 incisions to right arm and to 2 incisions to abd - all dry and intact. IV infusing. Monitor in sinus rhythm.

## 2022-10-11 NOTE — ANESTHESIA POSTPROCEDURE EVALUATION
Department of Anesthesiology  Postprocedure Note    Patient: Rod Dodd  MRN: [id number]  YOB: 1955  Date of evaluation: 10/11/2022      Procedure Summary     Date: 10/11/22 Room / Location: [address]    Anesthesia Start: 1134 Anesthesia Stop: 1224    Procedures:       EXCISION OF RIGHT UPPER EXTREMITY LIPOMA X 5 (Right)      EXCISION OF EPIGASTRIC LIPOMA X 2 (Abdomen) Diagnosis:       Lipoma of right shoulder      Lipoma of abdominal wall      (LIPOMA OF RIGHT UPPER EXTREMITY, LIPOMA OF ABDOMINAL WALL)    Surgeons: Heber Post MD Responsible Provider: Parker Hernandez MD    Anesthesia Type: MAC ASA Status: 3          Anesthesia Type: No value filed.     Vesta Phase I: Vesta Score: 10    Vesta Phase II: Vesta Score: 10      Anesthesia Post Evaluation    Patient location during evaluation: PACU  Level of consciousness: awake and alert  Airway patency: patent  Nausea & Vomiting: no nausea and no vomiting  Complications: no  Cardiovascular status: blood pressure returned to baseline  Respiratory status: acceptable  Hydration status: euvolemic  Comments: Postoperative Anesthesia Note    Name:    Rod Dodd  MRN:      [id number]    Patient Vitals in the past 12 hrs:  10/11/22 1315, BP:128/74, Temp:97.1 °F (36.2 °C), Temp src:Temporal, Pulse:82, Resp:18, SpO2:92 %  10/11/22 1312, BP:124/84, Temp:97.6 °F (36.4 °C), Temp src:Temporal, Pulse:83, Resp:16, SpO2:94 %  10/11/22 1305, Temp:97.5 °F (36.4 °C), Pulse:84, Resp:14, SpO2:93 %  10/11/22 1300, BP:111/75, Pulse:85, Resp:18, SpO2:90 %  10/11/22 1245, BP:118/76, Pulse:89, Resp:17, SpO2:92 %  10/11/22 1240, BP:118/79, Pulse:90, Resp:17, SpO2:96 %  10/11/22 1235, BP:123/75, Pulse:92, Resp:18, SpO2:93 %  10/11/22 1230, BP:113/79, Pulse:94, Resp:20, SpO2:90 %  10/11/22 1228, BP:126/75, Temp:97.5 °F (36.4 °C), Temp src:Temporal, Pulse:94, Resp:18, SpO2:92 %  10/11/22 0752, BP:(!) 146/54, Temp:97.6 °F (36.4 °C), Temp src:Temporal, Pulse:(!) 103, Resp:18, SpO2:95 %  10/11/22 0745, Weight:175 lb 0.7 oz (79.4 kg)     LABS:    CBC  Lab Results       Component                Value               Date/Time                  WBC                      12.7 (H)            09/09/2022 04:24 PM        HGB                      13.0                09/09/2022 04:24 PM        HCT                      40.5                09/09/2022 04:24 PM        PLT                      254                 09/09/2022 04:24 PM   RENAL  Lab Results       Component                Value               Date/Time                  NA                       139                 09/09/2022 04:23 PM        K                        4.5                 09/09/2022 04:23 PM        CL                       105                 09/09/2022 04:23 PM        CO2                      17 (L)              09/09/2022 04:23 PM        BUN                      12                  09/09/2022 04:23 PM        CREATININE               0.7                 09/09/2022 04:23 PM        GLUCOSE                  114 (H)             09/09/2022 04:23 PM   COAGS  Lab Results       Component                Value               Date/Time                  PROTIME                  11.2                02/06/2022 10:30 AM        INR                      0.99                02/06/2022 10:30 AM        APTT                     26.3                12/20/2013 12:02 AM     Intake & Output:  @81GKYZ@    Nausea & Vomiting:  No    Level of Consciousness:  Awake    Pain Assessment:  Adequate analgesia    Anesthesia Complications:  No apparent anesthetic complications    SUMMARY      Vital signs stable  OK to discharge from Stage I post anesthesia care.   Care transferred from Anesthesiology department on discharge from perioperative area

## 2022-10-11 NOTE — PROGRESS NOTES
Patient arrived to phase 2. Denies pain. VSS.          Electronically signed by Reyne Boxer, RN on 10/11/2022 at 1:14 PM

## 2022-10-11 NOTE — ANESTHESIA PRE PROCEDURE
Department of Anesthesiology  Preprocedure Note       Name:  Ze Moreno   Age:  79 y.o.  :  1955                                          MRN:  [id number]         Date:  10/11/2022      Surgeon: Sunita Araujo):  Anibal Hester MD    Procedure: Procedure(s):  EXCISION OF RIGHT UPPER EXTREMITY LIPOMA X 4  EXCISION OF EPIGASTRIC LIPOMA X 2    Medications prior to admission:   Prior to Admission medications    Medication Sig Start Date End Date Taking?  Authorizing Provider   cyclobenzaprine (FLEXERIL) 5 MG tablet Take 1 tablet by mouth three times daily as needed for muscle spasm 22   Xavi Delaney MD   diphenhydrAMINE (BANOPHEN) 50 MG capsule TAKE 1 CAPSULE BY MOUTH ONCE DAILY AT NIGHT AT BEDTIME AS NEEDED FOR [facility] 22   Xavi Delaney MD   cloNIDine (CATAPRES) 0.1 MG tablet Take 1 tablet by mouth 2 times daily Bid 22   Luis E Quiles MD   hydroCHLOROthiazide (HYDRODIURIL) 25 MG tablet Take 1 tablet by mouth once daily 22   Luis E Quiles MD   ondansetron (ZOFRAN ODT) 4 MG disintegrating tablet Take 1 tablet by mouth every 8 hours as needed for Nausea or Vomiting 22   Luis E Quiles MD   levothyroxine (SYNTHROID) 125 MCG tablet Take 1 tablet by mouth Daily TAKE 1 TABLET EVERY DAY (DISCONTINUE 135MCG)  Patient taking differently: Take 125 mcg by mouth every other day 22   Luis E Quiles MD   potassium chloride (KLOR-CON M) 10 MEQ extended release tablet Take 2 tablets by mouth once daily  Patient taking differently: Take 10 mEq by mouth daily 22   Xavi Delaney MD   QUEtiapine (SEROQUEL) 100 MG tablet TAKE 1 TABLET EVERY NIGHT  Patient taking differently: Take 100 mg by mouth nightly as needed 22   Xavi Delaney MD   Calcium Carb-Cholecalciferol (CALCIUM-VITAMIN D) 600-400 MG-UNIT TABS Take 1 tablet by mouth twice daily  Patient taking differently: daily 22   Xavi Delaney MD   Fluticasone furoate-vilanterol (BREO ELLIPTA) 200-25 MCG/INH AEPB inhaler Inhale 1 puff into the lungs daily 3/7/22   Sebastien Lam MD   COVID-19 Specimen Collection KIT TEST AS DIRECTED TODAY 2/15/22   Historical Provider, MD    MG tablet TAKE 1 TABLET BY MOUTH EVERY 8 HOURS AS NEEDED FOR PAIN 12/16/21   Sebastien Lam MD   Nebulizers (COMPRESSOR/NEBULIZER) MISC 1 Units by Does not apply route 4 times daily as needed (Wheezing SOB) 11/24/21   Sebastien Lam MD   Respiratory Therapy Supplies MISC 1 each by Does not apply route 4 times daily as needed (wheezing SOB) All Nebulizer supplies needed 11/24/21   Sebastien Lam MD   ipratropium-albuterol (DUONEB) 0.5-2.5 (3) MG/3ML SOLN nebulizer solution Inhale 3 mLs into the lungs as needed for Shortness of Breath 11/17/21   Sebastien Lam MD   ALPRAZolam [place]) 1 MG tablet Take 1 mg by mouth daily as needed. Historical Provider, MD   albuterol-ipratropium (COMBIVENT RESPIMAT)  MCG/ACT AERS inhaler INHALE 1 PUFF BY MOUTH EVERY SIX HOURS AS NEEDED FOR WHEEZING 7/14/21   Sebastien Lam MD   docusate sodium (STOOL SOFTENER) 100 MG capsule TAKE ONE CAPSULE BY MOUTH DAILY AS NEEDED FOR CONSTIPATION 6/4/21   Sebastien Lam MD   lactulose [facility]) 10 GM/15ML solution Take 15 mLs by mouth as needed (constipation) 6/4/21   Sebastien Lam MD   [facility] 4 MG/0.1ML LIQD nasal spray Take by mouth as needed  11/11/19   Historical Provider, MD   tiZANidine (ZANAFLEX) 4 MG tablet Take 4 mg by mouth 2 times daily as needed     Historical Provider, MD   oxyCODONE-acetaminophen (PERCOCET)  MG per tablet Take 1 tablet by mouth every 6 hours as needed for Pain.      Historical Provider, MD       Current medications:    Current Facility-Administered Medications   Medication Dose Route Frequency Provider Last Rate Last Admin    0.9 % sodium chloride infusion   IntraVENous Continuous Jorge Pierre MD        sodium chloride flush 0.9 % injection 5-40 mL  5-40 mL IntraVENous 2 times per day Jorge Pierre MD        sodium chloride flush 0.9 % injection 5-40 mL  5-40 mL IntraVENous PRN Farrukh Hale MD        0.9 % sodium chloride infusion   IntraVENous PRN Farrukh Hale MD        ceFAZolin (ANCEF) 2,000 mg in dextrose 5 % 50 mL IVPB (mini-bag)  2,000 mg IntraVENous On Call to [address] Dodson MD           Allergies: Allergies   Allergen Reactions    Bupropion Other (See Comments)     Muscle spasms/seizure like symptoms     Morphine Hives and Itching    Morphine And Related Itching       Problem List:    Patient Active Problem List   Diagnosis Code    Depression F32. A    Constipation K59.00    High anion gap metabolic acidosis L26.22    MORALES (dyspnea on exertion) R06.09    COPD exacerbation (HCC) J44.1    GERD (gastroesophageal reflux disease) K21.9    Essential hypertension I10    Prediabetes R73.03    Vitamin D deficiency E55.9    History of cholecystectomy Z90.49    Degenerative spondylolisthesis M43.10    Spondylolisthesis, lumbar region M43.16    DDD (degenerative disc disease), lumbar M51.36    Mechanical low back pain M54.59    Chronic pain syndrome G89.4    Facet arthropathy M47.819    Spinal stenosis of lumbar region M48.061    Degenerative lumbar spinal stenosis M48.061    Disc displacement, lumbar M51.26    Osteoarthritis of lumbar spine M47.816    Chronic low back pain M54.50, G89.29    Lymphocytosis D72.820    Leukocytosis D72.829    Partial small bowel obstruction (HCC) K56.600    Cortical age-related cataract of both eyes H25.013    Age-related nuclear cataract of both eyes H25.13    RPE (retinal pigment epithelium) atrophy H35.54    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, E07.9    AVRIL (obstructive sleep apnea) G47.33    Mixed hyperlipidemia E78.2    Chronic nausea R11.0    Abnormal mammogram R92.8    Former heavy cigarette smoker (20-39 per day) Z87.891    Former smoker Z87.891    Hypothyroidism E03.9    Osteoarthritis M19.90    COPD with acute exacerbation (HCC) J44.1    Seizure ([facility] [address] R56.9       Past Medical History:        Diagnosis Date    CAMDEN (acute kidney injury) ([facility] [address] 10/07/2013    Anxiety     Chronic abdominal pain     possibly due to diverticulosos    COPD (chronic obstructive pulmonary disease) (HCC)     DDD (degenerative disc disease), lumbar 2016    Elevated glycated hemoglobin     Hyperlipidemia     Hypertension     Hypothyroidism     Rotator cuff tear     right    Wears glasses        Past Surgical History:        Procedure Laterality Date    CARPAL TUNNEL RELEASE Left     CENTRAL LINE  10/07/2013         CHOLECYSTECTOMY      COLONOSCOPY  2011    Tubular adenoma    COLONOSCOPY  10/24/2005    Dr. Wanda Maloney - Tubular adenoma    CYST REMOVAL Left     thumb    ENDOSCOPY, COLON, DIAGNOSTIC      ESOPHAGEAL MOTILITY STUDY  2013    NORMAL    EYE SURGERY Bilateral     cataracts    FINE NEEDLE ASPIRATION  08/10/2012    THYROID - NEGATIVE    FINGER NAIL SURGERY Bilateral 2019    TOTAL AVULSION OF 1-5 TOENAILS BILATERAL FEET performed by Princess Mcdaniel DPM at [address] [place] [address] Right     inguinal hernia    HYSTERECTOMY (CERVIX STATUS UNKNOWN)      OVARIAN CYST SURGERY Left      and (left side) and  Right side    ROTATOR CUFF REPAIR Left     MIA AND BSO (CERVIX REMOVED)  08/15/2002    Endometriosis, fibroids    TONSILLECTOMY  1972    TUBAL LIGATION  1984    UPPER GASTROINTESTINAL ENDOSCOPY  10/17/2005    Bx small bowel, Gastric, GE junction all negative    UPPER GASTROINTESTINAL ENDOSCOPY  2000    Dr. Barnett Rater - NORMAL       Social History:    Social History     Tobacco Use    Smoking status: Former     Packs/day: 0.25     Years: 32.00     Pack years: 8.00     Types: Cigarettes     Quit date: 1/10/2017     Years since quittin.7    Smokeless tobacco: Never    Tobacco comments:     30 years    Substance Use Topics    Alcohol use:  No Counseling given: Not Answered  Tobacco comments: 30 years       Vital Signs (Current):   Vitals:    10/07/22 1626 10/11/22 0745   Weight: 179 lb (81.2 kg) 175 lb 0.7 oz (79.4 kg)   Height: 5' 5\" (1.651 m)                                               BP Readings from Last 3 Encounters:   10/06/22 (!) 153/97   09/20/22 119/80   09/09/22 120/65       NPO Status: Time of last liquid consumption: 2300                        Time of last solid consumption: 2300                        Date of last liquid consumption: 10/10/22                        Date of last solid food consumption: 10/10/22    BMI:   Wt Readings from Last 3 Encounters:   10/11/22 175 lb 0.7 oz (79.4 kg)   10/06/22 176 lb (79.8 kg)   09/20/22 176 lb (79.8 kg)     Body mass index is 29.13 kg/m². CBC:   Lab Results   Component Value Date/Time    WBC 12.7 09/09/2022 04:24 PM    RBC 4.82 09/09/2022 04:24 PM    RBC 5.35 07/05/2017 02:26 PM    HGB 13.0 09/09/2022 04:24 PM    HCT 40.5 09/09/2022 04:24 PM    MCV 84.0 09/09/2022 04:24 PM    RDW 14.0 09/09/2022 04:24 PM     09/09/2022 04:24 PM       CMP:   Lab Results   Component Value Date/Time     09/09/2022 04:23 PM    K 4.5 09/09/2022 04:23 PM     09/09/2022 04:23 PM    CO2 17 09/09/2022 04:23 PM    BUN 12 09/09/2022 04:23 PM    CREATININE 0.7 09/09/2022 04:23 PM    GFRAA >60 09/09/2022 04:23 PM    GFRAA >60 05/12/2013 06:20 PM    AGRATIO 1.3 09/09/2022 04:23 PM    LABGLOM >60 09/09/2022 04:23 PM    GLUCOSE 114 09/09/2022 04:23 PM    PROT 7.6 09/09/2022 04:23 PM    PROT 6.8 01/22/2013 09:25 AM    CALCIUM 11.2 09/09/2022 04:23 PM    BILITOT 0.3 09/09/2022 04:23 PM    ALKPHOS 93 09/09/2022 04:23 PM    AST 18 09/09/2022 04:23 PM    ALT 8 09/09/2022 04:23 PM       POC Tests: No results for input(s): POCGLU, POCNA, POCK, POCCL, POCBUN, POCHEMO, POCHCT in the last 72 hours.     Coags:   Lab Results   Component Value Date/Time    PROTIME 11.2 02/06/2022 10:30 AM    INR 0.99 02/06/2022 10:30 AM APTT 26.3 12/20/2013 12:02 AM       HCG (If Applicable): No results found for: PREGTESTUR, PREGSERUM, HCG, HCGQUANT     ABGs:   Lab Results   Component Value Date/Time    PHART 7.32 12/19/2013 08:25 PM    PO2ART 73 12/19/2013 08:25 PM    DTN5CHM 34 12/19/2013 08:25 PM    IJE7VQS 17 12/19/2013 08:25 PM    BEART -7.5 12/19/2013 08:25 PM    F1NVVDKI 96 12/19/2013 08:25 PM        Type & Screen (If Applicable):  No results found for: LABABO, LABRH    Drug/Infectious Status (If Applicable):  No results found for: HIV, HEPCAB    COVID-19 Screening (If Applicable):   Lab Results   Component Value Date/Time    COVID19 Not Detected 02/06/2022 11:11 AM    COVID19 Not Detected 01/27/2022 04:54 PM           Anesthesia Evaluation  Patient summary reviewed no history of anesthetic complications:   Airway: Mallampati: II  TM distance: >3 FB   Neck ROM: full  Mouth opening: > = 3 FB   Dental:    (+) edentulous      Pulmonary: breath sounds clear to auscultation  (+) COPD (states rarely uses O2 2L at home):  shortness of breath:  sleep apnea:      (-) not a current smoker                           Cardiovascular:    (+) hypertension:,     (-) valvular problems/murmurs, past MI, CAD, CABG/stent, dysrhythmias,  angina and no pulmonary hypertension    ECG reviewed  Rhythm: regular  Rate: normal                    Neuro/Psych:   (+) neuromuscular disease:, psychiatric history:depression/anxiety             GI/Hepatic/Renal:   (+) GERD:,      (-) PUD, hepatitis, liver disease and no renal disease       Endo/Other:    (+) hypothyroidism: arthritis:., .    (-) diabetes mellitus               Abdominal:             Vascular: Other Findings:           Anesthesia Plan      MAC     ASA 3       Induction: intravenous. Anesthetic plan and risks discussed with patient. Plan discussed with CRNA.                 This pre-anesthesia assessment may be used as a history and physical.    DOS STAFF ADDENDUM:    Pt seen and examined, chart reviewed (including anesthesia, drug and allergy history). No interval changes to history and physical examination. Anesthetic plan, risks, benefits, alternatives, and personnel involved discussed with patient. Patient verbalized an understanding and agrees to proceed.       Dulce Maria Cantu MD  October 11, 2022  7:50 AM      Dulce Maria Cantu MD   10/11/2022

## 2022-10-11 NOTE — H&P
Update History & Physical    The patient's History and Physical from my office visit on October 6, 2022 was reviewed with the patient and I examined the patient. There was no change. The surgical site was confirmed by the patient and me. Patient with 5 right upper arm lipomas and two epigastric lipomas. Sites marked. Plan: The risks, benefits, expected outcome, and alternative to the recommended procedure have been discussed with the patient. Patient understands and wants to proceed with the procedure. Will proceed with excision of right upper arm lipomas x 5 and epigastric lipomas x 2. Ancef ordered. Bilateral SCDs.     Electronically signed by Alis Lopez MD on 10/11/2022 at 11:25 AM

## 2022-10-12 PROBLEM — D17.20 LIPOMA OF UPPER ARM: Status: ACTIVE | Noted: 2022-10-12

## 2022-10-13 NOTE — OP NOTE
[address] [place] [address] [facility]                                OPERATIVE REPORT    PATIENT NAME: Bill Ellis                      :        1955  MED REC NO:   [id number]                          ROOM:  ACCOUNT NO:   [de-identified]                           ADMIT DATE: 10/11/2022  PROVIDER:     Barbie Momin MD    DATE OF PROCEDURE:  10/11/2022    PREOPERATIVE DIAGNOSES:  Lipomas of right upper extremity x5 and  epigastric abdominal wall lipomas x2. POSTOPERATIVE DIAGNOSES:  Lipomas of right upper extremity x5 and  epigastric abdominal wall lipomas x2. OPERATION PERFORMED:  1. Excision of right upper arm lipomas measuring 0.8 x 1.1 cm, 1.6 x  2.5 cm, 1 x 1.3 cm, 1.2 x 1.5 cm, and 0.9 x 1.2 cm.  2.  Excision of epigastric abdominal wall lipomas measuring 1.5 x 1.7 cm  and 2 x 2.2 cm. SURGEON:  Barbie Momin MD    ANESTHESIA:  MAC. ASA CLASS:  III. DVT PROPHYLAXIS:  The patient was wearing bilateral SCDs. ANTIBIOTICS:  The patient received Ancef 2 gm IV preoperatively. INDICATIONS:  The patient is a 27-year-old female, who presented with  multiple subcutaneous masses of her bilateral upper extremities,  epigastric abdominal wall, and left lower back. These on exam were  consistent with lipomas. She is presenting today for excision of her  more symptomatic right upper arm lipomas along with her epigastric  lipomas. Risks, benefits, and alternatives were explained to the  patient. She was willing to consent for the procedure. OPERATIVE PROCEDURE:  After informed consent was obtained, the patient  was brought to the operating room and placed in the supine position. MAC anesthesia was induced. We then prepped her right upper arm and  abdominal wall in the usual sterile fashion. Time-out was then  performed. We first started with her right upper arm.   A combination of  0.5% Marcaine along with 2% lidocaine with epinephrine was injected  directly overlying her five symptomatic lipomas. Transverse incisions  were then made directly over top of these. We then sequentially removed  one lipoma after another. The first lipoma was 0.8 x 1.1 cm. The  second lipoma was 1.6 x 2.5 cm. The third lipoma was 1 x 1.3 cm. The  fourth lipoma was 1.2 x 1.5 cm and then the fifth lipoma was 1.9 x 2.3  cm. Once these were removed, electrocautery was used for hemostasis. The incisions were then closed using 4-0 Vicryl suture and Dermabond was  placed over top. We then transitioned to the epigastric abdominal wall  lipomas. Similar to the right upper arm, lidocaine was injected  overlying both of these two sites. Incisions were then made over both  of the sites. We then sequentially removed the right inferior lipoma,  which measured 1.5 x 1.7 cm and then we removed the second lipoma, which  was 2 x 2.2 cm. It should be noted that all of these incisions were  made down to the subcutaneous tissue, which is where the lipomas were  located. After the two epigastric lipomas were removed, electrocautery  was used for hemostasis. Then we used 4-0 Vicryl suture to close each  of the two defects in a subcuticular layer and Dermabond was placed over  top. Overall, the patient tolerated the procedure well and was taken to  the recovery room in stable condition. FINDINGS:  Five subcutaneous lipomas of right upper arm and two  epigastric abdominal wall lipomas. ESTIMATED BLOOD LOSS:  Less than 50. COMPLICATIONS:  None. SPECIMENS:  1. Right upper extremity lipomas. 2.  Epigastric abdominal wall lipomas.         Shadi La MD    D: 10/12/2022 16:47:05       T: 10/12/2022 22:46:17     [email or web address]  Job#: [id number]     Doc#: [id number]    CC:

## 2022-10-14 ENCOUNTER — ANESTHESIA EVENT (OUTPATIENT)
Dept: OPERATING ROOM | Age: 67
End: 2022-10-14
Payer: MEDICARE

## 2022-10-18 ENCOUNTER — ANESTHESIA (OUTPATIENT)
Dept: OPERATING ROOM | Age: 67
End: 2022-10-18
Payer: MEDICARE

## 2022-10-18 ENCOUNTER — HOSPITAL ENCOUNTER (OUTPATIENT)
Age: 67
Setting detail: OUTPATIENT SURGERY
Discharge: HOME | End: 2022-10-18
Attending: SURGERY | Admitting: SURGERY
Payer: MEDICARE

## 2022-10-18 VITALS
OXYGEN SATURATION: 93 % | WEIGHT: 174.49 LBS | DIASTOLIC BLOOD PRESSURE: 78 MMHG | TEMPERATURE: 97.3 F | HEIGHT: 65 IN | RESPIRATION RATE: 18 BRPM | BODY MASS INDEX: 29.07 KG/M2 | SYSTOLIC BLOOD PRESSURE: 122 MMHG | HEART RATE: 78 BPM

## 2022-10-18 DIAGNOSIS — D17.21 LIPOMA OF RIGHT SHOULDER: ICD-10-CM

## 2022-10-18 DIAGNOSIS — D17.20 LIPOMA OF UPPER ARM: Primary | ICD-10-CM

## 2022-10-18 DIAGNOSIS — D17.22 LIPOMA OF LEFT UPPER EXTREMITY: ICD-10-CM

## 2022-10-18 DIAGNOSIS — D17.1 LIPOMA OF LOWER BACK: ICD-10-CM

## 2022-10-18 LAB
GLUCOSE BLD-MCNC: 109 MG/DL (ref 70–99)
PERFORMED ON: ABNORMAL

## 2022-10-18 PROCEDURE — 24075 EXC ARM/ELBOW LES SC < 3 CM: CPT | Performed by: SURGERY

## 2022-10-18 PROCEDURE — 6360000002 HC RX W HCPCS: Performed by: SURGERY

## 2022-10-18 PROCEDURE — 2580000003 HC RX 258: Performed by: SURGERY

## 2022-10-18 PROCEDURE — 7100000010 HC PHASE II RECOVERY - FIRST 15 MIN: Performed by: SURGERY

## 2022-10-18 PROCEDURE — 2709999900 HC NON-CHARGEABLE SUPPLY: Performed by: SURGERY

## 2022-10-18 PROCEDURE — 3700000001 HC ADD 15 MINUTES (ANESTHESIA): Performed by: SURGERY

## 2022-10-18 PROCEDURE — 7100000000 HC PACU RECOVERY - FIRST 15 MIN: Performed by: SURGERY

## 2022-10-18 PROCEDURE — 6360000002 HC RX W HCPCS: Performed by: NURSE ANESTHETIST, CERTIFIED REGISTERED

## 2022-10-18 PROCEDURE — 2500000003 HC RX 250 WO HCPCS: Performed by: NURSE ANESTHETIST, CERTIFIED REGISTERED

## 2022-10-18 PROCEDURE — 7100000001 HC PACU RECOVERY - ADDTL 15 MIN: Performed by: SURGERY

## 2022-10-18 PROCEDURE — 21930 EXC BACK LES SC < 3 CM: CPT | Performed by: SURGERY

## 2022-10-18 PROCEDURE — 6360000002 HC RX W HCPCS: Performed by: ANESTHESIOLOGY

## 2022-10-18 PROCEDURE — 2500000003 HC RX 250 WO HCPCS: Performed by: SURGERY

## 2022-10-18 PROCEDURE — 7100000011 HC PHASE II RECOVERY - ADDTL 15 MIN: Performed by: SURGERY

## 2022-10-18 PROCEDURE — 88304 TISSUE EXAM BY PATHOLOGIST: CPT

## 2022-10-18 PROCEDURE — 3600000013 HC SURGERY LEVEL 3 ADDTL 15MIN: Performed by: SURGERY

## 2022-10-18 PROCEDURE — 3600000003 HC SURGERY LEVEL 3 BASE: Performed by: SURGERY

## 2022-10-18 PROCEDURE — 3700000000 HC ANESTHESIA ATTENDED CARE: Performed by: SURGERY

## 2022-10-18 PROCEDURE — 2580000003 HC RX 258: Performed by: ANESTHESIOLOGY

## 2022-10-18 RX ORDER — HYDROCODONE BITARTRATE AND ACETAMINOPHEN 5; 325 MG/1; MG/1
1 TABLET ORAL EVERY 4 HOURS PRN
Qty: 18 TABLET | Refills: 0 | Status: SHIPPED | OUTPATIENT
Start: 2022-10-18 | End: 2022-10-25

## 2022-10-18 RX ORDER — FENTANYL CITRATE 50 UG/ML
INJECTION, SOLUTION INTRAMUSCULAR; INTRAVENOUS PRN
Status: DISCONTINUED | OUTPATIENT
Start: 2022-10-18 | End: 2022-10-18 | Stop reason: SDUPTHER

## 2022-10-18 RX ORDER — LABETALOL HYDROCHLORIDE 5 MG/ML
5 INJECTION, SOLUTION INTRAVENOUS
Status: DISCONTINUED | OUTPATIENT
Start: 2022-10-18 | End: 2022-10-18 | Stop reason: HOSPADM

## 2022-10-18 RX ORDER — SODIUM CHLORIDE 9 MG/ML
INJECTION, SOLUTION INTRAVENOUS CONTINUOUS
Status: DISCONTINUED | OUTPATIENT
Start: 2022-10-18 | End: 2022-10-18 | Stop reason: HOSPADM

## 2022-10-18 RX ORDER — FENTANYL CITRATE 50 UG/ML
25 INJECTION, SOLUTION INTRAMUSCULAR; INTRAVENOUS EVERY 5 MIN PRN
Status: DISCONTINUED | OUTPATIENT
Start: 2022-10-18 | End: 2022-10-18 | Stop reason: HOSPADM

## 2022-10-18 RX ORDER — SODIUM CHLORIDE 9 MG/ML
INJECTION, SOLUTION INTRAVENOUS PRN
Status: DISCONTINUED | OUTPATIENT
Start: 2022-10-18 | End: 2022-10-18 | Stop reason: HOSPADM

## 2022-10-18 RX ORDER — OXYCODONE HYDROCHLORIDE 5 MG/1
5 TABLET ORAL PRN
Status: DISCONTINUED | OUTPATIENT
Start: 2022-10-18 | End: 2022-10-18 | Stop reason: HOSPADM

## 2022-10-18 RX ORDER — SODIUM CHLORIDE 0.9 % (FLUSH) 0.9 %
5-40 SYRINGE (ML) INJECTION PRN
Status: DISCONTINUED | OUTPATIENT
Start: 2022-10-18 | End: 2022-10-18 | Stop reason: HOSPADM

## 2022-10-18 RX ORDER — OXYCODONE HYDROCHLORIDE 10 MG/1
10 TABLET ORAL PRN
Status: DISCONTINUED | OUTPATIENT
Start: 2022-10-18 | End: 2022-10-18 | Stop reason: HOSPADM

## 2022-10-18 RX ORDER — MEPERIDINE HYDROCHLORIDE 25 MG/ML
12.5 INJECTION INTRAMUSCULAR; INTRAVENOUS; SUBCUTANEOUS EVERY 5 MIN PRN
Status: DISCONTINUED | OUTPATIENT
Start: 2022-10-18 | End: 2022-10-18 | Stop reason: HOSPADM

## 2022-10-18 RX ORDER — ONDANSETRON 2 MG/ML
4 INJECTION INTRAMUSCULAR; INTRAVENOUS
Status: COMPLETED | OUTPATIENT
Start: 2022-10-18 | End: 2022-10-18

## 2022-10-18 RX ORDER — PROPOFOL 10 MG/ML
INJECTION, EMULSION INTRAVENOUS CONTINUOUS PRN
Status: DISCONTINUED | OUTPATIENT
Start: 2022-10-18 | End: 2022-10-18 | Stop reason: SDUPTHER

## 2022-10-18 RX ORDER — SODIUM CHLORIDE 0.9 % (FLUSH) 0.9 %
5-40 SYRINGE (ML) INJECTION EVERY 12 HOURS SCHEDULED
Status: DISCONTINUED | OUTPATIENT
Start: 2022-10-18 | End: 2022-10-18 | Stop reason: HOSPADM

## 2022-10-18 RX ORDER — MIDAZOLAM HYDROCHLORIDE 1 MG/ML
INJECTION INTRAMUSCULAR; INTRAVENOUS PRN
Status: DISCONTINUED | OUTPATIENT
Start: 2022-10-18 | End: 2022-10-18 | Stop reason: SDUPTHER

## 2022-10-18 RX ORDER — DIPHENHYDRAMINE HYDROCHLORIDE 50 MG/ML
12.5 INJECTION INTRAMUSCULAR; INTRAVENOUS
Status: DISCONTINUED | OUTPATIENT
Start: 2022-10-18 | End: 2022-10-18 | Stop reason: HOSPADM

## 2022-10-18 RX ORDER — LIDOCAINE HYDROCHLORIDE 20 MG/ML
INJECTION, SOLUTION EPIDURAL; INFILTRATION; INTRACAUDAL; PERINEURAL PRN
Status: DISCONTINUED | OUTPATIENT
Start: 2022-10-18 | End: 2022-10-18 | Stop reason: SDUPTHER

## 2022-10-18 RX ADMIN — FENTANYL CITRATE 25 MCG: 50 INJECTION INTRAMUSCULAR; INTRAVENOUS at 13:33

## 2022-10-18 RX ADMIN — ONDANSETRON 4 MG: 2 INJECTION INTRAMUSCULAR; INTRAVENOUS at 15:26

## 2022-10-18 RX ADMIN — CEFAZOLIN 2000 MG: 2 INJECTION, POWDER, FOR SOLUTION INTRAMUSCULAR; INTRAVENOUS at 13:24

## 2022-10-18 RX ADMIN — SODIUM CHLORIDE: 9 INJECTION, SOLUTION INTRAVENOUS at 13:24

## 2022-10-18 RX ADMIN — PROPOFOL 150 MCG/KG/MIN: 10 INJECTION, EMULSION INTRAVENOUS at 13:30

## 2022-10-18 RX ADMIN — FENTANYL CITRATE 25 MCG: 50 INJECTION INTRAMUSCULAR; INTRAVENOUS at 13:42

## 2022-10-18 RX ADMIN — MIDAZOLAM 2 MG: 1 INJECTION INTRAMUSCULAR; INTRAVENOUS at 13:24

## 2022-10-18 RX ADMIN — FENTANYL CITRATE 50 MCG: 50 INJECTION INTRAMUSCULAR; INTRAVENOUS at 13:27

## 2022-10-18 RX ADMIN — LIDOCAINE HYDROCHLORIDE 100 MG: 20 INJECTION, SOLUTION EPIDURAL; INFILTRATION; INTRACAUDAL; PERINEURAL at 13:30

## 2022-10-18 ASSESSMENT — PAIN - FUNCTIONAL ASSESSMENT: PAIN_FUNCTIONAL_ASSESSMENT: 0-10

## 2022-10-18 ASSESSMENT — LIFESTYLE VARIABLES: SMOKING_STATUS: 0

## 2022-10-18 ASSESSMENT — ENCOUNTER SYMPTOMS: SHORTNESS OF BREATH: 0

## 2022-10-18 NOTE — ANESTHESIA PRE PROCEDURE
Department of Anesthesiology  Preprocedure Note       Name:  Honorio Fermin   Age:  79 y.o.  :  1955                                          MRN:  [id number]         Date:  10/18/2022      Surgeon: Barbra Holloway):  Charmaine Phillips MD    Procedure: Procedure(s):  EXCISION OF LEFT UPPER EXTREMITY LIPOMAS X 5  EXCISION OF LEFT LOWER BACK LIPOMA X 1    Medications prior to admission:   Prior to Admission medications    Medication Sig Start Date End Date Taking?  Authorizing Provider   docusate sodium (STOOL SOFTENER) 100 MG capsule TAKE ONE CAPSULE BY MOUTH TWICE DAILY AS NEEDED FOR CONSTIPATION 10/11/22   Charmaine Phillips MD   cyclobenzaprine (FLEXERIL) 5 MG tablet Take 1 tablet by mouth three times daily as needed for muscle spasm 22   Lora Goldberg MD   diphenhydrAMINE (BANOPHEN) 50 MG capsule TAKE 1 CAPSULE BY MOUTH ONCE DAILY AT NIGHT AT BEDTIME AS NEEDED FOR [facility] 22   Lora Goldberg MD   cloNIDine (CATAPRES) 0.1 MG tablet Take 1 tablet by mouth 2 times daily Bid 22   Clay hZang MD   hydroCHLOROthiazide (HYDRODIURIL) 25 MG tablet Take 1 tablet by mouth once daily 22   Clay Zhang MD   ondansetron (ZOFRAN ODT) 4 MG disintegrating tablet Take 1 tablet by mouth every 8 hours as needed for Nausea or Vomiting 22   Clay Zhang MD   levothyroxine (SYNTHROID) 125 MCG tablet Take 1 tablet by mouth Daily TAKE 1 TABLET EVERY DAY (DISCONTINUE 135MCG)  Patient taking differently: Take 125 mcg by mouth every other day 22   Clay Zhang MD   potassium chloride (KLOR-CON M) 10 MEQ extended release tablet Take 2 tablets by mouth once daily  Patient taking differently: Take 10 mEq by mouth daily 22   Lora Goldberg MD   QUEtiapine (SEROQUEL) 100 MG tablet TAKE 1 TABLET EVERY NIGHT  Patient taking differently: Take 100 mg by mouth nightly as needed 22   Lora Goldberg MD   Calcium Carb-Cholecalciferol (CALCIUM-VITAMIN D) 600-400 MG-UNIT TABS Take 1 tablet by mouth twice daily  Patient taking differently: daily 4/8/22   Maritza Nguyễn MD   Fluticasone furoate-vilanterol (BREO ELLIPTA) 200-25 MCG/INH AEPB inhaler Inhale 1 puff into the lungs daily 3/7/22   Maritza Nguyễn MD   COVID-19 Specimen Collection KIT TEST AS DIRECTED TODAY 2/15/22   Historical Provider, MD    MG tablet TAKE 1 TABLET BY MOUTH EVERY 8 HOURS AS NEEDED FOR PAIN 12/16/21   Maritza Nguyễn MD   Nebulizers (COMPRESSOR/NEBULIZER) MISC 1 Units by Does not apply route 4 times daily as needed (Wheezing SOB) 11/24/21   Maritza Nguyễn MD   Respiratory Therapy Supplies MISC 1 each by Does not apply route 4 times daily as needed (wheezing SOB) All Nebulizer supplies needed 11/24/21   Maritza Nguyễn MD   ipratropium-albuterol (DUONEB) 0.5-2.5 (3) MG/3ML SOLN nebulizer solution Inhale 3 mLs into the lungs as needed for Shortness of Breath 11/17/21   Maritza Nguyễn MD   ALPRAZolam Jay Perea) 1 MG tablet Take 1 mg by mouth daily as needed.     Historical Provider, MD   albuterol-ipratropium (COMBIVENT RESPIMAT)  MCG/ACT AERS inhaler INHALE 1 PUFF BY MOUTH EVERY SIX HOURS AS NEEDED FOR WHEEZING 7/14/21   Maritza Nguyễn MD   lactulose [facility]) 10 GM/15ML solution Take 15 mLs by mouth as needed (constipation) 6/4/21   Maritza Nguyễn MD   [facility] 4 MG/0.1ML LIQD nasal spray Take by mouth as needed  11/11/19   Historical Provider, MD   tiZANidine (ZANAFLEX) 4 MG tablet Take 4 mg by mouth 2 times daily as needed     Historical Provider, MD       Current medications:    Current Facility-Administered Medications   Medication Dose Route Frequency Provider Last Rate Last Admin    0.9 % sodium chloride infusion   IntraVENous Continuous Dianna Singleton MD        sodium chloride flush 0.9 % injection 5-40 mL  5-40 mL IntraVENous 2 times per day Dianna Singleton MD        sodium chloride flush 0.9 % injection 5-40 mL  5-40 mL IntraVENous PRN Dianna Singleton MD        0.9 % sodium chloride infusion IntraVENous PRN Atiya Stone MD        ceFAZolin (ANCEF) 2,000 mg in dextrose 5 % 50 mL IVPB (mini-bag)  2,000 mg IntraVENous On Call to [address] Dodson MD           Allergies: Allergies   Allergen Reactions    Bupropion Other (See Comments)     Muscle spasms/seizure like symptoms     Morphine Hives and Itching    Morphine And Related Itching       Problem List:    Patient Active Problem List   Diagnosis Code    Depression F32. A    Constipation K59.00    High anion gap metabolic acidosis T74.75    MORALES (dyspnea on exertion) R06.09    COPD exacerbation (HCC) J44.1    GERD (gastroesophageal reflux disease) K21.9    Essential hypertension I10    Prediabetes R73.03    Vitamin D deficiency E55.9    History of cholecystectomy Z90.49    Degenerative spondylolisthesis M43.10    Spondylolisthesis, lumbar region M43.16    DDD (degenerative disc disease), lumbar M51.36    Mechanical low back pain M54.59    Chronic pain syndrome G89.4    Facet arthropathy M47.819    Spinal stenosis of lumbar region M48.061    Degenerative lumbar spinal stenosis M48.061    Disc displacement, lumbar M51.26    Osteoarthritis of lumbar spine M47.816    Chronic low back pain M54.50, G89.29    Lymphocytosis D72.820    Leukocytosis D72.829    Partial small bowel obstruction (HCC) K56.600    Cortical age-related cataract of both eyes H25.013    Age-related nuclear cataract of both eyes H25.13    RPE (retinal pigment epithelium) atrophy H35.54    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, E07.9    AVRIL (obstructive sleep apnea) G47.33    Mixed hyperlipidemia E78.2    Chronic nausea R11.0    Abnormal mammogram R92.8    Former heavy cigarette smoker (20-39 per day) Z87.891    Former smoker Z87.891    Hypothyroidism E03.9    Osteoarthritis M19.90    COPD with acute exacerbation (HCC) J44.1    Seizure ([address] R56.9    Lipoma of upper arm D17.20       Past Medical History:        Diagnosis Date    [place] [facility]acute kidney [facility]) ([facility] [address] 10/07/2013    Anxiety     Chronic abdominal pain     possibly due to diverticulosos    COPD (chronic obstructive pulmonary disease) (HCC)     DDD (degenerative disc disease), lumbar 12/01/2016    Elevated glycated hemoglobin     Hyperlipidemia     Hypertension     Hypothyroidism     Rotator cuff tear     right    Wears glasses        Past Surgical History:        Procedure Laterality Date    ABDOMEN SURGERY N/A 10/11/2022    EXCISION OF EPIGASTRIC LIPOMA X 2 performed by Kevin Arauz MD at [address] Right 10/11/2022    EXCISION OF RIGHT UPPER EXTREMITY LIPOMA X 5 performed by Kvein Arauz MD at [address] Left     CENTRAL LINE  10/07/2013         CHOLECYSTECTOMY      COLONOSCOPY  09/06/2011    Tubular adenoma    COLONOSCOPY  10/24/2005    Dr. Lori Kumar - Tubular adenoma    CYST REMOVAL Left     thumb    ENDOSCOPY, COLON, DIAGNOSTIC      ESOPHAGEAL MOTILITY STUDY  06/17/2013    NORMAL    EYE SURGERY Bilateral     cataracts    FINE NEEDLE ASPIRATION  08/10/2012    THYROID - NEGATIVE    FINGER NAIL SURGERY Bilateral 05/21/2019    TOTAL AVULSION OF 1-5 TOENAILS BILATERAL FEET performed by Latasha Cedeno DPM at [address] [place] [address] Right 1976    inguinal hernia    HYSTERECTOMY (CERVIX STATUS UNKNOWN)  2002    OVARIAN CYST SURGERY Left     1976 and 1977(left side) and 1980 Right side    ROTATOR CUFF REPAIR Left 2021    MIA AND BSO (CERVIX REMOVED)  08/15/2002    Endometriosis, fibroids    TONSILLECTOMY  1972    TUBAL LIGATION  1984    UPPER GASTROINTESTINAL ENDOSCOPY  10/17/2005    Bx small bowel, Gastric, GE junction all negative    UPPER GASTROINTESTINAL ENDOSCOPY  09/05/2000    Dr. Ambrocio Thompson - NORMAL       Social History:    Social History     Tobacco Use    Smoking status: Former     Packs/day: 0.25     Years: 32.00     Pack years: 8.00     Types: Cigarettes     Quit date: 1/10/2017 Years since quittin.7    Smokeless tobacco: Never    Tobacco comments:     30 years    Substance Use Topics    Alcohol use: No                                Counseling given: Not Answered  Tobacco comments: 30 years       Vital Signs (Current):   Vitals:    10/07/22 1653 10/18/22 1134   BP:  114/67   Pulse:  87   Resp:  16   Temp:  96.8 °F (36 °C)   TempSrc:  Temporal   SpO2:  94%   Weight: 179 lb (81.2 kg) 174 lb 7.9 oz (79.2 kg)   Height: 5' 5\" (1.651 m) 5' 5\" (1.651 m)                                              BP Readings from Last 3 Encounters:   10/18/22 114/67   10/11/22 128/74   10/06/22 (!) 153/97       NPO Status:                                                                                 BMI:   Wt Readings from Last 3 Encounters:   10/18/22 174 lb 7.9 oz (79.2 kg)   10/11/22 175 lb 0.7 oz (79.4 kg)   10/06/22 176 lb (79.8 kg)     Body mass index is 29.04 kg/m².     CBC:   Lab Results   Component Value Date/Time    WBC 12.7 2022 04:24 PM    RBC 4.82 2022 04:24 PM    RBC 5.35 2017 02:26 PM    HGB 13.0 2022 04:24 PM    HCT 40.5 2022 04:24 PM    MCV 84.0 2022 04:24 PM    RDW 14.0 2022 04:24 PM     2022 04:24 PM       CMP:   Lab Results   Component Value Date/Time     2022 04:23 PM    K 4.5 2022 04:23 PM     2022 04:23 PM    CO2 17 2022 04:23 PM    BUN 12 2022 04:23 PM    CREATININE 0.7 2022 04:23 PM    GFRAA >60 2022 04:23 PM    GFRAA >60 2013 06:20 PM    AGRATIO 1.3 2022 04:23 PM    LABGLOM >60 2022 04:23 PM    GLUCOSE 114 2022 04:23 PM    PROT 7.6 2022 04:23 PM    PROT 6.8 2013 09:25 AM    CALCIUM 11.2 2022 04:23 PM    BILITOT 0.3 2022 04:23 PM    ALKPHOS 93 2022 04:23 PM    AST 18 2022 04:23 PM    ALT 8 2022 04:23 PM       POC Tests: No results for input(s): POCGLU, POCNA, POCK, POCCL, POCBUN, POCHEMO, POCHCT in the last 72 hours. Coags:   Lab Results   Component Value Date/Time    PROTIME 11.2 02/06/2022 10:30 AM    INR 0.99 02/06/2022 10:30 AM    APTT 26.3 12/20/2013 12:02 AM       HCG (If Applicable): No results found for: PREGTESTUR, PREGSERUM, HCG, HCGQUANT     ABGs:   Lab Results   Component Value Date/Time    PHART 7.32 12/19/2013 08:25 PM    PO2ART 73 12/19/2013 08:25 PM    MLI5NTB 34 12/19/2013 08:25 PM    CIK1TJW 17 12/19/2013 08:25 PM    BEART -7.5 12/19/2013 08:25 PM    T4IGJQTM 96 12/19/2013 08:25 PM        Type & Screen (If Applicable):  No results found for: LABABO, LABRH    Drug/Infectious Status (If Applicable):  No results found for: HIV, HEPCAB    COVID-19 Screening (If Applicable):   Lab Results   Component Value Date/Time    COVID19 Not Detected 02/06/2022 11:11 AM    COVID19 Not Detected 01/27/2022 04:54 PM           Anesthesia Evaluation  Patient summary reviewed   history of anesthetic complications (Had PONV in the distant past with GEN but none recently): PONV. Airway: Mallampati: II  TM distance: <3 FB   Neck ROM: limited  Mouth opening: > = 3 FB   Dental: normal exam   (+) lower dentures and upper dentures      Pulmonary:normal exam  breath sounds clear to auscultation  (+) COPD:  sleep apnea:      (-) shortness of breath and not a current smoker                          ROS comment: Uses her Brio most days - no rescue inhaler for a long while   Cardiovascular:    (+) hypertension:,         Rhythm: regular  Rate: normal                 ROS comment: EF 50-55%, mild MR, Trivial TR     Neuro/Psych:   (+) seizures:, depression/anxiety              ROS comment: DDD  Seizures d/t [place]ryn per patient - years ago GI/Hepatic/Renal:   (+) GERD:,           Endo/Other:    (+) hypothyroidism: arthritis:., .                 Abdominal:             Vascular: negative vascular ROS. Other Findings:           Anesthesia Plan      MAC     ASA 3       Induction: intravenous.       Anesthetic plan and risks discussed with patient. Use of blood products discussed with patient whom did not consent to blood products. Plan discussed with CRNA.     Attending anesthesiologist reviewed and agrees with Tato Emerson MD   10/18/2022

## 2022-10-18 NOTE — PROGRESS NOTES
Pt discharged to home. Transported with wheelchair. Accompanied by cousin. [address] daughter to be home with patient. Transported in personal vehicle. Discharge instructions, Rx, and personal belongings given to pt. Explanation of discharge medications and instructions understood by verbal statement. No questions, comments or concerns at this time.        Electronically signed by Davion Wing RN on 10/18/2022 at 3:47 PM

## 2022-10-18 NOTE — PROGRESS NOTES
Patient arrived to phase 2. Denies pain. Left arm incisions x5 and back incision x2 clean, dry and intact.        Electronically signed by Vera Bumpers, RN on 10/18/2022 at 3:10 PM

## 2022-10-18 NOTE — BRIEF OP NOTE
Brief Postoperative Note      Patient:  Daysi Luciano  YOB: 1955  MRN: [id number]    Date of Procedure: 10/18/2022    Pre-Op Diagnosis: LIPOMA OF LEFT UPPER EXTREMITY x 5, LIPOMA OF LOWER BACK x 2    Post-Op Diagnosis: Same       Procedure(s):  EXCISION OF LEFT UPPER EXTREMITY LIPOMAS X 5  EXCISION OF LEFT LOWER BACK LIPOMA X 2    Surgeon(s):  Puja Herrera MD    Assistant:  Surgical Assistant: Jerel Casiano    Anesthesia: Monitor Anesthesia Care    Estimated Blood Loss (mL): less than 50     Complications: None    Specimens:   ID Type Source Tests Collected by Time Destination   A : Left upper arm lipomas Specimen Arm SURGICAL PATHOLOGY Puja Herrera MD 10/18/2022 5051    B : Left lower back Lipomas Tissue Tissue SURGICAL PATHOLOGY Puja Herrera MD 10/18/2022 1354        Implants:  * No implants in log *      Drains: * No LDAs found *    Findings: lipomas of the left upper extremity measuring 1.2 x 1.7 cm, 1 x 1 cm, 0.8 x 1 cm, 1 x 1 cm, 1.4 x 1.6 cm; lipomas of lower back measuring 0.8 x 1.2 cm and 1.1 x 2 cm    Electronically signed by Puja Herrera MD on 10/18/2022 at 2:15 PM

## 2022-10-18 NOTE — DISCHARGE INSTRUCTIONS
Solitario Christopher MD     General and Vascular Surgery   Linn Nyhan, MD     [address] [facility] [address]     [address] [place] [address], [place]mani [facility]  Norma Gastelum, APRN - CNP   Phone: [phone]           Fax: [phone]                                               POST-OPERATIVE INSTRUCTIONS FOR LIPOMA REMOVAL    Call the office to schedule your post-operative appointment with your surgeon for two (2) weeks. You will have water occlusive glue closing your incision(s). You may shower. Wash incision(s) gently, and pat dry. Do not rub your incision(s). Do not soak incision(s) in tub baths, hot tubs or pools    General guidelines for activity:  Do what is comfortable: stop and rest when you feel tired. Drink plenty of fluids after surgery. Do NOT drive while taking your narcotic pain medicine. Watch for signs of infection:   Fever over 100.5°   Excessive warmth or bright redness around your incision(s)  Leakage of bloody or cloudy fluid from your incision(s)    You will have pain medicine ordered. Take as directed. If you experience constipation  Increase your water intake, and activity; walking is best.  A stool softener or mild laxative may be necessary if you still have not had a bowel  movement ; call the office for further instructions.

## 2022-10-18 NOTE — PROGRESS NOTES
Patient complaining of nausea. Zofran administered per order. Se eMAR.        Electronically signed by Beryle Oiler, RN on 10/18/2022 at 3:28 PM

## 2022-10-18 NOTE — PROGRESS NOTES
Patient on room air O2 with SAO2 96%. Patient dozing off and on. VSS. Patient denies C/O pain or nausea. Hand off report to [facility].

## 2022-10-18 NOTE — H&P
Update History & Physical    The patient's History and Physical from Dr. Leo Limon on October 11, 2022 was reviewed with the patient and I examined the patient. There was no change. The surgical site was confirmed by the patient and me. Left upper arm lipomas x 5 and left lower back lipomas x 2 marked. Plan: The risks, benefits, expected outcome, and alternative to the recommended procedure have been discussed with the patient. Patient understands and wants to proceed with the procedure. Will proceed with excision of left upper arm lipomas x 5 and left lower back lipomas x 2. Ancef ordered. Bilateral SCDs.      Electronically signed by Juanita Garcia MD on 10/18/2022 at 1:09 PM

## 2022-10-18 NOTE — PROGRESS NOTES
Patient opens eyes to name. Resp easy unlabored on 2L NC with SaO2 96%. Patient denies C/O pain or nausea. VSS. Moving all to command.

## 2022-10-18 NOTE — PROGRESS NOTES
Patient admitted to PACU from OR. Patient asleep. Resp easy unlabored on 4L NC with SaO2 96%. Left upper arm surgical glue dressings x 4 GUERA with edges well approximated. Lower back surgical glue dressings x 2 GUERA with edges well approximated.  IV patent to right wrist.

## 2022-10-18 NOTE — ANESTHESIA POSTPROCEDURE EVALUATION
Department of Anesthesiology  Postprocedure Note    Patient: Aleks Castillo  MRN: [id number]  YOB: 1955  Date of evaluation: 10/18/2022      Procedure Summary     Date: 10/18/22 Room / Location: [address]    Anesthesia Start: 6902 Anesthesia Stop: 9677    Procedures:       EXCISION OF LEFT UPPER EXTREMITY LIPOMAS X 5 (Left)      EXCISION OF LEFT LOWER BACK LIPOMA X 1 (Left) Diagnosis:       Lipoma of right shoulder      Lipoma of lower back      (LIPOMA OF RIGHT UPPER EXTREMITY, LIPOMA OF LOWER BACK)    Surgeons: Carmen Fuentes MD Responsible Provider: Colletta Smiles, MD    Anesthesia Type: MAC ASA Status: 3          Anesthesia Type: No value filed.     Vesta Phase I: Vesta Score: 5    Vesta Phase II: Vesta Score: 10      Anesthesia Post Evaluation    Patient location during evaluation: PACU  Patient participation: complete - patient participated  Level of consciousness: awake and alert  Pain score: 0  Nausea & Vomiting: no nausea  Complications: no  Cardiovascular status: hemodynamically stable  Respiratory status: acceptable  Hydration status: stable

## 2022-10-20 NOTE — OP NOTE
[address] [place] [address] [facility]                                OPERATIVE REPORT    PATIENT NAME: Jessee Rodríguez                      :        1955  MED REC NO:   [id number]                          ROOM:  ACCOUNT NO:   [de-identified]                           ADMIT DATE: 10/18/2022  PROVIDER:     Kobe Bar MD    DATE OF PROCEDURE:  10/18/2022    PREOPERATIVE DIAGNOSES:  Lipoma left upper extremity x5 and lipoma of  the left lower back x2. POSTOPERATIVE DIAGNOSES:  Lipoma left upper extremity x5 and lipoma of  the left lower back x2. OPERATION PERFORMED:  1. Excision of left upper extremity lipomas x5 measuring 1.2 x 1.7 cm,  1 x 1 cm, 0.8 x 1 cm, 1 x 1 cm, and 1.4 x 1.6 cm.  2.  Excision of left lower back lipomas measuring 0.8 x 1.2 cm and 1.1 x  2 cm. SURGEON:  Kobe Bar MD    ANESTHESIA:  Monitored anesthesia care. ASA CLASS:  III. DVT PROPHYLAXIS:  The patient was wearing bilateral SCDs. ANTIBIOTICS:  The patient received Ancef 2 gm IV preoperatively. INDICATIONS:  The patient is a 61-year-old female with a history of  multiple subcutaneous lipomas. She previously had lipomas removed from  her right upper extremity and epigastrium. She is presenting today for  excision of her left upper extremity lipomas along with her left lower  back lipomas. The risks, benefits, and alternatives were explained to  the patient. She was willing to consent for the procedure. OPERATIVE PROCEDURE:  After informed consent was obtained, the patient  was brought to the operating room and placed in the supine position. She was then rotated into the right lateral decubitus position and  anesthesia was induced. Her left upper extremity was prepped and draped  along with her left lower back. We first started with her left upper  extremity. Her lipomas were located in the upper arm.   We went ahead  and injected local at all five locations. We then began to remove these  lipomas first by creating incision directly over top going down and  freeing up lipoma and removing them in their entirety. After the  removal with each of the five locations, we would use electrocautery for  hemostasis. The wounds were closed with a combination of 4-0 Vicryl  suture followed by Dermabond. These lipomas were all located in the  subcutaneous tissue below the level of the skin. The lipomas of the  left upper extremity measured 1.2 x 1.7 cm, 1 x 1 cm, 0.8 x 1 cm, 1 x 1  cm, and the last was 1.4 x 1.6 cm. Once we were finished excising those  and closing the incision, we then progressed to the left lower back  lipomas. Local was injected directly over top of the lipomas and  transverse incisions were made down to the subcutaneous tissue. We then  bluntly freed up the lipomas in both of these locations in order to  remove them in their entirety. These two lipomas measured 0.8 x 1.2 cm  and 1.1 x 2 cm. After removing them, the wounds were inspected and  there were no residual lipomas. We then closed the wounds with 4-0  Vicryl suture and Dermabond was placed over top of each of the two  incisions. Overall, the patient tolerated the procedure well, was taken  to the recovery room in stable condition. FINDINGS:  Left upper extremity lipomas x5 and left lower back lipomas  x2 with dimensions as above. ESTIMATED BLOOD LOSS:  Less than 50 mL. COMPLICATIONS:  None. SPECIMENS:  1. Left upper extremity lipomas. 2.  Left lower back lipomas.         Kwaku Wright MD    D: 10/20/2022 12:31:47       T: 10/20/2022 13:17:26     [email or web address]  Job#: [id number]     Doc#: [id number]    CC:

## 2022-12-06 DIAGNOSIS — L29.9 PRURITUS: ICD-10-CM

## 2022-12-06 RX ORDER — CYCLOBENZAPRINE HCL 5 MG
TABLET ORAL
Qty: 90 TABLET | Refills: 0 | Status: SHIPPED | OUTPATIENT
Start: 2022-12-06

## 2022-12-06 RX ORDER — DIPHENHYDRAMINE HCL 50 MG
CAPSULE ORAL
Qty: 30 CAPSULE | Refills: 0 | Status: SHIPPED | OUTPATIENT
Start: 2022-12-06

## 2022-12-06 RX ORDER — OMEPRAZOLE 20 MG/1
CAPSULE, DELAYED RELEASE ORAL
Qty: 90 CAPSULE | Refills: 1 | Status: SHIPPED | OUTPATIENT
Start: 2022-12-06

## 2022-12-21 RX ORDER — LOSARTAN POTASSIUM 25 MG/1
TABLET ORAL
Qty: 90 TABLET | Refills: 1 | Status: SHIPPED | OUTPATIENT
Start: 2022-12-21

## 2022-12-21 NOTE — TELEPHONE ENCOUNTER
----- Message from Heidi Adan sent at 12/21/2022  2:37 PM EST -----  Subject: Refill Request    QUESTIONS  Name of Medication? Other - losartan 25 mg tablet   Patient-reported dosage and instructions? losartan 25 mg tablet 2x a day   How many days do you have left? 0  [facility]erred [facility]acy? [facility] [place]  Pharmacy phone number (if available)? [phone]  ---------------------------------------------------------------------------  --------------,  Name of Medication? promethazine (PHENERGAN) 6.25 MG/5ML syrup  Patient-reported dosage and instructions? promethazine (PHENERGAN) 6.25   MG/5ML syrup at night   How many days do you have left? 0  [place] [facility] [facility] [place] [address]  Pharmacy phone number (if available)? [phone]  ---------------------------------------------------------------------------  --------------  Fany GRAY  What is the best way for the office to contact you? OK to leave message on   voicemail  Preferred Call Back Phone Number? [phone]  ---------------------------------------------------------------------------  --------------  SCRIPT ANSWERS  Relationship to Patient?  Self

## 2023-01-03 ENCOUNTER — OFFICE VISIT (OUTPATIENT)
Dept: PRIMARY CARE CLINIC | Age: 68
End: 2023-01-03
Payer: MEDICARE

## 2023-01-03 VITALS
RESPIRATION RATE: 18 BRPM | WEIGHT: 174.2 LBS | DIASTOLIC BLOOD PRESSURE: 89 MMHG | SYSTOLIC BLOOD PRESSURE: 139 MMHG | TEMPERATURE: 97.2 F | HEART RATE: 103 BPM | BODY MASS INDEX: 28.99 KG/M2

## 2023-01-03 DIAGNOSIS — R11.0 CHRONIC NAUSEA: ICD-10-CM

## 2023-01-03 DIAGNOSIS — E78.2 MIXED HYPERLIPIDEMIA: ICD-10-CM

## 2023-01-03 DIAGNOSIS — Z77.128 EXPOSURE TO ENVIRONMENTAL TOXIC SUBSTANCES: ICD-10-CM

## 2023-01-03 DIAGNOSIS — J45.40 MODERATE PERSISTENT ASTHMA WITHOUT COMPLICATION: ICD-10-CM

## 2023-01-03 DIAGNOSIS — I10 ESSENTIAL HYPERTENSION: ICD-10-CM

## 2023-01-03 PROBLEM — D17.20 LIPOMA OF UPPER ARM: Status: RESOLVED | Noted: 2022-10-12 | Resolved: 2023-01-03

## 2023-01-03 PROBLEM — J44.1 COPD WITH ACUTE EXACERBATION (HCC): Status: RESOLVED | Noted: 2022-02-06 | Resolved: 2023-01-03

## 2023-01-03 PROBLEM — D17.1 LIPOMA OF LOWER BACK: Status: RESOLVED | Noted: 2022-10-18 | Resolved: 2023-01-03

## 2023-01-03 PROCEDURE — 1036F TOBACCO NON-USER: CPT | Performed by: FAMILY MEDICINE

## 2023-01-03 PROCEDURE — 1090F PRES/ABSN URINE INCON ASSESS: CPT | Performed by: FAMILY MEDICINE

## 2023-01-03 PROCEDURE — 1123F ACP DISCUSS/DSCN MKR DOCD: CPT | Performed by: FAMILY MEDICINE

## 2023-01-03 PROCEDURE — 3017F COLORECTAL CA SCREEN DOC REV: CPT | Performed by: FAMILY MEDICINE

## 2023-01-03 PROCEDURE — 3079F DIAST BP 80-89 MM HG: CPT | Performed by: FAMILY MEDICINE

## 2023-01-03 PROCEDURE — G8417 CALC BMI ABV UP PARAM F/U: HCPCS | Performed by: FAMILY MEDICINE

## 2023-01-03 PROCEDURE — G8399 PT W/DXA RESULTS DOCUMENT: HCPCS | Performed by: FAMILY MEDICINE

## 2023-01-03 PROCEDURE — 3075F SYST BP GE 130 - 139MM HG: CPT | Performed by: FAMILY MEDICINE

## 2023-01-03 PROCEDURE — G8427 DOCREV CUR MEDS BY ELIG CLIN: HCPCS | Performed by: FAMILY MEDICINE

## 2023-01-03 PROCEDURE — G8484 FLU IMMUNIZE NO ADMIN: HCPCS | Performed by: FAMILY MEDICINE

## 2023-01-03 PROCEDURE — 99214 OFFICE O/P EST MOD 30 MIN: CPT | Performed by: FAMILY MEDICINE

## 2023-01-03 RX ORDER — QUETIAPINE FUMARATE 100 MG/1
100 TABLET, FILM COATED ORAL NIGHTLY
Qty: 90 TABLET | Refills: 1 | Status: SHIPPED | OUTPATIENT
Start: 2023-01-03

## 2023-01-03 ASSESSMENT — ENCOUNTER SYMPTOMS
BLOOD IN STOOL: 0
DIARRHEA: 0
SHORTNESS OF BREATH: 1
NAUSEA: 1
CONSTIPATION: 0
ABDOMINAL PAIN: 0

## 2023-01-04 NOTE — PROGRESS NOTES
1/3/2023     Kim Santos (:  1955) is a 79 y.o. female, here for evaluation of the following medical concerns:    Headache  Nausea & Vomiting  Associated symptoms include headaches and nausea. Pertinent negatives include no abdominal pain or chest pain. Patient presented to the office for follow-up. She was a patient of Dr Chelsy Torres and Dr Valarie Mayberry. She has hypertension controlled with losartan 25 mg daily and hydrochlorothiazide, also takes Catapres 0.1 mg twice a day as needed. She has history of asthma and COPD and requested refill of Combivent inhaler, also takes Breo and sometimes a nebulizer treatment. She has hypothyroidism and takes thyroid replacement Synthroid 125 mcg daily. She complain of chronic nausea requested refill of Phenergan syrup. She still fixated on possible exposure to \"red dye #40\" on her beddings which happened sometime before  of  and she thinks it still in her system, complaining of intermittent headache, dizziness, occasional loose stool and red material in the toilet as well as occasional breathing problem. She reported that she called the  of the product, CDC including infectious disease specialist and nobody could give her an answer. Last visit she was advised to see an allergist which she has not done so. Review of Systems   Constitutional:  Negative for activity change and appetite change. Eyes:  Negative for visual disturbance. Respiratory:  Positive for shortness of breath. Cardiovascular:  Negative for chest pain and leg swelling. Gastrointestinal:  Positive for nausea. Negative for abdominal pain, blood in stool, constipation and diarrhea. Genitourinary:  Negative for difficulty urinating, frequency, hematuria, menstrual problem and urgency. Neurological:  Positive for dizziness and headaches. Negative for syncope. Psychiatric/Behavioral:  Negative for behavioral problems.       Prior to Visit Medications Medication Sig Taking?  Authorizing Provider   Promethazine HCl 6.25 MG/5ML SOLN TAKE 5ML BY MOUTH EVERY 6 HOURS AS NEEDED FOR NAUSEA Yes Levan Crigler, MD   QUEtiapine (SEROQUEL) 100 MG tablet Take 1 tablet by mouth nightly Yes Levan Crigler, MD   albuterol-ipratropium (COMBIVENT RESPIMAT)  MCG/ACT AERS inhaler INHALE 1 PUFF BY MOUTH EVERY SIX HOURS AS NEEDED FOR WHEEZING Yes Levan Crigler, MD   losartan (COZAAR) 25 MG tablet TAKE 1 TABLET EVERY DAY  Mae Rahman MD   cyclobenzaprine (FLEXERIL) 5 MG tablet Take 1 tablet by mouth three times daily as needed for muscle spasm  Marie Fierro MD   diphenhydrAMINE (BANOPHEN) 50 MG capsule TAKE 1 CAPSULE BY MOUTH ONCE DAILY AT NIGHT AT BEDTIME AS NEEDED FOR ITCHING  Marie Fierro MD   omeprazole (PRILOSEC) 20 MG delayed release capsule TAKE 1 CAPSULE BY MOUTH IN THE MORNING BEFORE BREAKFAST AS NEEDED FOR HEARTBURN  Marie Fierro MD   docusate sodium (STOOL SOFTENER) 100 MG capsule TAKE ONE CAPSULE BY MOUTH TWICE DAILY AS NEEDED FOR CONSTIPATION  Abran Fernando MD   cloNIDine (CATAPRES) 0.1 MG tablet Take 1 tablet by mouth 2 times daily Bid  Levan Crigler, MD   hydroCHLOROthiazide (HYDRODIURIL) 25 MG tablet Take 1 tablet by mouth once daily  Levan Crigler, MD   ondansetron (ZOFRAN ODT) 4 MG disintegrating tablet Take 1 tablet by mouth every 8 hours as needed for Nausea or Vomiting  Levan Crigler, MD   levothyroxine (SYNTHROID) 125 MCG tablet Take 1 tablet by mouth Daily TAKE 1 TABLET EVERY DAY (DISCONTINUE 135MCG)  Patient taking differently: Take 125 mcg by mouth every other day  Levan Crigler, MD   potassium chloride (KLOR-CON M) 10 MEQ extended release tablet Take 2 tablets by mouth once daily  Patient taking differently: Take 10 mEq by mouth daily  Eh Quiñonez MD   Calcium Carb-Cholecalciferol (CALCIUM-VITAMIN D) 600-400 MG-UNIT TABS Take 1 tablet by mouth twice daily  Patient taking differently: daily  Gonzalez Moreland MD   Fluticasone furoate-vilanterol (BREO ELLIPTA) 200-25 MCG/INH AEPB inhaler Inhale 1 puff into the lungs daily  Gonzalez Moreland MD   COVID-19 Specimen Collection KIT TEST AS DIRECTED TODAY  Historical Provider, MD    MG tablet TAKE 1 TABLET BY MOUTH EVERY 8 HOURS AS NEEDED FOR PAIN  Gonzalez Moreland MD   Nebulizers (COMPRESSOR/NEBULIZER) MISC 1 Units by Does not apply route 4 times daily as needed (Wheezing SOB)  Gonzalez Moreland MD   Respiratory Therapy Supplies MISC 1 each by Does not apply route 4 times daily as needed (wheezing SOB) All Nebulizer supplies needed  Gonzalez Moreland MD   ipratropium-albuterol (DUONEB) 0.5-2.5 (3) MG/3ML SOLN nebulizer solution Inhale 3 mLs into the lungs as needed for Shortness of Breath  Gonzalez Moreland MD   ALPRAZolam Jennett Dubin) 1 MG tablet Take 1 mg by mouth daily as needed. Historical Provider, MD   lactulose (CHRONULAC) 10 GM/15ML solution Take 15 mLs by mouth as needed (constipation)  Gonzalez Moreland MD   [facility] 4 MG/0.1ML LIQD nasal spray Take by mouth as needed   Historical Provider, MD   tiZANidine (ZANAFLEX) 4 MG tablet Take 4 mg by mouth 2 times daily as needed   Historical Provider, MD        Social History     Tobacco Use    Smoking status: Former     Packs/day: 0.25     Years: 32.00     Pack years: 8.00     Types: Cigarettes     Quit date: 1/10/2017     Years since quittin.9    Smokeless tobacco: Never    Tobacco comments:     30 years    Substance Use Topics    Alcohol use: No        Vitals:    23 1557   BP: 139/89   Pulse: (!) 103   Resp: 18   Temp: 97.2 °F (36.2 °C)   TempSrc: Temporal   Weight: 174 lb 3.2 oz (79 kg)     Estimated body mass index is 28.99 kg/m² as calculated from the following:    Height as of 10/18/22: 5' 5\" (1.651 m). Weight as of this encounter: 174 lb 3.2 oz (79 kg). Physical Exam  Constitutional:       Appearance: She is well-developed. HENT:      Head: Normocephalic.       Right Ear: External ear normal.      Nose: Nose normal.   Eyes:      Conjunctiva/sclera: Conjunctivae normal.      Pupils: Pupils are equal, round, and reactive to light. Neck:      Thyroid: No thyromegaly. Cardiovascular:      Rate and Rhythm: Normal rate and regular rhythm. Heart sounds: Normal heart sounds. No murmur heard. No friction rub. Pulmonary:      Effort: Pulmonary effort is normal.      Breath sounds: Normal breath sounds. Abdominal:      General: Bowel sounds are normal.      Palpations: Abdomen is soft. There is no mass. Musculoskeletal:         General: Normal range of motion. Cervical back: Normal range of motion and neck supple. Lymphadenopathy:      Cervical: No cervical adenopathy. Skin:     General: Skin is warm. Findings: No rash. Neurological:      Mental Status: She is alert and oriented to person, place, and time. ASSESSMENT/PLAN:  1. Essential hypertension  Controlled. Continue losartan 25 mg daily, hydrochlorothiazide 25 mg daily and Catapres 0.1 mg twice daily as needed. 2. Moderate persistent asthma without complication  Controlled. Patient requested refill of Combivent inhaler. She also use of Breo and a nebulizer treatment  - albuterol-ipratropium (COMBIVENT RESPIMAT)  MCG/ACT AERS inhaler; INHALE 1 PUFF BY MOUTH EVERY SIX HOURS AS NEEDED FOR WHEEZING  Dispense: 3 each; Refill: 1    3. Mixed hyperlipidemia  Controlled. Will check lipid panel next blood draw. 4. Chronic nausea  Patient requested refill of Phenergan elixir    5. Exposure to environmental toxic substances  Patient is still fixated on possible exposure to \" red dye # 40\" which is contaminated her bedding since Christmas of 2021 and probably still in her system but he claims nobody can provide a good explanation.   Patient was referred again to allergist  - Jennyfer Gambino MD, Allergy & Immunology, [facility] in 2-3 mos     An electronic signature was used to authenticate this note.    --Erick Levi MD on 1/3/2023 at 7:11 PM

## 2023-01-13 RX ORDER — CYCLOBENZAPRINE HCL 5 MG
TABLET ORAL
Qty: 90 TABLET | Refills: 0 | OUTPATIENT
Start: 2023-01-13

## 2023-01-17 ENCOUNTER — OFFICE VISIT (OUTPATIENT)
Dept: PRIMARY CARE CLINIC | Age: 68
End: 2023-01-17
Payer: MEDICARE

## 2023-01-17 VITALS
BODY MASS INDEX: 29.52 KG/M2 | OXYGEN SATURATION: 96 % | HEART RATE: 87 BPM | RESPIRATION RATE: 16 BRPM | TEMPERATURE: 98.1 F | WEIGHT: 177.2 LBS | HEIGHT: 65 IN | SYSTOLIC BLOOD PRESSURE: 135 MMHG | DIASTOLIC BLOOD PRESSURE: 89 MMHG

## 2023-01-17 DIAGNOSIS — J45.40 MODERATE PERSISTENT ASTHMA WITHOUT COMPLICATION: ICD-10-CM

## 2023-01-17 DIAGNOSIS — Z23 NEED FOR PNEUMOCOCCAL VACCINATION: ICD-10-CM

## 2023-01-17 DIAGNOSIS — E03.9 ACQUIRED HYPOTHYROIDISM: ICD-10-CM

## 2023-01-17 DIAGNOSIS — Z77.128 EXPOSURE TO ENVIRONMENTAL TOXIC SUBSTANCES: ICD-10-CM

## 2023-01-17 DIAGNOSIS — R73.03 PRE-DIABETES: ICD-10-CM

## 2023-01-17 DIAGNOSIS — G47.00 INSOMNIA, UNSPECIFIED TYPE: ICD-10-CM

## 2023-01-17 DIAGNOSIS — E78.2 MIXED HYPERLIPIDEMIA: ICD-10-CM

## 2023-01-17 DIAGNOSIS — I10 ESSENTIAL HYPERTENSION: Primary | ICD-10-CM

## 2023-01-17 DIAGNOSIS — I10 ESSENTIAL HYPERTENSION: ICD-10-CM

## 2023-01-17 LAB
A/G RATIO: 2 (ref 1.1–2.2)
ALBUMIN SERPL-MCNC: 4.5 G/DL (ref 3.4–5)
ALP BLD-CCNC: 88 U/L (ref 40–129)
ALT SERPL-CCNC: 12 U/L (ref 10–40)
ANION GAP SERPL CALCULATED.3IONS-SCNC: 15 MMOL/L (ref 3–16)
AST SERPL-CCNC: 12 U/L (ref 15–37)
BILIRUB SERPL-MCNC: <0.2 MG/DL (ref 0–1)
BUN BLDV-MCNC: 12 MG/DL (ref 7–20)
CALCIUM SERPL-MCNC: 10.6 MG/DL (ref 8.3–10.6)
CHLORIDE BLD-SCNC: 104 MMOL/L (ref 99–110)
CO2: 26 MMOL/L (ref 21–32)
CREAT SERPL-MCNC: 0.9 MG/DL (ref 0.6–1.2)
GFR SERPL CREATININE-BSD FRML MDRD: >60 ML/MIN/{1.73_M2}
GLUCOSE BLD-MCNC: 116 MG/DL (ref 70–99)
POTASSIUM SERPL-SCNC: 3.4 MMOL/L (ref 3.5–5.1)
SODIUM BLD-SCNC: 145 MMOL/L (ref 136–145)
TOTAL PROTEIN: 6.8 G/DL (ref 6.4–8.2)
TSH REFLEX: 1.84 UIU/ML (ref 0.27–4.2)

## 2023-01-17 PROCEDURE — G8399 PT W/DXA RESULTS DOCUMENT: HCPCS | Performed by: FAMILY MEDICINE

## 2023-01-17 PROCEDURE — 90677 PCV20 VACCINE IM: CPT | Performed by: FAMILY MEDICINE

## 2023-01-17 PROCEDURE — G0009 ADMIN PNEUMOCOCCAL VACCINE: HCPCS | Performed by: FAMILY MEDICINE

## 2023-01-17 PROCEDURE — 3017F COLORECTAL CA SCREEN DOC REV: CPT | Performed by: FAMILY MEDICINE

## 2023-01-17 PROCEDURE — 1036F TOBACCO NON-USER: CPT | Performed by: FAMILY MEDICINE

## 2023-01-17 PROCEDURE — G8484 FLU IMMUNIZE NO ADMIN: HCPCS | Performed by: FAMILY MEDICINE

## 2023-01-17 PROCEDURE — 99214 OFFICE O/P EST MOD 30 MIN: CPT | Performed by: FAMILY MEDICINE

## 2023-01-17 PROCEDURE — 1090F PRES/ABSN URINE INCON ASSESS: CPT | Performed by: FAMILY MEDICINE

## 2023-01-17 PROCEDURE — G8427 DOCREV CUR MEDS BY ELIG CLIN: HCPCS | Performed by: FAMILY MEDICINE

## 2023-01-17 PROCEDURE — 3079F DIAST BP 80-89 MM HG: CPT | Performed by: FAMILY MEDICINE

## 2023-01-17 PROCEDURE — 1123F ACP DISCUSS/DSCN MKR DOCD: CPT | Performed by: FAMILY MEDICINE

## 2023-01-17 PROCEDURE — G8417 CALC BMI ABV UP PARAM F/U: HCPCS | Performed by: FAMILY MEDICINE

## 2023-01-17 PROCEDURE — 3075F SYST BP GE 130 - 139MM HG: CPT | Performed by: FAMILY MEDICINE

## 2023-01-17 RX ORDER — OXYCODONE AND ACETAMINOPHEN 10; 325 MG/1; MG/1
TABLET ORAL
COMMUNITY
Start: 2022-12-27

## 2023-01-17 SDOH — ECONOMIC STABILITY: FOOD INSECURITY: WITHIN THE PAST 12 MONTHS, YOU WORRIED THAT YOUR FOOD WOULD RUN OUT BEFORE YOU GOT MONEY TO BUY MORE.: NEVER TRUE

## 2023-01-17 SDOH — ECONOMIC STABILITY: FOOD INSECURITY: WITHIN THE PAST 12 MONTHS, THE FOOD YOU BOUGHT JUST DIDN'T LAST AND YOU DIDN'T HAVE MONEY TO GET MORE.: NEVER TRUE

## 2023-01-17 ASSESSMENT — PATIENT HEALTH QUESTIONNAIRE - PHQ9
5. POOR APPETITE OR OVEREATING: 2
SUM OF ALL RESPONSES TO PHQ QUESTIONS 1-9: 6
2. FEELING DOWN, DEPRESSED OR HOPELESS: 1
8. MOVING OR SPEAKING SO SLOWLY THAT OTHER PEOPLE COULD HAVE NOTICED. OR THE OPPOSITE, BEING SO FIGETY OR RESTLESS THAT YOU HAVE BEEN MOVING AROUND A LOT MORE THAN USUAL: 0
10. IF YOU CHECKED OFF ANY PROBLEMS, HOW DIFFICULT HAVE THESE PROBLEMS MADE IT FOR YOU TO DO YOUR WORK, TAKE CARE OF THINGS AT HOME, OR GET ALONG WITH OTHER PEOPLE: 0
4. FEELING TIRED OR HAVING LITTLE ENERGY: 1
7. TROUBLE CONCENTRATING ON THINGS, SUCH AS READING THE NEWSPAPER OR WATCHING TELEVISION: 0
9. THOUGHTS THAT YOU WOULD BE BETTER OFF DEAD, OR OF HURTING YOURSELF: 0
3. TROUBLE FALLING OR STAYING ASLEEP: 1
SUM OF ALL RESPONSES TO PHQ QUESTIONS 1-9: 6
6. FEELING BAD ABOUT YOURSELF - OR THAT YOU ARE A FAILURE OR HAVE LET YOURSELF OR YOUR FAMILY DOWN: 0
SUM OF ALL RESPONSES TO PHQ9 QUESTIONS 1 & 2: 2
SUM OF ALL RESPONSES TO PHQ QUESTIONS 1-9: 6
1. LITTLE INTEREST OR PLEASURE IN DOING THINGS: 1
SUM OF ALL RESPONSES TO PHQ QUESTIONS 1-9: 6

## 2023-01-17 ASSESSMENT — ENCOUNTER SYMPTOMS
DIARRHEA: 0
CONSTIPATION: 0
ABDOMINAL PAIN: 0
COUGH: 0
EYE PAIN: 0
SHORTNESS OF BREATH: 0

## 2023-01-17 ASSESSMENT — SOCIAL DETERMINANTS OF HEALTH (SDOH)
HOW HARD IS IT FOR YOU TO PAY FOR THE VERY BASICS LIKE FOOD, HOUSING, MEDICAL CARE, AND HEATING?: NOT HARD AT ALL
HOW HARD IS IT FOR YOU TO PAY FOR THE VERY BASICS LIKE FOOD, HOUSING, MEDICAL CARE, AND HEATING?: NOT HARD AT ALL

## 2023-01-17 NOTE — PROGRESS NOTES
Chief Complaint   Patient presents with    Establish Care           ASSESSMENT/PLAN:  1. Essential hypertension  Controlled in triage  Continue regimen  Update renal function  Follow-up in 3 months to continue to monitor and gauge improvement  - Comprehensive Metabolic Panel; Future    2. Moderate persistent asthma without complication  Controlled per patient  Continue Combivent and DuoNebs as needed  Follow-up as needed    3. Mixed hyperlipidemia  Discussed ASCVD risk and recommendation for aspirin, patient politely declines  Politely declines statin medication  Follow-up as needed    4. Acquired hypothyroidism  Reviewed last TSH, within normal limits  Will update per patient request  Currently taking levothyroxine 125 mcg every other day, will titrate accordingly  - TSH with Reflex; Future    5. Pre-diabetes  Update A1c  Discussed lifestyle changes and dietary modifications to implement in order to decrease risk of diabetes  All questions answered  - Hemoglobin A1C; Future  - Comprehensive Metabolic Panel; Future    6. Insomnia, unspecified type  Controlled on Seroquel    7. Need for pneumococcal vaccination  Administered  - Pneumococcal, PCV20, PREVNAR 20, (age 25 yrs+), IM, PF     8. Exposure to environmental toxic substances  Referral to ext allergist placed. HPI:  Jorden Lane is a 79 y.o. (: 1955) here today revisit concerns just addressed by her previous PCP which she met with only once on 1/3/2023. This provider gave her an allergy referral for a possible red dye exposure from her bed sheets in Dec 2021. She states she is still experiencing symptoms of nausea, headache and intermittent loose stools. Her toilet seat and rocking chair arms and phone  cord is turning pink. This tells her that the dye is still in her system. She has not followed up with allergist yet, not covered by her insurance.      She will let me know who to refer her to when she touches base with her insurance company. Regarding chronic medical issues:  HTN- taking hydrochlorothiazide and clonidine daily    Asthma- Combivent and DuoNebs as needed    Hyperlipidemia- not on statin or daily aspirin. Discussed risk and patient politely declines these meds. The 10-year ASCVD risk score (Allie HOLT, et al., 2019) is: 10.7%    Values used to calculate the score:      Age: 79 years      Sex: Female      Is Non- : Yes      Diabetic: No      Tobacco smoker: No      Systolic Blood Pressure: 193 mmHg      Is BP treated: Yes      HDL Cholesterol: 51 mg/dL      Total Cholesterol: 183 mg/dL    Acquired hypothyroidism- reviewed last thyroid function test and within normal limits. Currently taking levothyroxine 125 mcg every other day. Prediabetes-diet controlled  Hemoglobin A1C   Date Value Ref Range Status   09/20/2022 5.8 % Final     Lab Results   Component Value Date    CREATININE 0.7 09/09/2022     Insomnia-takes Seroquel, controlled per patient    Review of Systems   Constitutional:  Negative for appetite change, chills, fatigue and fever. HENT:  Negative for congestion. Eyes:  Negative for pain and visual disturbance. Respiratory:  Negative for cough and shortness of breath. Cardiovascular:  Negative for chest pain and palpitations. Gastrointestinal:  Negative for abdominal pain, constipation and diarrhea. Genitourinary:  Negative for difficulty urinating. Musculoskeletal:  Negative for arthralgias. Skin:  Negative for rash and wound. Neurological:  Negative for dizziness, weakness, light-headedness and headaches. Hematological:  Does not bruise/bleed easily. Psychiatric/Behavioral:  Positive for behavioral problems. Negative for sleep disturbance. The patient is nervous/anxious.       Past Medical History:   Diagnosis Date    CAMDEN (acute kidney injury) ([facility] [address] 10/07/2013    Anxiety     Chronic abdominal pain     possibly due to diverticulosos    COPD (chronic obstructive pulmonary disease) ([facility] [address]     COPD with acute exacerbation ([facility] [address] 2022    DDD (degenerative disc disease), lumbar 2016    Elevated glycated hemoglobin     High anion gap metabolic acidosis     History of cholecystectomy 2016    Hyperlipidemia     Hypertension     Hypothyroidism     Lipoma of lower back 10/18/2022    Lipoma of upper arm 10/12/2022    Partial small bowel obstruction ([facility] [address] 2014    Rotator cuff tear     right    Wears glasses        Family History   Problem Relation Age of Onset    Diabetes Sister     Heart Disease Sister         chf    Cancer Sister 76        colon cancer  metastatic    Diabetes Sister     Kidney Disease Sister     Cancer Sister         brain cancer throat cancer and smoked    Cancer Mother 68        cerival cancer    Osteoarthritis Mother     Ovarian Cancer Mother     Heart Disease Father     High Blood Pressure Father     Heart Disease Brother 40        heart attack    High Blood Pressure Maternal Aunt     Cancer Other 46        liver cancer    Cancer Other 47        lung cancer and smoker, maternal neice    Cancer Other         bladder cancer, first cousin.     Diabetes Sister 46        complication of diabetes       Social History     Tobacco Use    Smoking status: Former     Packs/day: 0.25     Years: 32.00     Pack years: 8.00     Types: Cigarettes     Quit date: 1/10/2017     Years since quittin.0    Smokeless tobacco: Never    Tobacco comments:     30 years    Vaping Use    Vaping Use: Never used   Substance Use Topics    Alcohol use: No    Drug use: Never       New Prescriptions    No medications on file       Meds Prior to visit:  Current Outpatient Medications on File Prior to Visit   Medication Sig Dispense Refill    oxyCODONE-acetaminophen (PERCOCET)  MG per tablet TAKE 1 TABLET BY MOUTH EVERY 6 HOURS AS NEEDED FOR 30 DAYS      Promethazine HCl 6.25 MG/5ML SOLN TAKE 5ML BY MOUTH EVERY 6 HOURS AS NEEDED FOR NAUSEA 600 mL 3    QUEtiapine (SEROQUEL) 100 MG tablet Take 1 tablet by mouth nightly 90 tablet 1    albuterol-ipratropium (COMBIVENT RESPIMAT)  MCG/ACT AERS inhaler INHALE 1 PUFF BY MOUTH EVERY SIX HOURS AS NEEDED FOR WHEEZING 3 each 1    cloNIDine (CATAPRES) 0.1 MG tablet Take 1 tablet by mouth 2 times daily Bid 60 tablet 1    hydroCHLOROthiazide (HYDRODIURIL) 25 MG tablet Take 1 tablet by mouth once daily 90 tablet 1    levothyroxine (SYNTHROID) 125 MCG tablet Take 1 tablet by mouth Daily TAKE 1 TABLET EVERY DAY (DISCONTINUE 135MCG) (Patient taking differently: Take 125 mcg by mouth every other day) 90 tablet 1    potassium chloride (KLOR-CON M) 10 MEQ extended release tablet Take 2 tablets by mouth once daily (Patient taking differently: Take 10 mEq by mouth daily) 180 tablet 1    Calcium Carb-Cholecalciferol (CALCIUM-VITAMIN D) 600-400 MG-UNIT TABS Take 1 tablet by mouth twice daily (Patient taking differently: daily) 60 tablet 11     MG tablet TAKE 1 TABLET BY MOUTH EVERY 8 HOURS AS NEEDED FOR PAIN 270 tablet 1    Respiratory Therapy Supplies MISC 1 each by Does not apply route 4 times daily as needed (wheezing SOB) All Nebulizer supplies needed 50 each 5    ipratropium-albuterol (DUONEB) 0.5-2.5 (3) MG/3ML SOLN nebulizer solution Inhale 3 mLs into the lungs as needed for Shortness of Breath 360 mL 11     No current facility-administered medications on file prior to visit.      Allergies   Allergen Reactions    Bupropion Other (See Comments)     Muscle spasms/seizure like symptoms   Other reaction(s): seizure-like symptoms    Morphine Hives, Itching, Rash, Anaphylaxis and Hallucinations    Morphine And Related Itching       OBJECTIVE:  /89   Pulse 87   Temp 98.1 °F (36.7 °C) (Temporal)   Resp 16   Ht 5' 5\" (1.651 m)   Wt 177 lb 3.2 oz (80.4 kg)   LMP  (LMP Unknown)   SpO2 96%   BMI 29.49 kg/m²   BP Readings from Last 2 Encounters:   01/17/23 135/89   01/03/23 139/89     Wt Readings from Last 3 Encounters:   01/17/23 177 lb 3.2 oz (80.4 kg)   01/03/23 174 lb 3.2 oz (79 kg)   10/18/22 174 lb 7.9 oz (79.2 kg)       Physical Exam  Vitals reviewed. Constitutional:       General: She is not in acute distress. Appearance: Normal appearance. She is not ill-appearing. HENT:      Head: Normocephalic and atraumatic. Right Ear: External ear normal.      Left Ear: External ear normal.      Nose: Nose normal. No congestion. Mouth/Throat:      Mouth: Mucous membranes are moist.      Pharynx: Oropharynx is clear. No oropharyngeal exudate. Eyes:      Extraocular Movements: Extraocular movements intact. Conjunctiva/sclera: Conjunctivae normal.      Pupils: Pupils are equal, round, and reactive to light. Cardiovascular:      Rate and Rhythm: Normal rate and regular rhythm. Pulses: Normal pulses. Heart sounds: Normal heart sounds. Pulmonary:      Effort: Pulmonary effort is normal. No respiratory distress. Breath sounds: Normal breath sounds. No stridor. No wheezing, rhonchi or rales. Abdominal:      General: Bowel sounds are normal.      Palpations: Abdomen is soft. Tenderness: There is no abdominal tenderness. Musculoskeletal:         General: Normal range of motion. Cervical back: Normal range of motion and neck supple. Right lower leg: No edema. Left lower leg: No edema. Skin:     General: Skin is warm. Capillary Refill: Capillary refill takes less than 2 seconds. Findings: No erythema or rash. Neurological:      General: No focal deficit present. Mental Status: She is alert and oriented to person, place, and time. Mental status is at baseline. Motor: No weakness. Coordination: Coordination normal.      Gait: Gait normal.   Psychiatric:         Mood and Affect: Mood normal.         Behavior: Behavior normal.         Thought Content:  Thought content normal.         Judgment: Judgment normal.       Lab Results   Component Value Date    WBC 12.7 (H) 09/09/2022    HGB 13.0 09/09/2022    HCT 40.5 09/09/2022    MCV 84.0 09/09/2022     09/09/2022     Lab Results   Component Value Date     09/09/2022    K 4.5 09/09/2022     09/09/2022    CO2 17 (L) 09/09/2022    BUN 12 09/09/2022    CREATININE 0.7 09/09/2022    GLUCOSE 114 (H) 09/09/2022    CALCIUM 11.2 (H) 09/09/2022    PROT 7.6 09/09/2022    LABALBU 4.3 09/09/2022    BILITOT 0.3 09/09/2022    ALKPHOS 93 09/09/2022    AST 18 09/09/2022    ALT 8 (L) 09/09/2022    LABGLOM >60 09/09/2022    GFRAA >60 09/09/2022    AGRATIO 1.3 09/09/2022    GLOB 2.7 03/20/2020     Lab Results   Component Value Date    CHOL 183 09/01/2021    CHOL 126 03/25/2021    CHOL 119 02/22/2021     Lab Results   Component Value Date    TRIG 175 (H) 09/01/2021    TRIG 138 03/25/2021    TRIG 108 02/22/2021     Lab Results   Component Value Date    HDL 51 09/01/2021    HDL 44 03/25/2021    HDL 54 02/22/2021     Lab Results   Component Value Date    LDLCALC 97 09/01/2021    LDLCALC 54 03/25/2021    LDLCALC 43 02/22/2021     Lab Results   Component Value Date    LABVLDL 35 09/01/2021    LABVLDL 28 03/25/2021    LABVLDL 22 02/22/2021     Lab Results   Component Value Date    LABA1C 5.8 09/20/2022         Discussed use, benefit, and side effects of prescribed medications. Barriers to medication compliance addressed. All patient questions answered. Pt voiced understanding. RTC Return in about 3 months (around 4/17/2023). No future appointments.       Miki Carbajal MD  1/17/2023  12:04 PM

## 2023-01-18 LAB
ESTIMATED AVERAGE GLUCOSE: 114 MG/DL
HBA1C MFR BLD: 5.6 %

## 2023-01-24 RX ORDER — CYCLOBENZAPRINE HCL 5 MG
TABLET ORAL
Qty: 90 TABLET | Refills: 0 | OUTPATIENT
Start: 2023-01-24

## 2023-01-24 NOTE — TELEPHONE ENCOUNTER
Medication:   Requested Prescriptions     Pending Prescriptions Disp Refills    cyclobenzaprine (FLEXERIL) 5 MG tablet 90 tablet 0        Last Filled:      Patient Phone Number: [phone] (home)     Last appt: 1/3/2023   Next appt: Visit date not found    Last OARRS:   RX Monitoring 1/30/2017   Attestation The Prescription Monitoring Report for this patient was reviewed today.

## 2023-01-27 DIAGNOSIS — L29.9 PRURITUS: ICD-10-CM

## 2023-01-30 RX ORDER — CYCLOBENZAPRINE HCL 5 MG
TABLET ORAL
Qty: 90 TABLET | Refills: 0 | OUTPATIENT
Start: 2023-01-30

## 2023-01-30 RX ORDER — DIPHENHYDRAMINE HCL 50 MG
CAPSULE ORAL
Qty: 30 CAPSULE | Refills: 0 | OUTPATIENT
Start: 2023-01-30

## 2023-02-02 DIAGNOSIS — E03.9 ACQUIRED HYPOTHYROIDISM: ICD-10-CM

## 2023-02-02 DIAGNOSIS — J45.40 MODERATE PERSISTENT ASTHMA WITHOUT COMPLICATION: ICD-10-CM

## 2023-02-02 RX ORDER — IPRATROPIUM BROMIDE AND ALBUTEROL SULFATE 2.5; .5 MG/3ML; MG/3ML
1 SOLUTION RESPIRATORY (INHALATION) PRN
Qty: 360 ML | Refills: 11 | Status: SHIPPED | OUTPATIENT
Start: 2023-02-02

## 2023-02-02 RX ORDER — IBUPROFEN 800 MG/1
TABLET ORAL
Qty: 270 TABLET | Refills: 1 | Status: SHIPPED | OUTPATIENT
Start: 2023-02-02

## 2023-02-02 RX ORDER — POTASSIUM CHLORIDE 750 MG/1
TABLET, EXTENDED RELEASE ORAL
Qty: 180 TABLET | Refills: 1 | Status: SHIPPED | OUTPATIENT
Start: 2023-02-02

## 2023-02-02 RX ORDER — HYDROCHLOROTHIAZIDE 25 MG/1
TABLET ORAL
Qty: 90 TABLET | Refills: 1 | Status: SHIPPED | OUTPATIENT
Start: 2023-02-02

## 2023-02-02 RX ORDER — LEVOTHYROXINE SODIUM 0.12 MG/1
125 TABLET ORAL DAILY
Qty: 90 TABLET | Refills: 1 | Status: SHIPPED | OUTPATIENT
Start: 2023-02-02

## 2023-02-02 RX ORDER — CLONIDINE HYDROCHLORIDE 0.1 MG/1
0.1 TABLET ORAL 2 TIMES DAILY
Qty: 60 TABLET | Refills: 1 | Status: SHIPPED | OUTPATIENT
Start: 2023-02-02

## 2023-02-02 RX ORDER — QUETIAPINE FUMARATE 100 MG/1
100 TABLET, FILM COATED ORAL NIGHTLY
Qty: 90 TABLET | Refills: 1 | Status: SHIPPED | OUTPATIENT
Start: 2023-02-02

## 2023-02-02 NOTE — TELEPHONE ENCOUNTER
Medication:   Requested Prescriptions     Pending Prescriptions Disp Refills    albuterol-ipratropium (COMBIVENT RESPIMAT)  MCG/ACT AERS inhaler 3 each 1     Sig: INHALE 1 PUFF BY MOUTH EVERY SIX HOURS AS NEEDED FOR WHEEZING    cloNIDine (CATAPRES) 0.1 MG tablet 60 tablet 1     Sig: Take 1 tablet by mouth 2 times daily Bid    hydroCHLOROthiazide (HYDRODIURIL) 25 MG tablet 90 tablet 1     Sig: Take 1 tablet by mouth once daily    ibuprofen (IBU) 800 MG tablet 270 tablet 1     Sig: TAKE 1 TABLET BY MOUTH EVERY 8 HOURS AS NEEDED FOR PAIN    ipratropium-albuterol (DUONEB) 0.5-2.5 (3) MG/3ML SOLN nebulizer solution 360 mL 11     Sig: Inhale 3 mLs into the lungs as needed for Shortness of Breath    levothyroxine (SYNTHROID) 125 MCG tablet 90 tablet 1     Sig: Take 1 tablet by mouth Daily TAKE 1 TABLET EVERY DAY (DISCONTINUE 135MCG)    potassium chloride (KLOR-CON M) 10 MEQ extended release tablet 180 tablet 1     Sig: Take 2 tablets by mouth once daily    Respiratory Therapy Supplies MISC 50 each 5     Si each by Does not apply route 4 times daily as needed (wheezing SOB) All Nebulizer supplies needed    QUEtiapine (SEROQUEL) 100 MG tablet 90 tablet 1     Sig: Take 1 tablet by mouth nightly    Promethazine HCl 6.25 MG/5ML SOLN 600 mL 3     Sig: TAKE 5ML BY MOUTH EVERY 6 HOURS AS NEEDED FOR NAUSEA        Last Filled:  by previous doctor,     Patient Phone Number: [phone] (home)     Last appt: 2023   Next appt: Visit date not found    Lcation:   Requested Prescriptions     Pending Prescriptions Disp Refills    albuterol-ipratropium (COMBIVENT RESPIMAT)  MCG/ACT AERS inhaler 3 each 1     Sig: INHALE 1 PUFF BY MOUTH EVERY SIX HOURS AS NEEDED FOR WHEEZING    cloNIDine (CATAPRES) 0.1 MG tablet 60 tablet 1     Sig: Take 1 tablet by mouth 2 times daily Bid    hydroCHLOROthiazide (HYDRODIURIL) 25 MG tablet 90 tablet 1     Sig: Take 1 tablet by mouth once daily    ibuprofen (IBU) 800 MG tablet 270 tablet 1 Sig: TAKE 1 TABLET BY MOUTH EVERY 8 HOURS AS NEEDED FOR PAIN    ipratropium-albuterol (DUONEB) 0.5-2.5 (3) MG/3ML SOLN nebulizer solution 360 mL 11     Sig: Inhale 3 mLs into the lungs as needed for Shortness of Breath    levothyroxine (SYNTHROID) 125 MCG tablet 90 tablet 1     Sig: Take 1 tablet by mouth Daily TAKE 1 TABLET EVERY DAY (DISCONTINUE 135MCG)    potassium chloride (KLOR-CON M) 10 MEQ extended release tablet 180 tablet 1     Sig: Take 2 tablets by mouth once daily    Respiratory Therapy Supplies MISC 50 each 5     Si each by Does not apply route 4 times daily as needed (wheezing SOB) All Nebulizer supplies needed    QUEtiapine (SEROQUEL) 100 MG tablet 90 tablet 1     Sig: Take 1 tablet by mouth nightly    Promethazine HCl 6.25 MG/5ML SOLN 600 mL 3     Sig: TAKE 5ML BY MOUTH EVERY 6 HOURS AS NEEDED FOR NAUSEA    Pt stated she also takes b12 but it was not in chart might need new script    Patient Phone Number: [phone] (home)     Last appt: 2023

## 2023-02-15 ENCOUNTER — TELEPHONE (OUTPATIENT)
Dept: PRIMARY CARE CLINIC | Age: 68
End: 2023-02-15

## 2023-02-15 RX ORDER — ROFLUMILAST 500 UG/1
500 TABLET ORAL DAILY
Qty: 90 TABLET | Refills: 0 | Status: SHIPPED | OUTPATIENT
Start: 2023-02-15

## 2023-02-15 NOTE — TELEPHONE ENCOUNTER
Roflumilast (DALIRESP) 500 MCG tablet [[id number]]     Order Details  Dose: 500 mcg Route: Oral Frequency: DAILY   Dispense Quantity: 90 tablet Refills: 0          Sig: Take 1 tablet by mouth daily         Start Date: 02/15/23 End Date: --   Written Date: 02/15/23 Expiration Date: 02/15/24   Providers    Authorizing Provider: Ana Laura Monroy MD NPI: [id number]   Ordering User:  [place] [id number]   Phone:  [phone]  Fax:  [phone]

## 2023-02-15 NOTE — TELEPHONE ENCOUNTER
Patient said she is on the  highest dose  and taking one a day of Daliresp . She did not have the bottle to tell me dose.    She would like this sent to   [facility]  [address] -  [phone]

## 2023-02-28 DIAGNOSIS — E53.8 VITAMIN B 12 DEFICIENCY: ICD-10-CM

## 2023-02-28 RX ORDER — CYANOCOBALAMIN 1000 UG/ML
INJECTION, SOLUTION INTRAMUSCULAR; SUBCUTANEOUS
Qty: 1 ML | Refills: 5 | Status: SHIPPED | OUTPATIENT
Start: 2023-02-28

## 2023-02-28 NOTE — TELEPHONE ENCOUNTER
Medication:   Requested Prescriptions     Pending Prescriptions Disp Refills    cyanocobalamin 1000 MCG/ML injection 1 mL 5     Sig: INJECT 1 ML  ONCE EVERY MONTH        Last Filled:  02/17/21    Patient Phone Number: [phone] (home)     Last appt: 1/17/2023   Next appt: Visit date not found    Last OARRS:   RX Monitoring 1/30/2017   Attestation The Prescription Monitoring Report for this patient was reviewed today.

## 2023-02-28 NOTE — TELEPHONE ENCOUNTER
Pt requesting a refill for 1000 microgram/ml Cyanocobalamin once a month shot.  Her previous pcp that left used to prescribe this medication

## 2023-03-02 ENCOUNTER — TELEPHONE (OUTPATIENT)
Dept: PRIMARY CARE CLINIC | Age: 68
End: 2023-03-02

## 2023-03-02 NOTE — TELEPHONE ENCOUNTER
Roflumilast (DALIRESP) 500 MCG tablet [[id number]]     Order Details  Dose: 500 mcg Route: Oral Frequency: DAILY   Dispense Quantity: 90 tablet Refills: 0          Sig: Take 1 tablet by mouth daily         Start Date: 02/15/23 End Date: --   Written Date: 02/15/23 Expiration Date: 02/15/24   Providers    Authorizing Provider: Tyler Graham MD NPI: [id number]   Ordering User:  Darryn Muñoz [place] [id number]   Phone:  [phone]  Fax:  [phone]

## 2023-03-02 NOTE — TELEPHONE ENCOUNTER
Submitted PA for Roflumilast 500MCG tablets  Via CMM Key: [id number] STATUS: APPROVED through 12/31/2023; Approval letter attached. If this requires a response please respond to the pool ( P [facility] [address] [facility]). Thank you please advise patient.

## 2023-03-02 NOTE — TELEPHONE ENCOUNTER
Roflumilast (DALIRESP) 500 MCG tablet   CAN WE PLEASE GET A PA STARTED FOR THIS MEDICATION PT HAS BEEN WITHOUT SINCE 2/15/23. THANK YOU.

## 2023-03-16 ENCOUNTER — TELEPHONE (OUTPATIENT)
Dept: PRIMARY CARE CLINIC | Age: 68
End: 2023-03-16

## 2023-03-16 ENCOUNTER — TELEMEDICINE (OUTPATIENT)
Dept: PRIMARY CARE CLINIC | Age: 68
End: 2023-03-16

## 2023-03-16 DIAGNOSIS — H91.93 DIFFICULTY HEARING, BILATERAL: Primary | ICD-10-CM

## 2023-03-16 DIAGNOSIS — Z00.00 MEDICARE ANNUAL WELLNESS VISIT, SUBSEQUENT: ICD-10-CM

## 2023-03-16 RX ORDER — CYCLOBENZAPRINE HCL 10 MG
10 TABLET ORAL
COMMUNITY
End: 2023-03-16 | Stop reason: SDUPTHER

## 2023-03-16 RX ORDER — CYCLOBENZAPRINE HCL 10 MG
10 TABLET ORAL
Qty: 60 TABLET | Refills: 3 | Status: SHIPPED | OUTPATIENT
Start: 2023-03-16

## 2023-03-16 RX ORDER — QUETIAPINE FUMARATE 100 MG/1
100 TABLET, FILM COATED ORAL NIGHTLY
Qty: 90 TABLET | Refills: 1 | Status: SHIPPED | OUTPATIENT
Start: 2023-03-16

## 2023-03-16 ASSESSMENT — PATIENT HEALTH QUESTIONNAIRE - PHQ9
8. MOVING OR SPEAKING SO SLOWLY THAT OTHER PEOPLE COULD HAVE NOTICED. OR THE OPPOSITE, BEING SO FIGETY OR RESTLESS THAT YOU HAVE BEEN MOVING AROUND A LOT MORE THAN USUAL: 0
SUM OF ALL RESPONSES TO PHQ QUESTIONS 1-9: 0
SUM OF ALL RESPONSES TO PHQ9 QUESTIONS 1 & 2: 0
5. POOR APPETITE OR OVEREATING: 0
6. FEELING BAD ABOUT YOURSELF - OR THAT YOU ARE A FAILURE OR HAVE LET YOURSELF OR YOUR FAMILY DOWN: 0
4. FEELING TIRED OR HAVING LITTLE ENERGY: 0
9. THOUGHTS THAT YOU WOULD BE BETTER OFF DEAD, OR OF HURTING YOURSELF: 0
SUM OF ALL RESPONSES TO PHQ QUESTIONS 1-9: 0
1. LITTLE INTEREST OR PLEASURE IN DOING THINGS: 0
SUM OF ALL RESPONSES TO PHQ QUESTIONS 1-9: 0
10. IF YOU CHECKED OFF ANY PROBLEMS, HOW DIFFICULT HAVE THESE PROBLEMS MADE IT FOR YOU TO DO YOUR WORK, TAKE CARE OF THINGS AT HOME, OR GET ALONG WITH OTHER PEOPLE: 0
2. FEELING DOWN, DEPRESSED OR HOPELESS: 0
SUM OF ALL RESPONSES TO PHQ QUESTIONS 1-9: 0
3. TROUBLE FALLING OR STAYING ASLEEP: 0
7. TROUBLE CONCENTRATING ON THINGS, SUCH AS READING THE NEWSPAPER OR WATCHING TELEVISION: 0

## 2023-03-16 ASSESSMENT — LIFESTYLE VARIABLES
HOW OFTEN DO YOU HAVE A DRINK CONTAINING ALCOHOL: MONTHLY OR LESS
HOW MANY STANDARD DRINKS CONTAINING ALCOHOL DO YOU HAVE ON A TYPICAL DAY: 1 OR 2

## 2023-03-16 NOTE — LETTER
CONSENT TO OPERATION            Excision of left upper extremity lipomas x3         Excision of right upper extremity lipomas x4              Excision of epigastric lipoma    PATIENT : Elzbieta Gemma OF BIRTH:  1955             DATE : 5/16/22    1. I request and consent that Dr. Stone Negrete and/or his associates or assistants perform an operation and/or procedures on the above patient at [facility], to treat the condition(s) which appear indicated by the diagnostic studies already performed. 2. It has been explained to me by the informing physician that during the course of the operation and/or procedure(s) unforeseen conditions may be revealed that necessitate an extension of the original operation and/or procedure(s) or different operation and/or procedures than those set forth in Paragraph 1. I therefore authorize and request that my physician and/or his associates or assistants perform such operations and/or procedures as are necessary and desirable in the exercise of professional judgment. The authority granted under this Paragraph 2 shall extend to all conditions that require treatment and are known to my physician at the time the operation is commenced. 3. I have been made aware by the informing physician of certain risks and consequences that are associated with the operation and/or procedure(s) described in Paragraph 1, the reasonable alternative methods or treatment, the possible consequences, the possibility that the operation and/or procedure(s) may be unsuccessful and the possibility of complications. I understand the reasonably known risks to be:  - Bleeding  - Infection  - Poor Healing  - Possible Damage to Nerve, Vessel, Tendon/Muscle or Bone  - Need for further Treatment/Surgery  - Stiffness  - Pain  - Residual or Recurrent Symptoms  - Anesthetic and/or Medical Risks     4.  I have also been informed by the informing physician that there are other risks from both known and unknown causes that are attendant to the performance of any surgical procedure. I am aware that the practice of medicine and surgery is not an exact science, and that no guarantees have been made to me concerning the results of the operation and/or procedure(s). 5. I consent to the administration of anesthesia and to the use of such anesthetics as may be deemed advisable by the anesthesiologist who has been engaged by me or my physician. 6. I certify that I have read and understand the above consent to operation and/or other procedure(s); that the explanations therein referred to were made to me by the informing physician in advance of my signing this consent; that all blanks or statements requiring insertion or completion were filled in and inapplicable paragraphs, if any, were stricken before I signed; and that all questions asked by me about the operation and/or procedure(s) which I have consented to have been fully answered in a satisfactory manner.            5/16/22                    _______________________  Signature Of Patient   Date              Witness                     OR Date:  ___________ Opioid Counseling: I discussed with the patient the potential side effects of opioids including but not limited to addiction, altered mental status, and depression. I stressed avoiding alcohol, benzodiazepines, muscle relaxants and sleep aids unless specifically okayed by a physician. The patient verbalized understanding of the proper use and possible adverse effects of opioids. All of the patient's questions and concerns were addressed. They were instructed to flush the remaining pills down the toilet if they did not need them for pain.

## 2023-03-16 NOTE — PROGRESS NOTES
Medicare Annual Wellness Visit    Rod Dodd is here for Medicare AWV    Assessment & Plan   Difficulty hearing, bilateral  Medicare annual wellness visit, subsequent      Recommendations for Preventive Services Due: see orders and patient instructions/AVS.  Recommended screening schedule for the next 5-10 years is provided to the patient in written form: see Patient Instructions/AVS.     No follow-ups on file. Subjective   The following acute and/or chronic problems were also addressed today:  Patient state seeing Gastro next week for colonoscopy and possible scope. She has been having acid reflux in the night time hours causing her to not be able to keep her food down. Patient's complete Health Risk Assessment and screening values have been reviewed and are found in Flowsheets. The following problems were reviewed today and where indicated follow up appointments were made and/or referrals ordered. Positive Risk Factor Screenings with Interventions:                Opioid Risk: (Low risk score <55) Opioid risk score: 30    Patient is low risk for opioid use disorder or overdose. Last PDMP Amarilys Larson as Reviewed:  Review User Review Instant Review Result              Last Controlled Substance Monitoring Documentation      [address] Office Visit from 1/30/2017 in [address] [facility] The Prescription Monitoring Report for this patient was reviewed today.  filed at 01/30/2017 1524                    Safety:  Do all of your stairways have a railing or banister?: (!) No  Interventions:  Not Applicable, no stairs in home                     Objective      Patient-Reported Vitals  No data recorded          Allergies   Allergen Reactions    Bupropion Other (See Comments)     Muscle spasms/seizure like symptoms   Other reaction(s): seizure-like symptoms    Morphine Hives, Itching, Rash, Anaphylaxis and Hallucinations    Morphine And Related Itching     Prior to Visit Medications Medication Sig Taking?  Authorizing Provider   cyanocobalamin 1000 MCG/ML injection INJECT 1 ML  ONCE EVERY MONTH Yes Prudencio Montero MD   Roflumilast (DALIRESP) 500 MCG tablet Take 1 tablet by mouth daily Yes Prudencio Montero MD   albuterol-ipratropium (COMBIVENT RESPIMAT)  MCG/ACT AERS inhaler INHALE 1 PUFF BY MOUTH EVERY SIX HOURS AS NEEDED FOR WHEEZING Yes Prudencio Montero MD   cloNIDine (CATAPRES) 0.1 MG tablet Take 1 tablet by mouth 2 times daily Bid Yes Prudencio Montero MD   hydroCHLOROthiazide (HYDRODIURIL) 25 MG tablet Take 1 tablet by mouth once daily Yes Prudencio Montero MD   ibuprofen (IBU) 800 MG tablet TAKE 1 TABLET BY MOUTH EVERY 8 HOURS AS NEEDED FOR PAIN Yes Prudencio Montero MD   ipratropium-albuterol (DUONEB) 0.5-2.5 (3) MG/3ML SOLN nebulizer solution Inhale 3 mLs into the lungs as needed for Shortness of Breath Yes Prudencio Montero MD   levothyroxine (SYNTHROID) 125 MCG tablet Take 1 tablet by mouth Daily TAKE 1 TABLET EVERY DAY (DISCONTINUE 135MCG) Yes Prudencio Montero MD   potassium chloride (KLOR-CON M) 10 MEQ extended release tablet Take 2 tablets by mouth once daily Yes Prudencio Montero MD   Respiratory Therapy Supplies MISC 1 each by Does not apply route 4 times daily as needed (wheezing SOB) All Nebulizer supplies needed Yes Prudencio Montero MD   QUEtiapine (SEROQUEL) 100 MG tablet Take 1 tablet by mouth nightly Yes Prudencio Montero MD   Promethazine HCl 6.25 MG/5ML SOLN TAKE 5ML BY MOUTH EVERY 6 HOURS AS NEEDED FOR NAUSEA Yes Prudencio Montero MD   oxyCODONE-acetaminophen (PERCOCET)  MG per tablet TAKE 1 TABLET BY MOUTH EVERY 6 HOURS AS NEEDED FOR 30 DAYS Yes Historical Provider, MD   Calcium Carb-Cholecalciferol (CALCIUM-VITAMIN D) 600-400 MG-UNIT TABS Take 1 tablet by mouth twice daily  Patient taking differently: daily Yes Sita Hansen MD   cyclobenzaprine (FLEXERIL) 10 MG tablet Take 10 mg by mouth every 6-8 hours as needed Take 2 every 6-8 hours for pain  Historical Provider, MD Veloz (Including outside providers/suppliers regularly involved in providing care):   Patient Care Team:  Ken Rosas MD as PCP - General (Family Medicine)  Ken Rosas MD as PCP - Empaneled Provider  Unknown Provider Result (Inactive) as Consulting Physician (Obstetrics & Gynecology)  Juani Moraes MD as Consulting Physician (Obstetrics & Gynecology)  Giselle Knox DO as Consulting Physician (Endocrinology)  Tori Song as Consulting Physician (Psychiatry)  Maldonado Lee MD as Consulting Physician (Pulmonology)     Reviewed and updated this visit:  Allergies  Meds  Med Hx  Surg Hx  Soc Hx  Fam Hx        I, Becky Urias LPN, 7/17/2028, performed the documented evaluation under the direct supervision of the attending physician. Deandre Cooper, was evaluated through a synchronous (real-time) audio-video encounter. The patient (or guardian if applicable) is aware that this is a billable service, which includes applicable co-pays. This Virtual Visit was conducted with patient's (and/or legal guardian's) consent. The visit was conducted pursuant to the emergency declaration under the [facility], [address] [place] [address] waiver authority and the [employer] and [employer] General Act. Patient identification was verified, and a caregiver was present when appropriate.    The patient was located at Home: [address] [facility] [address] [address]  Provider was located at [facility] ([address] [place], [place] [address]t): [address] [place] [address]  [address] [facility] [address]        Becky Urias LPN

## 2023-03-16 NOTE — PATIENT INSTRUCTIONS
Personalized Preventive Plan for Lalitha Young - 3/16/2023  Medicare offers a range of preventive health benefits. Some of the tests and screenings are paid in full while other may be subject to a deductible, co-insurance, and/or copay. Some of these benefits include a comprehensive review of your medical history including lifestyle, illnesses that may run in your family, and various assessments and screenings as appropriate. After reviewing your medical record and screening and assessments performed today your provider may have ordered immunizations, labs, imaging, and/or referrals for you. A list of these orders (if applicable) as well as your Preventive Care list are included within your After Visit Summary for your review. Other Preventive Recommendations:    A preventive eye exam performed by an eye specialist is recommended every 1-2 years to screen for glaucoma; cataracts, macular degeneration, and other eye disorders. A preventive dental visit is recommended every 6 months. Try to get at least 150 minutes of exercise per week or 10,000 steps per day on a pedometer . Order or download the FREE \"Exercise & Physical Activity: Your Everyday Guide\" from [employer]. Call 7-730.839.9664 or search [employer] online. You need 9801-7020 mg of calcium and 0809-1325 IU of vitamin D per day. It is possible to meet your calcium requirement with diet alone, but a vitamin D supplement is usually necessary to meet this goal.  When exposed to the sun, use a sunscreen that protects against both UVA and UVB radiation with an SPF of 30 or greater. Reapply every 2 to 3 hours or after sweating, drying off with a towel, or swimming. Always wear a seat belt when traveling in a car. Always wear a helmet when riding a bicycle or motorcycle.

## 2023-04-17 ENCOUNTER — TELEPHONE (OUTPATIENT)
Dept: PRIMARY CARE CLINIC | Age: 68
End: 2023-04-17

## 2023-04-17 RX ORDER — CLONIDINE HYDROCHLORIDE 0.1 MG/1
TABLET ORAL
Qty: 60 TABLET | Refills: 0 | Status: SHIPPED | OUTPATIENT
Start: 2023-04-17

## 2023-04-17 NOTE — TELEPHONE ENCOUNTER
Medication:   Requested Prescriptions     Pending Prescriptions Disp Refills    cloNIDine (CATAPRES) 0.1 MG tablet [Pharmacy Med Name: cloNIDine HCl 0.1 MG Oral Tablet] 60 tablet 0     Sig: Take 1 tablet by mouth twice daily        Last Filled:  02/02/23    Patient Phone Number: [phone] (home)     Last appt: 3/16/2023   Next appt: Visit date not found    Last OARRS:   RX Monitoring 1/30/2017   Attestation The Prescription Monitoring Report for this patient was reviewed today.

## 2023-04-17 NOTE — TELEPHONE ENCOUNTER
Pt called needs refill    cloNIDine (CATAPRES) 0.1 MG tablet [[id number]]       [address], [address] [place]   Phone:  [phone]  Fax:  [phone]

## 2023-04-21 LAB — MAMMOGRAPHY, EXTERNAL: NEGATIVE

## 2023-05-15 ENCOUNTER — OFFICE VISIT (OUTPATIENT)
Dept: ENT CLINIC | Age: 68
End: 2023-05-15
Payer: MEDICARE

## 2023-05-15 VITALS
HEIGHT: 65 IN | SYSTOLIC BLOOD PRESSURE: 141 MMHG | BODY MASS INDEX: 29.49 KG/M2 | DIASTOLIC BLOOD PRESSURE: 84 MMHG | HEART RATE: 113 BPM | WEIGHT: 177 LBS | OXYGEN SATURATION: 99 %

## 2023-05-15 DIAGNOSIS — K21.9 LARYNGOPHARYNGEAL REFLUX (LPR): ICD-10-CM

## 2023-05-15 DIAGNOSIS — R09.89 GLOBUS SENSATION: Primary | ICD-10-CM

## 2023-05-15 DIAGNOSIS — J30.9 ALLERGIC RHINITIS, UNSPECIFIED SEASONALITY, UNSPECIFIED TRIGGER: ICD-10-CM

## 2023-05-15 DIAGNOSIS — H93.13 TINNITUS OF BOTH EARS: ICD-10-CM

## 2023-05-15 PROCEDURE — 31575 DIAGNOSTIC LARYNGOSCOPY: CPT | Performed by: OTOLARYNGOLOGY

## 2023-05-15 PROCEDURE — G8427 DOCREV CUR MEDS BY ELIG CLIN: HCPCS | Performed by: OTOLARYNGOLOGY

## 2023-05-15 PROCEDURE — 3077F SYST BP >= 140 MM HG: CPT | Performed by: OTOLARYNGOLOGY

## 2023-05-15 PROCEDURE — G8417 CALC BMI ABV UP PARAM F/U: HCPCS | Performed by: OTOLARYNGOLOGY

## 2023-05-15 PROCEDURE — G8399 PT W/DXA RESULTS DOCUMENT: HCPCS | Performed by: OTOLARYNGOLOGY

## 2023-05-15 PROCEDURE — 3079F DIAST BP 80-89 MM HG: CPT | Performed by: OTOLARYNGOLOGY

## 2023-05-15 PROCEDURE — 1036F TOBACCO NON-USER: CPT | Performed by: OTOLARYNGOLOGY

## 2023-05-15 PROCEDURE — 1123F ACP DISCUSS/DSCN MKR DOCD: CPT | Performed by: OTOLARYNGOLOGY

## 2023-05-15 PROCEDURE — 3017F COLORECTAL CA SCREEN DOC REV: CPT | Performed by: OTOLARYNGOLOGY

## 2023-05-15 PROCEDURE — 99203 OFFICE O/P NEW LOW 30 MIN: CPT | Performed by: OTOLARYNGOLOGY

## 2023-05-15 PROCEDURE — 1090F PRES/ABSN URINE INCON ASSESS: CPT | Performed by: OTOLARYNGOLOGY

## 2023-05-15 RX ORDER — FLUTICASONE PROPIONATE 50 MCG
1 SPRAY, SUSPENSION (ML) NASAL DAILY
Qty: 32 G | Refills: 1 | Status: SHIPPED | OUTPATIENT
Start: 2023-05-15

## 2023-05-15 NOTE — PROGRESS NOTES
consistent with acid reflux. No other concerning masses or lesions. ASSESSMENT/PLAN  1. Globus sensation  The patient seems to be fixated on this red dye issue. I do not believe this is related to any of the complaints that she has related to ENT today. She likely has some acid reflux. She is reportedly getting an EGD soon so I am holding off on giving her any medications for this at this time. I told her to do what ever gastroenterology recommends. 2. Laryngopharyngeal reflux (LPR)  I think reflux is probably playing a big role in this. Recommended she follow-up with gastroenterology for the EGD    3. Allergic rhinitis, unspecified seasonality, unspecified trigger  I am going to have her do Flonase daily. 4.  Tinnitus  She likely has hearing loss, but missed her audiogram today. I recommend she follow-up with an audiogram           I have performed a head and neck physical exam personally or was physically present during the key or critical portions of the service. This note was generated completely or in part utilizing Dragon dictation speech recognition software. Occasionally, words are mistranscribed and despite editing, the text may contain inaccuracies due to incorrect word recognition. If further clarification is needed please contact the office at [phone].

## 2023-05-22 RX ORDER — CLONIDINE HYDROCHLORIDE 0.1 MG/1
TABLET ORAL
Qty: 60 TABLET | Refills: 0 | Status: SHIPPED | OUTPATIENT
Start: 2023-05-22

## 2023-05-22 NOTE — TELEPHONE ENCOUNTER
Medication:   Requested Prescriptions     Pending Prescriptions Disp Refills    cloNIDine (CATAPRES) 0.1 MG tablet [Pharmacy Med Name: cloNIDine HCl 0.1 MG Oral Tablet] 60 tablet 0     Sig: Take 1 tablet by mouth twice daily        Last Filled:  04/17/23    Patient Phone Number: [phone] (home)     Last appt: 3/16/2023 Return in about 3 months (around 4/17/2023  Next appt: Visit date not found    Last OARRS:   RX Monitoring 1/30/2017   Attestation The Prescription Monitoring Report for this patient was reviewed today.

## 2023-06-19 ENCOUNTER — TELEPHONE (OUTPATIENT)
Dept: PRIMARY CARE CLINIC | Age: 68
End: 2023-06-19

## 2023-06-19 NOTE — TELEPHONE ENCOUNTER
Person from [facility] called said the need diagnosed code for the pt condition so they can order her a oxygen cord.      [facility]  [phone]

## 2023-06-20 ENCOUNTER — OFFICE VISIT (OUTPATIENT)
Dept: PRIMARY CARE CLINIC | Age: 68
End: 2023-06-20
Payer: MEDICARE

## 2023-06-20 VITALS
WEIGHT: 181.4 LBS | SYSTOLIC BLOOD PRESSURE: 131 MMHG | BODY MASS INDEX: 30.19 KG/M2 | HEART RATE: 118 BPM | DIASTOLIC BLOOD PRESSURE: 89 MMHG

## 2023-06-20 DIAGNOSIS — E53.8 VITAMIN B 12 DEFICIENCY: ICD-10-CM

## 2023-06-20 DIAGNOSIS — F41.9 ANXIETY AND DEPRESSION: ICD-10-CM

## 2023-06-20 DIAGNOSIS — G43.809 OTHER MIGRAINE WITHOUT STATUS MIGRAINOSUS, NOT INTRACTABLE: ICD-10-CM

## 2023-06-20 DIAGNOSIS — G47.33 OSA (OBSTRUCTIVE SLEEP APNEA): ICD-10-CM

## 2023-06-20 DIAGNOSIS — J44.9 CHRONIC OBSTRUCTIVE PULMONARY DISEASE, UNSPECIFIED COPD TYPE (HCC): ICD-10-CM

## 2023-06-20 DIAGNOSIS — I10 ESSENTIAL HYPERTENSION: Primary | ICD-10-CM

## 2023-06-20 DIAGNOSIS — E78.2 MIXED HYPERLIPIDEMIA: ICD-10-CM

## 2023-06-20 DIAGNOSIS — F32.A ANXIETY AND DEPRESSION: ICD-10-CM

## 2023-06-20 DIAGNOSIS — E03.9 ACQUIRED HYPOTHYROIDISM: ICD-10-CM

## 2023-06-20 DIAGNOSIS — L30.9 DERMATITIS: ICD-10-CM

## 2023-06-20 PROCEDURE — G8427 DOCREV CUR MEDS BY ELIG CLIN: HCPCS | Performed by: FAMILY MEDICINE

## 2023-06-20 PROCEDURE — 3017F COLORECTAL CA SCREEN DOC REV: CPT | Performed by: FAMILY MEDICINE

## 2023-06-20 PROCEDURE — 1090F PRES/ABSN URINE INCON ASSESS: CPT | Performed by: FAMILY MEDICINE

## 2023-06-20 PROCEDURE — 1123F ACP DISCUSS/DSCN MKR DOCD: CPT | Performed by: FAMILY MEDICINE

## 2023-06-20 PROCEDURE — G8399 PT W/DXA RESULTS DOCUMENT: HCPCS | Performed by: FAMILY MEDICINE

## 2023-06-20 PROCEDURE — 1036F TOBACCO NON-USER: CPT | Performed by: FAMILY MEDICINE

## 2023-06-20 PROCEDURE — 3079F DIAST BP 80-89 MM HG: CPT | Performed by: FAMILY MEDICINE

## 2023-06-20 PROCEDURE — 3023F SPIROM DOC REV: CPT | Performed by: FAMILY MEDICINE

## 2023-06-20 PROCEDURE — 99214 OFFICE O/P EST MOD 30 MIN: CPT | Performed by: FAMILY MEDICINE

## 2023-06-20 PROCEDURE — G8417 CALC BMI ABV UP PARAM F/U: HCPCS | Performed by: FAMILY MEDICINE

## 2023-06-20 PROCEDURE — 3075F SYST BP GE 130 - 139MM HG: CPT | Performed by: FAMILY MEDICINE

## 2023-06-20 RX ORDER — HYDROCHLOROTHIAZIDE 25 MG/1
TABLET ORAL
Qty: 90 TABLET | Refills: 1 | Status: CANCELLED | OUTPATIENT
Start: 2023-06-20

## 2023-06-20 RX ORDER — IPRATROPIUM BROMIDE AND ALBUTEROL SULFATE 2.5; .5 MG/3ML; MG/3ML
1 SOLUTION RESPIRATORY (INHALATION) PRN
Qty: 360 ML | Refills: 11 | Status: SHIPPED | OUTPATIENT
Start: 2023-06-20

## 2023-06-20 RX ORDER — LEVOTHYROXINE SODIUM 0.12 MG/1
125 TABLET ORAL DAILY
Qty: 90 TABLET | Refills: 1 | Status: SHIPPED | OUTPATIENT
Start: 2023-06-20

## 2023-06-20 RX ORDER — ROFLUMILAST 500 UG/1
500 TABLET ORAL DAILY
Qty: 90 TABLET | Refills: 0 | Status: SHIPPED | OUTPATIENT
Start: 2023-06-20

## 2023-06-20 RX ORDER — SERTRALINE HYDROCHLORIDE 25 MG/1
25 TABLET, FILM COATED ORAL DAILY
Qty: 90 TABLET | Refills: 1 | Status: SHIPPED | OUTPATIENT
Start: 2023-06-20

## 2023-06-20 RX ORDER — CYCLOBENZAPRINE HCL 10 MG
10 TABLET ORAL
Qty: 60 TABLET | Refills: 3 | Status: SHIPPED | OUTPATIENT
Start: 2023-06-20

## 2023-06-20 RX ORDER — FLUTICASONE FUROATE AND VILANTEROL 200; 25 UG/1; UG/1
1 POWDER RESPIRATORY (INHALATION) DAILY
Qty: 60 EACH | Refills: 3 | Status: SHIPPED | OUTPATIENT
Start: 2023-06-20

## 2023-06-20 ASSESSMENT — ENCOUNTER SYMPTOMS
CONSTIPATION: 0
DIARRHEA: 0
EYE PAIN: 0
ABDOMINAL PAIN: 0
COUGH: 0
SHORTNESS OF BREATH: 0

## 2023-06-20 NOTE — PATIENT INSTRUCTIONS
Dr. Ton Ortiz. [address] [facility], [facility], [address]   Dr. Pinkie Antu Dr. Pete Meigs Dr. Salbador Oh [facility]  [phone]. [address] [place].  [address]  [phone]  Dr. Halina Suazo

## 2023-06-20 NOTE — PROGRESS NOTES
Chief Complaint   Patient presents with    Hypertension         ASSESSMENT/PLAN:  1. Essential hypertension  Controlled on regimen  Update labs  Follow up in 6 months to gauge improvement   - CBC with Auto Differential; Future  - Comprehensive Metabolic Panel; Future    2. Chronic obstructive pulmonary disease, unspecified COPD type ([facility] [address]  Controlled per patient  Continue regimen  Would like to see pulmonology for evaluation and treatment options. - Roflumilast (DALIRESP) 500 MCG tablet; Take 1 tablet by mouth daily  Dispense: 90 tablet; Refill: 0  - ipratropium 0.5 mg-albuterol 2.5 mg (DUONEB) 0.5-2.5 (3) MG/3ML SOLN nebulizer solution; Inhale 3 mLs into the lungs as needed for Shortness of Breath  Dispense: 360 mL; Refill: 11  - fluticasone furoate-vilanterol (BREO ELLIPTA) 200-25 MCG/ACT AEPB inhaler; Inhale 1 puff into the lungs daily  Dispense: 60 each; Refill: 3  - [facility] MD, Pulmonary, [facility]    3. Mixed hyperlipidemia  Politely declines statin and asa  Discussed ascvd risk of great than 10% and risks of not treating   All questions answered. 4. AVRIL (obstructive sleep apnea)  Uncontrolled   Referral placed, likely needs new Denita Holter MD, Sleep [facility]    5. Acquired hypothyroidism  Update lab  Continue levo daily and titrate accordingly  - levothyroxine (SYNTHROID) 125 MCG tablet; Take 1 tablet by mouth Daily TAKE 1 TABLET EVERY DAY (DISCONTINUE 135MCG)  Dispense: 90 tablet; Refill: 1  - TSH with Reflex; Future    6. Vitamin B 12 deficiency  Update before refilling inj vial  - Vitamin B12 & Folate; Future    7. Other migraine without status migrainosus, not intractable  Referral placed  Offered medication to try but she politely declines  Discussed prognosis and modalities of treatment, answered all questions   - [facility]    8.  Dermatitis  Uncontrolled   Referral placed for eval and treatment   - [facility] Katerine Shoemaker,

## 2023-06-20 NOTE — TELEPHONE ENCOUNTER
Pt called back and said she was working with the  there and will let us know if anything else is needed.

## 2023-06-26 DIAGNOSIS — E03.9 ACQUIRED HYPOTHYROIDISM: ICD-10-CM

## 2023-06-26 DIAGNOSIS — I10 ESSENTIAL HYPERTENSION: ICD-10-CM

## 2023-06-26 DIAGNOSIS — E53.8 VITAMIN B 12 DEFICIENCY: ICD-10-CM

## 2023-06-27 DIAGNOSIS — R73.09 ELEVATED GLUCOSE: Primary | ICD-10-CM

## 2023-06-27 DIAGNOSIS — R74.8 ELEVATED VITAMIN B12 LEVEL: ICD-10-CM

## 2023-06-27 DIAGNOSIS — R94.4 DECREASED GFR: ICD-10-CM

## 2023-06-27 LAB
ALBUMIN SERPL-MCNC: 4.7 G/DL (ref 3.4–5)
ALBUMIN/GLOB SERPL: 1.8 {RATIO} (ref 1.1–2.2)
ALP SERPL-CCNC: 120 U/L (ref 40–129)
ALT SERPL-CCNC: 13 U/L (ref 10–40)
ANION GAP SERPL CALCULATED.3IONS-SCNC: 17 MMOL/L (ref 3–16)
AST SERPL-CCNC: 13 U/L (ref 15–37)
BASOPHILS # BLD: 0.1 K/UL (ref 0–0.2)
BASOPHILS NFR BLD: 0.6 %
BILIRUB SERPL-MCNC: <0.2 MG/DL (ref 0–1)
BUN SERPL-MCNC: 22 MG/DL (ref 7–20)
CALCIUM SERPL-MCNC: 10.2 MG/DL (ref 8.3–10.6)
CHLORIDE SERPL-SCNC: 100 MMOL/L (ref 99–110)
CO2 SERPL-SCNC: 23 MMOL/L (ref 21–32)
CREAT SERPL-MCNC: 1.2 MG/DL (ref 0.6–1.2)
DEPRECATED RDW RBC AUTO: 13.7 % (ref 12.4–15.4)
EOSINOPHIL # BLD: 0.1 K/UL (ref 0–0.6)
EOSINOPHIL NFR BLD: 0.9 %
FOLATE SERPL-MCNC: 5.56 NG/ML (ref 4.78–24.2)
GFR SERPLBLD CREATININE-BSD FMLA CKD-EPI: 49 ML/MIN/{1.73_M2}
GLUCOSE SERPL-MCNC: 151 MG/DL (ref 70–99)
HCT VFR BLD AUTO: 41.4 % (ref 36–48)
HGB BLD-MCNC: 13.2 G/DL (ref 12–16)
LYMPHOCYTES # BLD: 2.8 K/UL (ref 1–5.1)
LYMPHOCYTES NFR BLD: 23.8 %
MCH RBC QN AUTO: 27.6 PG (ref 26–34)
MCHC RBC AUTO-ENTMCNC: 31.9 G/DL (ref 31–36)
MCV RBC AUTO: 86.6 FL (ref 80–100)
MONOCYTES # BLD: 0.9 K/UL (ref 0–1.3)
MONOCYTES NFR BLD: 7.6 %
NEUTROPHILS # BLD: 7.9 K/UL (ref 1.7–7.7)
NEUTROPHILS NFR BLD: 67.1 %
PLATELET # BLD AUTO: 237 K/UL (ref 135–450)
PMV BLD AUTO: 10.7 FL (ref 5–10.5)
POTASSIUM SERPL-SCNC: 4.2 MMOL/L (ref 3.5–5.1)
PROT SERPL-MCNC: 7.3 G/DL (ref 6.4–8.2)
RBC # BLD AUTO: 4.78 M/UL (ref 4–5.2)
SODIUM SERPL-SCNC: 140 MMOL/L (ref 136–145)
TSH SERPL DL<=0.005 MIU/L-ACNC: 0.82 UIU/ML (ref 0.27–4.2)
VIT B12 SERPL-MCNC: >2000 PG/ML (ref 211–911)
WBC # BLD AUTO: 11.8 K/UL (ref 4–11)

## 2023-06-30 ENCOUNTER — TELEPHONE (OUTPATIENT)
Dept: SLEEP CENTER | Age: 68
End: 2023-06-30

## 2023-07-06 ENCOUNTER — TELEPHONE (OUTPATIENT)
Dept: PRIMARY CARE CLINIC | Age: 68
End: 2023-07-06

## 2023-07-07 ENCOUNTER — TELEMEDICINE (OUTPATIENT)
Dept: PRIMARY CARE CLINIC | Age: 68
End: 2023-07-07

## 2023-07-07 DIAGNOSIS — J44.1 COPD EXACERBATION (HCC): Primary | ICD-10-CM

## 2023-07-07 RX ORDER — PREDNISONE 20 MG/1
20 TABLET ORAL DAILY
Qty: 10 TABLET | Refills: 0 | Status: SHIPPED | OUTPATIENT
Start: 2023-07-07 | End: 2023-07-17

## 2023-07-07 RX ORDER — PREDNISONE 10 MG/1
10 TABLET ORAL DAILY
Qty: 10 TABLET | Refills: 0 | Status: SHIPPED | OUTPATIENT
Start: 2023-07-07 | End: 2023-07-17

## 2023-07-07 RX ORDER — PREDNISONE 5 MG/1
5 TABLET ORAL DAILY
Qty: 10 TABLET | Refills: 0 | Status: SHIPPED | OUTPATIENT
Start: 2023-07-07 | End: 2023-07-17

## 2023-07-07 RX ORDER — AMOXICILLIN AND CLAVULANATE POTASSIUM 875; 125 MG/1; MG/1
1 TABLET, FILM COATED ORAL 2 TIMES DAILY
Qty: 14 TABLET | Refills: 0 | Status: SHIPPED | OUTPATIENT
Start: 2023-07-07 | End: 2023-07-14

## 2023-07-07 ASSESSMENT — ENCOUNTER SYMPTOMS
SHORTNESS OF BREATH: 1
CHEST TIGHTNESS: 1
SORE THROAT: 0
WHEEZING: 1
COUGH: 1
SINUS PAIN: 0
SINUS PRESSURE: 0
RHINORRHEA: 0

## 2023-07-07 NOTE — PATIENT INSTRUCTIONS
Patient Education        Chronic Obstructive Pulmonary Disease (COPD): Care Instructions  Overview     Chronic obstructive pulmonary disease (COPD) is a lung disease that makes it hard to breathe. With COPD, the airways that lead to the lungs are narrowed, and the tiny air sacs in the lungs are damaged and lose their stretch. People with COPD have decreased airflow in and out of the lungs, which makes it hard to breathe. The airways also can get clogged with thick mucus. Cigarette smoking is a major cause of COPD. Although there is no cure for COPD, you can slow its progress. Following your treatment plan and taking care of yourself can help you feel better and live longer. Follow-up care is a key part of your treatment and safety. Be sure to make and go to all appointments, and call your doctor if you are having problems. It's also a good idea to know your test results and keep a list of the medicines you take. How can you care for yourself at home? Staying healthy    Do not smoke. This is the most important step you can take to prevent more damage to your lungs. If you need help quitting, talk to your doctor about stop-smoking programs and medicines. These can increase your chances of quitting for good. Avoid infections such as COVID-19, colds, and the flu. Wash your hands often. Get the pneumococcal and whooping cough (pertussis) vaccines. If you have had these vaccines before, ask your doctor if you need another dose. Get the flu vaccine every fall. Stay up to date on your COVID-19 vaccines. Avoid secondhand smoke and air pollution. Try to stay inside with your windows closed when air pollution is bad. Medicines and oxygen therapy    Take your medicines exactly as prescribed. Call your doctor if you think you are having a problem with your medicine. You may be taking medicines such as:  Bronchodilators. These help open your airways and make breathing easier.  They are either short-acting (work for 4

## 2023-07-07 NOTE — PROGRESS NOTES
2023       TELEHEALTH EVALUATION -- Audio/Visual (During [id number]-[id number] public health emergency)    HPI:    Vijaya Faustin (:  1955) has requested an audio/video evaluation for the following concern(s):    COPD Exacerbation  Lauro Patel presents today for evaluation of chest tightness, wheezing, shortness of breath and increased sputum production. She has a known history of COPD with her most recent exacerbation about 3 weeks ago. She has been compliant with her preventative inhalers, but has been requiring breathing treatments every 4-6 hours and has her oxygen turned up from 2 to 3 L. She has had no fevers, chills, nausea, vomiting or diarrhea. She has had no sick contacts. She did have a negative home COVID test and is fully vaccinated against COVID with the exception of the most recent booster shot. Review of Systems   Constitutional:  Negative for activity change, chills and fever. HENT:  Negative for congestion, ear pain, rhinorrhea, sinus pressure, sinus pain and sore throat. Respiratory:  Positive for cough, chest tightness, shortness of breath and wheezing. Cardiovascular:  Negative for chest pain. Neurological:  Negative for dizziness. Prior to Visit Medications    Medication Sig Taking?  Authorizing Provider   amoxicillin-clavulanate (AUGMENTIN) 875-125 MG per tablet Take 1 tablet by mouth 2 times daily for 7 days Yes , DO   predniSONE (DELTASONE) 20 MG tablet Take 1 tablet by mouth daily for 10 days Yes , DO   predniSONE (DELTASONE) 10 MG tablet Take 1 tablet by mouth daily for 10 days Yes  Landing, DO   predniSONE (DELTASONE) 10 MG tablet Take 1 tablet by mouth daily for 10 days Yes  Landing, DO   predniSONE (DELTASONE) 5 MG tablet Take 1 tablet by mouth daily for 10 days Yes  Landing, DO   amitriptyline (ELAVIL) 25 MG tablet TAKE 1 TABLET BY MOUTH TWICE DAILY FOR 30 DAYS Yes Historical Provider, MD   LINZESS 72 MCG CAPS capsule TAKE 1 CAPSULE BY MOUTH AT

## 2023-07-10 RX ORDER — CLONIDINE HYDROCHLORIDE 0.1 MG/1
TABLET ORAL
Qty: 60 TABLET | Refills: 0 | Status: SHIPPED | OUTPATIENT
Start: 2023-07-10

## 2023-07-10 NOTE — TELEPHONE ENCOUNTER
Medication:   Requested Prescriptions     Pending Prescriptions Disp Refills    cloNIDine (CATAPRES) 0.1 MG tablet [Pharmacy Med Name: cloNIDine HCl 0.1 MG Oral Tablet] 60 tablet 0     Sig: Take 1 tablet by mouth twice daily        Last Filled:  6/13/23    Last appt: 7/7/2023   Next appt: 7/18/2023    Last OARRS:   RX Monitoring 1/30/2017   Attestation The Prescription Monitoring Report for this patient was reviewed today.

## 2023-07-31 RX ORDER — POTASSIUM CHLORIDE 750 MG/1
TABLET, EXTENDED RELEASE ORAL
Qty: 180 TABLET | Refills: 0 | Status: SHIPPED | OUTPATIENT
Start: 2023-07-31

## 2023-07-31 NOTE — TELEPHONE ENCOUNTER
Medication:   Requested Prescriptions     Pending Prescriptions Disp Refills    potassium chloride (KLOR-CON M) 10 MEQ extended release tablet [Pharmacy Med Name: Potassium Chloride Monik ER 10 MEQ Oral Tablet Extended Release] 180 tablet 0     Sig: Take 2 tablets by mouth once daily        Last Filled:  2/2/23    Patient Phone Number: [phone] (home)     Last appt: 7/7/2023   Next appt: 8/1/2023    Last OARRS:   RX Monitoring 1/30/2017   Attestation The Prescription Monitoring Report for this patient was reviewed today.

## 2023-08-02 RX ORDER — POTASSIUM CHLORIDE 750 MG/1
TABLET, EXTENDED RELEASE ORAL
Qty: 180 TABLET | Refills: 0 | OUTPATIENT
Start: 2023-08-02

## 2023-08-02 NOTE — TELEPHONE ENCOUNTER
Medication:   Requested Prescriptions     Pending Prescriptions Disp Refills    potassium chloride (KLOR-CON M) 10 MEQ extended release tablet [Pharmacy Med Name: Potassium Chloride Monik ER 10 MEQ Oral Tablet Extended Release] 180 tablet 0     Sig: Take 2 tablets by mouth once daily        Last Filled:      Patient Phone Number: [phone] (home)     Last appt: 7/7/2023   Next appt: Visit date not found    Last OARRS:   RX Monitoring 1/30/2017   Attestation The Prescription Monitoring Report for this patient was reviewed today.

## 2023-08-07 RX ORDER — CLONIDINE HYDROCHLORIDE 0.1 MG/1
TABLET ORAL
Qty: 60 TABLET | Refills: 0 | Status: SHIPPED | OUTPATIENT
Start: 2023-08-07

## 2023-08-07 NOTE — TELEPHONE ENCOUNTER
Medication:   Requested Prescriptions     Pending Prescriptions Disp Refills    cloNIDine (CATAPRES) 0.1 MG tablet [Pharmacy Med Name: cloNIDine HCl 0.1 MG Oral Tablet] 60 tablet 0     Sig: Take 1 tablet by mouth twice daily        Last Filled:  07/10/23    Patient Phone Number: [phone] (home)     Last appt: 7/7/2023   Next appt: Visit date not found    Last OARRS:   RX Monitoring 1/30/2017   Attestation The Prescription Monitoring Report for this patient was reviewed today.

## 2023-08-28 RX ORDER — HYDROCHLOROTHIAZIDE 25 MG/1
TABLET ORAL
Qty: 90 TABLET | Refills: 0 | Status: SHIPPED | OUTPATIENT
Start: 2023-08-28

## 2023-08-28 NOTE — TELEPHONE ENCOUNTER
Medication:   Requested Prescriptions     Pending Prescriptions Disp Refills    hydroCHLOROthiazide (HYDRODIURIL) 25 MG tablet [Pharmacy Med Name: hydroCHLOROthiazide 25 MG Oral Tablet] 90 tablet 0     Sig: Take 1 tablet by mouth once daily        Last Filled:  02/02/23    Patient Phone Number: [phone] (home)     Last appt: 7/7/2023 Return in about 2 weeks (around 7/21/2023) for COPD/Asthma. Next appt: Visit date not found    Last OARRS:   RX Monitoring 1/30/2017   Attestation The Prescription Monitoring Report for this patient was reviewed today.

## 2023-08-29 DIAGNOSIS — J45.40 MODERATE PERSISTENT ASTHMA WITHOUT COMPLICATION: ICD-10-CM

## 2023-09-06 DIAGNOSIS — I10 ESSENTIAL HYPERTENSION: Primary | Chronic | ICD-10-CM

## 2023-09-06 RX ORDER — CLONIDINE HYDROCHLORIDE 0.1 MG/1
TABLET ORAL
Qty: 60 TABLET | Refills: 0 | OUTPATIENT
Start: 2023-09-06

## 2023-09-12 ENCOUNTER — OFFICE VISIT (OUTPATIENT)
Dept: PRIMARY CARE CLINIC | Age: 68
End: 2023-09-12
Payer: MEDICARE

## 2023-09-12 VITALS
TEMPERATURE: 97.9 F | SYSTOLIC BLOOD PRESSURE: 128 MMHG | HEIGHT: 65 IN | DIASTOLIC BLOOD PRESSURE: 82 MMHG | BODY MASS INDEX: 29.66 KG/M2 | WEIGHT: 178 LBS | HEART RATE: 106 BPM

## 2023-09-12 DIAGNOSIS — E78.2 MIXED HYPERLIPIDEMIA: ICD-10-CM

## 2023-09-12 DIAGNOSIS — R73.03 PREDIABETES: ICD-10-CM

## 2023-09-12 DIAGNOSIS — J44.9 MODERATE COPD (CHRONIC OBSTRUCTIVE PULMONARY DISEASE) (HCC): Chronic | ICD-10-CM

## 2023-09-12 DIAGNOSIS — I10 ESSENTIAL HYPERTENSION: Chronic | ICD-10-CM

## 2023-09-12 DIAGNOSIS — E03.9 HYPOTHYROIDISM, UNSPECIFIED TYPE: Primary | ICD-10-CM

## 2023-09-12 DIAGNOSIS — J44.9 CHRONIC OBSTRUCTIVE PULMONARY DISEASE, UNSPECIFIED COPD TYPE (HCC): ICD-10-CM

## 2023-09-12 DIAGNOSIS — E03.9 HYPOTHYROIDISM, UNSPECIFIED TYPE: ICD-10-CM

## 2023-09-12 PROBLEM — H25.13 AGE-RELATED NUCLEAR CATARACT OF BOTH EYES: Status: RESOLVED | Noted: 2018-11-01 | Resolved: 2023-09-12

## 2023-09-12 PROBLEM — R92.8 ABNORMAL MAMMOGRAM: Status: RESOLVED | Noted: 2020-01-09 | Resolved: 2023-09-12

## 2023-09-12 PROBLEM — M51.26 DISC DISPLACEMENT, LUMBAR: Status: RESOLVED | Noted: 2017-02-22 | Resolved: 2023-09-12

## 2023-09-12 PROBLEM — R56.9 SEIZURE (HCC): Status: RESOLVED | Noted: 2022-02-06 | Resolved: 2023-09-12

## 2023-09-12 PROBLEM — G89.29 CHRONIC LOW BACK PAIN: Status: RESOLVED | Noted: 2017-02-22 | Resolved: 2023-09-12

## 2023-09-12 PROBLEM — M47.816 OSTEOARTHRITIS OF LUMBAR SPINE: Status: RESOLVED | Noted: 2017-02-22 | Resolved: 2023-09-12

## 2023-09-12 PROBLEM — M48.061 DEGENERATIVE LUMBAR SPINAL STENOSIS: Status: RESOLVED | Noted: 2017-02-22 | Resolved: 2023-09-12

## 2023-09-12 PROBLEM — D72.829 LEUKOCYTOSIS: Status: RESOLVED | Noted: 2017-08-09 | Resolved: 2023-09-12

## 2023-09-12 PROBLEM — Z87.891 FORMER SMOKER: Status: RESOLVED | Noted: 2020-09-14 | Resolved: 2023-09-12

## 2023-09-12 PROBLEM — H25.013 CORTICAL AGE-RELATED CATARACT OF BOTH EYES: Status: RESOLVED | Noted: 2018-11-01 | Resolved: 2023-09-12

## 2023-09-12 PROBLEM — R11.0 CHRONIC NAUSEA: Status: RESOLVED | Noted: 2020-01-09 | Resolved: 2023-09-12

## 2023-09-12 PROBLEM — D72.820 LYMPHOCYTOSIS: Status: RESOLVED | Noted: 2017-06-23 | Resolved: 2023-09-12

## 2023-09-12 PROBLEM — Z87.891 FORMER HEAVY CIGARETTE SMOKER (20-39 PER DAY): Status: RESOLVED | Noted: 2020-01-09 | Resolved: 2023-09-12

## 2023-09-12 PROBLEM — M54.50 CHRONIC LOW BACK PAIN: Status: RESOLVED | Noted: 2017-02-22 | Resolved: 2023-09-12

## 2023-09-12 PROBLEM — H35.54 RPE (RETINAL PIGMENT EPITHELIUM) ATROPHY: Status: RESOLVED | Noted: 2018-11-01 | Resolved: 2023-09-12

## 2023-09-12 PROCEDURE — 1036F TOBACCO NON-USER: CPT | Performed by: STUDENT IN AN ORGANIZED HEALTH CARE EDUCATION/TRAINING PROGRAM

## 2023-09-12 PROCEDURE — 3079F DIAST BP 80-89 MM HG: CPT | Performed by: STUDENT IN AN ORGANIZED HEALTH CARE EDUCATION/TRAINING PROGRAM

## 2023-09-12 PROCEDURE — G8427 DOCREV CUR MEDS BY ELIG CLIN: HCPCS | Performed by: STUDENT IN AN ORGANIZED HEALTH CARE EDUCATION/TRAINING PROGRAM

## 2023-09-12 PROCEDURE — 3017F COLORECTAL CA SCREEN DOC REV: CPT | Performed by: STUDENT IN AN ORGANIZED HEALTH CARE EDUCATION/TRAINING PROGRAM

## 2023-09-12 PROCEDURE — 1123F ACP DISCUSS/DSCN MKR DOCD: CPT | Performed by: STUDENT IN AN ORGANIZED HEALTH CARE EDUCATION/TRAINING PROGRAM

## 2023-09-12 PROCEDURE — G8417 CALC BMI ABV UP PARAM F/U: HCPCS | Performed by: STUDENT IN AN ORGANIZED HEALTH CARE EDUCATION/TRAINING PROGRAM

## 2023-09-12 PROCEDURE — 3074F SYST BP LT 130 MM HG: CPT | Performed by: STUDENT IN AN ORGANIZED HEALTH CARE EDUCATION/TRAINING PROGRAM

## 2023-09-12 PROCEDURE — 3023F SPIROM DOC REV: CPT | Performed by: STUDENT IN AN ORGANIZED HEALTH CARE EDUCATION/TRAINING PROGRAM

## 2023-09-12 PROCEDURE — 99214 OFFICE O/P EST MOD 30 MIN: CPT | Performed by: STUDENT IN AN ORGANIZED HEALTH CARE EDUCATION/TRAINING PROGRAM

## 2023-09-12 PROCEDURE — 1090F PRES/ABSN URINE INCON ASSESS: CPT | Performed by: STUDENT IN AN ORGANIZED HEALTH CARE EDUCATION/TRAINING PROGRAM

## 2023-09-12 PROCEDURE — G8399 PT W/DXA RESULTS DOCUMENT: HCPCS | Performed by: STUDENT IN AN ORGANIZED HEALTH CARE EDUCATION/TRAINING PROGRAM

## 2023-09-12 RX ORDER — ROFLUMILAST 500 UG/1
500 TABLET ORAL DAILY
Qty: 90 TABLET | Refills: 1 | Status: SHIPPED | OUTPATIENT
Start: 2023-09-12

## 2023-09-12 RX ORDER — MOMETASONE FUROATE AND FORMOTEROL FUMARATE DIHYDRATE 100; 5 UG/1; UG/1
AEROSOL RESPIRATORY (INHALATION)
COMMUNITY
Start: 2023-09-06

## 2023-09-12 RX ORDER — ROFLUMILAST 500 UG/1
500 TABLET ORAL DAILY
Qty: 90 TABLET | Refills: 1 | Status: SHIPPED | OUTPATIENT
Start: 2023-09-12 | End: 2023-09-12 | Stop reason: SDUPTHER

## 2023-09-12 SDOH — ECONOMIC STABILITY: FOOD INSECURITY: WITHIN THE PAST 12 MONTHS, THE FOOD YOU BOUGHT JUST DIDN'T LAST AND YOU DIDN'T HAVE MONEY TO GET MORE.: NEVER TRUE

## 2023-09-12 SDOH — ECONOMIC STABILITY: INCOME INSECURITY: HOW HARD IS IT FOR YOU TO PAY FOR THE VERY BASICS LIKE FOOD, HOUSING, MEDICAL CARE, AND HEATING?: NOT HARD AT ALL

## 2023-09-12 SDOH — ECONOMIC STABILITY: HOUSING INSECURITY
IN THE LAST 12 MONTHS, WAS THERE A TIME WHEN YOU DID NOT HAVE A STEADY PLACE TO SLEEP OR SLEPT IN A SHELTER (INCLUDING NOW)?: NO

## 2023-09-12 SDOH — ECONOMIC STABILITY: FOOD INSECURITY: WITHIN THE PAST 12 MONTHS, YOU WORRIED THAT YOUR FOOD WOULD RUN OUT BEFORE YOU GOT MONEY TO BUY MORE.: NEVER TRUE

## 2023-09-12 ASSESSMENT — ENCOUNTER SYMPTOMS
NAUSEA: 0
WHEEZING: 0
SHORTNESS OF BREATH: 0

## 2023-09-12 NOTE — PROGRESS NOTES
Hepatitis B 07/28/2017    Hepatitis B (Engerix-B) 04/13/2017    Hepatitis B vaccine 04/13/2017    Influenza Vaccine, unspecified formulation 10/02/2011, 02/15/2014, 09/28/2015, 08/25/2016, 09/05/2017, 10/01/2019    Influenza Virus Vaccine 02/15/2014, 09/25/2016, 09/05/2017, 10/12/2018    Influenza Whole 10/02/2011, 09/25/2016    Influenza, AFLURIA (age 1 yrs+), FLUZONE, (age 10 mo+), MDV, 0.5mL 10/12/2018    Influenza, FLUCELVAX, (age 10 mo+), MDCK, PF, 0.5mL 10/09/2019    Influenza, FLUZONE (age 72 y+), High Dose, 0.7mL 08/29/2020, 09/30/2022    Influenza, Intradermal, Preservative free 09/25/2013, 02/15/2014    Pneumococcal, PCV-13, PREVNAR 15, (age 6w+), IM, 0.5mL 10/05/2016    Pneumococcal, PCV20, PREVNAR 21, (age 18y+), IM, 0.5mL 01/17/2023    Pneumococcal, PPSV23, PNEUMOVAX 21, (age 2y+), SC/IM, 0.5mL 10/08/2013    TDaP, ADACEL (age 6y-58y), BOOSTRIX (age 10y+), IM, 0.5mL 03/27/2015, 09/16/2019    Zoster Live (Zostavax) 04/13/2017    Zoster Recombinant (Shingrix) 09/22/2021, 04/28/2022       Health Maintenance   Topic Date Due    Hepatitis B vaccine (3 of 3 - 19+ 3-dose series) 10/13/2017    COVID-19 Vaccine (4 - Pfizer series) 11/26/2021    Flu vaccine (1) 08/01/2023    A1C test (Diabetic or Prediabetic)  01/17/2024    Breast cancer screen  02/17/2024    Depression Monitoring  03/16/2024    Annual Wellness Visit (AWV)  03/16/2024    GFR test (Diabetes, CKD 3-4, OR last GFR 15-59)  06/26/2024    Lipids  09/01/2026    DTaP/Tdap/Td vaccine (3 - Td or Tdap) 09/16/2029    Colorectal Cancer Screen  02/23/2032    DEXA (modify frequency per FRAX score)  Completed    Shingles vaccine  Completed    Pneumococcal 65+ years Vaccine  Completed    Hepatitis C screen  Completed    Hepatitis A vaccine  Aged Out    Hib vaccine  Aged Out    Meningococcal (ACWY) vaccine  Aged Out    HIV screen  Discontinued       PSH, PMH, SH and FH reviewed and noted. Recent and past labs, tests and consults also reviewed.   Recent or new

## 2023-09-12 NOTE — TELEPHONE ENCOUNTER
Medication:   Requested Prescriptions     Pending Prescriptions Disp Refills    Roflumilast (DALIRESP) 500 MCG tablet 90 tablet 1     Sig: Take 1 tablet by mouth daily        Last Filled:  9/12/2023    Patient Phone Number: [phone] (home)     Last appt: 9/12/2023   Next appt: Visit date not found        Return in about 6 months (around 3/12/2024) for COPD, HTN, hypothyroid .

## 2023-09-13 LAB
ALBUMIN SERPL-MCNC: 5.2 G/DL (ref 3.4–5)
ALBUMIN/GLOB SERPL: 2.1 {RATIO} (ref 1.1–2.2)
ALP SERPL-CCNC: 98 U/L (ref 40–129)
ALT SERPL-CCNC: 14 U/L (ref 10–40)
ANION GAP SERPL CALCULATED.3IONS-SCNC: 15 MMOL/L (ref 3–16)
AST SERPL-CCNC: 11 U/L (ref 15–37)
BASOPHILS # BLD: 0 K/UL (ref 0–0.2)
BASOPHILS NFR BLD: 0 %
BILIRUB SERPL-MCNC: 0.3 MG/DL (ref 0–1)
BUN SERPL-MCNC: 25 MG/DL (ref 7–20)
CALCIUM SERPL-MCNC: 11.3 MG/DL (ref 8.3–10.6)
CHLORIDE SERPL-SCNC: 100 MMOL/L (ref 99–110)
CHOLEST SERPL-MCNC: 164 MG/DL (ref 0–199)
CO2 SERPL-SCNC: 23 MMOL/L (ref 21–32)
CREAT SERPL-MCNC: 1.2 MG/DL (ref 0.6–1.2)
DEPRECATED RDW RBC AUTO: 14.6 % (ref 12.4–15.4)
EOSINOPHIL # BLD: 0 K/UL (ref 0–0.6)
EOSINOPHIL NFR BLD: 0 %
EST. AVERAGE GLUCOSE BLD GHB EST-MCNC: 122.6 MG/DL
GFR SERPLBLD CREATININE-BSD FMLA CKD-EPI: 49 ML/MIN/{1.73_M2}
GLUCOSE SERPL-MCNC: 136 MG/DL (ref 70–99)
HBA1C MFR BLD: 5.9 %
HCT VFR BLD AUTO: 45.2 % (ref 36–48)
HDLC SERPL-MCNC: 84 MG/DL (ref 40–60)
HGB BLD-MCNC: 14.5 G/DL (ref 12–16)
LDLC SERPL CALC-MCNC: 66 MG/DL
LYMPHOCYTES # BLD: 5 K/UL (ref 1–5.1)
LYMPHOCYTES NFR BLD: 30 %
MCH RBC QN AUTO: 26.8 PG (ref 26–34)
MCHC RBC AUTO-ENTMCNC: 32.1 G/DL (ref 31–36)
MCV RBC AUTO: 83.5 FL (ref 80–100)
MONOCYTES # BLD: 0.5 K/UL (ref 0–1.3)
MONOCYTES NFR BLD: 3 %
NEUTROPHILS # BLD: 11.2 K/UL (ref 1.7–7.7)
NEUTROPHILS NFR BLD: 67 %
PLATELET # BLD AUTO: 230 K/UL (ref 135–450)
PMV BLD AUTO: 10.8 FL (ref 5–10.5)
POTASSIUM SERPL-SCNC: 4.2 MMOL/L (ref 3.5–5.1)
PROT SERPL-MCNC: 7.7 G/DL (ref 6.4–8.2)
RBC # BLD AUTO: 5.42 M/UL (ref 4–5.2)
RBC MORPH BLD: NORMAL
SLIDE REVIEW: ABNORMAL
SODIUM SERPL-SCNC: 138 MMOL/L (ref 136–145)
TRIGL SERPL-MCNC: 71 MG/DL (ref 0–150)
TSH SERPL DL<=0.005 MIU/L-ACNC: 0.63 UIU/ML (ref 0.27–4.2)
VLDLC SERPL CALC-MCNC: 14 MG/DL
WBC # BLD AUTO: 16.7 K/UL (ref 4–11)

## 2023-09-14 ENCOUNTER — TELEPHONE (OUTPATIENT)
Dept: PRIMARY CARE CLINIC | Age: 68
End: 2023-09-14

## 2023-09-14 DIAGNOSIS — N18.9 CHRONIC KIDNEY DISEASE, UNSPECIFIED CKD STAGE: ICD-10-CM

## 2023-09-14 DIAGNOSIS — J44.9 CHRONIC OBSTRUCTIVE PULMONARY DISEASE, UNSPECIFIED COPD TYPE (HCC): ICD-10-CM

## 2023-09-14 DIAGNOSIS — E83.52 HYPERCALCEMIA: Primary | ICD-10-CM

## 2023-09-14 DIAGNOSIS — E03.9 ACQUIRED HYPOTHYROIDISM: ICD-10-CM

## 2023-09-14 DIAGNOSIS — D72.829 LEUKOCYTOSIS, UNSPECIFIED TYPE: ICD-10-CM

## 2023-09-14 RX ORDER — LEVOTHYROXINE SODIUM 0.12 MG/1
125 TABLET ORAL DAILY
Qty: 90 TABLET | Refills: 1 | Status: SHIPPED | OUTPATIENT
Start: 2023-09-14

## 2023-09-14 RX ORDER — QUETIAPINE FUMARATE 100 MG/1
100 TABLET, FILM COATED ORAL NIGHTLY
Qty: 90 TABLET | Refills: 1 | Status: SHIPPED | OUTPATIENT
Start: 2023-09-14

## 2023-09-14 RX ORDER — HYDROCHLOROTHIAZIDE 25 MG/1
25 TABLET ORAL DAILY
Qty: 90 TABLET | Refills: 0 | Status: SHIPPED | OUTPATIENT
Start: 2023-09-14

## 2023-09-14 NOTE — TELEPHONE ENCOUNTER
Medication:   Requested Prescriptions     Pending Prescriptions Disp Refills    QUEtiapine (SEROQUEL) 100 MG tablet 90 tablet 1     Sig: Take 1 tablet by mouth nightly    levothyroxine (SYNTHROID) 125 MCG tablet 90 tablet 1     Sig: Take 1 tablet by mouth Daily TAKE 1 TABLET EVERY DAY (DISCONTINUE 135MCG)        Last Filled:  6/20/2023    Patient Phone Number: [phone] (home)     Last appt: 9/12/2023   Next appt: 3/12/2024    Last OARRS:       1/30/2017     3:24 PM   RX Monitoring   Attestation The Prescription Monitoring Report for this patient was reviewed today.

## 2023-09-14 NOTE — TELEPHONE ENCOUNTER
Medication:   Requested Prescriptions     Pending Prescriptions Disp Refills    QUEtiapine (SEROQUEL) 100 MG tablet 90 tablet 1     Sig: Take 1 tablet by mouth nightly    levothyroxine (SYNTHROID) 125 MCG tablet 90 tablet 1     Sig: Take 1 tablet by mouth Daily TAKE 1 TABLET EVERY DAY (DISCONTINUE 135MCG)    hydroCHLOROthiazide (HYDRODIURIL) 25 MG tablet 90 tablet 0     Sig: Take 1 tablet by mouth daily        Last Filled:  6/20/2023    Patient Phone Number: [phone] (home)     Last appt: 9/12/2023   Next appt: 3/12/2024    Last OARRS:       1/30/2017     3:24 PM   RX Monitoring   Attestation The Prescription Monitoring Report for this patient was reviewed today.

## 2023-09-14 NOTE — TELEPHONE ENCOUNTER
M for patient to go over results. In the message I informed patient I would send message in [employer] with results. Message to patient was from Dr. Mounika Pham,     Your lab results show decreased kidney function, elevated calcium level, elevated white blood cell count and prediabetes. I would like you to return to the lab to complete follow-up blood work for your elevated calcium level. I would also like you to call the following numbers and schedule appointments with specialist to determine why your kidney function is decreased, and why your white blood cell count is so high. [facility], Jann Stover MD   [address], [address] [place] [address] [address]   Phone: [phone]     [facility] Fabi Nicole MD   [address] [facility].    [address]   Phone: [phone]     Panfilo Braun, DO

## 2023-09-19 ENCOUNTER — OFFICE VISIT (OUTPATIENT)
Dept: NEUROLOGY | Age: 68
End: 2023-09-19
Payer: MEDICARE

## 2023-09-19 VITALS
HEIGHT: 65 IN | OXYGEN SATURATION: 97 % | DIASTOLIC BLOOD PRESSURE: 85 MMHG | HEART RATE: 102 BPM | SYSTOLIC BLOOD PRESSURE: 134 MMHG | BODY MASS INDEX: 29.66 KG/M2 | WEIGHT: 178 LBS

## 2023-09-19 DIAGNOSIS — F51.01 PRIMARY INSOMNIA: ICD-10-CM

## 2023-09-19 DIAGNOSIS — G43.111 MIGRAINE WITH AURA, WITH INTRACTABLE MIGRAINE, SO STATED, WITH STATUS MIGRAINOSUS: ICD-10-CM

## 2023-09-19 DIAGNOSIS — G44.52 NEW PERSISTENT DAILY HEADACHE: Primary | ICD-10-CM

## 2023-09-19 PROCEDURE — 3075F SYST BP GE 130 - 139MM HG: CPT | Performed by: PSYCHIATRY & NEUROLOGY

## 2023-09-19 PROCEDURE — G8417 CALC BMI ABV UP PARAM F/U: HCPCS | Performed by: PSYCHIATRY & NEUROLOGY

## 2023-09-19 PROCEDURE — 1036F TOBACCO NON-USER: CPT | Performed by: PSYCHIATRY & NEUROLOGY

## 2023-09-19 PROCEDURE — 1123F ACP DISCUSS/DSCN MKR DOCD: CPT | Performed by: PSYCHIATRY & NEUROLOGY

## 2023-09-19 PROCEDURE — G8427 DOCREV CUR MEDS BY ELIG CLIN: HCPCS | Performed by: PSYCHIATRY & NEUROLOGY

## 2023-09-19 PROCEDURE — G8399 PT W/DXA RESULTS DOCUMENT: HCPCS | Performed by: PSYCHIATRY & NEUROLOGY

## 2023-09-19 PROCEDURE — 3017F COLORECTAL CA SCREEN DOC REV: CPT | Performed by: PSYCHIATRY & NEUROLOGY

## 2023-09-19 PROCEDURE — 1090F PRES/ABSN URINE INCON ASSESS: CPT | Performed by: PSYCHIATRY & NEUROLOGY

## 2023-09-19 PROCEDURE — 99204 OFFICE O/P NEW MOD 45 MIN: CPT | Performed by: PSYCHIATRY & NEUROLOGY

## 2023-09-19 PROCEDURE — 3079F DIAST BP 80-89 MM HG: CPT | Performed by: PSYCHIATRY & NEUROLOGY

## 2023-09-19 RX ORDER — TOPIRAMATE 25 MG/1
50 TABLET ORAL 2 TIMES DAILY
Qty: 120 TABLET | Refills: 2 | Status: SHIPPED | OUTPATIENT
Start: 2023-09-19

## 2023-09-19 RX ORDER — TIZANIDINE 4 MG/1
1 TABLET ORAL 3 TIMES DAILY
COMMUNITY
Start: 2023-09-13

## 2023-09-19 NOTE — PROGRESS NOTES
The patient is a 76y.o. years old female who  was referred by Sai Tinoco DO  for consultation regarding chronic headaches    HPI:  The patient describes history of chronic migraine since with 27years old but worse in the 2 years. Description weekly migraine. Frequency 2 to 3 days a week. Duration is minutes to hours. Degree is moderate to severe. Description of migraine unilateral pulsating pain with nausea, photophobia and phonophobia. She feels headaches worsen after she inhaled \"toxic red dye\". No recent trauma or injury or fever or chills. She does have daily headaches for the last 2 years. She does not take any medicine for these daily headaches. She tried most OTC, Flexeril, diazepam cervical without improvement. She also tried amitriptyline in the past.  She cannot recall other names of rescue medications. No recent MRI of the brain. Her migraines can interfere with ADL. She does have chronic pain and she sees pain specialist.  She takes narcotics as needed. Recent blood test showed elevated white count. She is scheduled for hematology consultation. No other new symptoms today. Other review of system was unremarkable. ROS : A 10-14 system review of constitutional, cardiovascular, respiratory, GI, eyes, , ENT, musculoskeletal, endocrine, skin, SHEENT, genitourinary, psychiatric and neurologic systems was obtained and updated today and is unremarkable except as mentioned in my HPI        Exam:   Constitutional:   Vitals:    09/19/23 1123   BP: 134/85   Site: Right Wrist   Position: Sitting   Pulse: (!) 102   SpO2: 97%   Weight: 178 lb (80.7 kg)   Height: 5' 5\" (1.651 m)       General appearance:  Normal development and appear in no acute distress. Mental Status:   Oriented to person, place, problem, and time. Memory: Good immediate recall. Intact remote memory  Normal attention span and concentration.   Language: intact naming, repeating and fluency   Good fund of

## 2023-09-19 NOTE — PATIENT INSTRUCTIONS
Topamax Initiation Taper:  Week 1: Topamax 25 mg - take 1 tablet by mouth at night for 7 days  Week 2: Topamax 25 mg - take 1 tablet by mouth in the morning and 1 at night for 7 days  Week 3: Topamax 25 mg - take 1 tablet by mouth in the morning and 2 tablets by mouth at night for 7 days  Week 4: Topamax 25 mg - take 2 tablets by mouth in the morning and 2 tablets my mouth at night and stay on this dose until you are seen for your follow up appt  Maintenance dose: Topamax 50 mg twice daily

## 2023-09-21 ENCOUNTER — HOSPITAL ENCOUNTER (EMERGENCY)
Age: 68
Discharge: HOME | End: 2023-09-21
Attending: EMERGENCY MEDICINE
Payer: MEDICARE

## 2023-09-21 ENCOUNTER — APPOINTMENT (OUTPATIENT)
Dept: GENERAL RADIOLOGY | Age: 68
End: 2023-09-21
Payer: MEDICARE

## 2023-09-21 VITALS
BODY MASS INDEX: 29.84 KG/M2 | DIASTOLIC BLOOD PRESSURE: 89 MMHG | SYSTOLIC BLOOD PRESSURE: 133 MMHG | WEIGHT: 179.3 LBS | OXYGEN SATURATION: 98 % | TEMPERATURE: 98.7 F | HEART RATE: 100 BPM | RESPIRATION RATE: 18 BRPM

## 2023-09-21 DIAGNOSIS — J02.9 ACUTE PHARYNGITIS, UNSPECIFIED ETIOLOGY: Primary | ICD-10-CM

## 2023-09-21 LAB — S PYO AG THROAT QL: NEGATIVE

## 2023-09-21 PROCEDURE — 99284 EMERGENCY DEPT VISIT MOD MDM: CPT

## 2023-09-21 PROCEDURE — 87880 STREP A ASSAY W/OPTIC: CPT

## 2023-09-21 PROCEDURE — 71045 X-RAY EXAM CHEST 1 VIEW: CPT

## 2023-09-21 PROCEDURE — 6370000000 HC RX 637 (ALT 250 FOR IP): Performed by: EMERGENCY MEDICINE

## 2023-09-21 PROCEDURE — 87081 CULTURE SCREEN ONLY: CPT

## 2023-09-21 RX ORDER — FLUTICASONE PROPIONATE 50 MCG
1 SPRAY, SUSPENSION (ML) NASAL DAILY
Qty: 16 G | Refills: 0 | Status: SHIPPED | OUTPATIENT
Start: 2023-09-21

## 2023-09-21 RX ORDER — ACETAMINOPHEN 325 MG/1
650 TABLET ORAL ONCE
Status: COMPLETED | OUTPATIENT
Start: 2023-09-21 | End: 2023-09-21

## 2023-09-21 RX ORDER — CYCLOBENZAPRINE HCL 10 MG
10 TABLET ORAL
Qty: 60 TABLET | Refills: 3 | Status: SHIPPED | OUTPATIENT
Start: 2023-09-21

## 2023-09-21 RX ADMIN — ACETAMINOPHEN 650 MG: 325 TABLET ORAL at 17:44

## 2023-09-21 ASSESSMENT — ENCOUNTER SYMPTOMS
DIARRHEA: 0
SORE THROAT: 1
VOICE CHANGE: 0
VOMITING: 0
NAUSEA: 0
TROUBLE SWALLOWING: 0

## 2023-09-21 ASSESSMENT — PAIN - FUNCTIONAL ASSESSMENT: PAIN_FUNCTIONAL_ASSESSMENT: NONE - DENIES PAIN

## 2023-09-21 NOTE — ED NOTES
Pt discharged to home. 1 prescription given. Discharge paperwork discussed. All questions and concerns answered at this time. Pt awake and alert. Respirations even and unlabored.       Angélica Sheehan RN  09/21/23 5349

## 2023-09-21 NOTE — TELEPHONE ENCOUNTER
Medication:   Requested Prescriptions     Pending Prescriptions Disp Refills    cyclobenzaprine (FLEXERIL) 10 MG tablet 60 tablet 3     Sig: Take 1 tablet by mouth every 6-8 hours as needed for Muscle spasms Take 2 every 6-8 hours for pain        Last Filled:  6/20/2023    Patient Phone Number: [phone] (home)     Last appt: 9/12/2023   Next appt: 3/12/2024     Return in about 6 months (around 3/12/2024) for COPD, HTN, hypothyroid

## 2023-09-24 ENCOUNTER — HOSPITAL ENCOUNTER (OUTPATIENT)
Dept: MRI IMAGING | Age: 68
Discharge: HOME | End: 2023-09-24
Payer: MEDICARE

## 2023-09-24 DIAGNOSIS — G44.52 NEW PERSISTENT DAILY HEADACHE: ICD-10-CM

## 2023-09-24 LAB — S PYO THROAT QL CULT: NORMAL

## 2023-09-24 PROCEDURE — 70551 MRI BRAIN STEM W/O DYE: CPT

## 2023-09-26 DIAGNOSIS — I10 ESSENTIAL HYPERTENSION: Primary | Chronic | ICD-10-CM

## 2023-09-27 ENCOUNTER — APPOINTMENT (OUTPATIENT)
Dept: CT IMAGING | Age: 68
End: 2023-09-27
Payer: MEDICARE

## 2023-09-27 ENCOUNTER — APPOINTMENT (OUTPATIENT)
Dept: GENERAL RADIOLOGY | Age: 68
End: 2023-09-27
Payer: MEDICARE

## 2023-09-27 ENCOUNTER — HOSPITAL ENCOUNTER (OUTPATIENT)
Dept: ULTRASOUND IMAGING | Age: 68
Discharge: HOME | End: 2023-09-27
Attending: INTERNAL MEDICINE
Payer: MEDICARE

## 2023-09-27 ENCOUNTER — HOSPITAL ENCOUNTER (EMERGENCY)
Age: 68
Discharge: HOME | End: 2023-09-27
Attending: EMERGENCY MEDICINE
Payer: MEDICARE

## 2023-09-27 VITALS
HEIGHT: 65 IN | SYSTOLIC BLOOD PRESSURE: 101 MMHG | RESPIRATION RATE: 18 BRPM | DIASTOLIC BLOOD PRESSURE: 77 MMHG | TEMPERATURE: 98 F | WEIGHT: 178 LBS | OXYGEN SATURATION: 99 % | HEART RATE: 96 BPM | BODY MASS INDEX: 29.66 KG/M2

## 2023-09-27 DIAGNOSIS — M17.12 OSTEOARTHRITIS OF LEFT KNEE, UNSPECIFIED OSTEOARTHRITIS TYPE: ICD-10-CM

## 2023-09-27 DIAGNOSIS — M71.22 SYNOVIAL CYST OF LEFT POPLITEAL SPACE: Primary | ICD-10-CM

## 2023-09-27 DIAGNOSIS — N17.9 AKI (ACUTE KIDNEY INJURY) (HCC): ICD-10-CM

## 2023-09-27 DIAGNOSIS — H66.92 LEFT OTITIS MEDIA, UNSPECIFIED OTITIS MEDIA TYPE: ICD-10-CM

## 2023-09-27 LAB
ANION GAP SERPL CALCULATED.3IONS-SCNC: 12 MMOL/L (ref 3–16)
BASOPHILS # BLD: 0 K/UL (ref 0–0.2)
BASOPHILS NFR BLD: 0.2 %
BUN SERPL-MCNC: 14 MG/DL (ref 7–20)
CALCIUM SERPL-MCNC: 9.6 MG/DL (ref 8.3–10.6)
CHLORIDE SERPL-SCNC: 110 MMOL/L (ref 99–110)
CK SERPL-CCNC: 150 U/L (ref 26–192)
CO2 SERPL-SCNC: 19 MMOL/L (ref 21–32)
CREAT SERPL-MCNC: 0.9 MG/DL (ref 0.6–1.2)
DEPRECATED RDW RBC AUTO: 14.7 % (ref 12.4–15.4)
EOSINOPHIL # BLD: 0.1 K/UL (ref 0–0.6)
EOSINOPHIL NFR BLD: 0.7 %
GFR SERPLBLD CREATININE-BSD FMLA CKD-EPI: >60 ML/MIN/{1.73_M2}
GLUCOSE SERPL-MCNC: 134 MG/DL (ref 70–99)
HCT VFR BLD AUTO: 38.9 % (ref 36–48)
HGB BLD-MCNC: 12.2 G/DL (ref 12–16)
LYMPHOCYTES # BLD: 2.1 K/UL (ref 1–5.1)
LYMPHOCYTES NFR BLD: 17.7 %
MCH RBC QN AUTO: 26.6 PG (ref 26–34)
MCHC RBC AUTO-ENTMCNC: 31.4 G/DL (ref 31–36)
MCV RBC AUTO: 84.8 FL (ref 80–100)
MONOCYTES # BLD: 0.7 K/UL (ref 0–1.3)
MONOCYTES NFR BLD: 5.8 %
NEUTROPHILS # BLD: 8.9 K/UL (ref 1.7–7.7)
NEUTROPHILS NFR BLD: 75.6 %
PLATELET # BLD AUTO: 173 K/UL (ref 135–450)
PMV BLD AUTO: 10 FL (ref 5–10.5)
POTASSIUM SERPL-SCNC: 3.6 MMOL/L (ref 3.5–5.1)
RBC # BLD AUTO: 4.59 M/UL (ref 4–5.2)
SODIUM SERPL-SCNC: 141 MMOL/L (ref 136–145)
WBC # BLD AUTO: 11.7 K/UL (ref 4–11)

## 2023-09-27 PROCEDURE — 6370000000 HC RX 637 (ALT 250 FOR IP): Performed by: EMERGENCY MEDICINE

## 2023-09-27 PROCEDURE — 73560 X-RAY EXAM OF KNEE 1 OR 2: CPT

## 2023-09-27 PROCEDURE — 82550 ASSAY OF CK (CPK): CPT

## 2023-09-27 PROCEDURE — 80048 BASIC METABOLIC PNL TOTAL CA: CPT

## 2023-09-27 PROCEDURE — 85025 COMPLETE CBC W/AUTO DIFF WBC: CPT

## 2023-09-27 PROCEDURE — 70450 CT HEAD/BRAIN W/O DYE: CPT

## 2023-09-27 PROCEDURE — 76770 US EXAM ABDO BACK WALL COMP: CPT

## 2023-09-27 PROCEDURE — 99284 EMERGENCY DEPT VISIT MOD MDM: CPT

## 2023-09-27 PROCEDURE — 93971 EXTREMITY STUDY: CPT

## 2023-09-27 RX ORDER — OXYCODONE HYDROCHLORIDE 5 MG/1
5 TABLET ORAL ONCE
Status: COMPLETED | OUTPATIENT
Start: 2023-09-27 | End: 2023-09-27

## 2023-09-27 RX ORDER — ACETAMINOPHEN 500 MG
1000 TABLET ORAL 3 TIMES DAILY PRN
Qty: 60 TABLET | Refills: 0 | Status: SHIPPED | OUTPATIENT
Start: 2023-09-27

## 2023-09-27 RX ORDER — CLONIDINE HYDROCHLORIDE 0.1 MG/1
TABLET ORAL
Qty: 60 TABLET | Refills: 5 | Status: SHIPPED | OUTPATIENT
Start: 2023-09-27

## 2023-09-27 RX ORDER — CEFDINIR 300 MG/1
300 CAPSULE ORAL 2 TIMES DAILY
Qty: 14 CAPSULE | Refills: 0 | Status: SHIPPED | OUTPATIENT
Start: 2023-09-27 | End: 2023-10-04

## 2023-09-27 RX ORDER — OXYCODONE HYDROCHLORIDE 5 MG/1
5 TABLET ORAL EVERY 8 HOURS PRN
Qty: 9 TABLET | Refills: 0 | Status: SHIPPED | OUTPATIENT
Start: 2023-09-27 | End: 2023-09-30

## 2023-09-27 RX ADMIN — OXYCODONE HYDROCHLORIDE 5 MG: 5 TABLET ORAL at 10:30

## 2023-09-27 ASSESSMENT — PAIN DESCRIPTION - FREQUENCY
FREQUENCY: CONTINUOUS
FREQUENCY: CONTINUOUS

## 2023-09-27 ASSESSMENT — PAIN DESCRIPTION - DESCRIPTORS
DESCRIPTORS: ACHING
DESCRIPTORS: THROBBING

## 2023-09-27 ASSESSMENT — PAIN DESCRIPTION - PAIN TYPE: TYPE: ACUTE PAIN

## 2023-09-27 ASSESSMENT — PAIN SCALES - GENERAL
PAINLEVEL_OUTOF10: 8
PAINLEVEL_OUTOF10: 10
PAINLEVEL_OUTOF10: 10
PAINLEVEL_OUTOF10: 8

## 2023-09-27 ASSESSMENT — PAIN DESCRIPTION - LOCATION
LOCATION: HEAD
LOCATION: LEG
LOCATION: LEG

## 2023-09-27 ASSESSMENT — PAIN - FUNCTIONAL ASSESSMENT
PAIN_FUNCTIONAL_ASSESSMENT: 0-10
PAIN_FUNCTIONAL_ASSESSMENT: 0-10

## 2023-09-27 ASSESSMENT — PAIN DESCRIPTION - ORIENTATION: ORIENTATION: LEFT

## 2023-09-27 NOTE — ED TRIAGE NOTES
Pt arrived to dept via self. Pt c/o Leg Swelling (Left leg swelling with pain starting 2 days ago, and left ear pain. Pt has been using ace wraps and ice. Took some tylenol and percocet yesterday that hasn't helped. Pt not able to walk)  . Pt awake, alert and oriented x 3. Skin warm and dry/normal color for ethnicity. Resp easy and unlabored.

## 2023-09-27 NOTE — ED PROVIDER NOTES
[facility] EMERGENCY DEPARTMENT    CHIEF COMPLAINT  Leg Swelling (Left leg swelling with pain starting 2 days ago, and left ear pain. Pt has been using ace wraps and ice. Took some tylenol and percocet yesterday that hasn't helped. Pt not able to walk)       HISTORY OF PRESENT ILLNESS  Irasema Mccoy is a 76 y.o. female with history of COPD, hypertension, hyperlipidemia who presents to the ED with left leg swelling for the past 2 days and left ear pain. She does not report any trauma or injury to her leg. She is having pain, particularly around her knee. Denies any warmth, redness or fever. She also woke up with pain in her left ear. Hearing is intact but feels like she is sort of underwater on that ear. Denies any drainage. Denies any trauma to her ear. Denies any other symptoms. Denies chest pain or shortness of breath. Denies any blisters or skin lesions.     I have reviewed the following from the nursing documentation:    Past Medical History:   Diagnosis Date    CAMDEN (acute kidney injury) ([address]) 10/07/2013    Anxiety     Chronic abdominal pain     possibly due to diverticulosos    COPD (chronic obstructive pulmonary disease) (HCC)     COPD with acute exacerbation ([address]) 2/6/2022    DDD (degenerative disc disease), lumbar 12/01/2016    Elevated glycated hemoglobin     High anion gap metabolic acidosis 15/9/2426    History of cholecystectomy 11/13/2016    Hyperlipidemia     Hypertension     Hypothyroidism     Lipoma of lower back 10/18/2022    Lipoma of upper arm 10/12/2022    Partial small bowel obstruction ([address]) 2/11/2014    Rotator cuff tear     right    Wears glasses      Past Surgical History:   Procedure Laterality Date    ABDOMEN SURGERY N/A 10/11/2022    EXCISION OF EPIGASTRIC LIPOMA X 2 performed by Navin Pavon MD at [facility] Right 10/11/2022    EXCISION OF RIGHT UPPER EXTREMITY LIPOMA X 5 performed by Navin Pavon MD at [facility]

## 2023-09-27 NOTE — DISCHARGE INSTRUCTIONS
Take acetaminophen as needed for pain. For severe pain that is not controlled with acetaminophen you can take oxycodone. Do not drink, drive or operate machinery while taking. Keep secure from teens/children. This medication may be addictive. Follow up with orthopedics (bone doctors). Take cefdinir twice daily for 1 week for ear infection. Follow up with primary doctor in 1 week to reassess your ear.

## 2023-09-27 NOTE — TELEPHONE ENCOUNTER
Medication:   Requested Prescriptions     Pending Prescriptions Disp Refills    cloNIDine (CATAPRES) 0.1 MG tablet [Pharmacy Med Name: cloNIDine HCl 0.1 MG Oral Tablet] 60 tablet 0     Sig: Take 1 tablet by mouth twice daily        Last Filled:  08/07/23    Patient Phone Number: [phone] (home)     Last appt: 9/12/2023   Next appt: 3/12/2024    Last OARRS:       1/30/2017     3:24 PM   RX Monitoring   Attestation The Prescription Monitoring Report for this patient was reviewed today.

## 2023-10-04 ENCOUNTER — HOSPITAL ENCOUNTER (OUTPATIENT)
Dept: SLEEP CENTER | Age: 68
Discharge: HOME | End: 2023-10-04
Payer: MEDICARE

## 2023-10-04 DIAGNOSIS — J44.9 MODERATE COPD (CHRONIC OBSTRUCTIVE PULMONARY DISEASE) (HCC): Chronic | ICD-10-CM

## 2023-10-04 DIAGNOSIS — G47.33 OBSTRUCTIVE SLEEP APNEA SYNDROME: ICD-10-CM

## 2023-10-04 PROCEDURE — 95811 POLYSOM 6/>YRS CPAP 4/> PARM: CPT

## 2023-10-11 ENCOUNTER — HOSPITAL ENCOUNTER (OUTPATIENT)
Dept: CT IMAGING | Age: 68
Discharge: HOME | End: 2023-10-11
Attending: INTERNAL MEDICINE
Payer: MEDICARE

## 2023-10-11 DIAGNOSIS — D72.829 LEUKOCYTOSIS, UNSPECIFIED TYPE: ICD-10-CM

## 2023-10-11 DIAGNOSIS — Z87.891 SMOKING HISTORY: ICD-10-CM

## 2023-10-11 PROCEDURE — 71271 CT THORAX LUNG CANCER SCR C-: CPT

## 2023-10-12 ENCOUNTER — TELEPHONE (OUTPATIENT)
Dept: PULMONOLOGY | Age: 68
End: 2023-10-12

## 2023-10-12 DIAGNOSIS — E03.9 ACQUIRED HYPOTHYROIDISM: ICD-10-CM

## 2023-10-13 DIAGNOSIS — J44.9 CHRONIC OBSTRUCTIVE PULMONARY DISEASE, UNSPECIFIED COPD TYPE (HCC): ICD-10-CM

## 2023-10-13 RX ORDER — ROFLUMILAST 500 UG/1
500 TABLET ORAL DAILY
Qty: 90 TABLET | Refills: 1 | Status: SHIPPED | OUTPATIENT
Start: 2023-10-13

## 2023-10-13 RX ORDER — QUETIAPINE FUMARATE 100 MG/1
100 TABLET, FILM COATED ORAL NIGHTLY
Qty: 90 TABLET | Refills: 1 | Status: SHIPPED | OUTPATIENT
Start: 2023-10-13

## 2023-10-13 RX ORDER — LEVOTHYROXINE SODIUM 0.12 MG/1
125 TABLET ORAL DAILY
Qty: 90 TABLET | Refills: 1 | OUTPATIENT
Start: 2023-10-13

## 2023-10-13 NOTE — TELEPHONE ENCOUNTER
Medication:   Requested Prescriptions     Pending Prescriptions Disp Refills    QUEtiapine (SEROQUEL) 100 MG tablet 90 tablet 1     Sig: Take 1 tablet by mouth nightly    Roflumilast (DALIRESP) 500 MCG tablet 90 tablet 1     Sig: Take 1 tablet by mouth daily        Last Filled:  9/12/2023     Patient Phone Number: [phone] (home)     Last appt: 9/12/2023   Next appt: 3/12/2024        Return in about 6 months (around 3/12/2024) for COPD, HTN, hypothyroid . oral

## 2023-10-13 NOTE — TELEPHONE ENCOUNTER
Medication:   Requested Prescriptions     Pending Prescriptions Disp Refills    levothyroxine (SYNTHROID) 125 MCG tablet [Pharmacy Med Name: LEVOTHYROXINE 125 MCG TABLET] 90 tablet 1     Sig: TAKE 1 TABLET BY MOUTH DAILY TAKE 1 TABLET EVERY DAY (DISCONTINUE 135MCG)        Last Filled:  09/14/23    Patient Phone Number: [phone] (home)     Last appt: 9/12/2023   Next appt: 3/12/2024    Last OARRS:       1/30/2017     3:24 PM   RX Monitoring   Attestation The Prescription Monitoring Report for this patient was reviewed today.

## 2023-10-16 ENCOUNTER — TELEPHONE (OUTPATIENT)
Dept: PRIMARY CARE CLINIC | Age: 68
End: 2023-10-16

## 2023-10-16 NOTE — TELEPHONE ENCOUNTER
Pt fell on her left side the other day and would like to xray done of whole left side of body . She said she's in pain and can barley walk.

## 2023-10-17 ENCOUNTER — TELEPHONE (OUTPATIENT)
Dept: CASE MANAGEMENT | Age: 68
End: 2023-10-17

## 2023-10-17 ENCOUNTER — HOSPITAL ENCOUNTER (OUTPATIENT)
Dept: GENERAL RADIOLOGY | Age: 68
Discharge: HOME | End: 2023-10-17
Payer: MEDICARE

## 2023-10-17 ENCOUNTER — OFFICE VISIT (OUTPATIENT)
Dept: PRIMARY CARE CLINIC | Age: 68
End: 2023-10-17
Payer: MEDICARE

## 2023-10-17 VITALS
BODY MASS INDEX: 28.66 KG/M2 | OXYGEN SATURATION: 98 % | SYSTOLIC BLOOD PRESSURE: 124 MMHG | TEMPERATURE: 97.6 F | HEIGHT: 65 IN | HEART RATE: 97 BPM | WEIGHT: 172 LBS | DIASTOLIC BLOOD PRESSURE: 71 MMHG

## 2023-10-17 DIAGNOSIS — M25.552 ACUTE PAIN OF LEFT HIP: ICD-10-CM
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
## 2023-10-17 DIAGNOSIS — M25.512 ACUTE PAIN OF LEFT SHOULDER: ICD-10-CM
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
## 2023-10-17 PROCEDURE — 1090F PRES/ABSN URINE INCON ASSESS: CPT | Performed by: NURSE PRACTITIONER

## 2023-10-17 PROCEDURE — 3074F SYST BP LT 130 MM HG: CPT | Performed by: NURSE PRACTITIONER

## 2023-10-17 PROCEDURE — 3078F DIAST BP <80 MM HG: CPT | Performed by: NURSE PRACTITIONER

## 2023-10-17 PROCEDURE — 1036F TOBACCO NON-USER: CPT | Performed by: NURSE PRACTITIONER

## 2023-10-17 PROCEDURE — G8427 DOCREV CUR MEDS BY ELIG CLIN: HCPCS | Performed by: NURSE PRACTITIONER

## 2023-10-17 PROCEDURE — 1123F ACP DISCUSS/DSCN MKR DOCD: CPT | Performed by: NURSE PRACTITIONER

## 2023-10-17 PROCEDURE — 73030 X-RAY EXAM OF SHOULDER: CPT

## 2023-10-17 PROCEDURE — G8417 CALC BMI ABV UP PARAM F/U: HCPCS | Performed by: NURSE PRACTITIONER

## 2023-10-17 PROCEDURE — G8484 FLU IMMUNIZE NO ADMIN: HCPCS | Performed by: NURSE PRACTITIONER

## 2023-10-17 PROCEDURE — G8399 PT W/DXA RESULTS DOCUMENT: HCPCS | Performed by: NURSE PRACTITIONER

## 2023-10-17 PROCEDURE — 3017F COLORECTAL CA SCREEN DOC REV: CPT | Performed by: NURSE PRACTITIONER

## 2023-10-17 PROCEDURE — 99214 OFFICE O/P EST MOD 30 MIN: CPT | Performed by: NURSE PRACTITIONER

## 2023-10-17 PROCEDURE — 73502 X-RAY EXAM HIP UNI 2-3 VIEWS: CPT

## 2023-10-17 RX ORDER — OXYCODONE AND ACETAMINOPHEN 10; 325 MG/1; MG/1
TABLET ORAL
COMMUNITY
Start: 2023-09-25

## 2023-10-17 RX ORDER — ROSUVASTATIN CALCIUM 10 MG/1
10 TABLET, COATED ORAL
COMMUNITY
Start: 2023-09-24

## 2023-10-17 ASSESSMENT — ENCOUNTER SYMPTOMS
ABDOMINAL PAIN: 0
SHORTNESS OF BREATH: 0
COUGH: 0

## 2023-10-17 NOTE — PROGRESS NOTES
[address] [facility]. PRIMARY CARE  [address] [facility] [address]  Dept: [phone]  Dept Fax: [phone]     10/17/2023      Tami Casas   1955     Chief Complaint   Patient presents with    Fall     Thursday. Pain       HPI     Patient states she fell at home a few days ago on Thursday. She was kneeling on the ground to pick something up but when she tried to stand up she lost her balance and fell backwards and landed on left side. When she fell she fell into a table. States initially she did not have much pain but later in the evening started to have pain in left side of body. She states she ended up taking a hot bath and took her prescribed Percocet and Flexeril for pain which gave her some mild relief. She states pain is mostly in left hip and left shoulder, though she does have some pain along lateral aspect of left leg.         3/16/2023     1:29 PM 1/17/2023    11:35 AM 1/31/2022    10:26 AM 2/17/2021     9:13 AM 1/9/2020    10:38 AM 1/8/2020     8:32 AM 8/26/2019     8:18 AM   PHQ Scores   PHQ2 Score 0 2 6 3 2 0 0   PHQ9 Score 0 6 12 10 2 0 0     Interpretation of Total Score Depression Severity: 1-4 = Minimal depression, 5-9 = Mild depression, 10-14 = Moderate depression, 15-19 = Moderately severe depression, 20-27 = Severe depression     Prior to Visit Medications    Medication Sig Taking? Authorizing Provider   metFORMIN (GLUCOPHAGE) 500 MG tablet TAKE 1 PILL IN THE MORNING WITH FOOD.  Yes ProviderUrsula MD   rosuvastatin (CRESTOR) 10 MG tablet Take 1 tablet by mouth Yes ProviderUrsula MD   oxyCODONE-acetaminophen (PERCOCET)  MG per tablet TAKE 1 TABLET BY MOUTH EVERY 6 HOURS FOR 30 DAYS Yes ProviderUrsula MD   QUEtiapine (SEROQUEL) 100 MG tablet Take 1 tablet by mouth nightly Yes Chapis Maldonado DO   Roflumilast (DALIRESP) 500 MCG tablet Take 1 tablet by mouth daily Yes Chapis Maldonado DO   cloNIDine (CATAPRES) 0.1 MG

## 2023-10-17 NOTE — TELEPHONE ENCOUNTER
CT Lung Cancer Screening completed on 10/11/23, ordering provider, Dr. Anna Marie Muñoz, sent radiology results with recommendations. Smoking history reviewed.

## 2023-10-17 NOTE — RESULT ENCOUNTER NOTE
Please notify patient that x-rays of hip and shoulder show arthritis but no fractures or dislocations. I recommend the treatment we discussed earlier and for her to follow-up if symptoms are not improving over the next couple of weeks.

## 2023-10-19 ENCOUNTER — TELEPHONE (OUTPATIENT)
Dept: PRIMARY CARE CLINIC | Age: 68
End: 2023-10-19

## 2023-10-19 ENCOUNTER — TELEPHONE (OUTPATIENT)
Dept: PULMONOLOGY | Age: 68
End: 2023-10-19

## 2023-10-19 RX ORDER — POTASSIUM CHLORIDE 750 MG/1
TABLET, EXTENDED RELEASE ORAL
Qty: 180 TABLET | Refills: 0 | Status: SHIPPED | OUTPATIENT
Start: 2023-10-19

## 2023-10-19 NOTE — TELEPHONE ENCOUNTER
----- Message from JACKIE Donohue CNP sent at 10/17/2023  3:29 PM EDT -----  Please notify patient that x-rays of hip and shoulder show arthritis but no fractures or dislocations. I recommend the treatment we discussed earlier and for her to follow-up if symptoms are not improving over the next couple of weeks.

## 2023-10-19 NOTE — TELEPHONE ENCOUNTER
Spoke to patient and informed her of results. She did say that she is starting to feel a little better and is having less pain.

## 2023-10-20 ENCOUNTER — TELEPHONE (OUTPATIENT)
Dept: PRIMARY CARE CLINIC | Age: 68
End: 2023-10-20

## 2023-10-20 NOTE — TELEPHONE ENCOUNTER
Pt called in wanted to know if you could send in medication for nausea.       ondansetron [facility]) injection 4 mg   [[id number]]    [facility]/pharmacy #[id number] - [place], [place] - [address] [address]   Phone:  [phone]  Fax:  [phone]

## 2023-10-23 DIAGNOSIS — R11.0 NAUSEA: Primary | ICD-10-CM

## 2023-10-23 RX ORDER — ONDANSETRON 4 MG/1
4 TABLET, FILM COATED ORAL 3 TIMES DAILY PRN
Qty: 30 TABLET | Refills: 0 | Status: SHIPPED | OUTPATIENT
Start: 2023-10-23

## 2023-10-24 ENCOUNTER — TELEPHONE (OUTPATIENT)
Dept: PULMONOLOGY | Age: 68
End: 2023-10-24

## 2023-10-25 ENCOUNTER — TELEPHONE (OUTPATIENT)
Dept: PULMONOLOGY | Age: 68
End: 2023-10-25

## 2023-10-30 ENCOUNTER — TELEPHONE (OUTPATIENT)
Dept: PULMONOLOGY | Age: 68
End: 2023-10-30

## 2023-10-30 NOTE — TELEPHONE ENCOUNTER
Spoke with pt to review titration results. Order to be sent to [facility] with pt stating that is the company she is using for her oxygen.  Pt to schedule f/u

## 2023-11-14 ENCOUNTER — OFFICE VISIT (OUTPATIENT)
Dept: PRIMARY CARE CLINIC | Age: 68
End: 2023-11-14
Payer: MEDICARE

## 2023-11-14 VITALS
DIASTOLIC BLOOD PRESSURE: 79 MMHG | BODY MASS INDEX: 29.32 KG/M2 | HEART RATE: 88 BPM | WEIGHT: 176 LBS | SYSTOLIC BLOOD PRESSURE: 119 MMHG | HEIGHT: 65 IN

## 2023-11-14 DIAGNOSIS — Z00.00 MEDICARE ANNUAL WELLNESS VISIT, SUBSEQUENT: Primary | ICD-10-CM

## 2023-11-14 PROCEDURE — G0439 PPPS, SUBSEQ VISIT: HCPCS | Performed by: STUDENT IN AN ORGANIZED HEALTH CARE EDUCATION/TRAINING PROGRAM

## 2023-11-14 PROCEDURE — 3078F DIAST BP <80 MM HG: CPT | Performed by: STUDENT IN AN ORGANIZED HEALTH CARE EDUCATION/TRAINING PROGRAM

## 2023-11-14 PROCEDURE — G8484 FLU IMMUNIZE NO ADMIN: HCPCS | Performed by: STUDENT IN AN ORGANIZED HEALTH CARE EDUCATION/TRAINING PROGRAM

## 2023-11-14 PROCEDURE — 3017F COLORECTAL CA SCREEN DOC REV: CPT | Performed by: STUDENT IN AN ORGANIZED HEALTH CARE EDUCATION/TRAINING PROGRAM

## 2023-11-14 PROCEDURE — 1123F ACP DISCUSS/DSCN MKR DOCD: CPT | Performed by: STUDENT IN AN ORGANIZED HEALTH CARE EDUCATION/TRAINING PROGRAM

## 2023-11-14 PROCEDURE — 3074F SYST BP LT 130 MM HG: CPT | Performed by: STUDENT IN AN ORGANIZED HEALTH CARE EDUCATION/TRAINING PROGRAM

## 2023-11-14 ASSESSMENT — PATIENT HEALTH QUESTIONNAIRE - PHQ9
SUM OF ALL RESPONSES TO PHQ9 QUESTIONS 1 & 2: 0
2. FEELING DOWN, DEPRESSED OR HOPELESS: 0
SUM OF ALL RESPONSES TO PHQ QUESTIONS 1-9: 0
6. FEELING BAD ABOUT YOURSELF - OR THAT YOU ARE A FAILURE OR HAVE LET YOURSELF OR YOUR FAMILY DOWN: 0
4. FEELING TIRED OR HAVING LITTLE ENERGY: 0
3. TROUBLE FALLING OR STAYING ASLEEP: 0
9. THOUGHTS THAT YOU WOULD BE BETTER OFF DEAD, OR OF HURTING YOURSELF: 0
SUM OF ALL RESPONSES TO PHQ QUESTIONS 1-9: 0
SUM OF ALL RESPONSES TO PHQ QUESTIONS 1-9: 0
7. TROUBLE CONCENTRATING ON THINGS, SUCH AS READING THE NEWSPAPER OR WATCHING TELEVISION: 0
1. LITTLE INTEREST OR PLEASURE IN DOING THINGS: 0
8. MOVING OR SPEAKING SO SLOWLY THAT OTHER PEOPLE COULD HAVE NOTICED. OR THE OPPOSITE, BEING SO FIGETY OR RESTLESS THAT YOU HAVE BEEN MOVING AROUND A LOT MORE THAN USUAL: 0
5. POOR APPETITE OR OVEREATING: 0
10. IF YOU CHECKED OFF ANY PROBLEMS, HOW DIFFICULT HAVE THESE PROBLEMS MADE IT FOR YOU TO DO YOUR WORK, TAKE CARE OF THINGS AT HOME, OR GET ALONG WITH OTHER PEOPLE: 0
SUM OF ALL RESPONSES TO PHQ QUESTIONS 1-9: 0

## 2023-11-14 ASSESSMENT — COLUMBIA-SUICIDE SEVERITY RATING SCALE - C-SSRS
5. HAVE YOU STARTED TO WORK OUT OR WORKED OUT THE DETAILS OF HOW TO KILL YOURSELF? DO YOU INTEND TO CARRY OUT THIS PLAN?: NO
3. HAVE YOU BEEN THINKING ABOUT HOW YOU MIGHT KILL YOURSELF?: NO
7. DID THIS OCCUR IN THE LAST THREE MONTHS: NO
4. HAVE YOU HAD THESE THOUGHTS AND HAD SOME INTENTION OF ACTING ON THEM?: NO

## 2023-11-19 DIAGNOSIS — E03.9 ACQUIRED HYPOTHYROIDISM: ICD-10-CM

## 2023-11-20 RX ORDER — LEVOTHYROXINE SODIUM 0.12 MG/1
125 TABLET ORAL DAILY
Qty: 90 TABLET | Refills: 1 | Status: SHIPPED | OUTPATIENT
Start: 2023-11-20

## 2023-11-20 NOTE — TELEPHONE ENCOUNTER
Medication:   Requested Prescriptions     Pending Prescriptions Disp Refills    levothyroxine (SYNTHROID) 125 MCG tablet [Pharmacy Med Name: LEVOTHYROXINE 125 MCG TABLET] 90 tablet 1     Sig: TAKE 1 TABLET BY MOUTH DAILY TAKE 1 TABLET EVERY DAY (DISCONTINUE 135MCG)        Last Filled:  9/14/23    Patient Phone Number: [phone] (home)     Last appt: 11/14/2023   Next appt: 3/12/2024    Last OARRS:       1/30/2017     3:24 PM   RX Monitoring   Attestation The Prescription Monitoring Report for this patient was reviewed today.

## 2023-11-24 DIAGNOSIS — F51.01 PRIMARY INSOMNIA: Primary | ICD-10-CM

## 2023-11-27 RX ORDER — QUETIAPINE FUMARATE 100 MG/1
100 TABLET, FILM COATED ORAL NIGHTLY
Qty: 90 TABLET | Refills: 5 | Status: SHIPPED | OUTPATIENT
Start: 2023-11-27

## 2023-11-27 NOTE — TELEPHONE ENCOUNTER
Medication:   Requested Prescriptions     Pending Prescriptions Disp Refills    QUEtiapine (SEROQUEL) 100 MG tablet [Pharmacy Med Name: QUEtiapine Fumarate 100 MG Oral Tablet] 90 tablet 0     Sig: Take 1 tablet by mouth nightly        Last Filled:  10/13/23    Last appt: 11/14/2023   Next appt: 3/12/2024    Last OARRS:       1/30/2017     3:24 PM   RX Monitoring   Attestation The Prescription Monitoring Report for this patient was reviewed today.

## 2023-12-02 DIAGNOSIS — I10 ESSENTIAL HYPERTENSION: Primary | Chronic | ICD-10-CM

## 2023-12-04 RX ORDER — HYDROCHLOROTHIAZIDE 25 MG/1
TABLET ORAL
Qty: 90 TABLET | Refills: 3 | Status: SHIPPED | OUTPATIENT
Start: 2023-12-04

## 2023-12-04 NOTE — TELEPHONE ENCOUNTER
Medication:   Requested Prescriptions     Pending Prescriptions Disp Refills    hydroCHLOROthiazide (HYDRODIURIL) 25 MG tablet [Pharmacy Med Name: hydroCHLOROthiazide 25 MG Oral Tablet] 90 tablet 0     Sig: Take 1 tablet by mouth once daily        Last Filled:  is she still taking? Patient Phone Number: [phone] (home)     Last appt: 11/14/2023   Next appt: 3/12/2024    Last OARRS:       1/30/2017     3:24 PM   RX Monitoring   Attestation The Prescription Monitoring Report for this patient was reviewed today.

## 2023-12-14 ENCOUNTER — TELEPHONE (OUTPATIENT)
Dept: PRIMARY CARE CLINIC | Age: 68
End: 2023-12-14

## 2023-12-14 DIAGNOSIS — F32.A ANXIETY AND DEPRESSION: ICD-10-CM

## 2023-12-14 DIAGNOSIS — F41.9 ANXIETY AND DEPRESSION: ICD-10-CM

## 2023-12-14 DIAGNOSIS — G43.111 MIGRAINE WITH AURA, WITH INTRACTABLE MIGRAINE, SO STATED, WITH STATUS MIGRAINOSUS: ICD-10-CM

## 2023-12-14 RX ORDER — CYCLOBENZAPRINE HCL 10 MG
TABLET ORAL
Qty: 60 TABLET | Refills: 3 | Status: SHIPPED | OUTPATIENT
Start: 2023-12-14

## 2023-12-14 RX ORDER — SERTRALINE HYDROCHLORIDE 25 MG/1
25 TABLET, FILM COATED ORAL DAILY
Qty: 90 TABLET | Refills: 0 | Status: SHIPPED | OUTPATIENT
Start: 2023-12-14

## 2023-12-14 NOTE — TELEPHONE ENCOUNTER
Medication:   Requested Prescriptions     Pending Prescriptions Disp Refills    sertraline (ZOLOFT) 25 MG tablet [Pharmacy Med Name: Sertraline HCl 25 MG Oral Tablet] 90 tablet 0     Sig: Take 1 tablet by mouth once daily        Last Filled:      Patient Phone Number: [phone] (home)     Last appt: 11/14/2023   Next appt: 3/12/2024    Last OARRS:       1/30/2017     3:24 PM   RX Monitoring   Attestation The Prescription Monitoring Report for this patient was reviewed today.

## 2023-12-14 NOTE — TELEPHONE ENCOUNTER
Medication:   Requested Prescriptions     Pending Prescriptions Disp Refills    cyclobenzaprine (FLEXERIL) 10 MG tablet [Pharmacy Med Name: CYCLOBENZAPRINE 10 MG TABLET] 60 tablet 3     Sig: TAKE 1 TABLET BY MOUTH EVERY 6-8 HOURS AS NEEDED FOR MUSCLE SPASMS        Last Filled:  9/21/23    Patient Phone Number: [phone] (home)     Last appt: 11/14/2023   Next appt: 3/12/2024    Last OARRS:       1/30/2017     3:24 PM   RX Monitoring   Attestation The Prescription Monitoring Report for this patient was reviewed today.

## 2023-12-15 NOTE — TELEPHONE ENCOUNTER
Medication:   Requested Prescriptions     Pending Prescriptions Disp Refills    topiramate (TOPAMAX) 25 MG tablet [Pharmacy Med Name: TOPIRAMATE 25 MG TABLET] 120 tablet 2     Sig: TAKE 2 TABLETS BY MOUTH TWICE A DAY        Last Filled:      Patient Phone Number: [phone] (home)     Last appt: 9/19/2023   Next appt: 2/1/2024    Last OARRS:       1/30/2017     3:24 PM   RX Monitoring   Attestation The Prescription Monitoring Report for this patient was reviewed today.

## 2023-12-16 RX ORDER — TOPIRAMATE 25 MG/1
50 TABLET ORAL 2 TIMES DAILY
Qty: 120 TABLET | Refills: 2 | Status: SHIPPED | OUTPATIENT
Start: 2023-12-16

## 2023-12-28 DIAGNOSIS — M25.512 ACUTE PAIN OF LEFT SHOULDER: ICD-10-CM

## 2023-12-28 DIAGNOSIS — M25.552 ACUTE PAIN OF LEFT HIP: ICD-10-CM

## 2023-12-28 NOTE — TELEPHONE ENCOUNTER
Medication:   Requested Prescriptions     Pending Prescriptions Disp Refills    diclofenac sodium (VOLTAREN) 1 % GEL [Pharmacy Med Name: DICLOFENAC SODIUM 1% GEL] 100 g 1     Sig: APPLY 2 G TOPICALLY 4 TIMES DAILY AS NEEDED FOR PAIN        Last Filled:    10/17/23  Patient Phone Number: [phone] (home)     Last appt: 11/14/2023   Next appt: 3/12/2024    Last OARRS:       1/30/2017     3:24 PM   RX Monitoring   Attestation The Prescription Monitoring Report for this patient was reviewed today.

## 2023-12-29 ENCOUNTER — OFFICE VISIT (OUTPATIENT)
Dept: PSYCHIATRY | Age: 68
End: 2023-12-29
Payer: MEDICARE

## 2023-12-29 VITALS
DIASTOLIC BLOOD PRESSURE: 89 MMHG | HEIGHT: 65 IN | WEIGHT: 176 LBS | HEART RATE: 88 BPM | BODY MASS INDEX: 29.32 KG/M2 | SYSTOLIC BLOOD PRESSURE: 144 MMHG

## 2023-12-29 DIAGNOSIS — F41.1 GENERALIZED ANXIETY DISORDER: ICD-10-CM

## 2023-12-29 DIAGNOSIS — F43.12 CHRONIC POST-TRAUMATIC STRESS DISORDER: Primary | ICD-10-CM

## 2023-12-29 DIAGNOSIS — F33.0 MILD EPISODE OF RECURRENT MAJOR DEPRESSIVE DISORDER (HCC): Chronic | ICD-10-CM

## 2023-12-29 DIAGNOSIS — Z63.4 BEREAVEMENT: ICD-10-CM

## 2023-12-29 PROCEDURE — 1090F PRES/ABSN URINE INCON ASSESS: CPT | Performed by: STUDENT IN AN ORGANIZED HEALTH CARE EDUCATION/TRAINING PROGRAM

## 2023-12-29 PROCEDURE — 3017F COLORECTAL CA SCREEN DOC REV: CPT | Performed by: STUDENT IN AN ORGANIZED HEALTH CARE EDUCATION/TRAINING PROGRAM

## 2023-12-29 PROCEDURE — 1123F ACP DISCUSS/DSCN MKR DOCD: CPT | Performed by: STUDENT IN AN ORGANIZED HEALTH CARE EDUCATION/TRAINING PROGRAM

## 2023-12-29 PROCEDURE — 90833 PSYTX W PT W E/M 30 MIN: CPT | Performed by: STUDENT IN AN ORGANIZED HEALTH CARE EDUCATION/TRAINING PROGRAM

## 2023-12-29 PROCEDURE — G8417 CALC BMI ABV UP PARAM F/U: HCPCS | Performed by: STUDENT IN AN ORGANIZED HEALTH CARE EDUCATION/TRAINING PROGRAM

## 2023-12-29 PROCEDURE — G8427 DOCREV CUR MEDS BY ELIG CLIN: HCPCS | Performed by: STUDENT IN AN ORGANIZED HEALTH CARE EDUCATION/TRAINING PROGRAM

## 2023-12-29 PROCEDURE — 1036F TOBACCO NON-USER: CPT | Performed by: STUDENT IN AN ORGANIZED HEALTH CARE EDUCATION/TRAINING PROGRAM

## 2023-12-29 PROCEDURE — 3077F SYST BP >= 140 MM HG: CPT | Performed by: STUDENT IN AN ORGANIZED HEALTH CARE EDUCATION/TRAINING PROGRAM

## 2023-12-29 PROCEDURE — G8484 FLU IMMUNIZE NO ADMIN: HCPCS | Performed by: STUDENT IN AN ORGANIZED HEALTH CARE EDUCATION/TRAINING PROGRAM

## 2023-12-29 PROCEDURE — 99204 OFFICE O/P NEW MOD 45 MIN: CPT | Performed by: STUDENT IN AN ORGANIZED HEALTH CARE EDUCATION/TRAINING PROGRAM

## 2023-12-29 PROCEDURE — G8399 PT W/DXA RESULTS DOCUMENT: HCPCS | Performed by: STUDENT IN AN ORGANIZED HEALTH CARE EDUCATION/TRAINING PROGRAM

## 2023-12-29 PROCEDURE — 3079F DIAST BP 80-89 MM HG: CPT | Performed by: STUDENT IN AN ORGANIZED HEALTH CARE EDUCATION/TRAINING PROGRAM

## 2023-12-29 RX ORDER — DULOXETIN HYDROCHLORIDE 30 MG/1
30 CAPSULE, DELAYED RELEASE ORAL DAILY
Qty: 30 CAPSULE | Refills: 2 | Status: SHIPPED | OUTPATIENT
Start: 2023-12-29

## 2023-12-29 RX ORDER — PRAZOSIN HYDROCHLORIDE 1 MG/1
CAPSULE ORAL
Qty: 67 CAPSULE | Refills: 0 | Status: SHIPPED | OUTPATIENT
Start: 2023-12-29 | End: 2024-01-15

## 2023-12-29 SDOH — SOCIAL STABILITY - SOCIAL INSECURITY: DISSAPEARANCE AND DEATH OF FAMILY MEMBER: Z63.4

## 2023-12-29 ASSESSMENT — ANXIETY QUESTIONNAIRES
3. WORRYING TOO MUCH ABOUT DIFFERENT THINGS: 2
IF YOU CHECKED OFF ANY PROBLEMS ON THIS QUESTIONNAIRE, HOW DIFFICULT HAVE THESE PROBLEMS MADE IT FOR YOU TO DO YOUR WORK, TAKE CARE OF THINGS AT HOME, OR GET ALONG WITH OTHER PEOPLE: SOMEWHAT DIFFICULT
4. TROUBLE RELAXING: 3
6. BECOMING EASILY ANNOYED OR IRRITABLE: 1
2. NOT BEING ABLE TO STOP OR CONTROL WORRYING: 2
5. BEING SO RESTLESS THAT IT IS HARD TO SIT STILL: 1
1. FEELING NERVOUS, ANXIOUS, OR ON EDGE: 2
GAD7 TOTAL SCORE: 13
7. FEELING AFRAID AS IF SOMETHING AWFUL MIGHT HAPPEN: 2

## 2023-12-29 ASSESSMENT — PATIENT HEALTH QUESTIONNAIRE - PHQ9
6. FEELING BAD ABOUT YOURSELF - OR THAT YOU ARE A FAILURE OR HAVE LET YOURSELF OR YOUR FAMILY DOWN: 1
SUM OF ALL RESPONSES TO PHQ QUESTIONS 1-9: 13
2. FEELING DOWN, DEPRESSED OR HOPELESS: 1
10. IF YOU CHECKED OFF ANY PROBLEMS, HOW DIFFICULT HAVE THESE PROBLEMS MADE IT FOR YOU TO DO YOUR WORK, TAKE CARE OF THINGS AT HOME, OR GET ALONG WITH OTHER PEOPLE: 1
SUM OF ALL RESPONSES TO PHQ QUESTIONS 1-9: 13
1. LITTLE INTEREST OR PLEASURE IN DOING THINGS: 2
SUM OF ALL RESPONSES TO PHQ9 QUESTIONS 1 & 2: 3
4. FEELING TIRED OR HAVING LITTLE ENERGY: 2
9. THOUGHTS THAT YOU WOULD BE BETTER OFF DEAD, OR OF HURTING YOURSELF: 0
SUM OF ALL RESPONSES TO PHQ QUESTIONS 1-9: 13
8. MOVING OR SPEAKING SO SLOWLY THAT OTHER PEOPLE COULD HAVE NOTICED. OR THE OPPOSITE, BEING SO FIGETY OR RESTLESS THAT YOU HAVE BEEN MOVING AROUND A LOT MORE THAN USUAL: 0
7. TROUBLE CONCENTRATING ON THINGS, SUCH AS READING THE NEWSPAPER OR WATCHING TELEVISION: 1
SUM OF ALL RESPONSES TO PHQ QUESTIONS 1-9: 13
5. POOR APPETITE OR OVEREATING: 3
3. TROUBLE FALLING OR STAYING ASLEEP: 3

## 2023-12-29 NOTE — PROGRESS NOTES
Patient presents for a NP office visit.  Patient states that she is here for depression, anxiety and PTSD. She states that she also struggles with getting sleep.      She has a 15 yr old granddaughter that she has had custody of since she was 5 yrs old.  She also express that she is not happy where she lives at the moment and doesn't feel like her and her granddaughter is safe in the neighborhood.  Patient denies being suicidal.      PHQ:13  LARRY:11  
tablet TAKE 2 TABLETS BY MOUTH TWICE A  tablet 2    cyclobenzaprine (FLEXERIL) 10 MG tablet TAKE 1 TABLET BY MOUTH EVERY 6-8 HOURS AS NEEDED FOR MUSCLE SPASMS 60 tablet 3    hydroCHLOROthiazide (HYDRODIURIL) 25 MG tablet Take 1 tablet by mouth once daily 90 tablet 3    levothyroxine (SYNTHROID) 125 MCG tablet TAKE 1 TABLET BY MOUTH DAILY TAKE 1 TABLET EVERY DAY (DISCONTINUE 135MCG) 90 tablet 1    ondansetron (ZOFRAN) 4 MG tablet Take 1 tablet by mouth 3 times daily as needed for Nausea or Vomiting 30 tablet 0    metFORMIN (GLUCOPHAGE) 500 MG tablet TAKE 1 PILL IN THE MORNING WITH FOOD.      oxyCODONE-acetaminophen (PERCOCET)  MG per tablet TAKE 1 TABLET BY MOUTH EVERY 6 HOURS FOR 30 DAYS      cloNIDine (CATAPRES) 0.1 MG tablet Take 1 tablet by mouth twice daily 60 tablet 5    tiZANidine (ZANAFLEX) 4 MG tablet Take 1 tablet by mouth in the morning, at noon, and at bedtime      DULERA 100-5 MCG/ACT inhaler       ipratropium 0.5 mg-albuterol 2.5 mg (DUONEB) 0.5-2.5 (3) MG/3ML SOLN nebulizer solution Inhale 3 mLs into the lungs as needed for Shortness of Breath 360 mL 11    Promethazine HCl 6.25 MG/5ML SOLN TAKE 5ML BY MOUTH EVERY 6 HOURS AS NEEDED FOR NAUSEA 600 mL 3    Respiratory Therapy Supplies MISC 1 each by Does not apply route 4 times daily as needed (wheezing SOB) All Nebulizer supplies needed 50 each 5    diclofenac sodium (VOLTAREN) 1 % GEL APPLY 2 G TOPICALLY 4 TIMES DAILY AS NEEDED FOR PAIN 100 g 1    Roflumilast (DALIRESP) 500 MCG tablet Take 1 tablet by mouth daily 90 tablet 1    albuterol-ipratropium (COMBIVENT RESPIMAT)  MCG/ACT AERS inhaler INHALE 1 PUFF BY MOUTH EVERY SIX HOURS AS NEEDED FOR WHEEZING 3 each 1     No current facility-administered medications on file prior to visit.       OBJECTIVE:  Vitals:    12/29/23 1025   BP: (!) 144/89   Pulse: 88   Weight: 79.8 kg (176 lb)   Height: 1.651 m (5' 5\")       MSE:   Appearance:    Appropriately dressed  Motor: No abnormal movements,

## 2024-01-05 ENCOUNTER — TELEPHONE (OUTPATIENT)
Dept: PULMONOLOGY | Age: 69
End: 2024-01-05

## 2024-01-05 NOTE — TELEPHONE ENCOUNTER
KASSIDY FROM [facility] / [facility]    Patient Fernando Reyes [birth year] - [place]   Patient has [employer] HMO. She just got this insurance as of Jan 1st.  We sent her info over to [facility] and have notified the patient.  Thanks   Brittany

## 2024-01-12 DIAGNOSIS — F43.12 CHRONIC POST-TRAUMATIC STRESS DISORDER: ICD-10-CM

## 2024-01-15 RX ORDER — PRAZOSIN HYDROCHLORIDE 2 MG/1
2 CAPSULE ORAL NIGHTLY
Qty: 30 CAPSULE | Refills: 2 | Status: SHIPPED | OUTPATIENT
Start: 2024-01-15 | End: 2024-02-13 | Stop reason: SDUPTHER

## 2024-01-15 NOTE — TELEPHONE ENCOUNTER
Spoke with patient and she stated that she is taking the 2mg at night.  She also mentioned that the medication is not helping with sleep.  She's able to go to sleep but not stay asleep.  Her blood pressure has went up, she would like to know what you want her to do.

## 2024-01-18 ENCOUNTER — TELEPHONE (OUTPATIENT)
Dept: NEUROLOGY | Age: 69
End: 2024-01-18

## 2024-01-18 NOTE — TELEPHONE ENCOUNTER
Rec'd letter from [facility]: topiramate quantity limit    Reached VM. LMOR to r/c to discuss dose change from:   25mg 2 tabs bid -TO- 50mg 1 tab bid

## 2024-01-19 ASSESSMENT — ENCOUNTER SYMPTOMS
ALLERGIC/IMMUNOLOGIC NEGATIVE: 1
GASTROINTESTINAL NEGATIVE: 1
RESPIRATORY NEGATIVE: 1
EYES NEGATIVE: 1

## 2024-01-24 DIAGNOSIS — E03.9 ACQUIRED HYPOTHYROIDISM: ICD-10-CM

## 2024-01-24 RX ORDER — LEVOTHYROXINE SODIUM 0.12 MG/1
125 TABLET ORAL DAILY
Qty: 90 TABLET | Refills: 1 | Status: SHIPPED | OUTPATIENT
Start: 2024-01-24

## 2024-01-24 NOTE — PROGRESS NOTES
PSYCHIATRY PROGRESS NOTE    Fernando Reyes  1955 01/30/2024  Face to Face time: 30 minutes  PCP: Chapis Maldonado DO    CC:   Chief Complaint   Patient presents with    Follow-up       Patient is a 68 y.o. female with significant PMH of AVRIL, DDD, hypothyroidism, OA, HTN, GERD, COPD, migraines, and prediabetes presents to the outpatient psychiatry clinic today for evaluation and management of depression and anxiety.     A:  Patient's presentation today is indicative of some continued difficulties with depression as well as PTSD.  Much of the difficulties do appear to be related to her issues with initiating sleep.  We will attempt to adjust her regimen to provide her with further support.    Diagnosis:  PTSD  LARRY  MDD-R mild-moderate  Bereavement    P:   1.  Restarted zolpidem 5 mg nightly for treatment of significant insomnia.  Patient was cautioned regarding adverse effects of this medication as have been described to her previously.  2.  Continue current medications of duloxetine 30 mg daily, topiramate 50 mg twice daily, and prazosin 2 mg nightly    Medication Monitoring:    - PDMP reviewed: Current prescription for oxycodone-acetaminophen    Follow-up: 4 weeks    Safety: Pt was counseled on the potential for increased suicidal ideations and advised on potential options for dealing with these including hotlines, calling the office, or going to the nearest emergency room.      __________________________________________________________________________    S:   Patient identified that she was doing \"so-so\".  She noted that she is still not sleeping well, though this is noted to be directly related to insomnia as opposed to nightmares.  The patient identified that she is having some awakenings but that they are not nearly as traumatic as where they were previously.  She noted that the anniversary of her godson's death is coming up tomorrow, is not anticipating that going well but is open to the idea of it being

## 2024-01-24 NOTE — TELEPHONE ENCOUNTER
Medication:   Requested Prescriptions     Pending Prescriptions Disp Refills    levothyroxine (SYNTHROID) 125 MCG tablet 90 tablet 1     Sig: Take 1 tablet by mouth Daily TAKE 1 TABLET EVERY DAY (DISCONTINUE 135MCG)        Last Filled:  11/20/23    Patient Phone Number: [phone] (home)     Last appt: 11/14/2023   Next appt: 3/12/2024    Last OARRS:       1/30/2017     3:24 PM   RX Monitoring   Attestation The Prescription Monitoring Report for this patient was reviewed today.

## 2024-01-29 DIAGNOSIS — R11.0 NAUSEA: ICD-10-CM

## 2024-01-30 ENCOUNTER — OFFICE VISIT (OUTPATIENT)
Dept: PSYCHIATRY | Age: 69
End: 2024-01-30
Payer: MEDICARE

## 2024-01-30 VITALS
WEIGHT: 172 LBS | OXYGEN SATURATION: 98 % | HEART RATE: 110 BPM | SYSTOLIC BLOOD PRESSURE: 126 MMHG | HEIGHT: 65 IN | DIASTOLIC BLOOD PRESSURE: 78 MMHG | BODY MASS INDEX: 28.66 KG/M2

## 2024-01-30 DIAGNOSIS — F43.12 CHRONIC POST-TRAUMATIC STRESS DISORDER: ICD-10-CM

## 2024-01-30 DIAGNOSIS — F41.1 GENERALIZED ANXIETY DISORDER: ICD-10-CM

## 2024-01-30 DIAGNOSIS — F51.01 PRIMARY INSOMNIA: Primary | ICD-10-CM

## 2024-01-30 PROCEDURE — G8399 PT W/DXA RESULTS DOCUMENT: HCPCS | Performed by: STUDENT IN AN ORGANIZED HEALTH CARE EDUCATION/TRAINING PROGRAM

## 2024-01-30 PROCEDURE — G8427 DOCREV CUR MEDS BY ELIG CLIN: HCPCS | Performed by: STUDENT IN AN ORGANIZED HEALTH CARE EDUCATION/TRAINING PROGRAM

## 2024-01-30 PROCEDURE — 3074F SYST BP LT 130 MM HG: CPT | Performed by: STUDENT IN AN ORGANIZED HEALTH CARE EDUCATION/TRAINING PROGRAM

## 2024-01-30 PROCEDURE — 3078F DIAST BP <80 MM HG: CPT | Performed by: STUDENT IN AN ORGANIZED HEALTH CARE EDUCATION/TRAINING PROGRAM

## 2024-01-30 PROCEDURE — 3017F COLORECTAL CA SCREEN DOC REV: CPT | Performed by: STUDENT IN AN ORGANIZED HEALTH CARE EDUCATION/TRAINING PROGRAM

## 2024-01-30 PROCEDURE — 1090F PRES/ABSN URINE INCON ASSESS: CPT | Performed by: STUDENT IN AN ORGANIZED HEALTH CARE EDUCATION/TRAINING PROGRAM

## 2024-01-30 PROCEDURE — 99214 OFFICE O/P EST MOD 30 MIN: CPT | Performed by: STUDENT IN AN ORGANIZED HEALTH CARE EDUCATION/TRAINING PROGRAM

## 2024-01-30 PROCEDURE — G8484 FLU IMMUNIZE NO ADMIN: HCPCS | Performed by: STUDENT IN AN ORGANIZED HEALTH CARE EDUCATION/TRAINING PROGRAM

## 2024-01-30 PROCEDURE — 1036F TOBACCO NON-USER: CPT | Performed by: STUDENT IN AN ORGANIZED HEALTH CARE EDUCATION/TRAINING PROGRAM

## 2024-01-30 PROCEDURE — G8417 CALC BMI ABV UP PARAM F/U: HCPCS | Performed by: STUDENT IN AN ORGANIZED HEALTH CARE EDUCATION/TRAINING PROGRAM

## 2024-01-30 PROCEDURE — 1123F ACP DISCUSS/DSCN MKR DOCD: CPT | Performed by: STUDENT IN AN ORGANIZED HEALTH CARE EDUCATION/TRAINING PROGRAM

## 2024-01-30 RX ORDER — ONDANSETRON 4 MG/1
4 TABLET, FILM COATED ORAL 3 TIMES DAILY PRN
Qty: 30 TABLET | Refills: 2 | Status: SHIPPED | OUTPATIENT
Start: 2024-01-30

## 2024-01-30 RX ORDER — ZOLPIDEM TARTRATE 5 MG/1
5 TABLET ORAL NIGHTLY PRN
Qty: 14 TABLET | Refills: 0 | Status: SHIPPED | OUTPATIENT
Start: 2024-01-30 | End: 2024-02-13

## 2024-01-30 ASSESSMENT — PATIENT HEALTH QUESTIONNAIRE - PHQ9
SUM OF ALL RESPONSES TO PHQ QUESTIONS 1-9: 7
5. POOR APPETITE OR OVEREATING: 1
7. TROUBLE CONCENTRATING ON THINGS, SUCH AS READING THE NEWSPAPER OR WATCHING TELEVISION: 0
SUM OF ALL RESPONSES TO PHQ QUESTIONS 1-9: 7
SUM OF ALL RESPONSES TO PHQ9 QUESTIONS 1 & 2: 2
9. THOUGHTS THAT YOU WOULD BE BETTER OFF DEAD, OR OF HURTING YOURSELF: 0
8. MOVING OR SPEAKING SO SLOWLY THAT OTHER PEOPLE COULD HAVE NOTICED. OR THE OPPOSITE, BEING SO FIGETY OR RESTLESS THAT YOU HAVE BEEN MOVING AROUND A LOT MORE THAN USUAL: 0
2. FEELING DOWN, DEPRESSED OR HOPELESS: 1
6. FEELING BAD ABOUT YOURSELF - OR THAT YOU ARE A FAILURE OR HAVE LET YOURSELF OR YOUR FAMILY DOWN: 0
3. TROUBLE FALLING OR STAYING ASLEEP: 2
SUM OF ALL RESPONSES TO PHQ QUESTIONS 1-9: 7
1. LITTLE INTEREST OR PLEASURE IN DOING THINGS: 1
SUM OF ALL RESPONSES TO PHQ QUESTIONS 1-9: 7
4. FEELING TIRED OR HAVING LITTLE ENERGY: 2
10. IF YOU CHECKED OFF ANY PROBLEMS, HOW DIFFICULT HAVE THESE PROBLEMS MADE IT FOR YOU TO DO YOUR WORK, TAKE CARE OF THINGS AT HOME, OR GET ALONG WITH OTHER PEOPLE: 1

## 2024-01-30 ASSESSMENT — ANXIETY QUESTIONNAIRES
IF YOU CHECKED OFF ANY PROBLEMS ON THIS QUESTIONNAIRE, HOW DIFFICULT HAVE THESE PROBLEMS MADE IT FOR YOU TO DO YOUR WORK, TAKE CARE OF THINGS AT HOME, OR GET ALONG WITH OTHER PEOPLE: SOMEWHAT DIFFICULT
1. FEELING NERVOUS, ANXIOUS, OR ON EDGE: 1
4. TROUBLE RELAXING: 2
GAD7 TOTAL SCORE: 8
5. BEING SO RESTLESS THAT IT IS HARD TO SIT STILL: 1
2. NOT BEING ABLE TO STOP OR CONTROL WORRYING: 1
3. WORRYING TOO MUCH ABOUT DIFFERENT THINGS: 1
7. FEELING AFRAID AS IF SOMETHING AWFUL MIGHT HAPPEN: 1
6. BECOMING EASILY ANNOYED OR IRRITABLE: 1

## 2024-01-30 NOTE — TELEPHONE ENCOUNTER
Medication:   Requested Prescriptions     Pending Prescriptions Disp Refills    ondansetron (ZOFRAN) 4 MG tablet [Pharmacy Med Name: ONDANSETRON HCL 4 MG TABLET] 30 tablet 0     Sig: TAKE 1 TABLET BY MOUTH 3 TIMES DAILY AS NEEDED FOR NAUSEA OR VOMITING        Last Filled:  10/23/23    Patient Phone Number: [phone] (home)     Last appt: 11/14/2023   Next appt: 3/12/2024    Last OARRS:       1/30/2017     3:24 PM   RX Monitoring   Attestation The Prescription Monitoring Report for this patient was reviewed today.

## 2024-02-01 ASSESSMENT — ENCOUNTER SYMPTOMS
ALLERGIC/IMMUNOLOGIC NEGATIVE: 1
EYES NEGATIVE: 1
ABDOMINAL PAIN: 1
RESPIRATORY NEGATIVE: 1

## 2024-02-09 DIAGNOSIS — F51.01 PRIMARY INSOMNIA: ICD-10-CM

## 2024-02-12 ENCOUNTER — TELEPHONE (OUTPATIENT)
Dept: PRIMARY CARE CLINIC | Age: 69
End: 2024-02-12

## 2024-02-13 ENCOUNTER — TELEPHONE (OUTPATIENT)
Dept: NEUROLOGY | Age: 69
End: 2024-02-13

## 2024-02-15 DIAGNOSIS — M25.512 ACUTE PAIN OF LEFT SHOULDER: ICD-10-CM

## 2024-02-15 DIAGNOSIS — M25.552 ACUTE PAIN OF LEFT HIP: ICD-10-CM

## 2024-02-15 RX ORDER — ZOLPIDEM TARTRATE 5 MG/1
5 TABLET ORAL NIGHTLY PRN
Qty: 14 TABLET | Refills: 0 | Status: SHIPPED | OUTPATIENT
Start: 2024-02-15 | End: 2024-02-29

## 2024-02-15 NOTE — TELEPHONE ENCOUNTER
Patient is calling back for  in regards to medication refill. Per patient she is lloking for a new PCP at this time and would like a refill on the     zolpidem (AMBIEN) 5 MG tablet  Last appointment: 1/30/2024  Next appointment: Visit date not found  Last refill: 1/30/24

## 2024-02-23 ENCOUNTER — TELEPHONE (OUTPATIENT)
Dept: NEUROLOGY | Age: 69
End: 2024-02-23

## 2024-03-04 DIAGNOSIS — M25.512 ACUTE PAIN OF LEFT SHOULDER: ICD-10-CM

## 2024-03-04 DIAGNOSIS — M25.552 ACUTE PAIN OF LEFT HIP: ICD-10-CM

## 2024-03-08 ENCOUNTER — OFFICE VISIT (OUTPATIENT)
Dept: PRIMARY CARE CLINIC | Age: 69
End: 2024-03-08
Payer: MEDICARE

## 2024-03-08 VITALS
SYSTOLIC BLOOD PRESSURE: 125 MMHG | HEART RATE: 92 BPM | OXYGEN SATURATION: 99 % | TEMPERATURE: 98 F | WEIGHT: 170.8 LBS | BODY MASS INDEX: 28.46 KG/M2 | HEIGHT: 65 IN | DIASTOLIC BLOOD PRESSURE: 73 MMHG

## 2024-03-08 DIAGNOSIS — J44.9 MODERATE COPD (CHRONIC OBSTRUCTIVE PULMONARY DISEASE) (HCC): Chronic | ICD-10-CM

## 2024-03-08 DIAGNOSIS — K21.9 GASTROESOPHAGEAL REFLUX DISEASE WITHOUT ESOPHAGITIS: Chronic | ICD-10-CM

## 2024-03-08 DIAGNOSIS — Z12.31 ENCOUNTER FOR SCREENING MAMMOGRAM FOR MALIGNANT NEOPLASM OF BREAST: ICD-10-CM

## 2024-03-08 DIAGNOSIS — F51.01 PRIMARY INSOMNIA: Primary | ICD-10-CM

## 2024-03-08 PROCEDURE — 3023F SPIROM DOC REV: CPT | Performed by: NURSE PRACTITIONER

## 2024-03-08 PROCEDURE — 3074F SYST BP LT 130 MM HG: CPT | Performed by: NURSE PRACTITIONER

## 2024-03-08 PROCEDURE — 1036F TOBACCO NON-USER: CPT | Performed by: NURSE PRACTITIONER

## 2024-03-08 PROCEDURE — G8399 PT W/DXA RESULTS DOCUMENT: HCPCS | Performed by: NURSE PRACTITIONER

## 2024-03-08 PROCEDURE — 3078F DIAST BP <80 MM HG: CPT | Performed by: NURSE PRACTITIONER

## 2024-03-08 PROCEDURE — 99214 OFFICE O/P EST MOD 30 MIN: CPT | Performed by: NURSE PRACTITIONER

## 2024-03-08 PROCEDURE — 1123F ACP DISCUSS/DSCN MKR DOCD: CPT | Performed by: NURSE PRACTITIONER

## 2024-03-08 PROCEDURE — G8428 CUR MEDS NOT DOCUMENT: HCPCS | Performed by: NURSE PRACTITIONER

## 2024-03-08 PROCEDURE — 1090F PRES/ABSN URINE INCON ASSESS: CPT | Performed by: NURSE PRACTITIONER

## 2024-03-08 PROCEDURE — G8484 FLU IMMUNIZE NO ADMIN: HCPCS | Performed by: NURSE PRACTITIONER

## 2024-03-08 PROCEDURE — 3017F COLORECTAL CA SCREEN DOC REV: CPT | Performed by: NURSE PRACTITIONER

## 2024-03-08 PROCEDURE — G8417 CALC BMI ABV UP PARAM F/U: HCPCS | Performed by: NURSE PRACTITIONER

## 2024-03-08 RX ORDER — MOMETASONE FUROATE AND FORMOTEROL FUMARATE DIHYDRATE 100; 5 UG/1; UG/1
2 AEROSOL RESPIRATORY (INHALATION) 2 TIMES DAILY
Qty: 13 G | Refills: 1 | Status: SHIPPED | OUTPATIENT
Start: 2024-03-08

## 2024-03-08 RX ORDER — OMEPRAZOLE 20 MG/1
20 CAPSULE, DELAYED RELEASE ORAL
Qty: 30 CAPSULE | Refills: 0 | Status: SHIPPED | OUTPATIENT
Start: 2024-03-08

## 2024-03-08 RX ORDER — ZOLPIDEM TARTRATE 5 MG/1
5 TABLET ORAL NIGHTLY PRN
Qty: 30 TABLET | Refills: 0 | Status: SHIPPED | OUTPATIENT
Start: 2024-03-08 | End: 2024-04-07

## 2024-03-08 ASSESSMENT — ENCOUNTER SYMPTOMS
COUGH: 0
SORE THROAT: 0
ABDOMINAL PAIN: 0

## 2024-03-08 NOTE — PROGRESS NOTES
disturbance.        /73 (Site: Left Upper Arm, Position: Sitting, Cuff Size: Medium Adult)   Pulse 92   Temp 98 °F (36.7 °C)   Ht 1.64 m (5' 4.57\")   Wt 77.5 kg (170 lb 12.8 oz)   LMP  (LMP Unknown)   SpO2 99%   BMI 28.81 kg/m²      Physical Exam  Constitutional:       Appearance: Normal appearance.   HENT:      Head: Normocephalic and atraumatic.   Eyes:      Extraocular Movements: Extraocular movements intact.   Cardiovascular:      Rate and Rhythm: Normal rate and regular rhythm.      Heart sounds: Normal heart sounds. No murmur heard.  Pulmonary:      Effort: Pulmonary effort is normal. No respiratory distress.      Breath sounds: Normal breath sounds. No wheezing.   Skin:     General: Skin is warm and dry.   Neurological:      General: No focal deficit present.      Mental Status: She is alert and oriented to person, place, and time.   Psychiatric:         Mood and Affect: Mood normal.         Behavior: Behavior normal.         Assessment:  Encounter Diagnoses   Name Primary?    Primary insomnia Yes    Moderate COPD (chronic obstructive pulmonary disease) (HCC)     Gastroesophageal reflux disease without esophagitis     Encounter for screening mammogram for malignant neoplasm of breast        Plan:  1. Primary insomnia  -Patient tolerates 5mg Ambien. May continue to use short term for insomnia. Follow up in 3 months for MAWV.   - zolpidem (AMBIEN) 5 MG tablet; Take 1 tablet by mouth nightly as needed for Sleep for up to 30 days. Max Daily Amount: 5 mg  Dispense: 30 tablet; Refill: 0    2. Moderate COPD (chronic obstructive pulmonary disease) ([facility])  -Follows with pulmonology. Due for follow up appt. Refill Dulera.   - DULERA 100-5 MCG/ACT inhaler; Inhale 2 puffs into the lungs 2 times daily  Dispense: 13 g; Refill: 1    3. Gastroesophageal reflux disease without esophagitis  -Patient states she uses omeprazole intermittently for reflux. Medication ordered.   - omeprazole (PRILOSEC) 20 MG delayed

## 2024-03-10 DIAGNOSIS — I10 ESSENTIAL HYPERTENSION: Chronic | ICD-10-CM

## 2024-03-10 RX ORDER — CLONIDINE HYDROCHLORIDE 0.1 MG/1
TABLET ORAL
Qty: 60 TABLET | Refills: 0 | OUTPATIENT
Start: 2024-03-10

## 2024-03-14 DIAGNOSIS — K21.9 GASTROESOPHAGEAL REFLUX DISEASE WITHOUT ESOPHAGITIS: Chronic | ICD-10-CM

## 2024-03-14 DIAGNOSIS — J44.9 CHRONIC OBSTRUCTIVE PULMONARY DISEASE, UNSPECIFIED COPD TYPE (HCC): ICD-10-CM

## 2024-03-14 RX ORDER — OMEPRAZOLE 20 MG/1
20 CAPSULE, DELAYED RELEASE ORAL
Qty: 30 CAPSULE | Refills: 0 | OUTPATIENT
Start: 2024-03-14

## 2024-03-14 RX ORDER — ROFLUMILAST 500 UG/1
500 TABLET ORAL DAILY
Qty: 90 TABLET | Refills: 1 | OUTPATIENT
Start: 2024-03-14

## 2024-03-25 DIAGNOSIS — F51.01 PRIMARY INSOMNIA: ICD-10-CM

## 2024-03-25 NOTE — TELEPHONE ENCOUNTER
Patient called and a voice message was left to the patient regarding calling back about medications

## 2024-03-26 RX ORDER — ZOLPIDEM TARTRATE 5 MG/1
5 TABLET ORAL NIGHTLY PRN
Qty: 30 TABLET | Refills: 0 | OUTPATIENT
Start: 2024-03-26 | End: 2024-04-25

## 2024-03-28 NOTE — TELEPHONE ENCOUNTER
Patient was called and patient stated they are using the oral medication for nausea the patient has severe vomiting spells and stated she has to pay for this medication out of pocket and she is ok with that.

## 2024-03-29 DIAGNOSIS — R11.0 NAUSEA: Primary | ICD-10-CM

## 2024-03-29 DIAGNOSIS — R11.0 NAUSEA: ICD-10-CM

## 2024-03-29 RX ORDER — ONDANSETRON 4 MG/1
4 TABLET, ORALLY DISINTEGRATING ORAL EVERY 12 HOURS PRN
Qty: 30 TABLET | Refills: 1 | Status: SHIPPED | OUTPATIENT
Start: 2024-03-29

## 2024-03-29 RX ORDER — ONDANSETRON 4 MG/1
4 TABLET, FILM COATED ORAL 3 TIMES DAILY PRN
Qty: 30 TABLET | Refills: 2 | OUTPATIENT
Start: 2024-03-29

## 2024-03-29 NOTE — TELEPHONE ENCOUNTER
Patient called back and stated she had [facility] and will cancel the appt with  the patient also stated she need a refill on Zofran and she does not see another primary care at [facility] the patient stated they are having GI issues and will follow up with her gastro doctor the patient stated they were having problems with nausea due to being poisoned by red dye that was in the fabrics the patient inhaled and has been causing problems with there digestive system and there nausea the patient stated she will keep seeing NP/LJ and will try to maintain symptoms with the Zofran.

## 2024-03-29 NOTE — TELEPHONE ENCOUNTER
Patient called and asked to call the office back regarding medication the patient wanted to be refilled.

## 2024-03-31 DIAGNOSIS — J44.9 CHRONIC OBSTRUCTIVE PULMONARY DISEASE, UNSPECIFIED COPD TYPE (HCC): ICD-10-CM

## 2024-03-31 RX ORDER — ROFLUMILAST 500 UG/1
500 TABLET ORAL DAILY
Qty: 90 TABLET | Refills: 1 | OUTPATIENT
Start: 2024-03-31

## 2024-04-02 DIAGNOSIS — I10 ESSENTIAL HYPERTENSION: Chronic | ICD-10-CM

## 2024-04-02 RX ORDER — CLONIDINE HYDROCHLORIDE 0.1 MG/1
TABLET ORAL
Qty: 60 TABLET | Refills: 0 | OUTPATIENT
Start: 2024-04-02

## 2024-04-03 DIAGNOSIS — I10 ESSENTIAL HYPERTENSION: Chronic | ICD-10-CM

## 2024-04-03 RX ORDER — CLONIDINE HYDROCHLORIDE 0.1 MG/1
0.1 TABLET ORAL 2 TIMES DAILY
Qty: 60 TABLET | Refills: 5 | Status: SHIPPED | OUTPATIENT
Start: 2024-04-03

## 2024-04-15 ENCOUNTER — TELEPHONE (OUTPATIENT)
Dept: PRIMARY CARE CLINIC | Age: 69
End: 2024-04-15

## 2024-04-15 NOTE — TELEPHONE ENCOUNTER
Patient has an appointment with DR Graves for Friday.  She is a patient of Ms Francesco.    I spoke with patient and she would like to get a primary care physician.    Okay to switch providers?

## 2024-04-15 NOTE — TELEPHONE ENCOUNTER
Denied. Pt can stay with [facility] as her primary care provider. If she is wanting a physician, she will need to look at other practices.

## 2024-04-17 NOTE — TELEPHONE ENCOUNTER
Called and left message for patient that she would not be able to switch [facility] providers and if she does not want to see a nurse practitioner, she would have to schedule with another office.     Madeline message sent as a follow up and 4/19 appointment cancelled.

## 2024-04-19 DIAGNOSIS — J44.9 CHRONIC OBSTRUCTIVE PULMONARY DISEASE, UNSPECIFIED COPD TYPE (HCC): ICD-10-CM

## 2024-04-19 DIAGNOSIS — J44.9 MODERATE COPD (CHRONIC OBSTRUCTIVE PULMONARY DISEASE) (HCC): Chronic | ICD-10-CM

## 2024-04-19 RX ORDER — CYCLOBENZAPRINE HCL 10 MG
TABLET ORAL
Qty: 60 TABLET | Refills: 3 | OUTPATIENT
Start: 2024-04-19

## 2024-04-19 RX ORDER — ROFLUMILAST 500 UG/1
500 TABLET ORAL DAILY
Qty: 90 TABLET | Refills: 1 | OUTPATIENT
Start: 2024-04-19

## 2024-04-22 DIAGNOSIS — J44.9 MODERATE COPD (CHRONIC OBSTRUCTIVE PULMONARY DISEASE) (HCC): Primary | ICD-10-CM

## 2024-04-22 RX ORDER — BUDESONIDE AND FORMOTEROL FUMARATE DIHYDRATE 80; 4.5 UG/1; UG/1
2 AEROSOL RESPIRATORY (INHALATION) 2 TIMES DAILY
Qty: 10.2 G | Refills: 3 | Status: SHIPPED | OUTPATIENT
Start: 2024-04-22

## 2024-04-22 RX ORDER — FLUTICASONE FUROATE 100 UG/1
POWDER RESPIRATORY (INHALATION)
Refills: 0 | OUTPATIENT
Start: 2024-04-22

## 2024-04-23 NOTE — TELEPHONE ENCOUNTER
Received an error in declining a medication through [facility]. LVM informing pharmacy of refusal of medication.

## 2024-05-03 DIAGNOSIS — R11.0 NAUSEA: ICD-10-CM

## 2024-05-06 RX ORDER — ONDANSETRON 4 MG/1
4 TABLET, ORALLY DISINTEGRATING ORAL EVERY 12 HOURS PRN
Qty: 14 TABLET | Refills: 0 | Status: SHIPPED | OUTPATIENT
Start: 2024-05-06

## 2024-05-06 NOTE — TELEPHONE ENCOUNTER
Medication:   Requested Prescriptions     Pending Prescriptions Disp Refills    ondansetron (ZOFRAN-ODT) 4 MG disintegrating tablet [Pharmacy Med Name: ONDANSETRON ODT 4 MG TABLET] 30 tablet 1     Sig: TAKE 1 TABLET BY MOUTH EVERY 12 HOURS AS NEEDED FOR NAUSEA OR VOMITING     Last Filled:  4/19/24    Last appt: 3/8/2024   Next appt: 6/10/2024

## 2024-05-23 DIAGNOSIS — J44.9 CHRONIC OBSTRUCTIVE PULMONARY DISEASE, UNSPECIFIED COPD TYPE (HCC): ICD-10-CM

## 2024-05-23 RX ORDER — ROFLUMILAST 500 UG/1
500 TABLET ORAL DAILY
Qty: 90 TABLET | Refills: 1 | OUTPATIENT
Start: 2024-05-23

## 2024-05-23 RX ORDER — CYCLOBENZAPRINE HCL 10 MG
TABLET ORAL
Qty: 60 TABLET | Refills: 3 | OUTPATIENT
Start: 2024-05-23

## 2024-05-28 ENCOUNTER — OFFICE VISIT (OUTPATIENT)
Dept: FAMILY MEDICINE CLINIC | Age: 69
End: 2024-05-28
Payer: MEDICARE

## 2024-05-28 ENCOUNTER — HOSPITAL ENCOUNTER (OUTPATIENT)
Dept: GENERAL RADIOLOGY | Age: 69
Discharge: HOME | End: 2024-05-28
Payer: MEDICARE

## 2024-05-28 VITALS
BODY MASS INDEX: 29.04 KG/M2 | DIASTOLIC BLOOD PRESSURE: 82 MMHG | HEART RATE: 92 BPM | WEIGHT: 172.2 LBS | OXYGEN SATURATION: 92 % | SYSTOLIC BLOOD PRESSURE: 138 MMHG

## 2024-05-28 DIAGNOSIS — R73.03 PREDIABETES: ICD-10-CM

## 2024-05-28 DIAGNOSIS — R11.0 NAUSEA: ICD-10-CM

## 2024-05-28 DIAGNOSIS — J44.9 MODERATE COPD (CHRONIC OBSTRUCTIVE PULMONARY DISEASE) (HCC): Chronic | ICD-10-CM
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
## 2024-05-28 DIAGNOSIS — I10 ESSENTIAL HYPERTENSION: Primary | Chronic | ICD-10-CM

## 2024-05-28 DIAGNOSIS — F51.01 PRIMARY INSOMNIA: ICD-10-CM

## 2024-05-28 PROCEDURE — 1123F ACP DISCUSS/DSCN MKR DOCD: CPT | Performed by: STUDENT IN AN ORGANIZED HEALTH CARE EDUCATION/TRAINING PROGRAM

## 2024-05-28 PROCEDURE — 1036F TOBACCO NON-USER: CPT | Performed by: STUDENT IN AN ORGANIZED HEALTH CARE EDUCATION/TRAINING PROGRAM

## 2024-05-28 PROCEDURE — G8399 PT W/DXA RESULTS DOCUMENT: HCPCS | Performed by: STUDENT IN AN ORGANIZED HEALTH CARE EDUCATION/TRAINING PROGRAM

## 2024-05-28 PROCEDURE — 73502 X-RAY EXAM HIP UNI 2-3 VIEWS: CPT

## 2024-05-28 PROCEDURE — 1090F PRES/ABSN URINE INCON ASSESS: CPT | Performed by: STUDENT IN AN ORGANIZED HEALTH CARE EDUCATION/TRAINING PROGRAM

## 2024-05-28 PROCEDURE — 3075F SYST BP GE 130 - 139MM HG: CPT | Performed by: STUDENT IN AN ORGANIZED HEALTH CARE EDUCATION/TRAINING PROGRAM

## 2024-05-28 PROCEDURE — 73610 X-RAY EXAM OF ANKLE: CPT

## 2024-05-28 PROCEDURE — 73590 X-RAY EXAM OF LOWER LEG: CPT

## 2024-05-28 PROCEDURE — 73552 X-RAY EXAM OF FEMUR 2/>: CPT

## 2024-05-28 PROCEDURE — G2211 COMPLEX E/M VISIT ADD ON: HCPCS | Performed by: STUDENT IN AN ORGANIZED HEALTH CARE EDUCATION/TRAINING PROGRAM

## 2024-05-28 PROCEDURE — 3023F SPIROM DOC REV: CPT | Performed by: STUDENT IN AN ORGANIZED HEALTH CARE EDUCATION/TRAINING PROGRAM

## 2024-05-28 PROCEDURE — G8417 CALC BMI ABV UP PARAM F/U: HCPCS | Performed by: STUDENT IN AN ORGANIZED HEALTH CARE EDUCATION/TRAINING PROGRAM

## 2024-05-28 PROCEDURE — G8427 DOCREV CUR MEDS BY ELIG CLIN: HCPCS | Performed by: STUDENT IN AN ORGANIZED HEALTH CARE EDUCATION/TRAINING PROGRAM

## 2024-05-28 PROCEDURE — 99214 OFFICE O/P EST MOD 30 MIN: CPT | Performed by: STUDENT IN AN ORGANIZED HEALTH CARE EDUCATION/TRAINING PROGRAM

## 2024-05-28 PROCEDURE — 3017F COLORECTAL CA SCREEN DOC REV: CPT | Performed by: STUDENT IN AN ORGANIZED HEALTH CARE EDUCATION/TRAINING PROGRAM

## 2024-05-28 PROCEDURE — 3079F DIAST BP 80-89 MM HG: CPT | Performed by: STUDENT IN AN ORGANIZED HEALTH CARE EDUCATION/TRAINING PROGRAM

## 2024-05-28 RX ORDER — DEXTROMETHORPHAN HYDROBROMIDE AND PROMETHAZINE HYDROCHLORIDE 15; 6.25 MG/5ML; MG/5ML
1.25 SYRUP ORAL 4 TIMES DAILY PRN
Qty: 5 ML | Refills: 2 | Status: SHIPPED | OUTPATIENT
Start: 2024-05-28 | End: 2024-06-04

## 2024-05-28 RX ORDER — AMITRIPTYLINE HYDROCHLORIDE 25 MG/1
25 TABLET, FILM COATED ORAL 2 TIMES DAILY
COMMUNITY
Start: 2024-02-23

## 2024-05-28 NOTE — PROGRESS NOTES
Chief Complaint   Patient presents with    New Patient     Pt states that she wants to talk about her high glucose and she also wants a X-Ray because she fell two weeks ago and left side of body is swollen. Wants her A1c checked.           HPI: Fernando Reyes is a 68 y.o. female who presents for New Patient    #HTN  #COPD  #PreDM  -Listed medications include clonidine 0.1 mg twice daily, Dulera 100/5 2 puffs twice daily, as needed, Daliresp 500 mcg QD, follows with Pulm   -Recently taken off of HCTZ, follows with Nephro, BP today is 138/82  -On Metformin 500 mg QD, due for A1c check, states is checking home glucose, is compliant with meds, trying to keep wait in check  -Asking about refill of Ambien, has not had for the last month, states takes this for Insomnia, was referred to Psych in past but did not go, needs referral  -Fell about 2 weeks ago on L ankle, did not get care, would like xray of L ankle, would like xray, did not hit head, did not lose consciousness, is on Hip down , needs refill on promethazine, takes this as needed help with nausea  -States is now on Symbicort, has not needed rescue inhaler recently     Hemoglobin A1C   Date Value Ref Range Status   09/12/2023 5.9 See comment % Final     Comment:     Comment:  Diagnosis of Diabetes: > or = 6.5%  Increased risk of diabetes (Prediabetes): 5.7-6.4%  Glycemic Control: Nonpregnant Adults: <7.0%                    Pregnant: <6.0%            Lab Results   Component Value Date    CREATININE 0.9 04/23/2024    BUN 11 04/23/2024     04/23/2024    K 3.5 04/23/2024     04/23/2024    CO2 25 04/23/2024        Past Medical History:   Diagnosis Date    CAMDEN (acute kidney injury) (HCC) 10/07/2013    Anxiety     Chronic abdominal pain     possibly due to diverticulosos    COPD (chronic obstructive pulmonary disease) (HCC)     COPD with acute exacerbation (HCC) 2/6/2022    DDD (degenerative disc disease), lumbar 12/01/2016    Elevated glycated

## 2024-05-29 DIAGNOSIS — M25.552 ACUTE PAIN OF LEFT HIP: ICD-10-CM

## 2024-05-29 DIAGNOSIS — M25.512 ACUTE PAIN OF LEFT SHOULDER: ICD-10-CM

## 2024-05-29 LAB
EST. AVERAGE GLUCOSE BLD GHB EST-MCNC: 102.5 MG/DL
HBA1C MFR BLD: 5.2 %

## 2024-06-02 DIAGNOSIS — F41.1 GENERALIZED ANXIETY DISORDER: ICD-10-CM

## 2024-06-02 RX ORDER — DULOXETIN HYDROCHLORIDE 30 MG/1
CAPSULE, DELAYED RELEASE ORAL DAILY
Qty: 30 CAPSULE | Refills: 2 | OUTPATIENT
Start: 2024-06-02

## 2024-06-12 DIAGNOSIS — J44.9 CHRONIC OBSTRUCTIVE PULMONARY DISEASE, UNSPECIFIED COPD TYPE (HCC): ICD-10-CM

## 2024-06-12 RX ORDER — CYCLOBENZAPRINE HCL 10 MG
TABLET ORAL
Qty: 60 TABLET | Refills: 3 | OUTPATIENT
Start: 2024-06-12

## 2024-06-12 RX ORDER — ROFLUMILAST 500 UG/1
500 TABLET ORAL DAILY
Qty: 90 TABLET | Refills: 1 | OUTPATIENT
Start: 2024-06-12

## 2024-06-17 RX ORDER — PROMETHAZINE HYDROCHLORIDE 6.25 MG/5ML
6.25 SYRUP ORAL 4 TIMES DAILY PRN
Qty: 600 ML | Refills: 1 | Status: SHIPPED | OUTPATIENT
Start: 2024-06-17 | End: 2024-08-16

## 2024-06-25 DIAGNOSIS — J44.9 CHRONIC OBSTRUCTIVE PULMONARY DISEASE, UNSPECIFIED COPD TYPE (HCC): ICD-10-CM

## 2024-06-25 RX ORDER — ROFLUMILAST 500 UG/1
500 TABLET ORAL DAILY
Qty: 90 TABLET | Refills: 1 | OUTPATIENT
Start: 2024-06-25

## 2024-06-26 RX ORDER — ROFLUMILAST 500 UG/1
500 TABLET ORAL DAILY
Qty: 90 TABLET | Refills: 1 | Status: SHIPPED | OUTPATIENT
Start: 2024-06-26

## 2024-07-08 ENCOUNTER — ENROLLMENT (OUTPATIENT)
Dept: PHARMACY | Facility: CLINIC | Age: 69
End: 2024-07-08

## 2024-07-31 DIAGNOSIS — I10 ESSENTIAL HYPERTENSION: Chronic | ICD-10-CM

## 2024-07-31 RX ORDER — CALCIUM CITRATE/VITAMIN D3 200MG-6.25
TABLET ORAL
Qty: 100 EACH | Refills: 11 | Status: SHIPPED | OUTPATIENT
Start: 2024-07-31

## 2024-07-31 RX ORDER — CLONIDINE HYDROCHLORIDE 0.1 MG/1
0.1 TABLET ORAL 2 TIMES DAILY
Qty: 60 TABLET | Refills: 1 | Status: SHIPPED | OUTPATIENT
Start: 2024-07-31

## 2024-07-31 RX ORDER — BLOOD-GLUCOSE METER
1 EACH MISCELLANEOUS DAILY
Qty: 1 EACH | Refills: 0 | Status: SHIPPED | OUTPATIENT
Start: 2024-07-31

## 2024-08-13 ENCOUNTER — HOSPITAL ENCOUNTER (OUTPATIENT)
Dept: MRI IMAGING | Age: 69
Discharge: HOME | End: 2024-08-13
Payer: MEDICARE

## 2024-08-13 ENCOUNTER — TELEPHONE (OUTPATIENT)
Dept: FAMILY MEDICINE CLINIC | Age: 69
End: 2024-08-13

## 2024-08-13 DIAGNOSIS — M75.41 IMPINGEMENT SYNDROME OF RIGHT SHOULDER: ICD-10-CM

## 2024-08-13 DIAGNOSIS — M47.817 SPONDYLOSIS OF LUMBOSACRAL REGION WITHOUT MYELOPATHY OR RADICULOPATHY: ICD-10-CM

## 2024-08-13 PROCEDURE — 72148 MRI LUMBAR SPINE W/O DYE: CPT

## 2024-08-13 PROCEDURE — 73221 MRI JOINT UPR EXTREM W/O DYE: CPT

## 2024-08-13 NOTE — TELEPHONE ENCOUNTER
LMOM to schedule with PCP to address-test results from recent MRI and medicines. Current headaches

## 2024-09-04 DIAGNOSIS — E03.9 ACQUIRED HYPOTHYROIDISM: ICD-10-CM

## 2024-09-04 RX ORDER — LEVOTHYROXINE SODIUM 125 UG/1
125 TABLET ORAL DAILY
Qty: 90 TABLET | Refills: 1 | OUTPATIENT
Start: 2024-09-04

## 2024-09-04 RX ORDER — CYCLOBENZAPRINE HCL 10 MG
TABLET ORAL
Qty: 60 TABLET | Refills: 3 | OUTPATIENT
Start: 2024-09-04

## 2024-09-06 RX ORDER — DEXTROMETHORPHAN HYDROBROMIDE AND PROMETHAZINE HYDROCHLORIDE 15; 6.25 MG/5ML; MG/5ML
SYRUP ORAL
COMMUNITY

## 2024-09-06 RX ORDER — DEXTROMETHORPHAN HYDROBROMIDE AND PROMETHAZINE HYDROCHLORIDE 15; 6.25 MG/5ML; MG/5ML
SYRUP ORAL
OUTPATIENT
Start: 2024-09-06

## 2024-09-18 DIAGNOSIS — J44.9 MODERATE COPD (CHRONIC OBSTRUCTIVE PULMONARY DISEASE) (HCC): Chronic | ICD-10-CM

## 2024-09-18 RX ORDER — LANCETS 30 GAUGE
1 EACH MISCELLANEOUS 2 TIMES DAILY
Qty: 100 EACH | Refills: 5 | OUTPATIENT
Start: 2024-09-18

## 2024-09-18 RX ORDER — MOMETASONE FUROATE AND FORMOTEROL FUMARATE DIHYDRATE 100; 5 UG/1; UG/1
2 AEROSOL RESPIRATORY (INHALATION) 2 TIMES DAILY
Qty: 13 G | Refills: 1 | Status: SHIPPED | OUTPATIENT
Start: 2024-09-18

## 2024-09-18 RX ORDER — DEXTROMETHORPHAN HYDROBROMIDE AND PROMETHAZINE HYDROCHLORIDE 15; 6.25 MG/5ML; MG/5ML
SYRUP ORAL
OUTPATIENT
Start: 2024-09-18

## 2024-10-22 ENCOUNTER — TELEPHONE (OUTPATIENT)
Dept: PRIMARY CARE CLINIC | Age: 69
End: 2024-10-22

## 2024-10-22 ENCOUNTER — TELEPHONE (OUTPATIENT)
Dept: CASE MANAGEMENT | Age: 69
End: 2024-10-22

## 2024-10-22 NOTE — TELEPHONE ENCOUNTER
Currently patient does not meet USPSTF lung cancer screening guidelines criteria.  Current documented smoking history reviewed.

## 2024-10-25 ENCOUNTER — OFFICE VISIT (OUTPATIENT)
Dept: PRIMARY CARE CLINIC | Age: 69
End: 2024-10-25

## 2024-10-25 VITALS
HEIGHT: 65 IN | OXYGEN SATURATION: 94 % | DIASTOLIC BLOOD PRESSURE: 75 MMHG | BODY MASS INDEX: 26.66 KG/M2 | SYSTOLIC BLOOD PRESSURE: 112 MMHG | WEIGHT: 160 LBS | TEMPERATURE: 98.1 F | HEART RATE: 93 BPM

## 2024-10-25 DIAGNOSIS — G56.00 CARPAL TUNNEL SYNDROME, UNSPECIFIED LATERALITY: ICD-10-CM

## 2024-10-25 DIAGNOSIS — Z91.81 HX OF FALL: ICD-10-CM

## 2024-10-25 DIAGNOSIS — G43.111 MIGRAINE WITH AURA, WITH INTRACTABLE MIGRAINE, SO STATED, WITH STATUS MIGRAINOSUS: ICD-10-CM

## 2024-10-25 DIAGNOSIS — M51.362 DEGENERATION OF INTERVERTEBRAL DISC OF LUMBAR REGION WITH DISCOGENIC BACK PAIN AND LOWER EXTREMITY PAIN: ICD-10-CM

## 2024-10-25 DIAGNOSIS — I10 ESSENTIAL HYPERTENSION: Chronic | ICD-10-CM

## 2024-10-25 DIAGNOSIS — R11.0 NAUSEA: Primary | ICD-10-CM

## 2024-10-25 DIAGNOSIS — J44.9 MODERATE COPD (CHRONIC OBSTRUCTIVE PULMONARY DISEASE) (HCC): Chronic | ICD-10-CM

## 2024-10-25 RX ORDER — MOMETASONE FUROATE AND FORMOTEROL FUMARATE DIHYDRATE 100; 5 UG/1; UG/1
2 AEROSOL RESPIRATORY (INHALATION) 2 TIMES DAILY
Qty: 13 G | Refills: 1 | Status: SHIPPED | OUTPATIENT
Start: 2024-10-25

## 2024-10-25 RX ORDER — CLONIDINE HYDROCHLORIDE 0.1 MG/1
0.1 TABLET ORAL 2 TIMES DAILY
Qty: 60 TABLET | Refills: 1 | Status: SHIPPED | OUTPATIENT
Start: 2024-10-25

## 2024-10-25 RX ORDER — TIZANIDINE 2 MG/1
2 TABLET ORAL 3 TIMES DAILY PRN
Qty: 30 TABLET | Refills: 3 | Status: SHIPPED | OUTPATIENT
Start: 2024-10-25

## 2024-10-25 RX ORDER — TOPIRAMATE 25 MG/1
50 TABLET, FILM COATED ORAL 2 TIMES DAILY
Qty: 120 TABLET | Refills: 2 | Status: CANCELLED | OUTPATIENT
Start: 2024-10-25

## 2024-10-25 RX ORDER — NEBULIZER ACCESSORIES
1 KIT MISCELLANEOUS DAILY PRN
Qty: 1 KIT | Refills: 0 | Status: SHIPPED | OUTPATIENT
Start: 2024-10-25

## 2024-10-25 RX ORDER — DEXTROMETHORPHAN HYDROBROMIDE AND PROMETHAZINE HYDROCHLORIDE 15; 6.25 MG/5ML; MG/5ML
1.25 SYRUP ORAL 4 TIMES DAILY PRN
Qty: 118 ML | Refills: 1 | Status: SHIPPED | OUTPATIENT
Start: 2024-10-25

## 2024-10-25 SDOH — ECONOMIC STABILITY: FOOD INSECURITY: WITHIN THE PAST 12 MONTHS, YOU WORRIED THAT YOUR FOOD WOULD RUN OUT BEFORE YOU GOT MONEY TO BUY MORE.: NEVER TRUE

## 2024-10-25 SDOH — ECONOMIC STABILITY: FOOD INSECURITY: WITHIN THE PAST 12 MONTHS, THE FOOD YOU BOUGHT JUST DIDN'T LAST AND YOU DIDN'T HAVE MONEY TO GET MORE.: NEVER TRUE

## 2024-10-25 SDOH — ECONOMIC STABILITY: INCOME INSECURITY: HOW HARD IS IT FOR YOU TO PAY FOR THE VERY BASICS LIKE FOOD, HOUSING, MEDICAL CARE, AND HEATING?: NOT HARD AT ALL

## 2024-10-25 ASSESSMENT — PATIENT HEALTH QUESTIONNAIRE - PHQ9
6. FEELING BAD ABOUT YOURSELF - OR THAT YOU ARE A FAILURE OR HAVE LET YOURSELF OR YOUR FAMILY DOWN: SEVERAL DAYS
5. POOR APPETITE OR OVEREATING: SEVERAL DAYS
SUM OF ALL RESPONSES TO PHQ QUESTIONS 1-9: 9
SUM OF ALL RESPONSES TO PHQ QUESTIONS 1-9: 9
4. FEELING TIRED OR HAVING LITTLE ENERGY: MORE THAN HALF THE DAYS
10. IF YOU CHECKED OFF ANY PROBLEMS, HOW DIFFICULT HAVE THESE PROBLEMS MADE IT FOR YOU TO DO YOUR WORK, TAKE CARE OF THINGS AT HOME, OR GET ALONG WITH OTHER PEOPLE: VERY DIFFICULT
SUM OF ALL RESPONSES TO PHQ QUESTIONS 1-9: 9
8. MOVING OR SPEAKING SO SLOWLY THAT OTHER PEOPLE COULD HAVE NOTICED. OR THE OPPOSITE, BEING SO FIGETY OR RESTLESS THAT YOU HAVE BEEN MOVING AROUND A LOT MORE THAN USUAL: NOT AT ALL
2. FEELING DOWN, DEPRESSED OR HOPELESS: NEARLY EVERY DAY
SUM OF ALL RESPONSES TO PHQ9 QUESTIONS 1 & 2: 3
1. LITTLE INTEREST OR PLEASURE IN DOING THINGS: NOT AT ALL
7. TROUBLE CONCENTRATING ON THINGS, SUCH AS READING THE NEWSPAPER OR WATCHING TELEVISION: NOT AT ALL
9. THOUGHTS THAT YOU WOULD BE BETTER OFF DEAD, OR OF HURTING YOURSELF: NOT AT ALL
SUM OF ALL RESPONSES TO PHQ QUESTIONS 1-9: 9
3. TROUBLE FALLING OR STAYING ASLEEP: MORE THAN HALF THE DAYS

## 2024-10-25 NOTE — ASSESSMENT & PLAN NOTE
Discussed patient not taking these medications on the days where her blood pressure is softer like it is today.  Continue to monitor regularly at home.    Orders:    cloNIDine (CATAPRES) 0.1 MG tablet; Take 1 tablet by mouth 2 times daily

## 2024-10-25 NOTE — ASSESSMENT & PLAN NOTE
Patient needs to follow-up with pulmonology  Will write prescription for new nebulizer and refill inhaler  Orders:    Respiratory Therapy Supplies (NEBULIZER/TUBING/MOUTHPIECE) KIT; 1 kit by Does not apply route daily as needed (wheezing)    DULERA 100-5 MCG/ACT inhaler; Inhale 2 puffs into the lungs 2 times daily

## 2024-10-25 NOTE — PROGRESS NOTES
Office Note  10/25/2024  Patient Name: Fernando Reyes  MRN: [id number] : 1955    SUBJECTIVE:     CHIEF COMPLAINT:  Chief Complaint   Patient presents with    New Patient     NP here to [facility]  Hospital Visit  Electric lift chair       HISTORY OF PRESENT ILLNESS:  She presents today with the following concerns:    Hospital visit:  -Patient was admitted to the hospital from 10/8 to 10/12 due to weakness and AMS  -Imaging included CT head and MRI of the brain showed no acute abnormalities, CT of the abdomen and pelvis showed no acute abnormalities.  -Infectious workup was largely unremarkable aside from elevated leukocyte course.  Neurology was consulted and patient had EEG done and that was also negative.  -She remain hospitalized until she had returned back to baseline.      HTN medication:  -Patient states that when she is home, her blood pressure is labile.  States that her blood pressure will range from being completely normal for a few days to being in the 180s.  Denies any symptoms with the elevated blood pressure.  -Is currently on clonidine as needed.    COPD:  -On Dulera and feels that this is helpful for the most part  -Previously had a pulmonologist but has not followed up with them in a while.  -States that she does need a new nebulizer machine  -Does have oxygen at home as needed    History of fall:  -Had a fall several months ago  -Imaging of the leg and ankle did not show any acute findings but did show osteoarthritis.  -She was referred to PT by previous PCP but has not gone as of yet  -Does admit that she still feels a bit unstable at times when she is walking    Chronic nausea:  -Ongoing for several months if not a year.  -States that this all started happening when she realized that the red dye from her comforter set that she bought this year had started \"bleeding\" until a lot of her things and made her sick.  -Patient states that she has been to see different doctors for this

## 2024-10-25 NOTE — ASSESSMENT & PLAN NOTE
Patient desires lift chair to help with her symptoms.    Orders:    tiZANidine (ZANAFLEX) 2 MG tablet; Take 1 tablet by mouth 3 times daily as needed (muscle pain)    Lift Chair MISC; by Does not apply route

## 2024-11-08 DIAGNOSIS — G56.00 CARPAL TUNNEL SYNDROME, UNSPECIFIED LATERALITY: ICD-10-CM

## 2024-11-08 DIAGNOSIS — G43.111 MIGRAINE WITH AURA, WITH INTRACTABLE MIGRAINE, SO STATED, WITH STATUS MIGRAINOSUS: ICD-10-CM

## 2024-11-08 NOTE — TELEPHONE ENCOUNTER
Medication:   Requested Prescriptions     Pending Prescriptions Disp Refills    amitriptyline (ELAVIL) 25 MG tablet [Pharmacy Med Name: AMITRIPTYLINE HCL 25 MG TAB] 180 tablet 1     Sig: TAKE 1 TABLET BY MOUTH TWICE A DAY        Last Filled:      Patient Phone Number: [phone] (home) [phone] (work)    Last appt: 10/25/2024   Next appt: 11/8/2024    Last OARRS:       1/30/2017     3:24 PM   RX Monitoring   Attestation The Prescription Monitoring Report for this patient was reviewed today.

## 2024-11-19 DIAGNOSIS — I10 ESSENTIAL HYPERTENSION: Chronic | ICD-10-CM

## 2024-11-19 RX ORDER — CLONIDINE HYDROCHLORIDE 0.1 MG/1
0.1 TABLET ORAL 2 TIMES DAILY
Qty: 60 TABLET | Refills: 0 | Status: SHIPPED | OUTPATIENT
Start: 2024-11-19

## 2024-11-19 NOTE — TELEPHONE ENCOUNTER
Medication:   Requested Prescriptions     Pending Prescriptions Disp Refills    cloNIDine (CATAPRES) 0.1 MG tablet [Pharmacy Med Name: CLONIDINE HCL 0.1 MG TABLET] 180 tablet 1     Sig: TAKE 1 TABLET BY MOUTH TWICE A DAY        Last Filled:      Patient Phone Number: [phone] (home) [phone] (work)    Last appt: 10/25/2024   Next appt: Visit date not found    Last OARRS:       1/30/2017     3:24 PM   RX Monitoring   Attestation The Prescription Monitoring Report for this patient was reviewed today.

## 2024-12-13 DIAGNOSIS — I10 ESSENTIAL HYPERTENSION: Chronic | ICD-10-CM

## 2024-12-13 RX ORDER — CLONIDINE HYDROCHLORIDE 0.1 MG/1
0.1 TABLET ORAL 2 TIMES DAILY
Qty: 60 TABLET | Refills: 0 | Status: SHIPPED | OUTPATIENT
Start: 2024-12-13

## 2024-12-13 NOTE — TELEPHONE ENCOUNTER
Medication:   Requested Prescriptions     Pending Prescriptions Disp Refills    cloNIDine (CATAPRES) 0.1 MG tablet [Pharmacy Med Name: CLONIDINE HCL 0.1 MG TABLET] 60 tablet 0     Sig: TAKE 1 TABLET BY MOUTH TWICE A DAY        Last Filled:      Patient Phone Number: [phone] (home) [phone] (work)    Last appt: 10/25/2024   Next appt: Visit date not found    Last OARRS:       1/30/2017     3:24 PM   RX Monitoring   Attestation The Prescription Monitoring Report for this patient was reviewed today.

## 2025-01-10 ENCOUNTER — TELEPHONE (OUTPATIENT)
Dept: PRIMARY CARE CLINIC | Age: 70
End: 2025-01-10

## 2025-01-10 DIAGNOSIS — R11.0 NAUSEA: ICD-10-CM

## 2025-01-10 DIAGNOSIS — G43.111 MIGRAINE WITH AURA, WITH INTRACTABLE MIGRAINE, SO STATED, WITH STATUS MIGRAINOSUS: ICD-10-CM

## 2025-01-10 DIAGNOSIS — F51.01 PRIMARY INSOMNIA: ICD-10-CM

## 2025-01-10 DIAGNOSIS — J44.9 CHRONIC OBSTRUCTIVE PULMONARY DISEASE, UNSPECIFIED COPD TYPE (HCC): ICD-10-CM

## 2025-01-10 DIAGNOSIS — J44.9 MODERATE COPD (CHRONIC OBSTRUCTIVE PULMONARY DISEASE) (HCC): Chronic | ICD-10-CM

## 2025-01-10 DIAGNOSIS — G56.00 CARPAL TUNNEL SYNDROME, UNSPECIFIED LATERALITY: ICD-10-CM

## 2025-01-10 DIAGNOSIS — M51.362 DEGENERATION OF INTERVERTEBRAL DISC OF LUMBAR REGION WITH DISCOGENIC BACK PAIN AND LOWER EXTREMITY PAIN: ICD-10-CM

## 2025-01-10 DIAGNOSIS — E03.9 ACQUIRED HYPOTHYROIDISM: ICD-10-CM

## 2025-01-10 RX ORDER — MOMETASONE FUROATE AND FORMOTEROL FUMARATE DIHYDRATE 100; 5 UG/1; UG/1
2 AEROSOL RESPIRATORY (INHALATION) 2 TIMES DAILY
Qty: 13 G | Refills: 1 | Status: SHIPPED | OUTPATIENT
Start: 2025-01-10

## 2025-01-10 RX ORDER — QUETIAPINE FUMARATE 100 MG/1
100 TABLET, FILM COATED ORAL NIGHTLY
Qty: 90 TABLET | Refills: 0 | OUTPATIENT
Start: 2025-01-10

## 2025-01-10 RX ORDER — LEVOTHYROXINE SODIUM 125 UG/1
125 TABLET ORAL DAILY
Qty: 90 TABLET | Refills: 1 | Status: SHIPPED | OUTPATIENT
Start: 2025-01-10

## 2025-01-10 RX ORDER — ROFLUMILAST 500 UG/1
500 TABLET ORAL DAILY
Qty: 90 TABLET | Refills: 1 | Status: SHIPPED | OUTPATIENT
Start: 2025-01-10

## 2025-01-10 RX ORDER — TIZANIDINE 2 MG/1
2 TABLET ORAL 3 TIMES DAILY PRN
Qty: 30 TABLET | Refills: 3 | Status: SHIPPED | OUTPATIENT
Start: 2025-01-10

## 2025-01-10 RX ORDER — DEXTROMETHORPHAN HYDROBROMIDE AND PROMETHAZINE HYDROCHLORIDE 15; 6.25 MG/5ML; MG/5ML
1.25 SYRUP ORAL 4 TIMES DAILY PRN
Qty: 118 ML | Refills: 1 | Status: SHIPPED | OUTPATIENT
Start: 2025-01-10 | End: 2025-01-10 | Stop reason: SDUPTHER

## 2025-01-10 RX ORDER — DEXTROMETHORPHAN HYDROBROMIDE AND PROMETHAZINE HYDROCHLORIDE 15; 6.25 MG/5ML; MG/5ML
1.25 SYRUP ORAL 4 TIMES DAILY PRN
Qty: 118 ML | Refills: 1 | Status: SHIPPED | OUTPATIENT
Start: 2025-01-10

## 2025-01-10 RX ORDER — ONDANSETRON 4 MG/1
4 TABLET, FILM COATED ORAL 3 TIMES DAILY PRN
Qty: 30 TABLET | Refills: 2 | OUTPATIENT
Start: 2025-01-10

## 2025-01-10 NOTE — TELEPHONE ENCOUNTER
Medication:   Requested Prescriptions     Pending Prescriptions Disp Refills    promethazine-dextromethorphan (PROMETHAZINE-DM) 6.25-15 MG/5ML syrup [Pharmacy Med Name: PROMETHAZINE-DM 6.25-15 MG/5ML] 118 mL 1     Sig: TAKE 1.25 MLS BY MOUTH 4 TIMES DAILY AS NEEDED FOR COUGH        Last Filled:      Patient Phone Number: [phone] (home) [phone] (work)    Last appt: 10/25/2024   Next appt: Visit date not found    Last OARRS:       1/30/2017     3:24 PM   RX Monitoring   Attestation The Prescription Monitoring Report for this patient was reviewed today.

## 2025-01-10 NOTE — TELEPHONE ENCOUNTER
Answering service sent message to me patient needed refills on medications and these were sent in and she also needs follow-up appointment with Dr. Perez so we will forward a message to Nicole so patient can get into be seen.

## 2025-01-14 ENCOUNTER — OFFICE VISIT (OUTPATIENT)
Dept: PRIMARY CARE CLINIC | Age: 70
End: 2025-01-14

## 2025-01-14 VITALS
TEMPERATURE: 98.1 F | HEIGHT: 64 IN | WEIGHT: 145 LBS | DIASTOLIC BLOOD PRESSURE: 84 MMHG | SYSTOLIC BLOOD PRESSURE: 137 MMHG | HEART RATE: 104 BPM | OXYGEN SATURATION: 93 % | BODY MASS INDEX: 24.75 KG/M2

## 2025-01-14 DIAGNOSIS — G43.111 MIGRAINE WITH AURA, WITH INTRACTABLE MIGRAINE, SO STATED, WITH STATUS MIGRAINOSUS: ICD-10-CM

## 2025-01-14 DIAGNOSIS — J44.9 CHRONIC OBSTRUCTIVE PULMONARY DISEASE, UNSPECIFIED COPD TYPE (HCC): ICD-10-CM

## 2025-01-14 DIAGNOSIS — K63.5 POLYP OF COLON, UNSPECIFIED PART OF COLON, UNSPECIFIED TYPE: ICD-10-CM

## 2025-01-14 DIAGNOSIS — R73.03 PREDIABETES: ICD-10-CM

## 2025-01-14 DIAGNOSIS — G47.00 INSOMNIA, UNSPECIFIED TYPE: ICD-10-CM

## 2025-01-14 DIAGNOSIS — R11.0 NAUSEA: Primary | ICD-10-CM

## 2025-01-14 DIAGNOSIS — F32.A DEPRESSION, UNSPECIFIED DEPRESSION TYPE: ICD-10-CM

## 2025-01-14 RX ORDER — ROFLUMILAST 500 UG/1
500 TABLET ORAL DAILY
Qty: 90 TABLET | Refills: 1 | Status: CANCELLED | OUTPATIENT
Start: 2025-01-14

## 2025-01-14 RX ORDER — DEXTROMETHORPHAN HYDROBROMIDE AND PROMETHAZINE HYDROCHLORIDE 15; 6.25 MG/5ML; MG/5ML
1.25 SYRUP ORAL 4 TIMES DAILY PRN
Qty: 118 ML | Refills: 1 | Status: CANCELLED | OUTPATIENT
Start: 2025-01-14

## 2025-01-14 RX ORDER — TOPIRAMATE 25 MG/1
50 TABLET, FILM COATED ORAL 2 TIMES DAILY
Qty: 120 TABLET | Refills: 2 | Status: SHIPPED | OUTPATIENT
Start: 2025-01-14

## 2025-01-14 RX ORDER — RAMELTEON 8 MG/1
8 TABLET ORAL NIGHTLY PRN
Qty: 30 TABLET | Refills: 1 | Status: SHIPPED | OUTPATIENT
Start: 2025-01-14 | End: 2025-01-16

## 2025-01-14 SDOH — ECONOMIC STABILITY: FOOD INSECURITY: WITHIN THE PAST 12 MONTHS, YOU WORRIED THAT YOUR FOOD WOULD RUN OUT BEFORE YOU GOT MONEY TO BUY MORE.: NEVER TRUE

## 2025-01-14 SDOH — ECONOMIC STABILITY: FOOD INSECURITY: WITHIN THE PAST 12 MONTHS, THE FOOD YOU BOUGHT JUST DIDN'T LAST AND YOU DIDN'T HAVE MONEY TO GET MORE.: NEVER TRUE

## 2025-01-14 ASSESSMENT — PATIENT HEALTH QUESTIONNAIRE - PHQ9
10. IF YOU CHECKED OFF ANY PROBLEMS, HOW DIFFICULT HAVE THESE PROBLEMS MADE IT FOR YOU TO DO YOUR WORK, TAKE CARE OF THINGS AT HOME, OR GET ALONG WITH OTHER PEOPLE: VERY DIFFICULT
5. POOR APPETITE OR OVEREATING: SEVERAL DAYS
2. FEELING DOWN, DEPRESSED OR HOPELESS: MORE THAN HALF THE DAYS
3. TROUBLE FALLING OR STAYING ASLEEP: MORE THAN HALF THE DAYS
4. FEELING TIRED OR HAVING LITTLE ENERGY: NEARLY EVERY DAY
SUM OF ALL RESPONSES TO PHQ QUESTIONS 1-9: 8
9. THOUGHTS THAT YOU WOULD BE BETTER OFF DEAD, OR OF HURTING YOURSELF: NOT AT ALL
7. TROUBLE CONCENTRATING ON THINGS, SUCH AS READING THE NEWSPAPER OR WATCHING TELEVISION: NOT AT ALL
SUM OF ALL RESPONSES TO PHQ QUESTIONS 1-9: 8
8. MOVING OR SPEAKING SO SLOWLY THAT OTHER PEOPLE COULD HAVE NOTICED. OR THE OPPOSITE, BEING SO FIGETY OR RESTLESS THAT YOU HAVE BEEN MOVING AROUND A LOT MORE THAN USUAL: NOT AT ALL
SUM OF ALL RESPONSES TO PHQ QUESTIONS 1-9: 8
SUM OF ALL RESPONSES TO PHQ QUESTIONS 1-9: 8
6. FEELING BAD ABOUT YOURSELF - OR THAT YOU ARE A FAILURE OR HAVE LET YOURSELF OR YOUR FAMILY DOWN: NOT AT ALL

## 2025-01-14 NOTE — PROGRESS NOTES
daily 13 g 1    promethazine-dextromethorphan (PROMETHAZINE-DM) 6.25-15 MG/5ML syrup Take 1.25 mLs by mouth 4 times daily as needed for Cough 118 mL 1    Respiratory Therapy Supplies (NEBULIZER/TUBING/MOUTHPIECE) KIT 1 kit by Does not apply route daily as needed (wheezing) 1 kit 0    ondansetron (ZOFRAN) 4 MG tablet TAKE 1 TABLET BY MOUTH 3 TIMES DAILY AS NEEDED FOR NAUSEA OR VOMITING 30 tablet 2    oxyCODONE-acetaminophen (PERCOCET)  MG per tablet TAKE 1 TABLET BY MOUTH EVERY 6 HOURS FOR 30 DAYS      Respiratory Therapy Supplies MISC 1 each by Does not apply route 4 times daily as needed (wheezing SOB) All Nebulizer supplies needed 50 each 5    cloNIDine (CATAPRES) 0.1 MG tablet TAKE 1 TABLET BY MOUTH TWICE A DAY (Patient not taking: Reported on 1/14/2025) 60 tablet 0    Lift Chair MISC by Does not apply route (Patient not taking: Reported on 1/14/2025) 1 each 0    Blood Glucose Monitoring Suppl (TRUE METRIX METER) CRYSTAL 1 kit by Does not apply route daily (Patient not taking: Reported on 1/14/2025) 1 each 0    blood glucose test strips (TRUE METRIX BLOOD GLUCOSE TEST) strip USE TO TEST BLOOD SUGAR DAILY (Patient not taking: Reported on 1/14/2025) 100 each 11    diclofenac sodium (VOLTAREN) 1 % GEL APPLY 2 G TOPICALLY 4 TIMES DAILY AS NEEDED FOR PAIN (Patient not taking: Reported on 1/14/2025) 100 g 1    metFORMIN (GLUCOPHAGE) 500 MG tablet TAKE 1 PILL IN THE MORNING WITH FOOD. (Patient not taking: Reported on 1/14/2025)       No current facility-administered medications for this visit.     Allergies:  Allergies   Allergen Reactions    Bupropion Other (See Comments)     Muscle spasms/seizure like symptoms   Other reaction(s): seizure-like symptoms    Morphine Hives, Itching, Rash, Anaphylaxis and Hallucinations    Morphine And Codeine Itching     Social History:  Social History     Tobacco Use    Smoking status: Former     Current packs/day: 0.00     Average packs/day: 0.3 packs/day for 33.0 years (8.3 ttl

## 2025-01-14 NOTE — ASSESSMENT & PLAN NOTE
Currently on Elavil, plan to discuss switching medication at next visit as patient is at increased risk for side effects due to her age

## 2025-01-14 NOTE — ASSESSMENT & PLAN NOTE
Patient states that her sugars fasting have been increasing, would to check and see where her A1c is now  Orders:    Hemoglobin A1C; Future

## 2025-01-15 NOTE — TELEPHONE ENCOUNTER
Medication:   Requested Prescriptions     Pending Prescriptions Disp Refills    traZODone (DESYREL) 50 MG tablet [Pharmacy Med Name: TRAZODONE 50 MG TABLET]  0        Last Filled:  [unfilled]    Patient Phone Number: [phone] (home)     Last appt: 1/14/2025   Next appt: 1/28/2025    Last OARRS:       1/30/2017     3:24 PM   RX Monitoring   Attestation The Prescription Monitoring Report for this patient was reviewed today.

## 2025-01-16 RX ORDER — TRAZODONE HYDROCHLORIDE 50 MG/1
50 TABLET, FILM COATED ORAL NIGHTLY
Qty: 30 TABLET | Refills: 1 | Status: SHIPPED | OUTPATIENT
Start: 2025-01-16

## 2025-01-17 DIAGNOSIS — R11.0 NAUSEA: ICD-10-CM

## 2025-01-17 DIAGNOSIS — R73.03 PREDIABETES: ICD-10-CM

## 2025-01-18 DIAGNOSIS — I10 ESSENTIAL HYPERTENSION: Chronic | ICD-10-CM

## 2025-01-18 LAB
EST. AVERAGE GLUCOSE BLD GHB EST-MCNC: 105.4 MG/DL
HBA1C MFR BLD: 5.3 %

## 2025-01-19 ENCOUNTER — APPOINTMENT (OUTPATIENT)
Dept: CT IMAGING | Age: 70
End: 2025-01-19
Payer: MEDICARE

## 2025-01-19 ENCOUNTER — HOSPITAL ENCOUNTER (EMERGENCY)
Age: 70
Discharge: HOME | End: 2025-01-19
Attending: EMERGENCY MEDICINE
Payer: MEDICARE

## 2025-01-19 VITALS
WEIGHT: 138.23 LBS | TEMPERATURE: 98.8 F | OXYGEN SATURATION: 98 % | SYSTOLIC BLOOD PRESSURE: 156 MMHG | DIASTOLIC BLOOD PRESSURE: 93 MMHG | HEART RATE: 100 BPM | RESPIRATION RATE: 18 BRPM | HEIGHT: 64 IN | BODY MASS INDEX: 23.6 KG/M2

## 2025-01-19 DIAGNOSIS — R10.9 ABDOMINAL PAIN, UNSPECIFIED ABDOMINAL LOCATION: Primary | ICD-10-CM

## 2025-01-19 DIAGNOSIS — R11.0 NAUSEA: ICD-10-CM

## 2025-01-19 LAB
ALBUMIN SERPL-MCNC: 4.8 G/DL (ref 3.4–5)
ALBUMIN/GLOB SERPL: 1.5 {RATIO} (ref 1.1–2.2)
ALP SERPL-CCNC: 92 U/L (ref 40–129)
ALT SERPL-CCNC: 13 U/L (ref 10–40)
ANION GAP SERPL CALCULATED.3IONS-SCNC: 15 MMOL/L (ref 3–16)
AST SERPL-CCNC: 19 U/L (ref 15–37)
BASOPHILS # BLD: 0.1 K/UL (ref 0–0.2)
BASOPHILS NFR BLD: 0.7 %
BILIRUB SERPL-MCNC: 0.4 MG/DL (ref 0–1)
BUN SERPL-MCNC: 8 MG/DL (ref 7–20)
CALCIUM SERPL-MCNC: 11 MG/DL (ref 8.3–10.6)
CHLORIDE SERPL-SCNC: 107 MMOL/L (ref 99–110)
CO2 SERPL-SCNC: 19 MMOL/L (ref 21–32)
CREAT SERPL-MCNC: 0.8 MG/DL (ref 0.6–1.2)
DEPRECATED RDW RBC AUTO: 15.1 % (ref 12.4–15.4)
EOSINOPHIL # BLD: 0 K/UL (ref 0–0.6)
EOSINOPHIL NFR BLD: 0.4 %
GFR SERPLBLD CREATININE-BSD FMLA CKD-EPI: 79 ML/MIN/{1.73_M2}
GLUCOSE SERPL-MCNC: 119 MG/DL (ref 70–99)
HCT VFR BLD AUTO: 48.1 % (ref 36–48)
HGB BLD-MCNC: 15.5 G/DL (ref 12–16)
LIPASE SERPL-CCNC: 30 U/L (ref 13–60)
LYMPHOCYTES # BLD: 2.4 K/UL (ref 1–5.1)
LYMPHOCYTES NFR BLD: 24.5 %
MAGNESIUM SERPL-MCNC: 1.65 MG/DL (ref 1.8–2.4)
MCH RBC QN AUTO: 25.9 PG (ref 26–34)
MCHC RBC AUTO-ENTMCNC: 32.2 G/DL (ref 31–36)
MCV RBC AUTO: 80.4 FL (ref 80–100)
MONOCYTES # BLD: 0.4 K/UL (ref 0–1.3)
MONOCYTES NFR BLD: 4.3 %
NEUTROPHILS # BLD: 6.9 K/UL (ref 1.7–7.7)
NEUTROPHILS NFR BLD: 70.1 %
PLATELET # BLD AUTO: 205 K/UL (ref 135–450)
PMV BLD AUTO: 10.4 FL (ref 5–10.5)
POTASSIUM SERPL-SCNC: 3.5 MMOL/L (ref 3.5–5.1)
PROT SERPL-MCNC: 8 G/DL (ref 6.4–8.2)
RBC # BLD AUTO: 5.99 M/UL (ref 4–5.2)
SODIUM SERPL-SCNC: 141 MMOL/L (ref 136–145)
TROPONIN, HIGH SENSITIVITY: <6 NG/L (ref 0–14)
WBC # BLD AUTO: 9.8 K/UL (ref 4–11)

## 2025-01-19 PROCEDURE — 6360000002 HC RX W HCPCS: Performed by: EMERGENCY MEDICINE

## 2025-01-19 PROCEDURE — 84484 ASSAY OF TROPONIN QUANT: CPT

## 2025-01-19 PROCEDURE — 74177 CT ABD & PELVIS W/CONTRAST: CPT

## 2025-01-19 PROCEDURE — 93005 ELECTROCARDIOGRAM TRACING: CPT | Performed by: EMERGENCY MEDICINE

## 2025-01-19 PROCEDURE — 6370000000 HC RX 637 (ALT 250 FOR IP): Performed by: EMERGENCY MEDICINE

## 2025-01-19 PROCEDURE — 85025 COMPLETE CBC W/AUTO DIFF WBC: CPT

## 2025-01-19 PROCEDURE — 80053 COMPREHEN METABOLIC PANEL: CPT

## 2025-01-19 PROCEDURE — 96374 THER/PROPH/DIAG INJ IV PUSH: CPT

## 2025-01-19 PROCEDURE — 99285 EMERGENCY DEPT VISIT HI MDM: CPT

## 2025-01-19 PROCEDURE — 83690 ASSAY OF LIPASE: CPT

## 2025-01-19 PROCEDURE — 36415 COLL VENOUS BLD VENIPUNCTURE: CPT

## 2025-01-19 PROCEDURE — 6360000004 HC RX CONTRAST MEDICATION: Performed by: EMERGENCY MEDICINE

## 2025-01-19 PROCEDURE — 83735 ASSAY OF MAGNESIUM: CPT

## 2025-01-19 RX ORDER — OXYCODONE AND ACETAMINOPHEN 5; 325 MG/1; MG/1
1 TABLET ORAL ONCE
Status: COMPLETED | OUTPATIENT
Start: 2025-01-19 | End: 2025-01-19

## 2025-01-19 RX ORDER — ONDANSETRON 4 MG/1
4 TABLET, ORALLY DISINTEGRATING ORAL 3 TIMES DAILY PRN
Qty: 6 TABLET | Refills: 0 | Status: SHIPPED | OUTPATIENT
Start: 2025-01-19 | End: 2025-01-19

## 2025-01-19 RX ORDER — METRONIDAZOLE 500 MG/1
500 TABLET ORAL 3 TIMES DAILY
Qty: 21 TABLET | Refills: 0 | Status: SHIPPED | OUTPATIENT
Start: 2025-01-19 | End: 2025-01-19

## 2025-01-19 RX ORDER — METRONIDAZOLE 500 MG/1
500 TABLET ORAL 3 TIMES DAILY
Qty: 21 TABLET | Refills: 0 | Status: SHIPPED | OUTPATIENT
Start: 2025-01-19 | End: 2025-01-26

## 2025-01-19 RX ORDER — ONDANSETRON 4 MG/1
4 TABLET, ORALLY DISINTEGRATING ORAL 3 TIMES DAILY PRN
Qty: 6 TABLET | Refills: 0 | Status: SHIPPED | OUTPATIENT
Start: 2025-01-19

## 2025-01-19 RX ORDER — IOPAMIDOL 755 MG/ML
75 INJECTION, SOLUTION INTRAVASCULAR
Status: COMPLETED | OUTPATIENT
Start: 2025-01-19 | End: 2025-01-19

## 2025-01-19 RX ORDER — ONDANSETRON 2 MG/ML
4 INJECTION INTRAMUSCULAR; INTRAVENOUS ONCE
Status: COMPLETED | OUTPATIENT
Start: 2025-01-19 | End: 2025-01-19

## 2025-01-19 RX ADMIN — OXYCODONE HYDROCHLORIDE AND ACETAMINOPHEN 1 TABLET: 5; 325 TABLET ORAL at 10:40

## 2025-01-19 RX ADMIN — ONDANSETRON 4 MG: 2 INJECTION, SOLUTION INTRAMUSCULAR; INTRAVENOUS at 10:40

## 2025-01-19 RX ADMIN — IOPAMIDOL 75 ML: 755 INJECTION, SOLUTION INTRAVENOUS at 10:53

## 2025-01-19 ASSESSMENT — PAIN SCALES - GENERAL
PAINLEVEL_OUTOF10: 10
PAINLEVEL_OUTOF10: 8

## 2025-01-19 ASSESSMENT — PAIN DESCRIPTION - LOCATION: LOCATION: ABDOMEN

## 2025-01-19 NOTE — DISCHARGE INSTRUCTIONS
Please follow up with GI tomorrow as scheduled for further evaluation and treatment.   As we discussed, continue taking omeprazole and take metronidazole as prescribed until follow up with GI.   If persistent or worsening symptoms, or if you have any concerns, return to ED immediately.

## 2025-01-19 NOTE — ED NOTES
Patient wheeled to front of hospital. Per RN and her daughter is going to pick her up. D/C: Order noted for d/c. Pt confirmed d/c paperwork has correct name. Discharge and education instructions reviewed with patient. Teach-back successful.  Pt verbalized understanding and denied questions at this time. No acute distress noted. Patient instructed to follow-up as noted - return to emergency department if symptoms worsen. Patient verbalized understanding. Discharged per EDMD with discharge instructions. Pt discharged to private vehicle. Patient stable upon departure. Thanked patient for choosing [facility] care. Provider aware of patient pain at time of discharge.

## 2025-01-19 NOTE — ED PROVIDER NOTES
[facility] EMERGENCY DEPARTMENT    EMERGENCY DEPARTMENT ENCOUNTER        Patient Name: Fernando Reyes  MRN: [id number]  Birthdate 1955  Date of evaluation: 1/19/2025  PCP: Natalia Perez DO  Note Started: 10:19 AM EST 1/19/25    CHIEF COMPLAINT       Abdominal Pain (Pt with abd pain and dark tarry stools.  Pt complains of abd pain for 3 weeks.  Pt states she has had pain in rectum.  Pt states she has had recent colo guard test that aleks back +.  Pt with nausea and emesis for 3 weeks per her report)      HISTORY OF PRESENT ILLNESS: 1 or more Elements       Fernando Reyes is a 69 y.o. female who presents to the emergency department for evaluation of abdominal pain and black stools.  Reports having symptoms for at least the last several weeks.  Reports the dark stools have been ongoing for about 4 weeks.  Reports the abdominal pain has been ongoing for about 2 to 3 months.  She followed up with her PCP and had a Cologuard that came back positive.    Reports having 4 colonoscopies in the past.  Last colonoscopy was in 2022 and she had 2 polyps.  Reports having family history of colon cancer.  No personal history of colon cancer or IBD.  Does not take any blood thinners.  Reports having unintentional weight loss over the past several weeks.  Decreased p.o. intake.    Denies having associated fevers, chills, chest pain, or shortness of breath.     No other complaints, modifying factors or associated symptoms.     History obtained by the patient unless stated otherwise as above on HPI.   No limitations unless specified as above on HPI.     Past medical history:   Past Medical History:   Diagnosis Date    CAMDEN (acute kidney injury) (HCC) 10/07/2013    Anxiety     Chronic abdominal pain     possibly due to diverticulosos    COPD (chronic obstructive pulmonary disease) (HCC)     COPD with acute exacerbation (HCC) 2/6/2022    DDD (degenerative disc disease), lumbar 12/01/2016    Elevated glycated hemoglobin     High

## 2025-01-20 ENCOUNTER — CARE COORDINATION (OUTPATIENT)
Dept: CARE COORDINATION | Age: 70
End: 2025-01-20

## 2025-01-20 LAB
EKG ATRIAL RATE: 99 BPM
EKG DIAGNOSIS: NORMAL
EKG P AXIS: 79 DEGREES
EKG P-R INTERVAL: 140 MS
EKG Q-T INTERVAL: 386 MS
EKG QRS DURATION: 134 MS
EKG QTC CALCULATION (BAZETT): 495 MS
EKG R AXIS: -84 DEGREES
EKG T AXIS: 60 DEGREES
EKG VENTRICULAR RATE: 99 BPM

## 2025-01-20 PROCEDURE — 93010 ELECTROCARDIOGRAM REPORT: CPT | Performed by: INTERNAL MEDICINE

## 2025-01-20 RX ORDER — CLONIDINE HYDROCHLORIDE 0.1 MG/1
0.1 TABLET ORAL 2 TIMES DAILY
Qty: 180 TABLET | Refills: 1 | Status: SHIPPED | OUTPATIENT
Start: 2025-01-20

## 2025-01-20 NOTE — TELEPHONE ENCOUNTER
Medication:   Requested Prescriptions     Pending Prescriptions Disp Refills    cloNIDine (CATAPRES) 0.1 MG tablet [Pharmacy Med Name: CLONIDINE HCL 0.1 MG TABLET] 180 tablet 1     Sig: TAKE 1 TABLET BY MOUTH TWICE A DAY        Last Filled:  [unfilled]    Patient Phone Number: [phone] (home)     Last appt: 1/14/2025   Next appt: 1/28/2025    Last OARRS:       1/30/2017     3:24 PM   RX Monitoring   Attestation The Prescription Monitoring Report for this patient was reviewed today.

## 2025-01-20 NOTE — CARE COORDINATION
Ambulatory Care Coordination Note     1/20/2025 12:02 PM     Patient outreach attempt by this ACM today to perform hospital follow up. ACM was unable to reach the patient by telephone today;   left voice message requesting a return phone call to this ACM.     ACM: Kim Lopes RN     Care Summary Note: ED follow up     PCP/Specialist follow up:   Future Appointments         Provider Specialty Dept Phone    1/28/2025 3:00 PM Natalia Perez DO Primary Care [phone]    1/30/2025 2:00 PM Abner Moralez MD Nephrology [phone]

## 2025-01-21 ENCOUNTER — CARE COORDINATION (OUTPATIENT)
Dept: CARE COORDINATION | Age: 70
End: 2025-01-21

## 2025-01-21 NOTE — CARE COORDINATION
Ambulatory Care Coordination Note     1/21/2025 2:58 PM     patient outreach attempt by this ACM today to perform hospital follow up. ACM was unable to reach the patient by telephone today;   left voice message requesting a return phone call to this ACM.     Patient closed (unable to reach patient) from the High Risk Care Management program on 1/21/2025.

## 2025-01-23 ENCOUNTER — TELEPHONE (OUTPATIENT)
Dept: PRIMARY CARE CLINIC | Age: 70
End: 2025-01-23

## 2025-01-23 DIAGNOSIS — G47.00 INSOMNIA, UNSPECIFIED TYPE: ICD-10-CM

## 2025-01-23 DIAGNOSIS — F51.01 PRIMARY INSOMNIA: Primary | ICD-10-CM

## 2025-01-23 RX ORDER — TRAZODONE HYDROCHLORIDE 50 MG/1
50 TABLET, FILM COATED ORAL NIGHTLY
Qty: 30 TABLET | Refills: 1 | Status: SHIPPED | OUTPATIENT
Start: 2025-01-23

## 2025-01-23 NOTE — TELEPHONE ENCOUNTER
PT called she needs refill on   Last  visit 1/14/25  Next visit 2/4/25    ramelteon (ROZEREM) 8 MG tablet     [facility] [address] [place] [address] RD - P [phone] - F [phone] [[id number]]

## 2025-02-12 ENCOUNTER — OFFICE VISIT (OUTPATIENT)
Dept: PRIMARY CARE CLINIC | Age: 70
End: 2025-02-12
Payer: MEDICARE

## 2025-02-12 VITALS
SYSTOLIC BLOOD PRESSURE: 135 MMHG | OXYGEN SATURATION: 92 % | DIASTOLIC BLOOD PRESSURE: 88 MMHG | BODY MASS INDEX: 23.16 KG/M2 | WEIGHT: 139 LBS | HEART RATE: 101 BPM | HEIGHT: 65 IN | TEMPERATURE: 98.7 F

## 2025-02-12 DIAGNOSIS — R11.0 NAUSEA: ICD-10-CM

## 2025-02-12 DIAGNOSIS — G43.111 MIGRAINE WITH AURA, WITH INTRACTABLE MIGRAINE, SO STATED, WITH STATUS MIGRAINOSUS: ICD-10-CM

## 2025-02-12 DIAGNOSIS — R63.4 WEIGHT LOSS: ICD-10-CM

## 2025-02-12 DIAGNOSIS — R10.9 ABDOMINAL PAIN, UNSPECIFIED ABDOMINAL LOCATION: Primary | ICD-10-CM

## 2025-02-12 DIAGNOSIS — F33.9 EPISODE OF RECURRENT MAJOR DEPRESSIVE DISORDER, UNSPECIFIED DEPRESSION EPISODE SEVERITY (HCC): ICD-10-CM

## 2025-02-12 PROCEDURE — 3075F SYST BP GE 130 - 139MM HG: CPT | Performed by: STUDENT IN AN ORGANIZED HEALTH CARE EDUCATION/TRAINING PROGRAM

## 2025-02-12 PROCEDURE — 1123F ACP DISCUSS/DSCN MKR DOCD: CPT | Performed by: STUDENT IN AN ORGANIZED HEALTH CARE EDUCATION/TRAINING PROGRAM

## 2025-02-12 PROCEDURE — 1159F MED LIST DOCD IN RCRD: CPT | Performed by: STUDENT IN AN ORGANIZED HEALTH CARE EDUCATION/TRAINING PROGRAM

## 2025-02-12 PROCEDURE — G8420 CALC BMI NORM PARAMETERS: HCPCS | Performed by: STUDENT IN AN ORGANIZED HEALTH CARE EDUCATION/TRAINING PROGRAM

## 2025-02-12 PROCEDURE — 1090F PRES/ABSN URINE INCON ASSESS: CPT | Performed by: STUDENT IN AN ORGANIZED HEALTH CARE EDUCATION/TRAINING PROGRAM

## 2025-02-12 PROCEDURE — G8427 DOCREV CUR MEDS BY ELIG CLIN: HCPCS | Performed by: STUDENT IN AN ORGANIZED HEALTH CARE EDUCATION/TRAINING PROGRAM

## 2025-02-12 PROCEDURE — 99214 OFFICE O/P EST MOD 30 MIN: CPT | Performed by: STUDENT IN AN ORGANIZED HEALTH CARE EDUCATION/TRAINING PROGRAM

## 2025-02-12 PROCEDURE — 3079F DIAST BP 80-89 MM HG: CPT | Performed by: STUDENT IN AN ORGANIZED HEALTH CARE EDUCATION/TRAINING PROGRAM

## 2025-02-12 PROCEDURE — 1036F TOBACCO NON-USER: CPT | Performed by: STUDENT IN AN ORGANIZED HEALTH CARE EDUCATION/TRAINING PROGRAM

## 2025-02-12 PROCEDURE — G8399 PT W/DXA RESULTS DOCUMENT: HCPCS | Performed by: STUDENT IN AN ORGANIZED HEALTH CARE EDUCATION/TRAINING PROGRAM

## 2025-02-12 PROCEDURE — 3017F COLORECTAL CA SCREEN DOC REV: CPT | Performed by: STUDENT IN AN ORGANIZED HEALTH CARE EDUCATION/TRAINING PROGRAM

## 2025-02-12 RX ORDER — GLYCERIN PEDIATRIC
1 SUPPOSITORY, RECTAL RECTAL
Qty: 5 SUPPOSITORY | Refills: 0 | Status: SHIPPED | OUTPATIENT
Start: 2025-02-12 | End: 2025-02-12

## 2025-02-12 RX ORDER — MIRTAZAPINE 15 MG/1
15 TABLET, FILM COATED ORAL NIGHTLY
Qty: 90 TABLET | Refills: 1 | Status: SHIPPED | OUTPATIENT
Start: 2025-02-12

## 2025-02-12 RX ORDER — TOPIRAMATE 25 MG/1
50 TABLET, FILM COATED ORAL 2 TIMES DAILY
Qty: 120 TABLET | Refills: 2 | Status: SHIPPED | OUTPATIENT
Start: 2025-02-12

## 2025-02-12 RX ORDER — DEXTROMETHORPHAN HYDROBROMIDE AND PROMETHAZINE HYDROCHLORIDE 15; 6.25 MG/5ML; MG/5ML
1.25 SYRUP ORAL 4 TIMES DAILY PRN
Qty: 118 ML | Refills: 1 | Status: SHIPPED | OUTPATIENT
Start: 2025-02-12

## 2025-02-12 NOTE — PROGRESS NOTES
CHOLECYSTECTOMY      COLONOSCOPY  09/06/2011    Tubular adenoma    COLONOSCOPY  10/24/2005    Dr. CASISDY - Tubular adenoma    CYST REMOVAL Left     thumb    ENDOSCOPY, COLON, DIAGNOSTIC      ESOPHAGEAL MOTILITY STUDY  06/17/2013    NORMAL    EYE SURGERY Bilateral     cataracts    FINE NEEDLE ASPIRATION  08/10/2012    THYROID - NEGATIVE    FINGER NAIL SURGERY Bilateral 05/21/2019    TOTAL AVULSION OF 1-5 TOENAILS BILATERAL FEET performed by Chet Smith DPM at [facility] OR    HEMORRHOID SURGERY  1990    HERNIA REPAIR Right 1976    inguinal hernia    HYSTERECTOMY (CERVIX STATUS UNKNOWN)  2002    OVARIAN CYST SURGERY Left     1976 and 1977(left side) and 1980 Right side    ROTATOR CUFF REPAIR Left 2021    SKIN LESION EXCISION Left 10/18/2022    EXCISION OF LEFT LOWER BACK LIPOMA X 1 performed by Moses Contreras MD at [facility] OR    [facility] AND BSO (CERVIX REMOVED)  08/15/2002    Endometriosis, fibroids    TONSILLECTOMY  1972    TUBAL LIGATION  1984    UPPER GASTROINTESTINAL ENDOSCOPY  10/17/2005    Bx small bowel, Gastric, GE junction all negative    UPPER GASTROINTESTINAL ENDOSCOPY  09/05/2000    Dr. Vazquez - NORMAL     Medications:  Current Outpatient Medications   Medication Sig Dispense Refill    promethazine-dextromethorphan (PROMETHAZINE-DM) 6.25-15 MG/5ML syrup Take 1.25 mLs by mouth 4 times daily as needed for Cough 118 mL 1    topiramate (TOPAMAX) 25 MG tablet Take 2 tablets by mouth 2 times daily 120 tablet 2    mirtazapine (REMERON) 15 MG tablet Take 1 tablet by mouth nightly 90 tablet 1    Glycerin, Laxative, (GLYCERIN PEDIATRIC) 1.2 g suppository Place 1 suppository rectally once as needed for Constipation 5 suppository 0    traZODone (DESYREL) 50 MG tablet Take 1 tablet by mouth nightly 30 tablet 1    ondansetron (ZOFRAN-ODT) 4 MG disintegrating tablet Take 1 tablet by mouth 3 times daily as needed for Nausea or Vomiting 6 tablet 0    Roflumilast (DALIRESP) 500 MCG tablet Take 1 tablet by mouth daily 90

## 2025-02-12 NOTE — ASSESSMENT & PLAN NOTE
Patient inconsistent with use of Elavil, we will go ahead and have her stop taking this medication as it can contribute to confusion as well as lightheadedness, dizziness, constipation.  Will switch to mirtazapine to help with appetite and also depression symptoms.  Will also place referral to Dr. Lane.    Orders:    mirtazapine (REMERON) 15 MG tablet; Take 1 tablet by mouth nightly    Ambulatory referral to Psychology

## 2025-02-17 ENCOUNTER — OFFICE VISIT (OUTPATIENT)
Dept: PSYCHOLOGY | Age: 70
End: 2025-02-17
Payer: MEDICARE

## 2025-02-17 DIAGNOSIS — F32.9 MAJOR DEPRESSIVE DISORDER WITH CURRENT ACTIVE EPISODE, UNSPECIFIED DEPRESSION EPISODE SEVERITY, UNSPECIFIED WHETHER RECURRENT: Primary | ICD-10-CM

## 2025-02-17 DIAGNOSIS — F41.1 GENERALIZED ANXIETY DISORDER: ICD-10-CM

## 2025-02-17 PROCEDURE — 1123F ACP DISCUSS/DSCN MKR DOCD: CPT | Performed by: STUDENT IN AN ORGANIZED HEALTH CARE EDUCATION/TRAINING PROGRAM

## 2025-02-17 PROCEDURE — 1036F TOBACCO NON-USER: CPT | Performed by: STUDENT IN AN ORGANIZED HEALTH CARE EDUCATION/TRAINING PROGRAM

## 2025-02-17 PROCEDURE — 90834 PSYTX W PT 45 MINUTES: CPT | Performed by: STUDENT IN AN ORGANIZED HEALTH CARE EDUCATION/TRAINING PROGRAM
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
## 2025-02-19 ENCOUNTER — TELEPHONE (OUTPATIENT)
Dept: PRIMARY CARE CLINIC | Age: 70
End: 2025-02-19

## 2025-02-19 NOTE — TELEPHONE ENCOUNTER
She says [facility] called her and said that the Dulera PA was not completed. That DR Perez denied this. She was upset. I told I didn't see any requests from [facility]ireza. [facility] should have a refill on it but it would be $400.00 if it has a PA it is $5.00. Please call her [phone]

## 2025-02-20 NOTE — TELEPHONE ENCOUNTER
Submitted PA for DULERA 100-5 MCG/ACT inhaler   Via CMM  (Key: [id number]) STATUS: PENDING.    Follow up done daily; if no decision with in three days we will refax.  If another three days goes by with no decision will call the insurance for status.

## 2025-02-21 NOTE — TELEPHONE ENCOUNTER
The medication is APPROVED THRU 12/31/25    If this requires a response please respond to the pool ( P MHCX PSC MEDICATION PRE-AUTH).      Thank you please advise patient.

## 2025-02-27 ENCOUNTER — OFFICE VISIT (OUTPATIENT)
Dept: PRIMARY CARE CLINIC | Age: 70
End: 2025-02-27

## 2025-02-27 VITALS
TEMPERATURE: 98.2 F | DIASTOLIC BLOOD PRESSURE: 81 MMHG | HEIGHT: 65 IN | WEIGHT: 138 LBS | BODY MASS INDEX: 22.99 KG/M2 | SYSTOLIC BLOOD PRESSURE: 132 MMHG | OXYGEN SATURATION: 95 % | HEART RATE: 97 BPM

## 2025-02-27 DIAGNOSIS — E55.9 VITAMIN D DEFICIENCY: ICD-10-CM

## 2025-02-27 DIAGNOSIS — E03.9 HYPOTHYROIDISM, UNSPECIFIED TYPE: Primary | ICD-10-CM

## 2025-02-27 DIAGNOSIS — R73.9 HYPERGLYCEMIA: ICD-10-CM

## 2025-02-27 DIAGNOSIS — R63.4 WEIGHT LOSS: ICD-10-CM

## 2025-02-27 DIAGNOSIS — Z00.00 ROUTINE ADULT HEALTH MAINTENANCE: ICD-10-CM

## 2025-02-27 DIAGNOSIS — R11.0 NAUSEA: ICD-10-CM

## 2025-02-27 DIAGNOSIS — J44.9 CHRONIC OBSTRUCTIVE PULMONARY DISEASE, UNSPECIFIED COPD TYPE (HCC): ICD-10-CM

## 2025-02-27 PROCEDURE — 36415 COLL VENOUS BLD VENIPUNCTURE: CPT | Performed by: STUDENT IN AN ORGANIZED HEALTH CARE EDUCATION/TRAINING PROGRAM

## 2025-02-27 RX ORDER — PROMETHAZINE HYDROCHLORIDE 6.25 MG/5ML
12.5 SYRUP ORAL 4 TIMES DAILY PRN
Qty: 473 ML | Refills: 1 | Status: SHIPPED | OUTPATIENT
Start: 2025-02-27

## 2025-02-27 RX ORDER — LEVOTHYROXINE SODIUM 100 UG/1
100 TABLET ORAL DAILY
Qty: 30 TABLET | Refills: 5 | Status: SHIPPED | OUTPATIENT
Start: 2025-02-27

## 2025-02-27 NOTE — PROGRESS NOTES
10/7/2013    History of cholecystectomy 11/13/2016    Hyperlipidemia     Hypertension     Hypothyroidism     Lipoma of lower back 10/18/2022    Lipoma of upper arm 10/12/2022    Partial small bowel obstruction (HCC) 2/11/2014    Rotator cuff tear     right    Wears glasses      Past Surgical History:  Past Surgical History:   Procedure Laterality Date    ABDOMEN SURGERY N/A 10/11/2022    EXCISION OF EPIGASTRIC LIPOMA X 2 performed by Moses Contreras MD at [facility] OR    ARM SURGERY Right 10/11/2022    EXCISION OF RIGHT UPPER EXTREMITY LIPOMA X 5 performed by Moses Contreras MD at [facility] OR    ARM SURGERY Left 10/18/2022    EXCISION OF LEFT UPPER EXTREMITY LIPOMAS X 5 performed by Moses Contreras MD at [facility] OR    CARPAL TUNNEL RELEASE Left     CENTRAL LINE  10/07/2013         CHOLECYSTECTOMY      COLONOSCOPY  09/06/2011    Tubular adenoma    COLONOSCOPY  10/24/2005    Dr. CASSIDY - Tubular adenoma    CYST REMOVAL Left     thumb    ENDOSCOPY, COLON, DIAGNOSTIC      ESOPHAGEAL MOTILITY STUDY  06/17/2013    NORMAL    EYE SURGERY Bilateral     cataracts    FINE NEEDLE ASPIRATION  08/10/2012    THYROID - NEGATIVE    FINGER NAIL SURGERY Bilateral 05/21/2019    TOTAL AVULSION OF 1-5 TOENAILS BILATERAL FEET performed by Chet Smith DPM at [facility] OR    HEMORRHOID SURGERY  1990    HERNIA REPAIR Right 1976    inguinal hernia    HYSTERECTOMY (CERVIX STATUS UNKNOWN)  2002    OVARIAN CYST SURGERY Left     1976 and 1977(left side) and 1980 Right side    ROTATOR CUFF REPAIR Left 2021    SKIN LESION EXCISION Left 10/18/2022    EXCISION OF LEFT LOWER BACK LIPOMA X 1 performed by Moses Contreras MD at [facility] OR    [facility] AND BSO (CERVIX REMOVED)  08/15/2002    Endometriosis, fibroids    TONSILLECTOMY  1972    TUBAL LIGATION  1984    UPPER GASTROINTESTINAL ENDOSCOPY  10/17/2005    Bx small bowel, Gastric, GE junction all negative    UPPER GASTROINTESTINAL ENDOSCOPY  09/05/2000    Dr. Vazquez - TSERING

## 2025-02-27 NOTE — ASSESSMENT & PLAN NOTE
Patient currently on levothyroxine 125 mcg, recheck thyroid levels TSH is significantly suppressed while she was on on her previous dose.  Will decrease to 100 mcg for now and recheck labs in 6 weeks if not able to get into endocrinology sooner.      Orders:    Non [facility] - External Referral to Endocrinology    levothyroxine (SYNTHROID) 100 MCG tablet; Take 1 tablet by mouth daily    TSH reflex to FT4, FT3

## 2025-02-28 LAB
25(OH)D3 SERPL-MCNC: 25.7 NG/ML
CHOLEST SERPL-MCNC: 178 MG/DL (ref 0–199)
FOLATE SERPL-MCNC: 11.2 NG/ML (ref 4.78–24.2)
HDLC SERPL-MCNC: 66 MG/DL (ref 40–60)
LDLC SERPL CALC-MCNC: 91 MG/DL
T3FREE SERPL-MCNC: 3.4 PG/ML (ref 2.3–4.2)
T4 FREE SERPL-MCNC: 1.5 NG/DL (ref 0.9–1.8)
TRIGL SERPL-MCNC: 103 MG/DL (ref 0–150)
TSH SERPL DL<=0.005 MIU/L-ACNC: <0.01 UIU/ML (ref 0.27–4.2)
VIT B12 SERPL-MCNC: 485 PG/ML (ref 211–911)
VLDLC SERPL CALC-MCNC: 21 MG/DL

## 2025-03-05 DIAGNOSIS — R73.9 HYPERGLYCEMIA: ICD-10-CM

## 2025-03-05 NOTE — TELEPHONE ENCOUNTER
Medication:   Requested Prescriptions     Pending Prescriptions Disp Refills    blood glucose monitor kit and supplies 1 kit 0     Sig: Dispense sufficient amount for indicated testing frequency plus additional to accommodate PRN testing needs. Dispense all needed supplies to include: monitor, strips, lancing device, lancets, control solutions, alcohol swabs.    Test 2 times daily        Last Filled:  [unfilled]    Patient Phone Number: [phone] (home)     Last appt: 2/27/2025   Next appt: Visit date not found    Last OARRS:       1/30/2017     3:24 PM   RX Monitoring   Attestation The Prescription Monitoring Report for this patient was reviewed today.

## 2025-03-10 ENCOUNTER — TELEPHONE (OUTPATIENT)
Dept: PRIMARY CARE CLINIC | Age: 70
End: 2025-03-10

## 2025-03-10 DIAGNOSIS — J44.9 MODERATE COPD (CHRONIC OBSTRUCTIVE PULMONARY DISEASE) (HCC): Primary | Chronic | ICD-10-CM

## 2025-03-10 RX ORDER — BUDESONIDE AND FORMOTEROL FUMARATE DIHYDRATE 160; 4.5 UG/1; UG/1
2 AEROSOL RESPIRATORY (INHALATION) 2 TIMES DAILY
Qty: 10.2 G | Refills: 12 | Status: SHIPPED | OUTPATIENT
Start: 2025-03-10

## 2025-03-11 ENCOUNTER — RESULTS FOLLOW-UP (OUTPATIENT)
Dept: PRIMARY CARE CLINIC | Age: 70
End: 2025-03-11

## 2025-03-19 ENCOUNTER — RESULTS FOLLOW-UP (OUTPATIENT)
Dept: WOMENS IMAGING | Age: 70
End: 2025-03-19

## 2025-03-19 ENCOUNTER — HOSPITAL ENCOUNTER (OUTPATIENT)
Dept: WOMENS IMAGING | Age: 70
Discharge: HOME | End: 2025-03-19
Payer: MEDICARE

## 2025-03-19 DIAGNOSIS — Z78.0 ENCOUNTER FOR OSTEOPOROSIS SCREENING IN ASYMPTOMATIC POSTMENOPAUSAL PATIENT: ICD-10-CM

## 2025-03-19 DIAGNOSIS — Z13.820 ENCOUNTER FOR OSTEOPOROSIS SCREENING IN ASYMPTOMATIC POSTMENOPAUSAL PATIENT: ICD-10-CM

## 2025-03-19 PROCEDURE — 77080 DXA BONE DENSITY AXIAL: CPT

## 2025-04-02 DIAGNOSIS — R11.0 NAUSEA: ICD-10-CM

## 2025-04-02 DIAGNOSIS — J44.9 CHRONIC OBSTRUCTIVE PULMONARY DISEASE, UNSPECIFIED COPD TYPE (HCC): ICD-10-CM

## 2025-04-03 RX ORDER — ONDANSETRON 4 MG/1
4 TABLET, FILM COATED ORAL 3 TIMES DAILY PRN
Qty: 30 TABLET | Refills: 2 | OUTPATIENT
Start: 2025-04-03

## 2025-04-04 RX ORDER — PROMETHAZINE HYDROCHLORIDE 6.25 MG/5ML
12.5 SYRUP ORAL 4 TIMES DAILY PRN
Qty: 473 ML | Refills: 1 | Status: SHIPPED | OUTPATIENT
Start: 2025-04-04

## 2025-04-04 RX ORDER — ROFLUMILAST 500 UG/1
500 TABLET ORAL DAILY
Qty: 90 TABLET | Refills: 1 | Status: SHIPPED | OUTPATIENT
Start: 2025-04-04

## 2025-04-10 ENCOUNTER — TRANSCRIBE ORDERS (OUTPATIENT)
Dept: ADMINISTRATIVE | Age: 70
End: 2025-04-10

## 2025-04-10 DIAGNOSIS — N64.4 BREAST PAIN: Primary | ICD-10-CM

## 2025-04-13 SDOH — HEALTH STABILITY: PHYSICAL HEALTH: ON AVERAGE, HOW MANY DAYS PER WEEK DO YOU ENGAGE IN MODERATE TO STRENUOUS EXERCISE (LIKE A BRISK WALK)?: 2 DAYS

## 2025-04-13 SDOH — HEALTH STABILITY: PHYSICAL HEALTH: ON AVERAGE, HOW MANY MINUTES DO YOU ENGAGE IN EXERCISE AT THIS LEVEL?: 60 MIN

## 2025-04-13 ASSESSMENT — PATIENT HEALTH QUESTIONNAIRE - PHQ9
SUM OF ALL RESPONSES TO PHQ QUESTIONS 1-9: 2
1. LITTLE INTEREST OR PLEASURE IN DOING THINGS: SEVERAL DAYS
2. FEELING DOWN, DEPRESSED OR HOPELESS: SEVERAL DAYS
SUM OF ALL RESPONSES TO PHQ QUESTIONS 1-9: 2

## 2025-04-13 ASSESSMENT — LIFESTYLE VARIABLES
HOW OFTEN DO YOU HAVE A DRINK CONTAINING ALCOHOL: 1
HOW MANY STANDARD DRINKS CONTAINING ALCOHOL DO YOU HAVE ON A TYPICAL DAY: 0
HOW OFTEN DO YOU HAVE SIX OR MORE DRINKS ON ONE OCCASION: 1
HOW MANY STANDARD DRINKS CONTAINING ALCOHOL DO YOU HAVE ON A TYPICAL DAY: PATIENT DOES NOT DRINK
HOW OFTEN DO YOU HAVE A DRINK CONTAINING ALCOHOL: NEVER

## 2025-04-14 ENCOUNTER — OFFICE VISIT (OUTPATIENT)
Dept: PRIMARY CARE CLINIC | Age: 70
End: 2025-04-14

## 2025-04-14 VITALS
TEMPERATURE: 98.4 F | DIASTOLIC BLOOD PRESSURE: 88 MMHG | HEIGHT: 65 IN | OXYGEN SATURATION: 96 % | HEART RATE: 87 BPM | SYSTOLIC BLOOD PRESSURE: 152 MMHG | BODY MASS INDEX: 24.16 KG/M2 | WEIGHT: 145 LBS

## 2025-04-14 DIAGNOSIS — I10 ESSENTIAL HYPERTENSION: Chronic | ICD-10-CM

## 2025-04-14 DIAGNOSIS — Z00.00 MEDICARE ANNUAL WELLNESS VISIT, SUBSEQUENT: Primary | ICD-10-CM

## 2025-04-14 DIAGNOSIS — M51.362 DEGENERATION OF INTERVERTEBRAL DISC OF LUMBAR REGION WITH DISCOGENIC BACK PAIN AND LOWER EXTREMITY PAIN: ICD-10-CM

## 2025-04-14 DIAGNOSIS — R10.9 ABDOMINAL PAIN, UNSPECIFIED ABDOMINAL LOCATION: ICD-10-CM

## 2025-04-14 DIAGNOSIS — F51.01 PRIMARY INSOMNIA: ICD-10-CM

## 2025-04-14 RX ORDER — QUETIAPINE FUMARATE 100 MG/1
100 TABLET, FILM COATED ORAL NIGHTLY
Qty: 60 TABLET | Refills: 5 | Status: SHIPPED | OUTPATIENT
Start: 2025-04-14

## 2025-04-14 RX ORDER — OMEPRAZOLE 40 MG/1
40 CAPSULE, DELAYED RELEASE ORAL DAILY
Qty: 30 CAPSULE | Refills: 5 | Status: SHIPPED | OUTPATIENT
Start: 2025-04-14

## 2025-04-14 RX ORDER — QUETIAPINE FUMARATE 100 MG/1
100 TABLET, FILM COATED ORAL NIGHTLY
COMMUNITY
End: 2025-04-14 | Stop reason: SDUPTHER

## 2025-04-14 RX ORDER — OMEPRAZOLE 40 MG/1
40 CAPSULE, DELAYED RELEASE ORAL DAILY
COMMUNITY
End: 2025-04-14 | Stop reason: SDUPTHER

## 2025-04-14 NOTE — PROGRESS NOTES
Medicare Annual Wellness Visit    Fernando Reyes is here for Medicare AWV and Diabetes (check)    Assessment & Plan   Medicare annual wellness visit, subsequent  Essential hypertension  Blood pressure elevated in office today however patient states that her blood pressures are usually never this high when she is at home.  Patient no longer on the hydrochlorothiazide.  Will have her monitor her blood pressures for the next 1 to 2 weeks and come back with blood pressure log.  Patient understands that if blood pressures remain elevated above goal that we will likely have to restart a different medication.    Degeneration of intervertebral disc of lumbar region with discogenic back pain and lower extremity pain  -     Lift Chair MISC; Starting Mon 4/14/2025, Disp-1 each, R-0, Print  Primary insomnia  Restarted  -     QUEtiapine (SEROQUEL) 100 MG tablet; Take 1 tablet by mouth nightly 1 or 2 at night, Disp-60 tablet, R-5Normal  Abdominal pain, unspecified abdominal location  Patient states that she still having random episodes of nausea/vomiting, etiology still unknown however could be due to patient's hypercalcemia.  Workup is still underway by nephrology as well as endocrinology.  Refilled  -     omeprazole (PRILOSEC) 40 MG delayed release capsule; Take 1 capsule by mouth daily, Disp-30 capsule, R-5Normal       Return in about 2 weeks (around 4/28/2025) for Hypertension follow up.     Subjective     Patient following with specialists for further workup of her thyroid and calcium levels being elevated.  Patient due to follow-up with endocrinology and nephrology within the next month.  Patient compliant with taking all of her medications.  She does feel as if she is having more difficulty getting back to sleep, she did request to restart Seroquel today.    Patient's complete Health Risk Assessment and screening values have been reviewed and are found in Flowsheets. The following problems were reviewed today and where

## 2025-04-19 DIAGNOSIS — J44.9 MODERATE COPD (CHRONIC OBSTRUCTIVE PULMONARY DISEASE) (HCC): Chronic | ICD-10-CM

## 2025-04-21 RX ORDER — MOMETASONE FUROATE AND FORMOTEROL FUMARATE DIHYDRATE 100; 5 UG/1; UG/1
2 AEROSOL RESPIRATORY (INHALATION) 2 TIMES DAILY
Qty: 26 G | Refills: 0 | Status: SHIPPED | OUTPATIENT
Start: 2025-04-21

## 2025-04-21 NOTE — TELEPHONE ENCOUNTER
Medication:   Requested Prescriptions     Pending Prescriptions Disp Refills    DULERA 100-5 MCG/ACT inhaler [Pharmacy Med Name: Dulera 100-5 MCG/ACT Inhalation Aerosol] 26 g 0     Sig: Inhale 2 puffs by mouth twice daily        Last Filled:      Patient Phone Number: [phone] (home)     Last appt: 4/14/2025   Next appt: Visit date not found    Last OARRS:       1/30/2017     3:24 PM   RX Monitoring   Attestation The Prescription Monitoring Report for this patient was reviewed today.

## 2025-04-24 ENCOUNTER — HOSPITAL ENCOUNTER (OUTPATIENT)
Dept: ULTRASOUND IMAGING | Age: 70
Discharge: HOME | End: 2025-04-24
Payer: MEDICARE

## 2025-04-24 ENCOUNTER — RESULTS FOLLOW-UP (OUTPATIENT)
Dept: PRIMARY CARE CLINIC | Age: 70
End: 2025-04-24

## 2025-04-24 ENCOUNTER — HOSPITAL ENCOUNTER (OUTPATIENT)
Dept: WOMENS IMAGING | Age: 70
Discharge: HOME | End: 2025-04-24
Payer: MEDICARE

## 2025-04-24 VITALS — HEIGHT: 65 IN | BODY MASS INDEX: 24.49 KG/M2 | WEIGHT: 147 LBS

## 2025-04-24 DIAGNOSIS — R92.8 ABNORMAL MAMMOGRAM: ICD-10-CM

## 2025-04-24 DIAGNOSIS — N64.4 BREAST PAIN: ICD-10-CM

## 2025-04-24 PROCEDURE — 76642 ULTRASOUND BREAST LIMITED: CPT

## 2025-04-24 PROCEDURE — G0279 TOMOSYNTHESIS, MAMMO: HCPCS

## 2025-05-01 ENCOUNTER — RESULTS FOLLOW-UP (OUTPATIENT)
Dept: ENDOCRINOLOGY | Age: 70
End: 2025-05-01

## 2025-05-03 DIAGNOSIS — G43.111 MIGRAINE WITH AURA, WITH INTRACTABLE MIGRAINE, SO STATED, WITH STATUS MIGRAINOSUS: ICD-10-CM

## 2025-05-05 RX ORDER — TOPIRAMATE 25 MG/1
50 TABLET, FILM COATED ORAL 2 TIMES DAILY
Qty: 360 TABLET | Refills: 0 | Status: SHIPPED | OUTPATIENT
Start: 2025-05-05

## 2025-05-05 NOTE — TELEPHONE ENCOUNTER
Medication:   Requested Prescriptions     Pending Prescriptions Disp Refills    topiramate (TOPAMAX) 25 MG tablet [Pharmacy Med Name: Topiramate 25 MG Oral Tablet] 360 tablet 0     Sig: Take 2 tablets by mouth twice daily        Last Filled:  [unfilled]    Patient Phone Number: [phone] (home)     Last appt: 4/14/2025   Next appt: Visit date not found    Last OARRS:       1/30/2017     3:24 PM   RX Monitoring   Attestation The Prescription Monitoring Report for this patient was reviewed today.

## 2025-05-08 ENCOUNTER — OFFICE VISIT (OUTPATIENT)
Dept: PRIMARY CARE CLINIC | Age: 70
End: 2025-05-08

## 2025-05-08 VITALS
DIASTOLIC BLOOD PRESSURE: 88 MMHG | WEIGHT: 147 LBS | SYSTOLIC BLOOD PRESSURE: 137 MMHG | TEMPERATURE: 98.2 F | HEIGHT: 65 IN | OXYGEN SATURATION: 92 % | BODY MASS INDEX: 24.49 KG/M2 | HEART RATE: 87 BPM

## 2025-05-08 DIAGNOSIS — T50.995S ALLERGIC REACTION TO DYE, SEQUELA: ICD-10-CM

## 2025-05-08 DIAGNOSIS — M85.80 OSTEOPENIA, UNSPECIFIED LOCATION: ICD-10-CM

## 2025-05-08 DIAGNOSIS — E03.9 ACQUIRED HYPOTHYROIDISM: Primary | ICD-10-CM

## 2025-05-08 LAB — TSH SERPL DL<=0.005 MIU/L-ACNC: 1.1 UIU/ML (ref 0.27–4.2)

## 2025-05-08 RX ORDER — ERGOCALCIFEROL 1.25 MG/1
50000 CAPSULE, LIQUID FILLED ORAL WEEKLY
COMMUNITY

## 2025-05-08 RX ORDER — OXYCODONE AND ACETAMINOPHEN 10; 325 MG/1; MG/1
1 TABLET ORAL EVERY 6 HOURS
COMMUNITY
Start: 2025-04-29

## 2025-05-08 RX ORDER — CYCLOBENZAPRINE HCL 10 MG
10 TABLET ORAL 3 TIMES DAILY
COMMUNITY
Start: 2025-04-29

## 2025-05-08 NOTE — ASSESSMENT & PLAN NOTE
Currently on Levo 100 mg, will check thyroid level since decreasing to this dose at last visit    Orders:    TSH reflex to FT4, FT3

## 2025-05-08 NOTE — PROGRESS NOTES
Office Note  2025  Patient Name: Fernando Reyes  MRN: [id number] : 1955    SUBJECTIVE:     CHIEF COMPLAINT:  Chief Complaint   Patient presents with    Referral - General     New endocrinologist    Thyroid Problem       HISTORY OF PRESENT ILLNESS:  She presents today with the following concerns:    Thyroid issue:  Patient states that due to miscommunication, patient was not able to be seen at her last endocrinology appt  Desires to get thyroid checked again and desires new endocrinologist    Allergy:  Patient states that she believes all of her symptoms started from being exposed to red dye  Patient desires to get more information about this         Past Medical History:  Past Medical History:   Diagnosis Date    CAMDEN (acute kidney injury) 10/07/2013    Anxiety     Chronic abdominal pain     possibly due to diverticulosos    COPD (chronic obstructive pulmonary disease) (HCC)     COPD with acute exacerbation (HCC) 2022    DDD (degenerative disc disease), lumbar 2016    Elevated glycated hemoglobin     High anion gap metabolic acidosis 10/7/2013    History of cholecystectomy 2016    Hyperlipidemia     Hypertension     Hypothyroidism     Lipoma of lower back 10/18/2022    Lipoma of upper arm 10/12/2022    Partial small bowel obstruction (HCC) 2014    Rotator cuff tear     right    Wears glasses      Past Surgical History:  Past Surgical History:   Procedure Laterality Date    ABDOMEN SURGERY N/A 10/11/2022    EXCISION OF EPIGASTRIC LIPOMA X 2 performed by Moses Contreras MD at [facility] OR    ARM SURGERY Right 10/11/2022    EXCISION OF RIGHT UPPER EXTREMITY LIPOMA X 5 performed by Moses Contreras MD at [facility] OR    ARM SURGERY Left 10/18/2022    EXCISION OF LEFT UPPER EXTREMITY LIPOMAS X 5 performed by Moses Contreras MD at [facility] OR    CARPAL TUNNEL RELEASE Left     CENTRAL LINE  10/07/2013         CHOLECYSTECTOMY      COLONOSCOPY  2011    Tubular adenoma

## 2025-05-09 ENCOUNTER — TELEPHONE (OUTPATIENT)
Dept: PULMONOLOGY | Age: 70
End: 2025-05-09

## 2025-05-09 DIAGNOSIS — G56.00 CARPAL TUNNEL SYNDROME, UNSPECIFIED UPPER LIMB: ICD-10-CM

## 2025-05-09 DIAGNOSIS — R11.0 NAUSEA: ICD-10-CM

## 2025-05-09 DIAGNOSIS — G43.111 MIGRAINE WITH AURA, INTRACTABLE, WITH STATUS MIGRAINOSUS: ICD-10-CM

## 2025-05-09 NOTE — TELEPHONE ENCOUNTER
She saw you back in 2014 for hospital follow up  Last seen by Dr Sharp in 2022   Wants to re-est for COPD   Do you want her to have a CT scan before she sees you in July?

## 2025-05-12 RX ORDER — PROMETHAZINE HYDROCHLORIDE 6.25 MG/5ML
SYRUP ORAL
Qty: 473 ML | Refills: 2 | OUTPATIENT
Start: 2025-05-12

## 2025-05-12 NOTE — TELEPHONE ENCOUNTER
Medication:   Requested Prescriptions     Pending Prescriptions Disp Refills    amitriptyline (ELAVIL) 25 MG tablet [Pharmacy Med Name: AMITRIPTYLINE HCL 25 MG TAB] 180 tablet 1     Sig: TAKE 1 TABLET BY MOUTH TWICE A DAY        Last Filled:  [unfilled]    Patient Phone Number: [phone] (home)     Last appt: 5/8/2025   Next appt: 6/19/2025    Last OARRS:       1/30/2017     3:24 PM   RX Monitoring   Attestation The Prescription Monitoring Report for this patient was reviewed today.

## 2025-05-13 ENCOUNTER — TELEPHONE (OUTPATIENT)
Dept: PRIMARY CARE CLINIC | Age: 70
End: 2025-05-13

## 2025-05-13 DIAGNOSIS — M51.362 DEGENERATION OF INTERVERTEBRAL DISC OF LUMBAR REGION WITH DISCOGENIC BACK PAIN AND LOWER EXTREMITY PAIN: Primary | ICD-10-CM

## 2025-05-13 DIAGNOSIS — Z91.81 HX OF FALL: ICD-10-CM

## 2025-05-13 DIAGNOSIS — M25.552 ACUTE PAIN OF LEFT HIP: ICD-10-CM

## 2025-05-13 NOTE — TELEPHONE ENCOUNTER
Katrina mahajan johnson () called requesting that prescription for a shower chair be faxed    Phone [phone]  Fax [phone]

## 2025-05-14 ENCOUNTER — TELEPHONE (OUTPATIENT)
Dept: PRIMARY CARE CLINIC | Age: 70
End: 2025-05-14

## 2025-05-14 DIAGNOSIS — F33.9 EPISODE OF RECURRENT MAJOR DEPRESSIVE DISORDER, UNSPECIFIED DEPRESSION EPISODE SEVERITY: ICD-10-CM

## 2025-05-14 DIAGNOSIS — M25.552 ACUTE PAIN OF LEFT HIP: ICD-10-CM

## 2025-05-14 DIAGNOSIS — R63.4 WEIGHT LOSS: ICD-10-CM

## 2025-05-14 DIAGNOSIS — M51.362 DEGENERATION OF INTERVERTEBRAL DISC OF LUMBAR REGION WITH DISCOGENIC BACK PAIN AND LOWER EXTREMITY PAIN: ICD-10-CM

## 2025-05-14 DIAGNOSIS — Z91.81 HX OF FALL: ICD-10-CM

## 2025-05-22 ENCOUNTER — TELEPHONE (OUTPATIENT)
Dept: ADMINISTRATIVE | Age: 70
End: 2025-05-22

## 2025-05-22 NOTE — TELEPHONE ENCOUNTER
Submitted PA for Topiramate 25MG tablets  Via FAX STATUS: PENDING    Follow up done daily; if no decision with in three days we will refax.  If another three days goes by with no decision will call the insurance for status.

## 2025-05-23 NOTE — TELEPHONE ENCOUNTER
Fax rec'd from [employer]. Please answer and submit back to PA pool to complete and send to insurance.     Thank you.

## 2025-05-27 NOTE — TELEPHONE ENCOUNTER
The medication topiramate (TOPAMAX) 25 MG tablet  is APPROVED from 05/22/25 to 12/31/25.    Please notify the patient.    If this requires a response please respond to the pool ( P MHCX PSC MEDICATION PRE-AUTH).

## 2025-06-16 DIAGNOSIS — R63.4 WEIGHT LOSS: ICD-10-CM

## 2025-06-16 DIAGNOSIS — F33.9 EPISODE OF RECURRENT MAJOR DEPRESSIVE DISORDER, UNSPECIFIED DEPRESSION EPISODE SEVERITY: ICD-10-CM

## 2025-06-16 RX ORDER — MIRTAZAPINE 15 MG/1
15 TABLET, FILM COATED ORAL NIGHTLY
Qty: 90 TABLET | Refills: 0 | Status: SHIPPED | OUTPATIENT
Start: 2025-06-16

## 2025-06-16 NOTE — TELEPHONE ENCOUNTER
Medication:   Requested Prescriptions     Pending Prescriptions Disp Refills    mirtazapine (REMERON) 15 MG tablet [Pharmacy Med Name: Mirtazapine 15 MG Oral Tablet] 90 tablet 0     Sig: Take 1 tablet by mouth nightly        Last Filled:      Patient Phone Number: [phone] (home)     Last appt: 5/8/2025   Next appt: 6/19/2025    Last OARRS:       1/30/2017     3:24 PM   RX Monitoring   Attestation The Prescription Monitoring Report for this patient was reviewed today.

## 2025-06-19 ENCOUNTER — TELEPHONE (OUTPATIENT)
Dept: PRIMARY CARE CLINIC | Age: 70
End: 2025-06-19

## 2025-06-19 DIAGNOSIS — E03.9 HYPOTHYROIDISM, UNSPECIFIED TYPE: Primary | ICD-10-CM

## 2025-06-19 NOTE — TELEPHONE ENCOUNTER
----- Message from Jason OROZCO sent at 6/19/2025 12:28 PM EDT -----  Regarding: [facility] Appointment Request  [facility] Appointment Request    Patient needs appointment for [facility] Appointment Type: New to Provider.    Patient Requested Dates(s):as soon as possible  Patient Requested Time:morning  Provider Name:Carol Gurrola MD      Reason for Appointment Request: Other new to provider  --------------------------------------------------------------------------------------------------------------------------    Relationship to Patient: Self     Call Back Information: OK to leave message on voicemail  Preferred Call Back Number: Phone [phone]      Patient really want Carol Gurrola MD to be her PCP

## 2025-06-19 NOTE — TELEPHONE ENCOUNTER
Patient called requesting referral to [facility] [place] checking to see if she has thyroid eye disease       [address]     [phone]

## 2025-06-23 DIAGNOSIS — J44.9 MODERATE COPD (CHRONIC OBSTRUCTIVE PULMONARY DISEASE) (HCC): Chronic | ICD-10-CM

## 2025-06-23 RX ORDER — MOMETASONE FUROATE AND FORMOTEROL FUMARATE DIHYDRATE 100; 5 UG/1; UG/1
2 AEROSOL RESPIRATORY (INHALATION) 2 TIMES DAILY
Qty: 26 G | Refills: 0 | Status: SHIPPED | OUTPATIENT
Start: 2025-06-23

## 2025-06-23 NOTE — TELEPHONE ENCOUNTER
Medication:   Requested Prescriptions     Pending Prescriptions Disp Refills    DULERA 100-5 MCG/ACT inhaler [Pharmacy Med Name: Dulera 100-5 MCG/ACT Inhalation Aerosol] 26 g 0     Sig: Inhale 2 puffs by mouth twice daily        Last Filled:      Patient Phone Number: [phone] (home)     Last appt: 5/8/2025   Next appt: Visit date not found    Last OARRS:       1/30/2017     3:24 PM   RX Monitoring   Attestation The Prescription Monitoring Report for this patient was reviewed today.

## 2025-07-26 DIAGNOSIS — R63.4 WEIGHT LOSS: ICD-10-CM

## 2025-07-26 DIAGNOSIS — F33.9 EPISODE OF RECURRENT MAJOR DEPRESSIVE DISORDER, UNSPECIFIED DEPRESSION EPISODE SEVERITY: ICD-10-CM

## 2025-07-28 RX ORDER — MIRTAZAPINE 15 MG/1
15 TABLET, FILM COATED ORAL NIGHTLY
Qty: 90 TABLET | Refills: 1 | Status: SHIPPED | OUTPATIENT
Start: 2025-07-28

## 2025-08-20 DIAGNOSIS — G43.111 MIGRAINE WITH AURA, WITH INTRACTABLE MIGRAINE, SO STATED, WITH STATUS MIGRAINOSUS: ICD-10-CM

## 2025-08-21 RX ORDER — TOPIRAMATE 25 MG/1
50 TABLET, FILM COATED ORAL 2 TIMES DAILY
Qty: 360 TABLET | Refills: 0 | Status: SHIPPED | OUTPATIENT
Start: 2025-08-21

## 2025-08-24 DIAGNOSIS — J44.9 MODERATE COPD (CHRONIC OBSTRUCTIVE PULMONARY DISEASE) (HCC): Chronic | ICD-10-CM

## 2025-08-25 RX ORDER — MOMETASONE FUROATE AND FORMOTEROL FUMARATE DIHYDRATE 100; 5 UG/1; UG/1
2 AEROSOL RESPIRATORY (INHALATION) 2 TIMES DAILY
Qty: 26 G | Refills: 0 | Status: SHIPPED | OUTPATIENT
Start: 2025-08-25

## 2025-09-03 ENCOUNTER — TELEPHONE (OUTPATIENT)
Dept: PRIMARY CARE CLINIC | Age: 70
End: 2025-09-03

## (undated) DEVICE — TUBING, SUCTION, 1/4" X 12', STRAIGHT: Brand: MEDLINE

## (undated) DEVICE — SPONGE GZ W4XL4IN COT 12 PLY TYP VII WVN C FLD DSGN

## (undated) DEVICE — E-Z CLEAN, NON-STICK, PTFE COATED, ELECTROSURGICAL BLADE ELECTRODE, 2.5 INCH (6.35 CM): Brand: EZ CLEAN

## (undated) DEVICE — CURITY STRETCH BANDAGE: Brand: CURITY

## (undated) DEVICE — MINOR SET UP: Brand: MEDLINE INDUSTRIES, INC.

## (undated) DEVICE — SYRINGE,EAR/ULCER, 2 OZ, STERILE: Brand: MEDLINE

## (undated) DEVICE — GLOVE SURG SZ 8 L12IN FNGR THK79MIL GRN LTX FREE

## (undated) DEVICE — CANISTER, RIGID, 1200CC: Brand: MEDLINE INDUSTRIES, INC.

## (undated) DEVICE — SHEET,DRAPE,53X77,STERILE: Brand: MEDLINE

## (undated) DEVICE — 6 ML SYRINGE LUER-LOCK TIP: Brand: MONOJECT

## (undated) DEVICE — TURNOVER KIT RM INF CTRL TECH

## (undated) DEVICE — ADHESIVE SKIN CLOSURE TOP 36 CC HI VISC DERMBND MINI

## (undated) DEVICE — BLADE OPHTH ORNG GRINDLESS SMALLER ALTERNATIVE TO NO15 GEN

## (undated) DEVICE — GOWN,SIRUS,POLYRNF,BRTHSLV,XL,30/CS: Brand: MEDLINE

## (undated) DEVICE — STANDARD HYPODERMIC NEEDLE,ALUMINUM HUB: Brand: MONOJECT

## (undated) DEVICE — SOLUTION IV IRRIG POUR BRL 0.9% SODIUM CHL 2F7124

## (undated) DEVICE — DRAPE,T,LIMB,BILATERAL,STERILE: Brand: MEDLINE

## (undated) DEVICE — GOWN,SIRUS,POLYRNF,BRTHSLV,LG,30/CS: Brand: MEDLINE

## (undated) DEVICE — DRAPE,T,LAPARO,TRANS,STERILE: Brand: MEDLINE

## (undated) DEVICE — CHLORAPREP 26ML ORANGE

## (undated) DEVICE — PLATE ES AD W 9FT CRD 2

## (undated) DEVICE — PODIATRY PK

## (undated) DEVICE — STRIP,CLOSURE,WOUND,MEDI-STRIP,1/2X4: Brand: MEDLINE

## (undated) DEVICE — INTENDED FOR TISSUE SEPARATION, AND OTHER PROCEDURES THAT REQUIRE A SHARP SURGICAL BLADE TO PUNCTURE OR CUT.: Brand: BARD-PARKER ® STAINLESS STEEL BLADES

## (undated) DEVICE — GLOVE ORANGE PI 7 1/2   MSG9075

## (undated) DEVICE — GOWN,AURORA,NONREINF,RAGLAN,XXL,STERILE: Brand: MEDLINE

## (undated) DEVICE — Z DISCONTINUED NO SUB IDED GLOVE SURG BEAD CUF 8 STD PF WHT STRL TRIUMPH LT LTX

## (undated) DEVICE — STANDARD HYPODERMIC NEEDLE,POLYPROPYLENE HUB: Brand: MONOJECT

## (undated) DEVICE — SOLUTION IV 1000ML 0.9% SOD CHL